# Patient Record
Sex: FEMALE | Race: BLACK OR AFRICAN AMERICAN | Employment: OTHER | URBAN - METROPOLITAN AREA
[De-identification: names, ages, dates, MRNs, and addresses within clinical notes are randomized per-mention and may not be internally consistent; named-entity substitution may affect disease eponyms.]

---

## 2017-01-10 ENCOUNTER — TELEPHONE (OUTPATIENT)
Dept: PRIMARY CARE CLINIC | Age: 62
End: 2017-01-10

## 2017-04-17 ENCOUNTER — OFFICE VISIT (OUTPATIENT)
Dept: PRIMARY CARE CLINIC | Age: 62
End: 2017-04-17

## 2017-04-17 VITALS
DIASTOLIC BLOOD PRESSURE: 83 MMHG | RESPIRATION RATE: 18 BRPM | BODY MASS INDEX: 34.38 KG/M2 | HEART RATE: 61 BPM | OXYGEN SATURATION: 97 % | SYSTOLIC BLOOD PRESSURE: 122 MMHG | WEIGHT: 213 LBS | TEMPERATURE: 98.8 F

## 2017-04-17 DIAGNOSIS — M79.10 MUSCLE PAIN: Primary | ICD-10-CM

## 2017-04-17 DIAGNOSIS — E53.8 VITAMIN B12 DEFICIENCY: ICD-10-CM

## 2017-04-17 DIAGNOSIS — I10 ESSENTIAL HYPERTENSION: ICD-10-CM

## 2017-04-17 DIAGNOSIS — E55.9 VITAMIN D DEFICIENCY: ICD-10-CM

## 2017-04-17 DIAGNOSIS — E78.2 MIXED HYPERLIPIDEMIA: ICD-10-CM

## 2017-04-17 DIAGNOSIS — I89.0 LYMPHEDEMA OF BOTH LOWER EXTREMITIES: ICD-10-CM

## 2017-04-17 DIAGNOSIS — M79.7 FIBROMYALGIA: ICD-10-CM

## 2017-04-17 PROCEDURE — G8419 CALC BMI OUT NRM PARAM NOF/U: HCPCS | Performed by: INTERNAL MEDICINE

## 2017-04-17 PROCEDURE — G8427 DOCREV CUR MEDS BY ELIG CLIN: HCPCS | Performed by: INTERNAL MEDICINE

## 2017-04-17 PROCEDURE — 99214 OFFICE O/P EST MOD 30 MIN: CPT | Performed by: INTERNAL MEDICINE

## 2017-04-17 PROCEDURE — 3014F SCREEN MAMMO DOC REV: CPT | Performed by: INTERNAL MEDICINE

## 2017-04-17 PROCEDURE — 3017F COLORECTAL CA SCREEN DOC REV: CPT | Performed by: INTERNAL MEDICINE

## 2017-04-17 PROCEDURE — 1036F TOBACCO NON-USER: CPT | Performed by: INTERNAL MEDICINE

## 2017-04-17 RX ORDER — SIMVASTATIN 10 MG
TABLET ORAL
Qty: 30 TABLET | Refills: 5 | Status: SHIPPED | OUTPATIENT
Start: 2017-04-17 | End: 2017-11-10 | Stop reason: SDUPTHER

## 2017-04-17 RX ORDER — SIMVASTATIN 10 MG
TABLET ORAL
Qty: 30 TABLET | Refills: 5 | Status: SHIPPED | OUTPATIENT
Start: 2017-04-17 | End: 2017-04-19

## 2017-04-19 ASSESSMENT — ENCOUNTER SYMPTOMS
RHINORRHEA: 0
BACK PAIN: 0
EYES NEGATIVE: 1
ABDOMINAL DISTENTION: 0
ANAL BLEEDING: 0
COUGH: 0
CONSTIPATION: 0
SORE THROAT: 0
DIARRHEA: 0
TROUBLE SWALLOWING: 0
SHORTNESS OF BREATH: 0
PHOTOPHOBIA: 0
RESPIRATORY NEGATIVE: 1
VOMITING: 0
BLOOD IN STOOL: 0

## 2017-04-19 ASSESSMENT — PATIENT HEALTH QUESTIONNAIRE - PHQ9
SUM OF ALL RESPONSES TO PHQ9 QUESTIONS 1 & 2: 0
1. LITTLE INTEREST OR PLEASURE IN DOING THINGS: 0
SUM OF ALL RESPONSES TO PHQ QUESTIONS 1-9: 0
2. FEELING DOWN, DEPRESSED OR HOPELESS: 0

## 2017-05-03 ENCOUNTER — OFFICE VISIT (OUTPATIENT)
Dept: PRIMARY CARE CLINIC | Age: 62
End: 2017-05-03

## 2017-05-03 VITALS
WEIGHT: 213 LBS | HEART RATE: 80 BPM | DIASTOLIC BLOOD PRESSURE: 73 MMHG | HEIGHT: 66 IN | BODY MASS INDEX: 34.23 KG/M2 | SYSTOLIC BLOOD PRESSURE: 107 MMHG | OXYGEN SATURATION: 97 %

## 2017-05-03 DIAGNOSIS — L03.113 CELLULITIS OF ARM, RIGHT: Primary | ICD-10-CM

## 2017-05-03 DIAGNOSIS — Z23 NEED FOR TDAP VACCINATION: ICD-10-CM

## 2017-05-03 PROCEDURE — G8427 DOCREV CUR MEDS BY ELIG CLIN: HCPCS | Performed by: INTERNAL MEDICINE

## 2017-05-03 PROCEDURE — G8417 CALC BMI ABV UP PARAM F/U: HCPCS | Performed by: INTERNAL MEDICINE

## 2017-05-03 PROCEDURE — 3014F SCREEN MAMMO DOC REV: CPT | Performed by: INTERNAL MEDICINE

## 2017-05-03 PROCEDURE — 1036F TOBACCO NON-USER: CPT | Performed by: INTERNAL MEDICINE

## 2017-05-03 PROCEDURE — 90715 TDAP VACCINE 7 YRS/> IM: CPT | Performed by: INTERNAL MEDICINE

## 2017-05-03 PROCEDURE — 3017F COLORECTAL CA SCREEN DOC REV: CPT | Performed by: INTERNAL MEDICINE

## 2017-05-03 PROCEDURE — 90471 IMMUNIZATION ADMIN: CPT | Performed by: INTERNAL MEDICINE

## 2017-05-03 PROCEDURE — 99212 OFFICE O/P EST SF 10 MIN: CPT | Performed by: INTERNAL MEDICINE

## 2017-05-03 RX ORDER — DOXYCYCLINE HYCLATE 100 MG
100 TABLET ORAL 2 TIMES DAILY
Qty: 14 TABLET | Refills: 0 | Status: SHIPPED | OUTPATIENT
Start: 2017-05-03 | End: 2017-05-10

## 2017-05-03 ASSESSMENT — ENCOUNTER SYMPTOMS: ROS SKIN COMMENTS: SEE HPI

## 2017-05-04 ENCOUNTER — TELEPHONE (OUTPATIENT)
Dept: PRIMARY CARE CLINIC | Age: 62
End: 2017-05-04

## 2017-06-12 DIAGNOSIS — E55.9 VITAMIN D DEFICIENCY: ICD-10-CM

## 2017-06-12 DIAGNOSIS — E78.2 MIXED HYPERLIPIDEMIA: ICD-10-CM

## 2017-06-12 DIAGNOSIS — E53.8 VITAMIN B12 DEFICIENCY: ICD-10-CM

## 2017-06-12 LAB
CHOLESTEROL, TOTAL: 217 MG/DL (ref 0–199)
HDLC SERPL-MCNC: 79 MG/DL (ref 40–60)
LDL CHOLESTEROL CALCULATED: 129 MG/DL
TRIGL SERPL-MCNC: 47 MG/DL (ref 0–150)
VITAMIN B-12: 322 PG/ML (ref 211–911)
VITAMIN D 25-HYDROXY: 44.5 NG/ML
VLDLC SERPL CALC-MCNC: 9 MG/DL

## 2017-07-03 RX ORDER — LINACLOTIDE 145 UG/1
CAPSULE, GELATIN COATED ORAL
Qty: 30 CAPSULE | Refills: 8 | Status: SHIPPED | OUTPATIENT
Start: 2017-07-03 | End: 2017-12-08 | Stop reason: SDUPTHER

## 2017-11-10 DIAGNOSIS — E78.2 MIXED HYPERLIPIDEMIA: ICD-10-CM

## 2017-11-11 RX ORDER — SIMVASTATIN 10 MG
TABLET ORAL
Qty: 30 TABLET | Refills: 5 | Status: SHIPPED | OUTPATIENT
Start: 2017-11-11 | End: 2018-05-25 | Stop reason: SDUPTHER

## 2017-12-08 ENCOUNTER — OFFICE VISIT (OUTPATIENT)
Dept: PRIMARY CARE CLINIC | Age: 62
End: 2017-12-08

## 2017-12-08 VITALS
RESPIRATION RATE: 18 BRPM | TEMPERATURE: 98.7 F | HEIGHT: 66 IN | DIASTOLIC BLOOD PRESSURE: 75 MMHG | OXYGEN SATURATION: 100 % | SYSTOLIC BLOOD PRESSURE: 116 MMHG | WEIGHT: 218 LBS | HEART RATE: 89 BPM | BODY MASS INDEX: 35.03 KG/M2

## 2017-12-08 DIAGNOSIS — K59.09 CHRONIC CONSTIPATION: ICD-10-CM

## 2017-12-08 DIAGNOSIS — Q82.0 HEREDITARY LYMPHEDEMA OF LEGS: ICD-10-CM

## 2017-12-08 DIAGNOSIS — Z12.11 COLON CANCER SCREENING: ICD-10-CM

## 2017-12-08 DIAGNOSIS — I10 ESSENTIAL HYPERTENSION: ICD-10-CM

## 2017-12-08 DIAGNOSIS — Z23 NEED FOR PROPHYLACTIC VACCINATION AND INOCULATION AGAINST VARICELLA: ICD-10-CM

## 2017-12-08 DIAGNOSIS — R00.0 RAPID PULSE: ICD-10-CM

## 2017-12-08 DIAGNOSIS — Z00.00 ANNUAL PHYSICAL EXAM: Primary | ICD-10-CM

## 2017-12-08 DIAGNOSIS — E55.9 VITAMIN D DEFICIENCY: ICD-10-CM

## 2017-12-08 PROCEDURE — 93000 ELECTROCARDIOGRAM COMPLETE: CPT | Performed by: INTERNAL MEDICINE

## 2017-12-08 PROCEDURE — 99396 PREV VISIT EST AGE 40-64: CPT | Performed by: INTERNAL MEDICINE

## 2017-12-08 ASSESSMENT — ENCOUNTER SYMPTOMS: ROS SKIN COMMENTS: SEE HPI

## 2017-12-08 ASSESSMENT — PATIENT HEALTH QUESTIONNAIRE - PHQ9
2. FEELING DOWN, DEPRESSED OR HOPELESS: 0
1. LITTLE INTEREST OR PLEASURE IN DOING THINGS: 0
SUM OF ALL RESPONSES TO PHQ9 QUESTIONS 1 & 2: 0
SUM OF ALL RESPONSES TO PHQ QUESTIONS 1-9: 0

## 2017-12-08 NOTE — PROGRESS NOTES
Subjective:      Patient ID: Betty Navas is a 65 y.o. female. HPI  Patient presents for annual exam:  Past Medical History:   Diagnosis Date    Allergic rhinitis     Chronic back pain     Fibroids     Fibromyalgia     Fibromyalgia     Hyperlipidemia 6/25/2012    Hypertension     STEPHANIE (obstructive sleep apnea)     Sleep apnea      Past Surgical History:   Procedure Laterality Date    ACHILLES TENDON SURGERY  2007    left achilles repair by Dr. Marlene Rodney cervical surgery-- sees Dr Lan Shetty [facility]  2006    Dr Bonita Lima and 8/2012    ENDOMETRIAL BIOPSY      Dr Moraima Pitts     Social History   Substance Use Topics    Smoking status: Former Smoker     Packs/day: 1.00     Years: 3.00     Quit date: 5/23/1982    Smokeless tobacco: Never Used    Alcohol use No     Current Outpatient Prescriptions   Medication Sig Dispense Refill    linaclotide (LINZESS) 145 MCG capsule TAKE ONE CAPSULE BY MOUTH DAILY 30 capsule 8    simvastatin (ZOCOR) 10 MG tablet TAKE ONE TABLET BY MOUTH EVERY NIGHT AT BEDTIME 30 tablet 5    Elastic Bandages & Supports (MEDICAL COMPRESSION STOCKINGS) MISC 2 each by Does not apply route daily Thigh high 30-40 mm Hg, 2 each 3     No current facility-administered medications for this visit.       Family History   Problem Relation Age of Onset    Cancer Mother      colon    Heart Disease Mother      heart infection    High Blood Pressure Father     Kidney Disease Paternal Uncle     Cancer Paternal Uncle 47     colon cancer    High Cholesterol Sister     Diabetes Maternal Aunt     Cancer Maternal Grandmother      breast cancer    Stroke Maternal Grandmother     Cancer Paternal Grandmother     Diabetes Paternal Grandmother      Allergies   Allergen Reactions    Lisinopril Anaphylaxis     sweating    Flagyl [Metronidazole Hcl] Other (See Comments)     \"metallic taste\" /75   Pulse 89   Temp 98.7 °F (37.1 °C) (Oral)   Resp 18   Ht 5' 6\" (1.676 m)   Wt 218 lb (98.9 kg)   SpO2 100%   BMI 35.19 kg/m²     Review of Systems   Constitutional: Negative for chills, fatigue and fever. Musculoskeletal: Negative for arthralgias. Skin: Positive for rash. See HPI   Hematological: Does not bruise/bleed easily. Psychiatric/Behavioral: Negative. Objective:   Physical Exam   Constitutional: She is oriented to person, place, and time. She appears well-developed and well-nourished. No distress. HENT:   Head: Normocephalic and atraumatic. Right Ear: External ear normal.   Left Ear: External ear normal.   Nose: Nose normal.   Mouth/Throat: Oropharynx is clear and moist. No oropharyngeal exudate. Eyes: Conjunctivae and EOM are normal. Pupils are equal, round, and reactive to light. No scleral icterus. Neck: Normal range of motion. Neck supple. No JVD present. No tracheal deviation present. No thyromegaly present. Cardiovascular: Normal rate, regular rhythm, normal heart sounds and intact distal pulses. No murmur heard. Pulmonary/Chest: Effort normal and breath sounds normal. She has no wheezes. She exhibits no tenderness. Normal breast exam and no adenopathy. Abdominal: Soft. Bowel sounds are normal. She exhibits no distension and no mass. There is no tenderness. There is no rebound and no guarding. Genitourinary: Rectal exam shows guaiac negative stool. Genitourinary Comments: Normal rectum hemoccult negative. Musculoskeletal: Normal range of motion. She exhibits no edema or tenderness. Right thigh is larger than left. .This is a chronic finding and MRI showed only fatty tissue and no mass. Lymphadenopathy:     She has no cervical adenopathy. Neurological: She is alert and oriented to person, place, and time. She has normal reflexes. No cranial nerve deficit. She exhibits normal muscle tone.  Coordination normal.   Skin: Skin is warm and dry. No rash noted. She is not diaphoretic. No erythema. No pallor. Lipomatous mass on the right leg with stable size. Psychiatric: She has a normal mood and affect. Her behavior is normal. Judgment and thought content normal.   Nursing note and vitals reviewed. BP Readings from Last 3 Encounters:   12/08/17 116/75   05/03/17 107/73   04/17/17 122/83       Assessment:      1. Annual physical exam  EKG 12 Lead    TSH WITH REFLEX TO FT4    Hemoglobin A1C    Comprehensive Metabolic Panel    Lipid Panel    CBC Auto Differential    Vitamin D 25 Hydroxy    Urinalysis   2. Need for prophylactic vaccination and inoculation against varicella , letter given to take to [facility] to get vaccine. 3. Rapid pulse will get ekg. 4. Colon cancer screening  POCT Fecal Immunochemical Test (FIT)   5. Hereditary lymphedema of legs new script given for bilateral thigh high 30-40,      6. Chronic constipation  linaclotide (LINZESS) 145 MCG capsule     '      Plan:      Arnol Grandchild received counseling on the following healthy behaviors: medication adherence    Patient given educational materials on After visit summary with diagnosis and treatment plan. I have instructed Arnol Grandchild to complete a self tracking handout on Weights and instructed them to bring it with them to her next appointment. Discussed use, benefit, and side effects of prescribed medications. Barriers to medication compliance addressed. All patient questions answered. Pt voiced understanding. Patient is taking over the counter meds and discussed as to how they interact with prescription medications.

## 2017-12-20 DIAGNOSIS — Z00.00 ANNUAL PHYSICAL EXAM: ICD-10-CM

## 2017-12-20 LAB
A/G RATIO: 1.6 (ref 1.1–2.2)
ALBUMIN SERPL-MCNC: 4.4 G/DL (ref 3.4–5)
ALP BLD-CCNC: 79 U/L (ref 40–129)
ALT SERPL-CCNC: 11 U/L (ref 10–40)
ANION GAP SERPL CALCULATED.3IONS-SCNC: 13 MMOL/L (ref 3–16)
AST SERPL-CCNC: 15 U/L (ref 15–37)
BASOPHILS ABSOLUTE: 0 K/UL (ref 0–0.2)
BASOPHILS RELATIVE PERCENT: 0.6 %
BILIRUB SERPL-MCNC: 0.6 MG/DL (ref 0–1)
BILIRUBIN URINE: NEGATIVE
BLOOD, URINE: NEGATIVE
BUN BLDV-MCNC: 11 MG/DL (ref 7–20)
CALCIUM SERPL-MCNC: 9.6 MG/DL (ref 8.3–10.6)
CHLORIDE BLD-SCNC: 100 MMOL/L (ref 99–110)
CHOLESTEROL, TOTAL: 198 MG/DL (ref 0–199)
CLARITY: CLEAR
CO2: 27 MMOL/L (ref 21–32)
COLOR: YELLOW
CREAT SERPL-MCNC: 0.9 MG/DL (ref 0.6–1.2)
EOSINOPHILS ABSOLUTE: 0.1 K/UL (ref 0–0.6)
EOSINOPHILS RELATIVE PERCENT: 2.3 %
GFR AFRICAN AMERICAN: >60
GFR NON-AFRICAN AMERICAN: >60
GLOBULIN: 2.8 G/DL
GLUCOSE BLD-MCNC: 82 MG/DL (ref 70–99)
GLUCOSE URINE: NEGATIVE MG/DL
HCT VFR BLD CALC: 40.9 % (ref 36–48)
HDLC SERPL-MCNC: 79 MG/DL (ref 40–60)
HEMOGLOBIN: 13.7 G/DL (ref 12–16)
KETONES, URINE: NEGATIVE MG/DL
LDL CHOLESTEROL CALCULATED: 106 MG/DL
LEUKOCYTE ESTERASE, URINE: NEGATIVE
LYMPHOCYTES ABSOLUTE: 2.8 K/UL (ref 1–5.1)
LYMPHOCYTES RELATIVE PERCENT: 48.7 %
MCH RBC QN AUTO: 30.1 PG (ref 26–34)
MCHC RBC AUTO-ENTMCNC: 33.5 G/DL (ref 31–36)
MCV RBC AUTO: 89.7 FL (ref 80–100)
MICROSCOPIC EXAMINATION: NORMAL
MONOCYTES ABSOLUTE: 0.7 K/UL (ref 0–1.3)
MONOCYTES RELATIVE PERCENT: 11.9 %
NEUTROPHILS ABSOLUTE: 2.1 K/UL (ref 1.7–7.7)
NEUTROPHILS RELATIVE PERCENT: 36.5 %
NITRITE, URINE: NEGATIVE
PDW BLD-RTO: 14.9 % (ref 12.4–15.4)
PH UA: 6.5
PLATELET # BLD: 223 K/UL (ref 135–450)
PMV BLD AUTO: 9.1 FL (ref 5–10.5)
POTASSIUM SERPL-SCNC: 4.1 MMOL/L (ref 3.5–5.1)
PROTEIN UA: NEGATIVE MG/DL
RBC # BLD: 4.56 M/UL (ref 4–5.2)
SODIUM BLD-SCNC: 140 MMOL/L (ref 136–145)
SPECIFIC GRAVITY UA: 1.01
TOTAL PROTEIN: 7.2 G/DL (ref 6.4–8.2)
TRIGL SERPL-MCNC: 66 MG/DL (ref 0–150)
TSH REFLEX FT4: 1.43 UIU/ML (ref 0.27–4.2)
URINE TYPE: NORMAL
UROBILINOGEN, URINE: 0.2 E.U./DL
VITAMIN D 25-HYDROXY: 38.3 NG/ML
VLDLC SERPL CALC-MCNC: 13 MG/DL
WBC # BLD: 5.7 K/UL (ref 4–11)

## 2017-12-21 LAB
ESTIMATED AVERAGE GLUCOSE: 108.3 MG/DL
HBA1C MFR BLD: 5.4 %

## 2017-12-28 DIAGNOSIS — C18.9 MALIGNANT NEOPLASM OF COLON, UNSPECIFIED PART OF COLON (HCC): Primary | ICD-10-CM

## 2017-12-28 LAB
CONTROL: NORMAL
HEMOCCULT STL QL: NEGATIVE

## 2017-12-28 PROCEDURE — 82274 ASSAY TEST FOR BLOOD FECAL: CPT | Performed by: INTERNAL MEDICINE

## 2018-03-29 ENCOUNTER — TELEPHONE (OUTPATIENT)
Dept: PRIMARY CARE CLINIC | Age: 63
End: 2018-03-29

## 2018-04-24 ENCOUNTER — TELEPHONE (OUTPATIENT)
Dept: PRIMARY CARE CLINIC | Age: 63
End: 2018-04-24

## 2018-05-02 ENCOUNTER — OFFICE VISIT (OUTPATIENT)
Dept: PRIMARY CARE CLINIC | Age: 63
End: 2018-05-02

## 2018-05-02 ENCOUNTER — HOSPITAL ENCOUNTER (OUTPATIENT)
Dept: OTHER | Age: 63
Discharge: HOME | End: 2018-05-02
Attending: INTERNAL MEDICINE | Admitting: INTERNAL MEDICINE

## 2018-05-02 VITALS
WEIGHT: 218 LBS | OXYGEN SATURATION: 98 % | HEIGHT: 66 IN | BODY MASS INDEX: 35.03 KG/M2 | DIASTOLIC BLOOD PRESSURE: 94 MMHG | HEART RATE: 68 BPM | SYSTOLIC BLOOD PRESSURE: 149 MMHG

## 2018-05-02 DIAGNOSIS — R31.29 MICROSCOPIC HEMATURIA: Primary | ICD-10-CM

## 2018-05-02 DIAGNOSIS — M54.31 SCIATICA OF RIGHT SIDE: ICD-10-CM

## 2018-05-02 DIAGNOSIS — M25.551 RIGHT HIP PAIN: ICD-10-CM

## 2018-05-02 PROCEDURE — G8427 DOCREV CUR MEDS BY ELIG CLIN: HCPCS | Performed by: INTERNAL MEDICINE

## 2018-05-02 PROCEDURE — 3017F COLORECTAL CA SCREEN DOC REV: CPT | Performed by: INTERNAL MEDICINE

## 2018-05-02 PROCEDURE — 99213 OFFICE O/P EST LOW 20 MIN: CPT | Performed by: INTERNAL MEDICINE

## 2018-05-02 PROCEDURE — G8417 CALC BMI ABV UP PARAM F/U: HCPCS | Performed by: INTERNAL MEDICINE

## 2018-05-02 PROCEDURE — 1036F TOBACCO NON-USER: CPT | Performed by: INTERNAL MEDICINE

## 2018-05-02 ASSESSMENT — ENCOUNTER SYMPTOMS
ANAL BLEEDING: 0
ROS SKIN COMMENTS: SEE HPI
BLOOD IN STOOL: 0
VOMITING: 0
SORE THROAT: 0
TROUBLE SWALLOWING: 0
ABDOMINAL DISTENTION: 0
EYES NEGATIVE: 1
RHINORRHEA: 0
COUGH: 0
CONSTIPATION: 0
RESPIRATORY NEGATIVE: 1
BACK PAIN: 0
DIARRHEA: 0
SHORTNESS OF BREATH: 0
PHOTOPHOBIA: 0

## 2018-05-02 ASSESSMENT — PATIENT HEALTH QUESTIONNAIRE - PHQ9
SUM OF ALL RESPONSES TO PHQ QUESTIONS 1-9: 0
SUM OF ALL RESPONSES TO PHQ9 QUESTIONS 1 & 2: 0
2. FEELING DOWN, DEPRESSED OR HOPELESS: 0
1. LITTLE INTEREST OR PLEASURE IN DOING THINGS: 0

## 2018-05-04 ENCOUNTER — HOSPITAL ENCOUNTER (OUTPATIENT)
Dept: ULTRASOUND IMAGING | Age: 63
Discharge: HOME | End: 2018-05-04
Attending: INTERNAL MEDICINE | Admitting: INTERNAL MEDICINE

## 2018-05-04 DIAGNOSIS — R31.29 MICROSCOPIC HEMATURIA: ICD-10-CM

## 2018-05-06 LAB
AMORPHOUS: NORMAL /HPF
BACTERIA: NORMAL /HPF
BILIRUBIN URINE: NEGATIVE
BLOOD, URINE: NEGATIVE
CALCIUM OXALATE CRYSTALS: NORMAL /HPF
CASTS: NORMAL /LPF
CLARITY: CLEAR
COLOR: YELLOW
COMMENT: NORMAL
CRYSTALS: NORMAL /HPF
CULTURE: NORMAL
EPITHELIAL CELLS, UA: NORMAL /HPF
GLUCOSE URINE: NEGATIVE
GRANULAR CASTS: NORMAL /LPF
HYALINE CASTS: NORMAL /LPF
KETONES, URINE: NEGATIVE
LEUKOCYTE ESTERASE, URINE: NEGATIVE
LEUKOCYTES, UA: NORMAL /HPF
NITRITE, URINE: NEGATIVE
PH UA: 6 (ref 5–8)
PROTEIN UA: NEGATIVE
RBC UA: NORMAL /HPF
REDUCING SUBSTANCES, URINE: NORMAL %
RENAL EPITHELIAL, POC: NORMAL /HPF
SPECIFIC GRAVITY UA: 1 (ref 1–1.03)
TRANSITIONAL EPITHELIAL: NORMAL /HPF
TRIPLE PHOSPHATE CRYSTALS: NORMAL /HPF
URATE CRYSTALS, URINE: NORMAL /HPF
YEAST: NORMAL /HPF

## 2018-05-25 DIAGNOSIS — E78.2 MIXED HYPERLIPIDEMIA: ICD-10-CM

## 2018-05-25 RX ORDER — SIMVASTATIN 10 MG
TABLET ORAL
Qty: 30 TABLET | Refills: 4 | Status: SHIPPED | OUTPATIENT
Start: 2018-05-25 | End: 2018-11-01 | Stop reason: SDUPTHER

## 2018-07-02 ENCOUNTER — TELEPHONE (OUTPATIENT)
Dept: PRIMARY CARE CLINIC | Age: 63
End: 2018-07-02

## 2018-07-02 DIAGNOSIS — K58.1 IRRITABLE BOWEL SYNDROME WITH CONSTIPATION: Primary | ICD-10-CM

## 2018-07-02 RX ORDER — LUBIPROSTONE 8 UG/1
8 CAPSULE, GELATIN COATED ORAL 2 TIMES DAILY WITH MEALS
Qty: 60 CAPSULE | Refills: 3 | Status: SHIPPED | OUTPATIENT
Start: 2018-07-02 | End: 2018-08-01

## 2018-07-02 NOTE — TELEPHONE ENCOUNTER
Patient requesting return call from physician to discuss [phone] [employer] co pay now over 200.00 can not afford please advise what else can be used (discount cards) [phone]

## 2018-07-23 ENCOUNTER — TELEPHONE (OUTPATIENT)
Dept: PRIMARY CARE CLINIC | Age: 63
End: 2018-07-23

## 2018-08-01 ENCOUNTER — OFFICE VISIT (OUTPATIENT)
Dept: PRIMARY CARE CLINIC | Age: 63
End: 2018-08-01

## 2018-08-01 VITALS
BODY MASS INDEX: 32.56 KG/M2 | HEART RATE: 78 BPM | WEIGHT: 202.6 LBS | OXYGEN SATURATION: 98 % | RESPIRATION RATE: 18 BRPM | DIASTOLIC BLOOD PRESSURE: 80 MMHG | HEIGHT: 66 IN | SYSTOLIC BLOOD PRESSURE: 111 MMHG

## 2018-08-01 DIAGNOSIS — G47.33 OSA (OBSTRUCTIVE SLEEP APNEA): ICD-10-CM

## 2018-08-01 DIAGNOSIS — I89.0 LYMPHEDEMA OF BOTH LOWER EXTREMITIES: ICD-10-CM

## 2018-08-01 DIAGNOSIS — E78.2 MIXED HYPERLIPIDEMIA: Primary | ICD-10-CM

## 2018-08-01 DIAGNOSIS — Z13.1 SCREENING FOR DIABETES MELLITUS: ICD-10-CM

## 2018-08-01 DIAGNOSIS — E55.9 VITAMIN D DEFICIENCY: ICD-10-CM

## 2018-08-01 DIAGNOSIS — I10 ESSENTIAL HYPERTENSION: ICD-10-CM

## 2018-08-01 PROCEDURE — G8417 CALC BMI ABV UP PARAM F/U: HCPCS | Performed by: INTERNAL MEDICINE

## 2018-08-01 PROCEDURE — 3017F COLORECTAL CA SCREEN DOC REV: CPT | Performed by: INTERNAL MEDICINE

## 2018-08-01 PROCEDURE — 1036F TOBACCO NON-USER: CPT | Performed by: INTERNAL MEDICINE

## 2018-08-01 PROCEDURE — 99214 OFFICE O/P EST MOD 30 MIN: CPT | Performed by: INTERNAL MEDICINE

## 2018-08-01 PROCEDURE — G8427 DOCREV CUR MEDS BY ELIG CLIN: HCPCS | Performed by: INTERNAL MEDICINE

## 2018-08-05 ASSESSMENT — ENCOUNTER SYMPTOMS
ROS SKIN COMMENTS: SEE HPI
RHINORRHEA: 0
PHOTOPHOBIA: 0
BLOOD IN STOOL: 0
EYES NEGATIVE: 1
CONSTIPATION: 0
COUGH: 0
DIARRHEA: 0
RESPIRATORY NEGATIVE: 1
VOMITING: 0
SORE THROAT: 0
ANAL BLEEDING: 0
TROUBLE SWALLOWING: 0
BACK PAIN: 0
SHORTNESS OF BREATH: 0
ABDOMINAL DISTENTION: 0

## 2018-08-06 NOTE — PROGRESS NOTES
Subjective:      Patient ID: Ash Boss is a 65 y.o. female. HPI  Patient presents for evaluation of the following:   Diagnosis    1. Mixed hyperlipidemia Control with weight loss diet and simvastatin 10 mg daily. patient has been following a low fat ketogenic diet and has lost 15 pounds over the past 3 months. No chest pain dyspnea on exertion TIA or claudication-like symptoms. No myalgias. Hyperlipidemia present for years. Wt Readings from Last 3 Encounters:   08/01/18 202 lb 9.6 oz (91.9 kg)   05/02/18 218 lb (98.9 kg)   12/08/17 218 lb (98.9 kg)     Lab Results   Component Value Date    CHOL 198 12/20/2017    CHOL 217 (H) 06/12/2017    CHOL 172 05/13/2016     Lab Results   Component Value Date    TRIG 66 12/20/2017    TRIG 47 06/12/2017    TRIG 35 05/13/2016     Lab Results   Component Value Date    HDL 79 (H) 12/20/2017    HDL 79 (H) 06/12/2017    HDL 72 (H) 05/13/2016     Lab Results   Component Value Date    LDLCALC 106 (H) 12/20/2017    LDLCALC 129 (H) 06/12/2017    LDLCALC 93 05/13/2016     Lab Results   Component Value Date    LABVLDL 13 12/20/2017    LABVLDL 9 06/12/2017    LABVLDL 7 05/13/2016     No results found for: CHOLHDLRATIO  The 10-year ASCVD risk score (Mitch Kong et al., 2013) is: 3.2%    Values used to calculate the score:      Age: 65 years      Sex: Female      Is Non- : Yes      Diabetic: No      Tobacco smoker: No      Systolic Blood Pressure: 878 mmHg      Is BP treated: No      HDL Cholesterol: 79 mg/dL      Total Cholesterol: 198 mg/dL      2. Essential hypertension controlled with diet and encourage exercise. Losing 17 pounds over the past month pressure is controlled again. Patient is following a low-salt diet as well as low-fat diet. Encouraged exercise. No headaches or dizziness. No leg edema. BP Readings from Last 3 Encounters:   08/01/18 111/80   05/02/18 (!) 149/94   12/08/17 116/75           3.  Vitamin D deficiency on supplement will monitor level with goal 50-80 no complaint of leg cramps or bone pain. 4. Screening for diabetes mellitus . Continue to work on diet and exercise and order A1c.    5. Lymphedema of both lower extremities improved with weight loss. Encourage exercise support stockings 30 mmHg. When the knees. No leg pain. 6. STEPHANIE (obstructive sleep apnea) needs refill on mask and tubing. Patient instructed to see her sleep specialist.     CPAP Machine MISC     Current Outpatient Prescriptions on File Prior to Visit   Medication Sig Dispense Refill    simvastatin (ZOCOR) 10 MG tablet TAKE ONE TABLET BY MOUTH DAILY AT BEDTIME 30 tablet 4    linaclotide (LINZESS) 145 MCG capsule TAKE ONE CAPSULE BY MOUTH DAILY 30 capsule 8    Elastic Bandages & Supports (MEDICAL COMPRESSION STOCKINGS) MISC 2 each by Does not apply route daily Thigh high 30-40 mm Hg, 2 each 3    diclofenac (VOLTAREN) 50 MG EC tablet Take 1 tablet by mouth 2 times daily 60 tablet 0     No current facility-administered medications on file prior to visit. Patient Active Problem List   Diagnosis    Fibromyalgia    Cervical spondylosis with radiculopathy    Vitamin D deficiency    Vitamin B12 deficiency    Constipation    Varicose vein of leg    Brittle nails    Mass of thigh    Obesity    STEPHANIE (obstructive sleep apnea)    H. pylori infection    Obesity (BMI 30.0-34. 9)    Essential hypertension    Hx of colonoscopy    Mixed hyperlipidemia     Allergies   Allergen Reactions    Lisinopril Anaphylaxis     sweating    Flagyl [Metronidazole Hcl] Other (See Comments)     \"metallic taste\"       Review of Systems   Constitutional: Negative for activity change, appetite change, chills, diaphoresis, fatigue, fever and unexpected weight change. Vitamin B12 has resolved the fatigue. HENT: Negative for congestion, rhinorrhea, sore throat and trouble swallowing. Eyes: Negative. Negative for photophobia. Respiratory: Negative.   Negative addressed. All patient questions answered. Pt voiced understanding.

## 2018-08-09 ENCOUNTER — TELEPHONE (OUTPATIENT)
Dept: PRIMARY CARE CLINIC | Age: 63
End: 2018-08-09

## 2018-08-09 DIAGNOSIS — K59.09 CHRONIC CONSTIPATION: ICD-10-CM

## 2018-08-09 NOTE — TELEPHONE ENCOUNTER
Patient calling about application for medication to receive assistance with pharmacy would like a call back

## 2018-08-27 ENCOUNTER — TELEPHONE (OUTPATIENT)
Dept: PRIMARY CARE CLINIC | Age: 63
End: 2018-08-27

## 2018-08-27 NOTE — TELEPHONE ENCOUNTER
Patient called stating [facility] has not received the complete fax that was supposed to be sent. Patient states that the prescription has not been received.  Please advise

## 2018-08-31 DIAGNOSIS — K59.09 CHRONIC CONSTIPATION: ICD-10-CM

## 2018-09-06 ENCOUNTER — TELEPHONE (OUTPATIENT)
Dept: PRIMARY CARE CLINIC | Age: 63
End: 2018-09-06

## 2018-09-10 ENCOUNTER — TELEPHONE (OUTPATIENT)
Dept: PRIMARY CARE CLINIC | Age: 63
End: 2018-09-10

## 2018-09-10 NOTE — TELEPHONE ENCOUNTER
[facility] is refaxing a form that we sent to them that cannot read. It is shrunken and dark. I will put on Diann's desk when it arrives.

## 2018-09-12 DIAGNOSIS — E78.2 MIXED HYPERLIPIDEMIA: ICD-10-CM

## 2018-09-12 DIAGNOSIS — E55.9 VITAMIN D DEFICIENCY: ICD-10-CM

## 2018-09-12 DIAGNOSIS — Z13.1 SCREENING FOR DIABETES MELLITUS: ICD-10-CM

## 2018-09-12 DIAGNOSIS — R31.29 MICROSCOPIC HEMATURIA: ICD-10-CM

## 2018-09-12 DIAGNOSIS — K59.09 CHRONIC CONSTIPATION: ICD-10-CM

## 2018-09-12 DIAGNOSIS — I10 ESSENTIAL HYPERTENSION: ICD-10-CM

## 2018-09-12 LAB
A/G RATIO: 1.7 (ref 1.1–2.2)
ALBUMIN SERPL-MCNC: 4.3 G/DL (ref 3.4–5)
ALP BLD-CCNC: 79 U/L (ref 40–129)
ALT SERPL-CCNC: 10 U/L (ref 10–40)
ANION GAP SERPL CALCULATED.3IONS-SCNC: 14 MMOL/L (ref 3–16)
AST SERPL-CCNC: 13 U/L (ref 15–37)
BILIRUB SERPL-MCNC: 0.5 MG/DL (ref 0–1)
BILIRUBIN URINE: NEGATIVE
BLOOD, URINE: ABNORMAL
BUN BLDV-MCNC: 11 MG/DL (ref 7–20)
CALCIUM SERPL-MCNC: 9.5 MG/DL (ref 8.3–10.6)
CHLORIDE BLD-SCNC: 103 MMOL/L (ref 99–110)
CLARITY: CLEAR
CO2: 25 MMOL/L (ref 21–32)
COLOR: YELLOW
CREAT SERPL-MCNC: 0.8 MG/DL (ref 0.6–1.2)
EPITHELIAL CELLS, UA: 0 /HPF (ref 0–5)
GFR AFRICAN AMERICAN: >60
GFR NON-AFRICAN AMERICAN: >60
GLOBULIN: 2.6 G/DL
GLUCOSE BLD-MCNC: 83 MG/DL (ref 70–99)
GLUCOSE URINE: NEGATIVE MG/DL
HYALINE CASTS: 1 /LPF (ref 0–8)
KETONES, URINE: NEGATIVE MG/DL
LEUKOCYTE ESTERASE, URINE: NEGATIVE
MICROSCOPIC EXAMINATION: YES
NITRITE, URINE: NEGATIVE
PH UA: 6.5
POTASSIUM SERPL-SCNC: 4.1 MMOL/L (ref 3.5–5.1)
PROTEIN UA: NEGATIVE MG/DL
RBC UA: 0 /HPF (ref 0–4)
SODIUM BLD-SCNC: 142 MMOL/L (ref 136–145)
SPECIFIC GRAVITY UA: 1.01
TOTAL PROTEIN: 6.9 G/DL (ref 6.4–8.2)
URINE TYPE: ABNORMAL
UROBILINOGEN, URINE: 0.2 E.U./DL
VITAMIN D 25-HYDROXY: 27 NG/ML
WBC UA: 0 /HPF (ref 0–5)

## 2018-09-13 LAB
ESTIMATED AVERAGE GLUCOSE: 102.5 MG/DL
HBA1C MFR BLD: 5.2 %

## 2018-09-16 LAB — URINE CULTURE, ROUTINE: NORMAL

## 2018-11-01 DIAGNOSIS — E78.2 MIXED HYPERLIPIDEMIA: ICD-10-CM

## 2018-11-02 RX ORDER — SIMVASTATIN 10 MG
TABLET ORAL
Qty: 30 TABLET | Refills: 3 | Status: SHIPPED | OUTPATIENT
Start: 2018-11-02 | End: 2018-11-16 | Stop reason: SDUPTHER

## 2018-11-09 ENCOUNTER — OFFICE VISIT (OUTPATIENT)
Dept: PRIMARY CARE CLINIC | Age: 63
End: 2018-11-09
Payer: COMMERCIAL

## 2018-11-09 VITALS
WEIGHT: 198 LBS | SYSTOLIC BLOOD PRESSURE: 130 MMHG | HEART RATE: 67 BPM | OXYGEN SATURATION: 99 % | HEIGHT: 66 IN | BODY MASS INDEX: 31.82 KG/M2 | DIASTOLIC BLOOD PRESSURE: 80 MMHG | TEMPERATURE: 98.7 F

## 2018-11-09 DIAGNOSIS — K21.9 GASTROESOPHAGEAL REFLUX DISEASE WITHOUT ESOPHAGITIS: Primary | ICD-10-CM

## 2018-11-09 DIAGNOSIS — Z71.85 VACCINE COUNSELING: ICD-10-CM

## 2018-11-09 PROCEDURE — G8427 DOCREV CUR MEDS BY ELIG CLIN: HCPCS | Performed by: INTERNAL MEDICINE

## 2018-11-09 PROCEDURE — G8484 FLU IMMUNIZE NO ADMIN: HCPCS | Performed by: INTERNAL MEDICINE

## 2018-11-09 PROCEDURE — 99213 OFFICE O/P EST LOW 20 MIN: CPT | Performed by: INTERNAL MEDICINE

## 2018-11-09 PROCEDURE — 3017F COLORECTAL CA SCREEN DOC REV: CPT | Performed by: INTERNAL MEDICINE

## 2018-11-09 PROCEDURE — G8417 CALC BMI ABV UP PARAM F/U: HCPCS | Performed by: INTERNAL MEDICINE

## 2018-11-09 PROCEDURE — 1036F TOBACCO NON-USER: CPT | Performed by: INTERNAL MEDICINE

## 2018-11-09 RX ORDER — OMEPRAZOLE 40 MG/1
40 CAPSULE, DELAYED RELEASE ORAL DAILY
Qty: 30 CAPSULE | Refills: 1 | Status: SHIPPED | OUTPATIENT
Start: 2018-11-09 | End: 2018-11-16

## 2018-11-09 ASSESSMENT — ENCOUNTER SYMPTOMS
WHEEZING: 0
GASTROINTESTINAL NEGATIVE: 1
SHORTNESS OF BREATH: 0
EYES NEGATIVE: 1
DIARRHEA: 0
COUGH: 0
NAUSEA: 0
CONSTIPATION: 0
ABDOMINAL DISTENTION: 0
ABDOMINAL PAIN: 0
CHEST TIGHTNESS: 0

## 2018-11-12 ENCOUNTER — TELEPHONE (OUTPATIENT)
Dept: PRIMARY CARE CLINIC | Age: 63
End: 2018-11-12

## 2018-11-13 ENCOUNTER — HOSPITAL ENCOUNTER (OUTPATIENT)
Dept: WOMENS IMAGING | Age: 63
Discharge: HOME | End: 2018-11-13
Payer: COMMERCIAL

## 2018-11-13 DIAGNOSIS — Z12.39 BREAST CANCER SCREENING: ICD-10-CM

## 2018-11-13 DIAGNOSIS — I89.0 LYMPHEDEMA OF BOTH LOWER EXTREMITIES: ICD-10-CM

## 2018-11-13 PROCEDURE — 77063 BREAST TOMOSYNTHESIS BI: CPT

## 2018-11-14 NOTE — TELEPHONE ENCOUNTER
Patient calling stating that recent orders sent to [facility] were supposed to specify knee high compressions socks. Patient already has thigh high.  Please advise

## 2018-11-16 ENCOUNTER — OFFICE VISIT (OUTPATIENT)
Dept: PRIMARY CARE CLINIC | Age: 63
End: 2018-11-16
Payer: COMMERCIAL

## 2018-11-16 VITALS
HEART RATE: 72 BPM | RESPIRATION RATE: 18 BRPM | SYSTOLIC BLOOD PRESSURE: 140 MMHG | TEMPERATURE: 97.4 F | DIASTOLIC BLOOD PRESSURE: 82 MMHG | OXYGEN SATURATION: 98 %

## 2018-11-16 DIAGNOSIS — I89.0 LYMPHEDEMA OF BOTH LOWER EXTREMITIES: Primary | ICD-10-CM

## 2018-11-16 DIAGNOSIS — K21.9 GASTROESOPHAGEAL REFLUX DISEASE WITHOUT ESOPHAGITIS: ICD-10-CM

## 2018-11-16 DIAGNOSIS — Z12.11 COLON CANCER SCREENING: ICD-10-CM

## 2018-11-16 DIAGNOSIS — E78.2 MIXED HYPERLIPIDEMIA: ICD-10-CM

## 2018-11-16 PROCEDURE — G8417 CALC BMI ABV UP PARAM F/U: HCPCS | Performed by: INTERNAL MEDICINE

## 2018-11-16 PROCEDURE — 3017F COLORECTAL CA SCREEN DOC REV: CPT | Performed by: INTERNAL MEDICINE

## 2018-11-16 PROCEDURE — 99214 OFFICE O/P EST MOD 30 MIN: CPT | Performed by: INTERNAL MEDICINE

## 2018-11-16 PROCEDURE — G8484 FLU IMMUNIZE NO ADMIN: HCPCS | Performed by: INTERNAL MEDICINE

## 2018-11-16 PROCEDURE — 1036F TOBACCO NON-USER: CPT | Performed by: INTERNAL MEDICINE

## 2018-11-16 PROCEDURE — G8427 DOCREV CUR MEDS BY ELIG CLIN: HCPCS | Performed by: INTERNAL MEDICINE

## 2018-11-16 RX ORDER — SIMVASTATIN 10 MG
TABLET ORAL
Qty: 30 TABLET | Refills: 11 | Status: SHIPPED | OUTPATIENT
Start: 2018-11-16 | End: 2019-12-12 | Stop reason: SDUPTHER

## 2018-11-16 ASSESSMENT — ENCOUNTER SYMPTOMS
DIARRHEA: 0
SINUS PRESSURE: 0
COUGH: 0
WHEEZING: 0
EYES NEGATIVE: 1
CHEST TIGHTNESS: 0
SHORTNESS OF BREATH: 0
ABDOMINAL DISTENTION: 0
ABDOMINAL PAIN: 0
NAUSEA: 0
GASTROINTESTINAL NEGATIVE: 1
CONSTIPATION: 0

## 2018-11-16 NOTE — PROGRESS NOTES
She has no cervical adenopathy. Neurological: She is alert and oriented to person, place, and time. She has normal strength. Skin: Skin is warm. Psychiatric: Her behavior is normal. Judgment normal.   Vitals reviewed. ASSESSMENT/PLAN:   Diagnosis Orders   1. Lymphedema of both lower extremities CONTROLLED WITH STOCKING AND LOW SALT, CONTINUE. Compression Stocking    2. Gastroesophageal reflux disease without esophagitis RESOLVED, DID NOT HAVE TO TAKE PRILOSEC, BUT HAS AT HOME , JUST IN CASE. MOUTH PAIN RESOLVED WITH BITE GUARD FROM DENTIST. 3. Mixed hyperlipidemia ON ZOCOR AND ASA, WILL GET FASTING LIPID, LDLGOAL LESS THAN 100 simvastatin (ZOCOR) 10 MG tablet    Lipid Panel   4. Colon cancer screening WITH NO SYMPTOMS OF BLEEDING. POCT Fecal Immunochemical Test (FIT)     Christine Scanlon received counseling on the following healthy behaviors: medication adherence    Patient given educational materials on Nutrition    I have instructed Christine Scanlon to complete a self tracking handout on Weights and instructed them to bring it with them to her next appointment. Discussed use, benefit, and side effects of prescribed medications. Barriers to medication compliance addressed. All patient questions answered. Pt voiced understanding. Patient is taking over the counter meds and discussed as to how they interact with prescription medications. An electronic signature was used to authenticate this note.     --Ricardo Choi MD on 11/16/2018 at 11:20 AM

## 2018-12-04 ENCOUNTER — OFFICE VISIT (OUTPATIENT)
Dept: GYNECOLOGY | Age: 63
End: 2018-12-04
Payer: COMMERCIAL

## 2018-12-04 VITALS
BODY MASS INDEX: 31.85 KG/M2 | SYSTOLIC BLOOD PRESSURE: 130 MMHG | RESPIRATION RATE: 17 BRPM | DIASTOLIC BLOOD PRESSURE: 82 MMHG | HEIGHT: 66 IN | WEIGHT: 198.2 LBS | HEART RATE: 83 BPM | TEMPERATURE: 97.2 F

## 2018-12-04 DIAGNOSIS — Z01.419 WELL WOMAN EXAM WITH ROUTINE GYNECOLOGICAL EXAM: Primary | ICD-10-CM

## 2018-12-04 DIAGNOSIS — Z78.0 MENOPAUSE: ICD-10-CM

## 2018-12-04 PROCEDURE — G8484 FLU IMMUNIZE NO ADMIN: HCPCS | Performed by: OBSTETRICS & GYNECOLOGY

## 2018-12-04 PROCEDURE — 99396 PREV VISIT EST AGE 40-64: CPT | Performed by: OBSTETRICS & GYNECOLOGY

## 2018-12-04 RX ORDER — IBUPROFEN 200 MG
200 TABLET ORAL EVERY 6 HOURS PRN
COMMUNITY
End: 2020-08-17

## 2018-12-04 ASSESSMENT — ENCOUNTER SYMPTOMS
GASTROINTESTINAL NEGATIVE: 1
EYES NEGATIVE: 1
RESPIRATORY NEGATIVE: 1

## 2018-12-05 DIAGNOSIS — E78.2 MIXED HYPERLIPIDEMIA: ICD-10-CM

## 2018-12-05 LAB
CHOLESTEROL, TOTAL: 200 MG/DL (ref 0–199)
HDLC SERPL-MCNC: 80 MG/DL (ref 40–60)
LDL CHOLESTEROL CALCULATED: 111 MG/DL
TRIGL SERPL-MCNC: 45 MG/DL (ref 0–150)
VLDLC SERPL CALC-MCNC: 9 MG/DL

## 2018-12-06 LAB
HPV COMMENT: NORMAL
HPV TYPE 16: NOT DETECTED
HPV TYPE 18: NOT DETECTED
HPVOH (OTHER TYPES): NOT DETECTED

## 2019-05-08 ENCOUNTER — OFFICE VISIT (OUTPATIENT)
Dept: PRIMARY CARE CLINIC | Age: 64
End: 2019-05-08
Payer: COMMERCIAL

## 2019-05-08 VITALS
OXYGEN SATURATION: 97 % | DIASTOLIC BLOOD PRESSURE: 95 MMHG | HEIGHT: 66 IN | TEMPERATURE: 97.2 F | SYSTOLIC BLOOD PRESSURE: 153 MMHG | BODY MASS INDEX: 31.98 KG/M2 | WEIGHT: 199 LBS | HEART RATE: 63 BPM

## 2019-05-08 DIAGNOSIS — M54.42 CHRONIC LEFT-SIDED LOW BACK PAIN WITH LEFT-SIDED SCIATICA: ICD-10-CM

## 2019-05-08 DIAGNOSIS — R10.13 EPIGASTRIC PAIN: ICD-10-CM

## 2019-05-08 DIAGNOSIS — R14.1 ABDOMINAL GAS PAIN: ICD-10-CM

## 2019-05-08 DIAGNOSIS — K59.09 OTHER CONSTIPATION: Primary | ICD-10-CM

## 2019-05-08 DIAGNOSIS — A04.8 H. PYLORI INFECTION: ICD-10-CM

## 2019-05-08 DIAGNOSIS — G89.29 CHRONIC LEFT-SIDED LOW BACK PAIN WITH LEFT-SIDED SCIATICA: ICD-10-CM

## 2019-05-08 PROBLEM — M54.9 CHRONIC BACK PAIN: Status: ACTIVE | Noted: 2019-05-08

## 2019-05-08 LAB
A/G RATIO: 1.4 (ref 1.1–2.2)
ALBUMIN SERPL-MCNC: 4.2 G/DL (ref 3.4–5)
ALP BLD-CCNC: 77 U/L (ref 40–129)
ALT SERPL-CCNC: 8 U/L (ref 10–40)
AMYLASE: 77 U/L (ref 25–115)
ANION GAP SERPL CALCULATED.3IONS-SCNC: 11 MMOL/L (ref 3–16)
AST SERPL-CCNC: 12 U/L (ref 15–37)
BASOPHILS ABSOLUTE: 0 K/UL (ref 0–0.2)
BASOPHILS RELATIVE PERCENT: 0.8 %
BILIRUB SERPL-MCNC: 0.4 MG/DL (ref 0–1)
BUN BLDV-MCNC: 11 MG/DL (ref 7–20)
CALCIUM SERPL-MCNC: 9.3 MG/DL (ref 8.3–10.6)
CHLORIDE BLD-SCNC: 104 MMOL/L (ref 99–110)
CO2: 26 MMOL/L (ref 21–32)
CREAT SERPL-MCNC: 0.8 MG/DL (ref 0.6–1.2)
EOSINOPHILS ABSOLUTE: 0.1 K/UL (ref 0–0.6)
EOSINOPHILS RELATIVE PERCENT: 2.4 %
GFR AFRICAN AMERICAN: >60
GFR NON-AFRICAN AMERICAN: >60
GLOBULIN: 2.9 G/DL
GLUCOSE BLD-MCNC: 86 MG/DL (ref 70–99)
HCT VFR BLD CALC: 40.2 % (ref 36–48)
HEMOGLOBIN: 13.5 G/DL (ref 12–16)
LIPASE: 16 U/L (ref 13–60)
LYMPHOCYTES ABSOLUTE: 1.8 K/UL (ref 1–5.1)
LYMPHOCYTES RELATIVE PERCENT: 44.4 %
MCH RBC QN AUTO: 29.9 PG (ref 26–34)
MCHC RBC AUTO-ENTMCNC: 33.5 G/DL (ref 31–36)
MCV RBC AUTO: 89.2 FL (ref 80–100)
MONOCYTES ABSOLUTE: 0.4 K/UL (ref 0–1.3)
MONOCYTES RELATIVE PERCENT: 10.8 %
NEUTROPHILS ABSOLUTE: 1.7 K/UL (ref 1.7–7.7)
NEUTROPHILS RELATIVE PERCENT: 41.6 %
PDW BLD-RTO: 14.4 % (ref 12.4–15.4)
PLATELET # BLD: 217 K/UL (ref 135–450)
PMV BLD AUTO: 8.6 FL (ref 5–10.5)
POTASSIUM SERPL-SCNC: 3.9 MMOL/L (ref 3.5–5.1)
RBC # BLD: 4.51 M/UL (ref 4–5.2)
SODIUM BLD-SCNC: 141 MMOL/L (ref 136–145)
TOTAL PROTEIN: 7.1 G/DL (ref 6.4–8.2)
WBC # BLD: 4 K/UL (ref 4–11)

## 2019-05-08 PROCEDURE — 3017F COLORECTAL CA SCREEN DOC REV: CPT | Performed by: INTERNAL MEDICINE

## 2019-05-08 PROCEDURE — G8427 DOCREV CUR MEDS BY ELIG CLIN: HCPCS | Performed by: INTERNAL MEDICINE

## 2019-05-08 PROCEDURE — 99214 OFFICE O/P EST MOD 30 MIN: CPT | Performed by: INTERNAL MEDICINE

## 2019-05-08 PROCEDURE — G8417 CALC BMI ABV UP PARAM F/U: HCPCS | Performed by: INTERNAL MEDICINE

## 2019-05-08 PROCEDURE — 1036F TOBACCO NON-USER: CPT | Performed by: INTERNAL MEDICINE

## 2019-05-08 RX ORDER — SIMETHICONE 80 MG
80 TABLET,CHEWABLE ORAL 4 TIMES DAILY PRN
Qty: 180 TABLET | Refills: 3 | Status: SHIPPED | OUTPATIENT
Start: 2019-05-08 | End: 2019-07-21

## 2019-05-08 ASSESSMENT — ENCOUNTER SYMPTOMS
BACK PAIN: 1
HEMATOCHEZIA: 0
RESPIRATORY NEGATIVE: 1
DIARRHEA: 0
VOMITING: 0
BELCHING: 0
FLATUS: 1
CONSTIPATION: 1
EYES NEGATIVE: 1
NAUSEA: 0
EYE DISCHARGE: 0
ABDOMINAL PAIN: 1

## 2019-05-08 ASSESSMENT — PATIENT HEALTH QUESTIONNAIRE - PHQ9
SUM OF ALL RESPONSES TO PHQ QUESTIONS 1-9: 0
SUM OF ALL RESPONSES TO PHQ QUESTIONS 1-9: 0
1. LITTLE INTEREST OR PLEASURE IN DOING THINGS: 0
2. FEELING DOWN, DEPRESSED OR HOPELESS: 0
SUM OF ALL RESPONSES TO PHQ9 QUESTIONS 1 & 2: 0

## 2019-05-08 NOTE — PATIENT INSTRUCTIONS
Take the simethicone (Mylicon 80) up to 4 times a day for increased abdominal gas. Use the Prilosec 20 mg once a day. X-ray of the abdomen. Continue to take Linzess as previously directed. Stop taking ibuprofen use Tylenol for pain in the back and elsewhere. Lab review. Please complete the fit test to screen for colon cancer. Bring the results back into the office. Continue your other medications. Follow up with Dr. Nicole Smith in 6 weeks. The emergency room if your abdominal pain worsens.

## 2019-05-08 NOTE — PROGRESS NOTES
Vincent Fonseca  YOB: 1955    Date of Service:  5/8/2019    Chief Complaint   Patient presents with    Abdominal Pain     c/o stomach pain for 2 weeks. Abdominal pain and bloating. The patient feels better after she passes gas. She has a history of recent use of Linzess or constipation and gets stool most of the time when she takes the medication. Unclear if this is part of the problem or not that the patient is satisfied with her elimination on this drug. KUB to see if she remains constipated after elimination with Linzess. Lab review today. Start on simethicone for the increased abdominal gas and start on Prilosec as previously ordered or gastritis. Discontinue ibuprofen. Tylenol for back pain. See other orders. Abdominal Pain   This is a new problem. The current episode started 1 to 4 weeks ago. The onset quality is undetermined. The problem occurs intermittently. The problem has been gradually worsening. The pain is located in the generalized abdominal region. The pain is at a severity of 4/10. The pain is moderate. The quality of the pain is a sensation of fullness and cramping. The abdominal pain does not radiate. Associated symptoms include arthralgias, constipation and flatus. Pertinent negatives include no anorexia, belching, diarrhea, dysuria, fever, frequency, headaches, hematochezia, hematuria, melena, nausea, vomiting or weight loss. Nothing aggravates the pain. The pain is relieved by passing flatus. She has tried nothing for the symptoms. Prior workup: KUB. Her past medical history is significant for abdominal surgery. Back Pain   This is a recurrent problem. The current episode started in the past 7 days. The problem occurs intermittently. The problem is unchanged. The pain is present in the lumbar spine and sacro-iliac. The quality of the pain is described as aching. The pain radiates to the left thigh. The pain is at a severity of 3/10. The pain is moderate.  The pain is worse during the day. The symptoms are aggravated by sitting. Associated symptoms include abdominal pain. Pertinent negatives include no dysuria, fever, headaches or weight loss. Risk factors include sedentary lifestyle. She has tried NSAIDs for the symptoms. The treatment provided mild relief.        Allergies   Allergen Reactions    Lisinopril Anaphylaxis     sweating    Flagyl [Metronidazole Hcl] Other (See Comments)     \"metallic taste\"     Outpatient Medications Marked as Taking for the 5/8/19 encounter (Office Visit) with Miah Torrez MD   Medication Sig Dispense Refill    simethicone (MYLICON) 80 MG chewable tablet Take 1 tablet by mouth 4 times daily as needed for Flatulence 180 tablet 3    ibuprofen (ADVIL;MOTRIN) 200 MG tablet Take 200 mg by mouth every 6 hours as needed for Pain      simvastatin (ZOCOR) 10 MG tablet TAKE ONE TABLET BY MOUTH EVERY NIGHT AT BEDTIME 30 tablet 11    linaclotide (LINZESS) 145 MCG capsule TAKE ONE CAPSULE BY MOUTH DAILY 90 capsule 8    CPAP Machine MISC by Does not apply route 1 each 0    Elastic Bandages & Supports (MEDICAL COMPRESSION STOCKINGS) MISC 2 each by Does not apply route daily Thigh high 30-40 mm Hg, 2 each 3       Immunization History   Administered Date(s) Administered    PPD Test 08/24/2010    Td, unspecified formulation 08/24/2010    Tdap (Boostrix, Adacel) 08/24/2010, 05/03/2017       Past Medical History:   Diagnosis Date    Allergic rhinitis     Chronic back pain     Fibroids     Fibromyalgia     Fibromyalgia     Hyperlipidemia 6/25/2012    Hypertension     STEPHANIE (obstructive sleep apnea)     Sleep apnea      Past Surgical History:   Procedure Laterality Date    ACHILLES TENDON SURGERY  2007    left achilles repair by Dr. Roslyn Hernandez cervical surgery-- sees Dr Tara Elder @ [address] [place] [address]    COLONOSCOPY  2006    Dr Titus Ulloa and 8/2012    ENDOMETRIAL BIOPSY      Dr Shafer Files     Family History   Problem Relation Age of Onset    Cancer Mother         colon    Heart Disease Mother         heart infection    High Blood Pressure Father     Kidney Disease Paternal Uncle     Cancer Paternal Uncle 47        colon cancer    High Cholesterol Sister     Diabetes Maternal Aunt     Cancer Maternal Grandmother         breast cancer    Stroke Maternal Grandmother     Cancer Paternal Grandmother     Diabetes Paternal Grandmother        Review of Systems:  Review of Systems   Constitutional: Negative for activity change, appetite change, fever and weight loss. HENT: Negative. Eyes: Negative. Negative for discharge. Respiratory: Negative. Gastrointestinal: Positive for abdominal pain, constipation and flatus. Negative for anorexia, diarrhea, hematochezia, melena, nausea and vomiting. Genitourinary: Negative for difficulty urinating, dysuria, frequency and hematuria. Musculoskeletal: Positive for arthralgias and back pain. Skin: Negative for rash. Neurological: Negative. Negative for headaches. Psychiatric/Behavioral: Negative. Negative for agitation and confusion. The patient is not nervous/anxious. All other systems reviewed and are negative. Vitals:    05/08/19 1215 05/08/19 1219   BP: (!) 153/96 (!) 153/95   Pulse: 63    Temp: 97.2 °F (36.2 °C)    TempSrc: Oral    SpO2: 97%    Weight: 199 lb (90.3 kg)    Height: 5' 6\" (1.676 m)      Body mass index is 32.12 kg/m². Wt Readings from Last 3 Encounters:   05/08/19 199 lb (90.3 kg)   12/04/18 198 lb 3.2 oz (89.9 kg)   11/09/18 198 lb (89.8 kg)     BP Readings from Last 3 Encounters:   05/08/19 (!) 153/95   12/04/18 130/82   11/16/18 (!) 140/82         Physical Exam   Constitutional: She is oriented to person, place, and time. She appears well-developed and well-nourished. HENT:   Head: Normocephalic and atraumatic. Eyes: Pupils are equal, round, and reactive to light.  Conjunctivae are normal.   Neck: Normal range of motion. Neck supple. Cardiovascular: Normal rate, regular rhythm and normal heart sounds. Exam reveals no gallop and no friction rub. No murmur heard. Pulmonary/Chest: Effort normal and breath sounds normal. No stridor. No respiratory distress. She has no wheezes. She has no rales. She exhibits no tenderness. Abdominal: Soft. Bowel sounds are normal. She exhibits no distension and no mass. There is no tenderness. There is no rebound and no guarding. No hernia. Musculoskeletal: Normal range of motion. She exhibits no edema, tenderness or deformity. Neurological: She is alert and oriented to person, place, and time. She has normal reflexes. Skin: Skin is warm and dry. Psychiatric: She has a normal mood and affect.  Her behavior is normal. Judgment and thought content normal.       Lab Review   Orders Only on 12/05/2018   Component Date Value    Cholesterol, Total 12/05/2018 200*    Triglycerides 12/05/2018 45     HDL 12/05/2018 80*    LDL Calculated 12/05/2018 111*    VLDL Cholesterol Calcula* 12/05/2018 9    Office Visit on 12/04/2018   Component Date Value    HPV TYPE 16 12/04/2018 Not Detected     HPV TYPE 18 12/04/2018 Not Detected     HPVOH (OTHER TYPES) 12/04/2018 Not Detected     HPV Comment 12/04/2018 See below      notapplicable      Health Maintenance   Topic Date Due    Colon Cancer Screen FIT/FOBT  12/28/2018    Flu vaccine (Season Ended) 12/09/2019 (Originally 9/1/2019)    Shingles Vaccine (1 of 2) 12/09/2019 (Originally 11/7/2006)    Potassium monitoring  09/12/2019    Creatinine monitoring  09/12/2019    Breast cancer screen  11/13/2020    Cervical cancer screen  12/04/2023    Lipid screen  12/05/2023    DTaP/Tdap/Td vaccine (3 - Td) 05/03/2027    Hepatitis C screen  Completed    HIV screen  Completed    Pneumococcal 0-64 years Vaccine  Aged Out          Assessment/Plan:    Constipation  See orders    Epigastric pain  See

## 2019-05-10 DIAGNOSIS — Z12.11 COLON CANCER SCREENING: ICD-10-CM

## 2019-05-10 LAB
CONTROL: NORMAL
HEMOCCULT STL QL: NEGATIVE

## 2019-05-10 PROCEDURE — 82274 ASSAY TEST FOR BLOOD FECAL: CPT | Performed by: INTERNAL MEDICINE

## 2019-05-13 ENCOUNTER — HOSPITAL ENCOUNTER (OUTPATIENT)
Age: 64
Discharge: HOME | End: 2019-05-13
Payer: COMMERCIAL

## 2019-05-13 ENCOUNTER — HOSPITAL ENCOUNTER (OUTPATIENT)
Dept: GENERAL RADIOLOGY | Age: 64
Discharge: HOME | End: 2019-05-13
Payer: COMMERCIAL

## 2019-05-13 DIAGNOSIS — K59.09 OTHER CONSTIPATION: ICD-10-CM

## 2019-05-13 PROCEDURE — 74018 RADEX ABDOMEN 1 VIEW: CPT

## 2019-05-15 ENCOUNTER — OFFICE VISIT (OUTPATIENT)
Dept: PRIMARY CARE CLINIC | Age: 64
End: 2019-05-15
Payer: COMMERCIAL

## 2019-05-15 VITALS
RESPIRATION RATE: 18 BRPM | HEART RATE: 81 BPM | OXYGEN SATURATION: 95 % | BODY MASS INDEX: 32.44 KG/M2 | DIASTOLIC BLOOD PRESSURE: 80 MMHG | WEIGHT: 201 LBS | TEMPERATURE: 98.3 F | SYSTOLIC BLOOD PRESSURE: 135 MMHG

## 2019-05-15 DIAGNOSIS — K59.04 CHRONIC IDIOPATHIC CONSTIPATION: Primary | ICD-10-CM

## 2019-05-15 DIAGNOSIS — R10.84 GENERALIZED ABDOMINAL PAIN: ICD-10-CM

## 2019-05-15 DIAGNOSIS — E66.01 CLASS 2 SEVERE OBESITY DUE TO EXCESS CALORIES WITH SERIOUS COMORBIDITY IN ADULT, UNSPECIFIED BMI (HCC): ICD-10-CM

## 2019-05-15 DIAGNOSIS — R14.0 ABDOMINAL BLOATING: ICD-10-CM

## 2019-05-15 DIAGNOSIS — K59.04 CHRONIC IDIOPATHIC CONSTIPATION: ICD-10-CM

## 2019-05-15 DIAGNOSIS — I10 ESSENTIAL HYPERTENSION: ICD-10-CM

## 2019-05-15 LAB
A/G RATIO: 1.5 (ref 1.1–2.2)
ALBUMIN SERPL-MCNC: 4.4 G/DL (ref 3.4–5)
ALP BLD-CCNC: 80 U/L (ref 40–129)
ALT SERPL-CCNC: 9 U/L (ref 10–40)
ANION GAP SERPL CALCULATED.3IONS-SCNC: 10 MMOL/L (ref 3–16)
AST SERPL-CCNC: 13 U/L (ref 15–37)
BASOPHILS ABSOLUTE: 0 K/UL (ref 0–0.2)
BASOPHILS RELATIVE PERCENT: 1.4 %
BILIRUB SERPL-MCNC: 0.5 MG/DL (ref 0–1)
BUN BLDV-MCNC: 11 MG/DL (ref 7–20)
CALCIUM SERPL-MCNC: 9.9 MG/DL (ref 8.3–10.6)
CHLORIDE BLD-SCNC: 102 MMOL/L (ref 99–110)
CO2: 29 MMOL/L (ref 21–32)
CREAT SERPL-MCNC: 0.8 MG/DL (ref 0.6–1.2)
EOSINOPHILS ABSOLUTE: 0.1 K/UL (ref 0–0.6)
EOSINOPHILS RELATIVE PERCENT: 3.7 %
GFR AFRICAN AMERICAN: >60
GFR NON-AFRICAN AMERICAN: >60
GLOBULIN: 3 G/DL
GLUCOSE BLD-MCNC: 90 MG/DL (ref 70–99)
HCT VFR BLD CALC: 41.2 % (ref 36–48)
HEMOGLOBIN: 13.6 G/DL (ref 12–16)
LYMPHOCYTES ABSOLUTE: 1.5 K/UL (ref 1–5.1)
LYMPHOCYTES RELATIVE PERCENT: 49.2 %
MCH RBC QN AUTO: 29.5 PG (ref 26–34)
MCHC RBC AUTO-ENTMCNC: 32.9 G/DL (ref 31–36)
MCV RBC AUTO: 89.7 FL (ref 80–100)
MONOCYTES ABSOLUTE: 0.3 K/UL (ref 0–1.3)
MONOCYTES RELATIVE PERCENT: 9.8 %
NEUTROPHILS ABSOLUTE: 1.1 K/UL (ref 1.7–7.7)
NEUTROPHILS RELATIVE PERCENT: 35.9 %
PDW BLD-RTO: 14.3 % (ref 12.4–15.4)
PLATELET # BLD: 220 K/UL (ref 135–450)
PMV BLD AUTO: 8.6 FL (ref 5–10.5)
POTASSIUM SERPL-SCNC: 4.4 MMOL/L (ref 3.5–5.1)
RBC # BLD: 4.6 M/UL (ref 4–5.2)
SEDIMENTATION RATE, ERYTHROCYTE: 13 MM/HR (ref 0–30)
SODIUM BLD-SCNC: 141 MMOL/L (ref 136–145)
TOTAL PROTEIN: 7.4 G/DL (ref 6.4–8.2)
TSH REFLEX FT4: 1.67 UIU/ML (ref 0.27–4.2)
WBC # BLD: 3.1 K/UL (ref 4–11)

## 2019-05-15 PROCEDURE — G8417 CALC BMI ABV UP PARAM F/U: HCPCS | Performed by: INTERNAL MEDICINE

## 2019-05-15 PROCEDURE — G8427 DOCREV CUR MEDS BY ELIG CLIN: HCPCS | Performed by: INTERNAL MEDICINE

## 2019-05-15 PROCEDURE — 3017F COLORECTAL CA SCREEN DOC REV: CPT | Performed by: INTERNAL MEDICINE

## 2019-05-15 PROCEDURE — 1036F TOBACCO NON-USER: CPT | Performed by: INTERNAL MEDICINE

## 2019-05-15 PROCEDURE — 99214 OFFICE O/P EST MOD 30 MIN: CPT | Performed by: INTERNAL MEDICINE

## 2019-05-15 RX ORDER — POLYETHYLENE GLYCOL 3350 17 G/17G
17 POWDER, FOR SOLUTION ORAL DAILY
Qty: 1530 G | Refills: 1 | Status: SHIPPED | OUTPATIENT
Start: 2019-05-15 | End: 2019-06-12

## 2019-05-15 ASSESSMENT — ENCOUNTER SYMPTOMS
ABDOMINAL PAIN: 0
WHEEZING: 0
ABDOMINAL DISTENTION: 0
EYES NEGATIVE: 1
CONSTIPATION: 1
SINUS PRESSURE: 0
DIARRHEA: 0
CHEST TIGHTNESS: 0
COUGH: 0
SHORTNESS OF BREATH: 0
NAUSEA: 0

## 2019-05-15 ASSESSMENT — PATIENT HEALTH QUESTIONNAIRE - PHQ9
2. FEELING DOWN, DEPRESSED OR HOPELESS: 0
SUM OF ALL RESPONSES TO PHQ9 QUESTIONS 1 & 2: 0
SUM OF ALL RESPONSES TO PHQ QUESTIONS 1-9: 0
1. LITTLE INTEREST OR PLEASURE IN DOING THINGS: 0
SUM OF ALL RESPONSES TO PHQ QUESTIONS 1-9: 0

## 2019-05-15 NOTE — PROGRESS NOTES
5/15/2019     Tiffani Piedra (:  1955) is a 59 y.o. female, here for evaluation of the following medical concerns:    HPI   Diagnosis Orders   1. Chronic idiopathic constipation chronic for years. Normal colonoscopy 2-3 years ago and negative fit test m may of 2019. Takes linzess prn , but daily for past week. Patient encourage to take daily. Stools are sometime liquid and some times formed. Good elimination with linzess 3 times a week. No melena or hematochezia.  kub with a few dilated loops of small  Bowel. NO nausea or RUQ pain. 2. Class 2 severe obesity due to excess calories with serious comorbidity in adult, unspecified BMI (HCC) not exercising. Start walking 30 minutes daily which will help constipation. Wt Readings from Last 3 Encounters:   05/15/19 201 lb (91.2 kg)   19 199 lb (90.3 kg)   18 198 lb 3.2 oz (89.9 kg)       3. Essential hypertension controlled on diet. No chest pain or shortness of breath. No leg edema with support stockings. One day got up to bath room and then went back to bed and put on cpap. Became afraid and called 911. Patient checked out and vital.  Since then no shortness of breath and going up and down stairs and fine . Did not go to er. BP Readings from Last 3 Encounters:   05/15/19 135/80   19 (!) 153/95   18 130/82           Patient Active Problem List   Diagnosis    Fibromyalgia    Cervical spondylosis with radiculopathy    Vitamin D deficiency    Vitamin B12 deficiency    Constipation    Varicose vein of leg    Brittle nails    Mass of thigh    Obesity    STEPHANIE (obstructive sleep apnea)    H. pylori infection    Obesity (BMI 30.0-34. 9)    Essential hypertension    Hx of colonoscopy    Mixed hyperlipidemia    Lymphedema of both lower extremities    Epigastric pain    Abdominal gas pain    Chronic back pain     Allergies   Allergen Reactions    Lisinopril Anaphylaxis and Itching     sweating    Flagyl [Metronidazole Hcl] Other (See Comments)     \"metallic taste\"    Metronidazole Nausea And Vomiting       Review of Systems   Constitutional: Negative for activity change, appetite change, fatigue, fever and unexpected weight change. Flu Vac    HENT: Negative. Negative for sinus pressure. Sore throat from November resolved and never took prilosec. No dysphagia   Eyes: Negative. Respiratory: Negative for cough, chest tightness, shortness of breath and wheezing. Does not smoke   No etoh   No asthma    Cardiovascular: Negative. Negative for chest pain. No HTN / CAD    Gastrointestinal: Positive for constipation. Negative for abdominal distention, abdominal pain, diarrhea and nausea. Frequent constipation , not relieved with linzess. Genitourinary: Negative for dysuria, frequency, menstrual problem, urgency and vaginal discharge. Musculoskeletal: Negative. Neurological: Negative for dizziness, weakness and headaches. Psychiatric/Behavioral: Negative for behavioral problems and sleep disturbance. The patient is not nervous/anxious. Prior to Visit Medications    Medication Sig Taking?  Authorizing Provider   simethicone (MYLICON) 80 MG chewable tablet Take 1 tablet by mouth 4 times daily as needed for Flatulence Yes Lynda Sandoval MD   ibuprofen (ADVIL;MOTRIN) 200 MG tablet Take 200 mg by mouth every 6 hours as needed for Pain Yes Historical Provider, MD   simvastatin (ZOCOR) 10 MG tablet TAKE ONE TABLET BY MOUTH EVERY NIGHT AT BEDTIME Yes Rosas Mann MD   linaclotide (LINZESS) 145 MCG capsule TAKE ONE CAPSULE BY MOUTH DAILY Yes Rosas Mann MD   CPAP Machine MISC by Does not apply route Yes Rosas Mann MD   Elastic Bandages & Supports ([facility]) [address] 2 each by Does not apply route daily Thigh high 30-40 mm Hg, Yes Rosas Mann MD        Social History     Tobacco Use    Smoking status: Former Smoker     Packs/day: 1.00     Years: 3.00     Pack years: 3.00     Last attempt to quit: 1982     Years since quittin.0    Smokeless tobacco: Never Used   Substance Use Topics    Alcohol use: No     Alcohol/week: 0.0 oz        Vitals:    05/15/19 0750   BP: 135/80   Pulse: 81   Resp: 18   Temp: 98.3 °F (36.8 °C)   TempSrc: Oral   SpO2: 95%   Weight: 201 lb (91.2 kg)     Estimated body mass index is 32.44 kg/m² as calculated from the following:    Height as of 19: 5' 6\" (1.676 m). Weight as of this encounter: 201 lb (91.2 kg). Physical Exam   Constitutional: She is oriented to person, place, and time. No distress. HENT:   Mouth/Throat: Oropharynx is clear and moist.   Eyes: Conjunctivae are normal.   Neck: Neck supple. Cardiovascular: Normal rate, regular rhythm and normal heart sounds. Pulmonary/Chest: Breath sounds normal.   Abdominal: Soft. She exhibits no distension. There is no tenderness. Musculoskeletal: Normal range of motion. Lymphadenopathy:     She has no cervical adenopathy. Neurological: She is alert and oriented to person, place, and time. She has normal strength. Skin: Skin is warm. Psychiatric: Her behavior is normal. Judgment normal.   Vitals reviewed. ASSESSMENT/PLAN:   Diagnosis Orders   1. Chronic idiopathic constipation  SACCHAROMYCES CEREVISIAE ANTIBODIES, IGG AND IGA    SEDIMENTATION RATE    TSH WITH REFLEX TO FT4    CBC Auto Differential    Comprehensive Metabolic Panel    polyethylene glycol (GLYCOLAX) powder   2. Class 2 severe obesity due to excess calories with serious comorbidity in adult, unspecified BMI ([address] conselled to continue diet and exercise, at a stand still in weight. 3. Essential hypertension controlled on diet contineu. 4. Abdominal bloating  SACCHAROMYCES CEREVISIAE ANTIBODIES, IGG AND IGA    SEDIMENTATION RATE   5. Generalized abdominal pain benign exexam consistent with IBS.  SACCHAROMYCES CEREVISIAE ANTIBODIES, IGG AND IGA    SEDIMENTATION RATE    FL UGI W SMALL BOWEL    polyethylene glycol (GLYCOLAX) powder     Avani iWlson received counseling on the following healthy behaviors: medication adherence    Patient given educational materials on After visit summary with diagnosis and treatment plan. I have instructed Avani Wilson to complete a self tracking handout on Blood Pressures  and Weights and instructed them to bring it with them to her next appointment. Discussed use, benefit, and side effects of prescribed medications. Barriers to medication compliance addressed. All patient questions answered. Pt voiced understanding. out of town    An [address] [facility] was used to authenticate this note.     --Franc Lilly MD on 5/15/2019 at 8:08 AM

## 2019-05-17 LAB
SACCHAROMYCES CEREVISIAE AB IGA: 2.1 UNITS (ref 0–24.9)
SACCHAROMYCES CEREVISIAE AB IGG: 3.6 UNITS (ref 0–24.9)

## 2019-05-21 DIAGNOSIS — D70.8 OTHER NEUTROPENIA (HCC): Primary | ICD-10-CM

## 2019-06-03 ENCOUNTER — TELEPHONE (OUTPATIENT)
Dept: PRIMARY CARE CLINIC | Age: 64
End: 2019-06-03

## 2019-06-03 NOTE — TELEPHONE ENCOUNTER
Patient is unclear on the instructions on how to take a medication. Please reach out to the patient regarding this matter.

## 2019-06-04 NOTE — TELEPHONE ENCOUNTER
Messages should not come to me on tuesdays and thursdays. I handle message for other MD's when they are not in the office. Inform patient to take linzess daily and miralax daily.

## 2019-06-10 ENCOUNTER — TELEPHONE (OUTPATIENT)
Dept: PRIMARY CARE CLINIC | Age: 64
End: 2019-06-10

## 2019-06-10 ENCOUNTER — HOSPITAL ENCOUNTER (OUTPATIENT)
Dept: GENERAL RADIOLOGY | Age: 64
Discharge: HOME | End: 2019-06-10
Payer: COMMERCIAL

## 2019-06-10 DIAGNOSIS — K59.09 CHRONIC CONSTIPATION: ICD-10-CM

## 2019-06-10 DIAGNOSIS — R10.84 GENERALIZED ABDOMINAL PAIN: ICD-10-CM

## 2019-06-10 NOTE — TELEPHONE ENCOUNTER
[address] radiology call patient scheduled for upper GI and small bowel follow through patient now feels testing not necessary doctor notified patient sent home.  FYI

## 2019-06-12 ENCOUNTER — OFFICE VISIT (OUTPATIENT)
Dept: PRIMARY CARE CLINIC | Age: 64
End: 2019-06-12
Payer: COMMERCIAL

## 2019-06-12 VITALS
RESPIRATION RATE: 18 BRPM | SYSTOLIC BLOOD PRESSURE: 125 MMHG | BODY MASS INDEX: 32.28 KG/M2 | DIASTOLIC BLOOD PRESSURE: 77 MMHG | WEIGHT: 200 LBS | OXYGEN SATURATION: 98 % | TEMPERATURE: 98 F | HEART RATE: 78 BPM

## 2019-06-12 DIAGNOSIS — D70.8 OTHER NEUTROPENIA (HCC): ICD-10-CM

## 2019-06-12 DIAGNOSIS — K58.1 IRRITABLE BOWEL SYNDROME WITH CONSTIPATION: Primary | ICD-10-CM
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
## 2019-06-12 LAB
BASOPHILS ABSOLUTE: 0 K/UL (ref 0–0.2)
BASOPHILS RELATIVE PERCENT: 0.6 %
EOSINOPHILS ABSOLUTE: 0.1 K/UL (ref 0–0.6)
EOSINOPHILS RELATIVE PERCENT: 2.1 %
HCT VFR BLD CALC: 40.9 % (ref 36–48)
HEMOGLOBIN: 13.3 G/DL (ref 12–16)
LYMPHOCYTES ABSOLUTE: 2.4 K/UL (ref 1–5.1)
LYMPHOCYTES RELATIVE PERCENT: 47 %
MCH RBC QN AUTO: 28.6 PG (ref 26–34)
MCHC RBC AUTO-ENTMCNC: 32.5 G/DL (ref 31–36)
MCV RBC AUTO: 88 FL (ref 80–100)
MONOCYTES ABSOLUTE: 0.7 K/UL (ref 0–1.3)
MONOCYTES RELATIVE PERCENT: 13 %
NEUTROPHILS ABSOLUTE: 1.9 K/UL (ref 1.7–7.7)
NEUTROPHILS RELATIVE PERCENT: 37.3 %
PDW BLD-RTO: 13.8 % (ref 12.4–15.4)
PLATELET # BLD: 228 K/UL (ref 135–450)
PMV BLD AUTO: 8.6 FL (ref 5–10.5)
RBC # BLD: 4.64 M/UL (ref 4–5.2)
WBC # BLD: 5.1 K/UL (ref 4–11)

## 2019-06-12 PROCEDURE — 3017F COLORECTAL CA SCREEN DOC REV: CPT | Performed by: INTERNAL MEDICINE

## 2019-06-12 PROCEDURE — G8417 CALC BMI ABV UP PARAM F/U: HCPCS | Performed by: INTERNAL MEDICINE

## 2019-06-12 PROCEDURE — G8427 DOCREV CUR MEDS BY ELIG CLIN: HCPCS | Performed by: INTERNAL MEDICINE

## 2019-06-12 PROCEDURE — 99213 OFFICE O/P EST LOW 20 MIN: CPT | Performed by: INTERNAL MEDICINE

## 2019-06-12 PROCEDURE — 1036F TOBACCO NON-USER: CPT | Performed by: INTERNAL MEDICINE

## 2019-06-12 ASSESSMENT — ENCOUNTER SYMPTOMS
SINUS PRESSURE: 0
VOMITING: 0
ABDOMINAL DISTENTION: 0
COUGH: 0
CONSTIPATION: 1
WHEEZING: 0
SHORTNESS OF BREATH: 0
ABDOMINAL PAIN: 1
VISUAL CHANGE: 0
NAUSEA: 0
BOWEL INCONTINENCE: 0
DIARRHEA: 0
EYES NEGATIVE: 1
CHEST TIGHTNESS: 0

## 2019-06-19 DIAGNOSIS — K58.1 IRRITABLE BOWEL SYNDROME WITH CONSTIPATION: ICD-10-CM

## 2019-07-19 ENCOUNTER — OFFICE VISIT (OUTPATIENT)
Dept: PRIMARY CARE CLINIC | Age: 64
End: 2019-07-19
Payer: COMMERCIAL

## 2019-07-19 VITALS
TEMPERATURE: 97.1 F | HEART RATE: 78 BPM | RESPIRATION RATE: 18 BRPM | BODY MASS INDEX: 32.77 KG/M2 | OXYGEN SATURATION: 100 % | SYSTOLIC BLOOD PRESSURE: 123 MMHG | WEIGHT: 203 LBS | DIASTOLIC BLOOD PRESSURE: 74 MMHG

## 2019-07-19 DIAGNOSIS — R41.3 MEMORY LOSS: Primary | ICD-10-CM

## 2019-07-19 DIAGNOSIS — I89.0 LYMPHEDEMA OF BOTH LOWER EXTREMITIES: ICD-10-CM

## 2019-07-19 DIAGNOSIS — R41.3 MEMORY LOSS: ICD-10-CM

## 2019-07-19 LAB
A/G RATIO: 1.9 (ref 1.1–2.2)
ALBUMIN SERPL-MCNC: 4.5 G/DL (ref 3.4–5)
ALP BLD-CCNC: 80 U/L (ref 40–129)
ALT SERPL-CCNC: 10 U/L (ref 10–40)
ANION GAP SERPL CALCULATED.3IONS-SCNC: 13 MMOL/L (ref 3–16)
AST SERPL-CCNC: 13 U/L (ref 15–37)
BILIRUB SERPL-MCNC: 0.3 MG/DL (ref 0–1)
BUN BLDV-MCNC: 12 MG/DL (ref 7–20)
CALCIUM SERPL-MCNC: 9.8 MG/DL (ref 8.3–10.6)
CHLORIDE BLD-SCNC: 101 MMOL/L (ref 99–110)
CO2: 25 MMOL/L (ref 21–32)
CREAT SERPL-MCNC: 0.9 MG/DL (ref 0.6–1.2)
GFR AFRICAN AMERICAN: >60
GFR NON-AFRICAN AMERICAN: >60
GLOBULIN: 2.4 G/DL
GLUCOSE BLD-MCNC: 76 MG/DL (ref 70–99)
POTASSIUM SERPL-SCNC: 4 MMOL/L (ref 3.5–5.1)
SODIUM BLD-SCNC: 139 MMOL/L (ref 136–145)
TOTAL PROTEIN: 6.9 G/DL (ref 6.4–8.2)
TSH REFLEX FT4: 1.29 UIU/ML (ref 0.27–4.2)
VITAMIN B-12: 279 PG/ML (ref 211–911)

## 2019-07-19 PROCEDURE — G8427 DOCREV CUR MEDS BY ELIG CLIN: HCPCS | Performed by: INTERNAL MEDICINE

## 2019-07-19 PROCEDURE — 3017F COLORECTAL CA SCREEN DOC REV: CPT | Performed by: INTERNAL MEDICINE

## 2019-07-19 PROCEDURE — 1036F TOBACCO NON-USER: CPT | Performed by: INTERNAL MEDICINE

## 2019-07-19 PROCEDURE — 99213 OFFICE O/P EST LOW 20 MIN: CPT | Performed by: INTERNAL MEDICINE

## 2019-07-19 PROCEDURE — G8417 CALC BMI ABV UP PARAM F/U: HCPCS | Performed by: INTERNAL MEDICINE

## 2019-07-19 ASSESSMENT — ENCOUNTER SYMPTOMS
FLATUS: 0
HEMATOCHEZIA: 0
VISUAL CHANGE: 0
COUGH: 0
NAUSEA: 0
CONSTIPATION: 0
SINUS PRESSURE: 0
ABDOMINAL PAIN: 1
VOMITING: 0
BELCHING: 0
DIARRHEA: 0
EYES NEGATIVE: 1
SHORTNESS OF BREATH: 0
ABDOMINAL DISTENTION: 0
WHEEZING: 0
CHEST TIGHTNESS: 0

## 2019-07-21 ENCOUNTER — HOSPITAL ENCOUNTER (EMERGENCY)
Age: 64
Discharge: HOME | End: 2019-07-21
Attending: EMERGENCY MEDICINE
Payer: COMMERCIAL

## 2019-07-21 ENCOUNTER — APPOINTMENT (OUTPATIENT)
Dept: GENERAL RADIOLOGY | Age: 64
End: 2019-07-21
Payer: COMMERCIAL

## 2019-07-21 VITALS
RESPIRATION RATE: 16 BRPM | OXYGEN SATURATION: 99 % | TEMPERATURE: 98.1 F | SYSTOLIC BLOOD PRESSURE: 144 MMHG | HEART RATE: 69 BPM | DIASTOLIC BLOOD PRESSURE: 90 MMHG

## 2019-07-21 DIAGNOSIS — R07.9 CHEST PAIN, UNSPECIFIED TYPE: Primary | ICD-10-CM

## 2019-07-21 LAB
ANION GAP SERPL CALCULATED.3IONS-SCNC: 9 MMOL/L (ref 3–16)
BASOPHILS ABSOLUTE: 0.1 K/UL (ref 0–0.2)
BASOPHILS RELATIVE PERCENT: 1.1 %
BUN BLDV-MCNC: 10 MG/DL (ref 7–20)
CALCIUM SERPL-MCNC: 9.5 MG/DL (ref 8.3–10.6)
CHLORIDE BLD-SCNC: 103 MMOL/L (ref 99–110)
CO2: 27 MMOL/L (ref 21–32)
CREAT SERPL-MCNC: 0.7 MG/DL (ref 0.6–1.2)
EOSINOPHILS ABSOLUTE: 0.1 K/UL (ref 0–0.6)
EOSINOPHILS RELATIVE PERCENT: 1.9 %
GFR AFRICAN AMERICAN: >60
GFR NON-AFRICAN AMERICAN: >60
GLUCOSE BLD-MCNC: 103 MG/DL (ref 70–99)
HCT VFR BLD CALC: 40.6 % (ref 36–48)
HEMOGLOBIN: 13.2 G/DL (ref 12–16)
LYMPHOCYTES ABSOLUTE: 2.4 K/UL (ref 1–5.1)
LYMPHOCYTES RELATIVE PERCENT: 46.5 %
MCH RBC QN AUTO: 28.4 PG (ref 26–34)
MCHC RBC AUTO-ENTMCNC: 32.4 G/DL (ref 31–36)
MCV RBC AUTO: 87.6 FL (ref 80–100)
MONOCYTES ABSOLUTE: 0.5 K/UL (ref 0–1.3)
MONOCYTES RELATIVE PERCENT: 9.6 %
NEUTROPHILS ABSOLUTE: 2.1 K/UL (ref 1.7–7.7)
NEUTROPHILS RELATIVE PERCENT: 40.9 %
PDW BLD-RTO: 14 % (ref 12.4–15.4)
PLATELET # BLD: 217 K/UL (ref 135–450)
PMV BLD AUTO: 8 FL (ref 5–10.5)
POTASSIUM SERPL-SCNC: 3.8 MMOL/L (ref 3.5–5.1)
RBC # BLD: 4.64 M/UL (ref 4–5.2)
SODIUM BLD-SCNC: 139 MMOL/L (ref 136–145)
TROPONIN: <0.01 NG/ML
TROPONIN: <0.01 NG/ML
WBC # BLD: 5.2 K/UL (ref 4–11)

## 2019-07-21 PROCEDURE — 71046 X-RAY EXAM CHEST 2 VIEWS: CPT

## 2019-07-21 PROCEDURE — 93005 ELECTROCARDIOGRAM TRACING: CPT | Performed by: PHYSICIAN ASSISTANT

## 2019-07-21 PROCEDURE — 80048 BASIC METABOLIC PNL TOTAL CA: CPT

## 2019-07-21 PROCEDURE — 99285 EMERGENCY DEPT VISIT HI MDM: CPT

## 2019-07-21 PROCEDURE — 85025 COMPLETE CBC W/AUTO DIFF WBC: CPT

## 2019-07-21 PROCEDURE — 84484 ASSAY OF TROPONIN QUANT: CPT

## 2019-07-21 ASSESSMENT — PAIN DESCRIPTION - DESCRIPTORS: DESCRIPTORS: HEAVINESS

## 2019-07-21 ASSESSMENT — PAIN DESCRIPTION - LOCATION: LOCATION: CHEST

## 2019-07-21 ASSESSMENT — PAIN DESCRIPTION - PAIN TYPE: TYPE: ACUTE PAIN

## 2019-07-21 ASSESSMENT — ENCOUNTER SYMPTOMS
VOMITING: 0
ABDOMINAL PAIN: 0
NAUSEA: 0
CHEST TIGHTNESS: 0
SHORTNESS OF BREATH: 0

## 2019-07-21 ASSESSMENT — PAIN DESCRIPTION - FREQUENCY: FREQUENCY: CONTINUOUS

## 2019-07-21 ASSESSMENT — PAIN SCALES - GENERAL: PAINLEVEL_OUTOF10: 7

## 2019-07-21 ASSESSMENT — HEART SCORE: ECG: 0

## 2019-07-21 NOTE — ED PROVIDER NOTES
[facility] ENCOUNTER          PHYSICIAN ASSISTANT NOTE       Date of evaluation: 7/21/2019    Chief Complaint     Chest Pain and Palpitations      History of Present Illness     Scott Bey is a 59 y.o. female with history of hyperlipidemia and hypertension who presents to the emergency department with chest discomfort. She has been experiencing intermittent chest discomfort with associated palpitations for the past several days. She saw her primary care provider for routine follow-up on Friday, and at that time she did not appreciate any abnormalities. Patient symptoms have progressed over the weekend and have persistently become more frequent. She denies any specific association with when the chest discomfort occurs, specifically denies any worsening with inspiration or exertion. She denies experiencing similar symptoms in the past.    Review of Systems     Review of Systems   Constitutional: Negative for chills and fever. Respiratory: Negative for chest tightness and shortness of breath. Cardiovascular: Positive for chest pain and palpitations. Negative for leg swelling. Gastrointestinal: Negative for abdominal pain, nausea and vomiting. Neurological: Negative for dizziness and headaches. Past Medical, Surgical, Family, and Social History     She has a past medical history of Allergic rhinitis, Chronic back pain, Fibroids, Fibromyalgia, Fibromyalgia, Hyperlipidemia, Hypertension, STEPHANIE (obstructive sleep apnea), and Sleep apnea. She has a past surgical history that includes Appendectomy; Cervix surgery; Endometrial biopsy; Achilles tendon surgery (2007); Breast cyst aspiration; and Colonoscopy (2006).   Her family history includes Cancer in her maternal grandmother, mother, and paternal grandmother; Cancer (age of onset: 47) in her paternal uncle; Diabetes in her maternal aunt and paternal grandmother; Heart Disease in her mother; High Blood Pressure in physician No att. providers found  See medical chart. RADIOLOGY:  XR CHEST STANDARD (2 VW)   Final Result      No acute cardiopulmonary findings. LABS:   Results for orders placed or performed during the hospital encounter of 07/21/19   CBC auto differential   Result Value Ref Range    WBC 5.2 4.0 - 11.0 K/uL    RBC 4.64 4.00 - 5.20 M/uL    Hemoglobin 13.2 12.0 - 16.0 g/dL    Hematocrit 40.6 36.0 - 48.0 %    MCV 87.6 80.0 - 100.0 fL    MCH 28.4 26.0 - 34.0 pg    MCHC 32.4 31.0 - 36.0 g/dL    RDW 14.0 12.4 - 15.4 %    Platelets 817 051 - 886 K/uL    MPV 8.0 5.0 - 10.5 fL    Neutrophils % 40.9 %    Lymphocytes % 46.5 %    Monocytes % 9.6 %    Eosinophils % 1.9 %    Basophils % 1.1 %    Neutrophils # 2.1 1.7 - 7.7 K/uL    Lymphocytes # 2.4 1.0 - 5.1 K/uL    Monocytes # 0.5 0.0 - 1.3 K/uL    Eosinophils # 0.1 0.0 - 0.6 K/uL    Basophils # 0.1 0.0 - 0.2 K/uL   Basic Metabolic Panel   Result Value Ref Range    Sodium 139 136 - 145 mmol/L    Potassium 3.8 3.5 - 5.1 mmol/L    Chloride 103 99 - 110 mmol/L    CO2 27 21 - 32 mmol/L    Anion Gap 9 3 - 16    Glucose 103 (H) 70 - 99 mg/dL    BUN 10 7 - 20 mg/dL    CREATININE 0.7 0.6 - 1.2 mg/dL    GFR Non-African American >60 >60    GFR African American >60 >60    Calcium 9.5 8.3 - 10.6 mg/dL   Troponin (lab)   Result Value Ref Range    Troponin <0.01 <0.01 ng/mL       ED BEDSIDE ULTRASOUND:  n/a    RECENT VITALS:  BP: (!) 149/82, Temp: 98.1 °F (36.7 °C), Pulse: 62, Resp: 20, SpO2: 96 %     Procedures     n/a    ED Course     Nursing Notes, Past Medical Hx,Past Surgical Hx, Social Hx, Allergies, and Family Hx were reviewed. The patient was given the following medications:  No orders of the defined types were placed in this encounter. CONSULTS:  None    MEDICAL DECISION MAKING / ASSESSMENT / Valarie Ernie is a 59 y.o. female who presents to the emergency department with chest discomfort.   The symptoms have been occurring for the past several days, but has become more frequent in nature. She denies any associated shortness of breath but has been expressing intermittent palpitations as well. She denies any specific events that worsen this pain, such as inspiration or with exertion. She denies any abdominal pain, nausea or vomiting. She still had similar symptoms in the past.  On exam, patient's lungs are clear to oscillation bilaterally without any focal wheezing or rales. Her cardiac exam reveals regular rate, rhythm without any obvious murmurs. Abdomen is soft without any focal tenderness, guarding or rebound. Patient has a heart score of 3. Initial troponin was normal.  All other lab work including EKG and chest x-ray were unremarkable as well. Given the patient's low heart score, I used shared decision-making to ask the patient if she would prefer to come in the hospital for stress testing versus going home to follow-up with her primary care provider as an outpatient. The patient opted to follow-up with her primary care provider. Given the fact that patient would like to go home, I ordered a second troponin. If this troponin is negative, she will be discharged home. The patient was given strict return precautions. She remained hemodynamically stable throughout her stay in the emergency department. This patient was also evaluated by the attending physician. All care plans were discussed and agreed upon. Clinical Impression     1.  Chest pain, unspecified type        Disposition     PATIENT REFERRED TO:  Loma Severe, MD  [address]  [phone]            DISCHARGE MEDICATIONS:  New Prescriptions    No medications on file       DISPOSITION Discharge - Pending Orders Complete 07/21/2019 08:50:49 PM     Robina Rowan  07/21/19 4040

## 2019-07-22 ENCOUNTER — TELEPHONE (OUTPATIENT)
Dept: PRIMARY CARE CLINIC | Age: 64
End: 2019-07-22

## 2019-07-22 DIAGNOSIS — R00.2 PALPITATIONS: Primary | ICD-10-CM

## 2019-07-22 LAB
EKG ATRIAL RATE: 77 BPM
EKG DIAGNOSIS: NORMAL
EKG P AXIS: 65 DEGREES
EKG P-R INTERVAL: 162 MS
EKG Q-T INTERVAL: 384 MS
EKG QRS DURATION: 78 MS
EKG QTC CALCULATION (BAZETT): 434 MS
EKG R AXIS: -3 DEGREES
EKG T AXIS: 49 DEGREES
EKG VENTRICULAR RATE: 77 BPM

## 2019-07-23 DIAGNOSIS — R00.2 PALPITATIONS: ICD-10-CM

## 2019-07-23 LAB — MAGNESIUM: 2.1 MG/DL (ref 1.8–2.4)

## 2019-07-24 ENCOUNTER — HOSPITAL ENCOUNTER (OUTPATIENT)
Dept: NON INVASIVE DIAGNOSTICS | Age: 64
Discharge: HOME | End: 2019-07-24
Payer: COMMERCIAL

## 2019-07-24 ENCOUNTER — NURSE ONLY (OUTPATIENT)
Dept: PRIMARY CARE CLINIC | Age: 64
End: 2019-07-24
Payer: COMMERCIAL

## 2019-07-24 VITALS
DIASTOLIC BLOOD PRESSURE: 74 MMHG | WEIGHT: 203 LBS | SYSTOLIC BLOOD PRESSURE: 125 MMHG | TEMPERATURE: 98.4 F | BODY MASS INDEX: 32.77 KG/M2

## 2019-07-24 DIAGNOSIS — E53.8 VITAMIN B12 DEFICIENCY: Primary | ICD-10-CM

## 2019-07-24 DIAGNOSIS — R00.2 PALPITATIONS: ICD-10-CM

## 2019-07-24 LAB
LV EF: 55 %
LVEF MODALITY: NORMAL

## 2019-07-24 PROCEDURE — 93225 XTRNL ECG REC<48 HRS REC: CPT

## 2019-07-24 PROCEDURE — 93306 TTE W/DOPPLER COMPLETE: CPT

## 2019-07-24 PROCEDURE — 96372 THER/PROPH/DIAG INJ SC/IM: CPT | Performed by: INTERNAL MEDICINE

## 2019-07-24 PROCEDURE — 93226 XTRNL ECG REC<48 HR SCAN A/R: CPT

## 2019-07-24 RX ORDER — CYANOCOBALAMIN 1000 UG/ML
1000 INJECTION INTRAMUSCULAR; SUBCUTANEOUS ONCE
Qty: 1 ML | Refills: 0 | Status: SHIPPED | OUTPATIENT
Start: 2019-07-24 | End: 2019-07-24 | Stop reason: CLARIF

## 2019-07-24 RX ORDER — CYANOCOBALAMIN 1000 UG/ML
1000 INJECTION INTRAMUSCULAR; SUBCUTANEOUS ONCE
Status: COMPLETED | OUTPATIENT
Start: 2019-07-24 | End: 2019-07-24

## 2019-07-24 RX ADMIN — CYANOCOBALAMIN 1000 MCG: 1000 INJECTION INTRAMUSCULAR; SUBCUTANEOUS at 11:41

## 2019-08-06 LAB
ACQUISITION DURATION: NORMAL S
AVERAGE HEART RATE: 75 BPM
EKG DIAGNOSIS: NORMAL
FASTEST SUPRAVENTRICULAR RATE: 98 BPM
HOLTER MAX HEART RATE: 102 BPM
HOOKUP DATE: NORMAL
HOOKUP TIME: NORMAL
LONGEST SUPRAVENTRICULAR RATE: 98 BPM
MAX HEART RATE TIME/DATE: NORMAL
MIN HEART RATE TIME/DATE: NORMAL
MIN HEART RATE: 56 BPM
NUMBER OF FASTEST SUPRAVENTRICULAR BEATS: 3
NUMBER OF LONGEST SUPRAVENTRICULAR BEATS: 3
NUMBER OF QRS COMPLEXES: NORMAL
NUMBER OF SUPRAVENTRICULAR BEATS IN RUNS: 3
NUMBER OF SUPRAVENTRICULAR COUPLETS: 9
NUMBER OF SUPRAVENTRICULAR ECTOPICS: 355
NUMBER OF SUPRAVENTRICULAR ISOLATED BEATS: 334
NUMBER OF SUPRAVENTRICULAR RUNS: 1
NUMBER OF VENTRICULAR BEATS IN RUNS: 0
NUMBER OF VENTRICULAR BIGEMINAL CYCLES: 3
NUMBER OF VENTRICULAR COUPLETS: 0
NUMBER OF VENTRICULAR ECTOPICS: 44
NUMBER OF VENTRICULAR ISOLATED BEATS: 44
NUMBER OF VENTRICULAR RUNS: 0

## 2019-10-21 ENCOUNTER — OFFICE VISIT (OUTPATIENT)
Dept: PRIMARY CARE CLINIC | Age: 64
End: 2019-10-21
Payer: COMMERCIAL

## 2019-10-21 VITALS
TEMPERATURE: 97.2 F | WEIGHT: 205 LBS | BODY MASS INDEX: 33.09 KG/M2 | SYSTOLIC BLOOD PRESSURE: 128 MMHG | DIASTOLIC BLOOD PRESSURE: 74 MMHG | OXYGEN SATURATION: 99 % | HEART RATE: 68 BPM

## 2019-10-21 DIAGNOSIS — K58.1 IRRITABLE BOWEL SYNDROME WITH CONSTIPATION: ICD-10-CM

## 2019-10-21 DIAGNOSIS — Z00.00 ENCOUNTER FOR PREVENTIVE HEALTH EXAMINATION: Primary | ICD-10-CM

## 2019-10-21 DIAGNOSIS — Z00.00 ENCOUNTER FOR PREVENTIVE HEALTH EXAMINATION: ICD-10-CM

## 2019-10-21 LAB
ALBUMIN SERPL-MCNC: 5 G/DL (ref 3.4–5)
ANION GAP SERPL CALCULATED.3IONS-SCNC: 16 MMOL/L (ref 3–16)
BASOPHILS ABSOLUTE: 0 K/UL (ref 0–0.2)
BASOPHILS RELATIVE PERCENT: 0.7 %
BILIRUBIN URINE: NEGATIVE
BLOOD, URINE: ABNORMAL
BUN BLDV-MCNC: 12 MG/DL (ref 7–20)
CALCIUM SERPL-MCNC: 9.7 MG/DL (ref 8.3–10.6)
CHLORIDE BLD-SCNC: 100 MMOL/L (ref 99–110)
CHOLESTEROL, TOTAL: 207 MG/DL (ref 0–199)
CLARITY: CLEAR
CO2: 24 MMOL/L (ref 21–32)
COLOR: YELLOW
CREAT SERPL-MCNC: 0.8 MG/DL (ref 0.6–1.2)
EOSINOPHILS ABSOLUTE: 0.1 K/UL (ref 0–0.6)
EOSINOPHILS RELATIVE PERCENT: 1.8 %
EPITHELIAL CELLS, UA: 0 /HPF (ref 0–5)
GFR AFRICAN AMERICAN: >60
GFR NON-AFRICAN AMERICAN: >60
GLUCOSE BLD-MCNC: 77 MG/DL (ref 70–99)
GLUCOSE URINE: NEGATIVE MG/DL
HCT VFR BLD CALC: 40.9 % (ref 36–48)
HDLC SERPL-MCNC: 99 MG/DL (ref 40–60)
HEMOGLOBIN: 13.5 G/DL (ref 12–16)
HYALINE CASTS: 0 /LPF (ref 0–8)
KETONES, URINE: NEGATIVE MG/DL
LDL CHOLESTEROL CALCULATED: 98 MG/DL
LEUKOCYTE ESTERASE, URINE: NEGATIVE
LYMPHOCYTES ABSOLUTE: 1.9 K/UL (ref 1–5.1)
LYMPHOCYTES RELATIVE PERCENT: 50 %
MCH RBC QN AUTO: 29.2 PG (ref 26–34)
MCHC RBC AUTO-ENTMCNC: 33 G/DL (ref 31–36)
MCV RBC AUTO: 88.6 FL (ref 80–100)
MICROSCOPIC EXAMINATION: YES
MONOCYTES ABSOLUTE: 0.3 K/UL (ref 0–1.3)
MONOCYTES RELATIVE PERCENT: 8.8 %
NEUTROPHILS ABSOLUTE: 1.5 K/UL (ref 1.7–7.7)
NEUTROPHILS RELATIVE PERCENT: 38.7 %
NITRITE, URINE: NEGATIVE
PDW BLD-RTO: 14.1 % (ref 12.4–15.4)
PH UA: 6 (ref 5–8)
PHOSPHORUS: 3.7 MG/DL (ref 2.5–4.9)
PLATELET # BLD: 215 K/UL (ref 135–450)
PMV BLD AUTO: 8.3 FL (ref 5–10.5)
POTASSIUM SERPL-SCNC: 3.9 MMOL/L (ref 3.5–5.1)
PROTEIN UA: NEGATIVE MG/DL
RBC # BLD: 4.62 M/UL (ref 4–5.2)
RBC UA: 2 /HPF (ref 0–4)
SODIUM BLD-SCNC: 140 MMOL/L (ref 136–145)
SPECIFIC GRAVITY UA: 1.01 (ref 1–1.03)
TRIGL SERPL-MCNC: 49 MG/DL (ref 0–150)
URINE TYPE: ABNORMAL
UROBILINOGEN, URINE: 0.2 E.U./DL
VITAMIN B-12: 262 PG/ML (ref 211–911)
VITAMIN D 25-HYDROXY: 24 NG/ML
VLDLC SERPL CALC-MCNC: 10 MG/DL
WBC # BLD: 3.8 K/UL (ref 4–11)
WBC UA: 0 /HPF (ref 0–5)

## 2019-10-21 PROCEDURE — 99396 PREV VISIT EST AGE 40-64: CPT | Performed by: INTERNAL MEDICINE

## 2019-10-21 PROCEDURE — G8484 FLU IMMUNIZE NO ADMIN: HCPCS | Performed by: INTERNAL MEDICINE

## 2019-10-21 ASSESSMENT — ENCOUNTER SYMPTOMS
NAUSEA: 0
CONSTIPATION: 0
CHEST TIGHTNESS: 0
EYES NEGATIVE: 1
SHORTNESS OF BREATH: 0
ABDOMINAL DISTENTION: 0
WHEEZING: 0
DIARRHEA: 0
ABDOMINAL PAIN: 0
VOMITING: 0
COUGH: 0
SINUS PRESSURE: 0

## 2019-10-28 ENCOUNTER — NURSE ONLY (OUTPATIENT)
Dept: PRIMARY CARE CLINIC | Age: 64
End: 2019-10-28
Payer: COMMERCIAL

## 2019-10-28 DIAGNOSIS — E53.8 VITAMIN B12 DEFICIENCY: Primary | ICD-10-CM

## 2019-10-28 PROCEDURE — 96372 THER/PROPH/DIAG INJ SC/IM: CPT | Performed by: INTERNAL MEDICINE

## 2019-10-28 RX ORDER — CYANOCOBALAMIN 1000 UG/ML
1000 INJECTION INTRAMUSCULAR; SUBCUTANEOUS ONCE
Status: COMPLETED | OUTPATIENT
Start: 2019-10-28 | End: 2019-10-28

## 2019-10-28 RX ADMIN — CYANOCOBALAMIN 1000 MCG: 1000 INJECTION INTRAMUSCULAR; SUBCUTANEOUS at 13:22

## 2019-11-04 DIAGNOSIS — I89.0 LYMPHEDEMA OF BOTH LOWER EXTREMITIES: ICD-10-CM

## 2019-11-25 ENCOUNTER — HOSPITAL ENCOUNTER (OUTPATIENT)
Dept: WOMENS IMAGING | Age: 64
Discharge: HOME | End: 2019-11-25
Payer: COMMERCIAL

## 2019-11-25 DIAGNOSIS — Z80.3 FAMILY HISTORY OF MALIGNANT NEOPLASM OF BREAST: ICD-10-CM

## 2019-11-25 DIAGNOSIS — Z12.31 BREAST CANCER SCREENING BY MAMMOGRAM: ICD-10-CM

## 2019-11-25 PROCEDURE — 77063 BREAST TOMOSYNTHESIS BI: CPT

## 2019-11-29 ENCOUNTER — TELEPHONE (OUTPATIENT)
Dept: PRIMARY CARE CLINIC | Age: 64
End: 2019-11-29

## 2019-11-29 DIAGNOSIS — I89.0 LYMPHEDEMA OF BOTH LOWER EXTREMITIES: Primary | ICD-10-CM

## 2019-12-12 DIAGNOSIS — E78.2 MIXED HYPERLIPIDEMIA: ICD-10-CM

## 2019-12-14 RX ORDER — SIMVASTATIN 10 MG
TABLET ORAL
Qty: 30 TABLET | Refills: 11 | Status: SHIPPED | OUTPATIENT
Start: 2019-12-14 | End: 2020-08-17 | Stop reason: SDUPTHER

## 2019-12-19 ENCOUNTER — PATIENT MESSAGE (OUTPATIENT)
Dept: PRIMARY CARE CLINIC | Age: 64
End: 2019-12-19

## 2019-12-19 DIAGNOSIS — E78.2 MIXED HYPERLIPIDEMIA: ICD-10-CM

## 2019-12-19 RX ORDER — SIMVASTATIN 10 MG
TABLET ORAL
Qty: 30 TABLET | Refills: 11 | Status: CANCELLED | OUTPATIENT
Start: 2019-12-19

## 2019-12-20 ENCOUNTER — TELEPHONE (OUTPATIENT)
Dept: PRIMARY CARE CLINIC | Age: 64
End: 2019-12-20

## 2019-12-31 ENCOUNTER — TELEPHONE (OUTPATIENT)
Dept: PRIMARY CARE CLINIC | Age: 64
End: 2019-12-31

## 2020-01-22 ENCOUNTER — OFFICE VISIT (OUTPATIENT)
Dept: PRIMARY CARE CLINIC | Age: 65
End: 2020-01-22
Payer: COMMERCIAL

## 2020-01-22 VITALS
BODY MASS INDEX: 32.78 KG/M2 | HEART RATE: 80 BPM | TEMPERATURE: 97.1 F | HEIGHT: 66 IN | SYSTOLIC BLOOD PRESSURE: 121 MMHG | RESPIRATION RATE: 16 BRPM | DIASTOLIC BLOOD PRESSURE: 77 MMHG | WEIGHT: 204 LBS | OXYGEN SATURATION: 100 %

## 2020-01-22 LAB
ALBUMIN SERPL-MCNC: 4.5 G/DL (ref 3.4–5)
ANION GAP SERPL CALCULATED.3IONS-SCNC: 15 MMOL/L (ref 3–16)
BUN BLDV-MCNC: 10 MG/DL (ref 7–20)
CALCIUM SERPL-MCNC: 10 MG/DL (ref 8.3–10.6)
CHLORIDE BLD-SCNC: 98 MMOL/L (ref 99–110)
CO2: 27 MMOL/L (ref 21–32)
CREAT SERPL-MCNC: 0.8 MG/DL (ref 0.6–1.2)
GFR AFRICAN AMERICAN: >60
GFR NON-AFRICAN AMERICAN: >60
GLUCOSE BLD-MCNC: 79 MG/DL (ref 70–99)
PHOSPHORUS: 3.9 MG/DL (ref 2.5–4.9)
POTASSIUM SERPL-SCNC: 4 MMOL/L (ref 3.5–5.1)
SODIUM BLD-SCNC: 140 MMOL/L (ref 136–145)
VITAMIN B-12: 327 PG/ML (ref 211–911)
VITAMIN D 25-HYDROXY: 40.6 NG/ML

## 2020-01-22 PROCEDURE — G8484 FLU IMMUNIZE NO ADMIN: HCPCS | Performed by: INTERNAL MEDICINE

## 2020-01-22 PROCEDURE — G8417 CALC BMI ABV UP PARAM F/U: HCPCS | Performed by: INTERNAL MEDICINE

## 2020-01-22 PROCEDURE — G8427 DOCREV CUR MEDS BY ELIG CLIN: HCPCS | Performed by: INTERNAL MEDICINE

## 2020-01-22 PROCEDURE — 96372 THER/PROPH/DIAG INJ SC/IM: CPT | Performed by: INTERNAL MEDICINE

## 2020-01-22 PROCEDURE — 1036F TOBACCO NON-USER: CPT | Performed by: INTERNAL MEDICINE

## 2020-01-22 PROCEDURE — 99213 OFFICE O/P EST LOW 20 MIN: CPT | Performed by: INTERNAL MEDICINE

## 2020-01-22 PROCEDURE — 3017F COLORECTAL CA SCREEN DOC REV: CPT | Performed by: INTERNAL MEDICINE

## 2020-01-22 RX ORDER — CYANOCOBALAMIN 1000 UG/ML
1000 INJECTION INTRAMUSCULAR; SUBCUTANEOUS ONCE
Status: COMPLETED | OUTPATIENT
Start: 2020-01-22 | End: 2020-01-22

## 2020-01-22 RX ADMIN — CYANOCOBALAMIN 1000 MCG: 1000 INJECTION INTRAMUSCULAR; SUBCUTANEOUS at 13:17

## 2020-01-22 ASSESSMENT — ENCOUNTER SYMPTOMS
SINUS PRESSURE: 0
BLURRED VISION: 0
VOMITING: 0
ABDOMINAL PAIN: 0
ORTHOPNEA: 0
CONSTIPATION: 0
DIARRHEA: 0
NAUSEA: 0
SHORTNESS OF BREATH: 0
WHEEZING: 0
CHEST TIGHTNESS: 0
EYES NEGATIVE: 1
COUGH: 0
ABDOMINAL DISTENTION: 0

## 2020-01-22 ASSESSMENT — PATIENT HEALTH QUESTIONNAIRE - PHQ9
SUM OF ALL RESPONSES TO PHQ QUESTIONS 1-9: 0
1. LITTLE INTEREST OR PLEASURE IN DOING THINGS: 0
2. FEELING DOWN, DEPRESSED OR HOPELESS: 0
SUM OF ALL RESPONSES TO PHQ9 QUESTIONS 1 & 2: 0
DEPRESSION UNABLE TO ASSESS: FUNCTIONAL CAPACITY MOTIVATION LIMITS ACCURACY
1. LITTLE INTEREST OR PLEASURE IN DOING THINGS: 0
SUM OF ALL RESPONSES TO PHQ9 QUESTIONS 1 & 2: 0
2. FEELING DOWN, DEPRESSED OR HOPELESS: 0

## 2020-01-22 NOTE — PROGRESS NOTES
tightness, shortness of breath and wheezing. Does not smoke   No etoh   No asthma    Cardiovascular: Negative. Negative for chest pain, palpitations, orthopnea and PND. No HTN / CAD    Gastrointestinal: Negative for abdominal distention, abdominal pain, constipation, diarrhea, nausea and vomiting. Frequent constipation , not relieved with linzess. Genitourinary: Negative for dysuria, frequency, hematuria, menstrual problem, urgency and vaginal discharge. Musculoskeletal: Negative. Negative for arthralgias, myalgias and neck pain. Skin: Negative for rash. Neurological: Negative for dizziness, weakness, numbness and headaches. Psychiatric/Behavioral: Negative for behavioral problems and sleep disturbance. The patient is not nervous/anxious. Prior to Visit Medications    Medication Sig Taking? Authorizing Provider   Elastic Bandages & Supports (MEDICAL COMPRESSION STOCKINGS) MISC 2 each by Does not apply route daily Thigh high 30-40 mm Hg, Yes Monica Ko MD   simvastatin (ZOCOR) 10 MG tablet TAKE ONE TABLET BY MOUTH EVERY NIGHT AT BEDTIME Yes Monica Ko MD   Compression Stockings MISC by Does not apply route Knee-high 20-30 mmHg compression bilaterally. Yes Monica Ko MD   linaclotide [facility]) 290 MCG CAPS capsule Take 1 capsule by mouth every morning (before breakfast) Yes Monica Ko MD   ibuprofen (ADVIL;MOTRIN) 200 MG tablet Take 200 mg by mouth every 6 hours as needed for Pain Yes Historical Provider, MD   CPAP Machine MISC by Does not apply route Yes Monica Ko MD        Social History     Tobacco Use    Smoking status: Former Smoker     Packs/day: 1.00     Years: 3.00     Pack years: 3.00     Last attempt to quit: 1982     Years since quittin.6    Smokeless tobacco: Never Used   Substance Use Topics    Alcohol use:  Yes     Alcohol/week: 0.0 standard drinks     Comment: occ        Vitals:    20 1147   BP: 121/77   Site: Left Upper Arm   Position: Sitting   Cuff Size: Large Adult   Pulse: 80   Resp: 16   Temp: 97.1 °F (36.2 °C)   TempSrc: Oral   SpO2: 100%   Weight: 204 lb (92.5 kg)   Height: 5' 6\" (1.676 m)     Estimated body mass index is 32.93 kg/m² as calculated from the following:    Height as of this encounter: 5' 6\" (1.676 m). Weight as of this encounter: 204 lb (92.5 kg). Physical Exam  Vitals signs reviewed. Constitutional:       General: She is not in acute distress. Eyes:      Conjunctiva/sclera: Conjunctivae normal.   Neck:      Musculoskeletal: Neck supple. Cardiovascular:      Rate and Rhythm: Normal rate and regular rhythm. Heart sounds: Normal heart sounds. Pulmonary:      Breath sounds: Normal breath sounds. Abdominal:      General: There is no distension. Palpations: Abdomen is soft. Tenderness: There is no tenderness. Musculoskeletal: Normal range of motion. Lymphadenopathy:      Cervical: No cervical adenopathy. Skin:     General: Skin is warm. Neurological:      Mental Status: She is alert and oriented to person, place, and time. Psychiatric:         Behavior: Behavior normal.         Judgment: Judgment normal.         ASSESSMENT/PLAN:  Considering chirotherapy of assymetric adipose deposits on the righ tthigh. Diagnosis Orders   1. Essential hypertension is under control with diet and exercise and reminded to avoid NSAIDs. BP Readings from Last 3 Encounters:   01/22/20 121/77   10/21/19 128/74   07/24/19 125/74    Renal Function Panel    Renal Function Panel   2. Vitamin D deficiency monitor level on supplement. Vitamin D 25 Hydroxy    Vitamin D 25 Hydroxy   3. Vitamin B12 deficiency felt better not having memory problems has come in for a few B12 injections and reminded to come monthly. Vitamin B12    cyanocobalamin injection 1,000 mcg    Vitamin B12       An electronic signature was used to authenticate this note.     --Yeni Atkins Mardel Denver, MD on 1/22/2020 at 12:19 PM

## 2020-08-17 ENCOUNTER — OFFICE VISIT (OUTPATIENT)
Dept: PRIMARY CARE CLINIC | Age: 65
End: 2020-08-17
Payer: COMMERCIAL

## 2020-08-17 ENCOUNTER — HOSPITAL ENCOUNTER (OUTPATIENT)
Dept: GENERAL RADIOLOGY | Age: 65
Discharge: HOME | End: 2020-08-17
Payer: COMMERCIAL

## 2020-08-17 VITALS
HEART RATE: 75 BPM | TEMPERATURE: 98.4 F | WEIGHT: 204 LBS | SYSTOLIC BLOOD PRESSURE: 148 MMHG | BODY MASS INDEX: 32.78 KG/M2 | HEIGHT: 66 IN | DIASTOLIC BLOOD PRESSURE: 86 MMHG | OXYGEN SATURATION: 98 %

## 2020-08-17 DIAGNOSIS — R42 VERTIGO: ICD-10-CM

## 2020-08-17 DIAGNOSIS — E55.9 VITAMIN D DEFICIENCY: ICD-10-CM

## 2020-08-17 DIAGNOSIS — Z13.1 SCREENING FOR DIABETES MELLITUS: ICD-10-CM

## 2020-08-17 DIAGNOSIS — Z12.11 SCREENING FOR COLON CANCER: Primary | ICD-10-CM

## 2020-08-17 DIAGNOSIS — I10 ESSENTIAL HYPERTENSION: ICD-10-CM

## 2020-08-17 LAB
A/G RATIO: 1.5 (ref 1.1–2.2)
ALBUMIN SERPL-MCNC: 4.4 G/DL (ref 3.4–5)
ALP BLD-CCNC: 76 U/L (ref 40–129)
ALT SERPL-CCNC: 9 U/L (ref 10–40)
ANION GAP SERPL CALCULATED.3IONS-SCNC: 13 MMOL/L (ref 3–16)
AST SERPL-CCNC: 14 U/L (ref 15–37)
BASOPHILS ABSOLUTE: 0 K/UL (ref 0–0.2)
BASOPHILS RELATIVE PERCENT: 1.2 %
BILIRUB SERPL-MCNC: 0.5 MG/DL (ref 0–1)
BILIRUBIN URINE: NEGATIVE
BLOOD, URINE: NEGATIVE
BUN BLDV-MCNC: 13 MG/DL (ref 7–20)
CALCIUM SERPL-MCNC: 10 MG/DL (ref 8.3–10.6)
CHLORIDE BLD-SCNC: 103 MMOL/L (ref 99–110)
CHOLESTEROL, TOTAL: 234 MG/DL (ref 0–199)
CLARITY: CLEAR
CO2: 25 MMOL/L (ref 21–32)
COLOR: YELLOW
CREAT SERPL-MCNC: 0.9 MG/DL (ref 0.6–1.2)
EOSINOPHILS ABSOLUTE: 0.1 K/UL (ref 0–0.6)
EOSINOPHILS RELATIVE PERCENT: 1.8 %
GFR AFRICAN AMERICAN: >60
GFR NON-AFRICAN AMERICAN: >60
GLOBULIN: 2.9 G/DL
GLUCOSE BLD-MCNC: 89 MG/DL (ref 70–99)
GLUCOSE URINE: NEGATIVE MG/DL
HCT VFR BLD CALC: 42.1 % (ref 36–48)
HDLC SERPL-MCNC: 77 MG/DL (ref 40–60)
HEMOGLOBIN: 13.7 G/DL (ref 12–16)
KETONES, URINE: NEGATIVE MG/DL
LDL CHOLESTEROL CALCULATED: 149 MG/DL
LEUKOCYTE ESTERASE, URINE: NEGATIVE
LYMPHOCYTES ABSOLUTE: 1.9 K/UL (ref 1–5.1)
LYMPHOCYTES RELATIVE PERCENT: 47.1 %
MCH RBC QN AUTO: 28.8 PG (ref 26–34)
MCHC RBC AUTO-ENTMCNC: 32.5 G/DL (ref 31–36)
MCV RBC AUTO: 88.7 FL (ref 80–100)
MICROSCOPIC EXAMINATION: NORMAL
MONOCYTES ABSOLUTE: 0.4 K/UL (ref 0–1.3)
MONOCYTES RELATIVE PERCENT: 9.2 %
NEUTROPHILS ABSOLUTE: 1.7 K/UL (ref 1.7–7.7)
NEUTROPHILS RELATIVE PERCENT: 40.7 %
NITRITE, URINE: NEGATIVE
PDW BLD-RTO: 15 % (ref 12.4–15.4)
PH UA: 6 (ref 5–8)
PLATELET # BLD: 215 K/UL (ref 135–450)
PMV BLD AUTO: 8.7 FL (ref 5–10.5)
POTASSIUM SERPL-SCNC: 4.3 MMOL/L (ref 3.5–5.1)
PROTEIN UA: NEGATIVE MG/DL
RBC # BLD: 4.75 M/UL (ref 4–5.2)
SODIUM BLD-SCNC: 141 MMOL/L (ref 136–145)
SPECIFIC GRAVITY UA: 1.01 (ref 1–1.03)
TOTAL PROTEIN: 7.3 G/DL (ref 6.4–8.2)
TRIGL SERPL-MCNC: 42 MG/DL (ref 0–150)
URINE TYPE: NORMAL
UROBILINOGEN, URINE: 0.2 E.U./DL
VLDLC SERPL CALC-MCNC: 8 MG/DL
WBC # BLD: 4.1 K/UL (ref 4–11)

## 2020-08-17 PROCEDURE — 99214 OFFICE O/P EST MOD 30 MIN: CPT | Performed by: INTERNAL MEDICINE

## 2020-08-17 PROCEDURE — 72040 X-RAY EXAM NECK SPINE 2-3 VW: CPT

## 2020-08-17 PROCEDURE — G8427 DOCREV CUR MEDS BY ELIG CLIN: HCPCS | Performed by: INTERNAL MEDICINE

## 2020-08-17 PROCEDURE — G8417 CALC BMI ABV UP PARAM F/U: HCPCS | Performed by: INTERNAL MEDICINE

## 2020-08-17 PROCEDURE — 1036F TOBACCO NON-USER: CPT | Performed by: INTERNAL MEDICINE

## 2020-08-17 PROCEDURE — 3017F COLORECTAL CA SCREEN DOC REV: CPT | Performed by: INTERNAL MEDICINE

## 2020-08-17 RX ORDER — ASPIRIN 81 MG/1
81 TABLET ORAL DAILY
Qty: 90 TABLET | Refills: 1 | Status: SHIPPED | OUTPATIENT
Start: 2020-08-17 | End: 2021-01-29

## 2020-08-17 RX ORDER — SIMVASTATIN 10 MG
TABLET ORAL
Qty: 30 TABLET | Refills: 11 | Status: SHIPPED | OUTPATIENT
Start: 2020-08-17 | End: 2020-09-15

## 2020-08-17 RX ORDER — HYDROCHLOROTHIAZIDE 25 MG/1
25 TABLET ORAL EVERY MORNING
Qty: 90 TABLET | Refills: 1 | Status: SHIPPED | OUTPATIENT
Start: 2020-08-17 | End: 2021-01-29

## 2020-08-17 ASSESSMENT — PATIENT HEALTH QUESTIONNAIRE - PHQ9
SUM OF ALL RESPONSES TO PHQ QUESTIONS 1-9: 0
SUM OF ALL RESPONSES TO PHQ QUESTIONS 1-9: 0
1. LITTLE INTEREST OR PLEASURE IN DOING THINGS: 0
SUM OF ALL RESPONSES TO PHQ9 QUESTIONS 1 & 2: 0
2. FEELING DOWN, DEPRESSED OR HOPELESS: 0

## 2020-08-17 NOTE — PATIENT INSTRUCTIONS
Low carbohydrate low fat diet:  Drink only water. You can have home made tea or lemonade. Only belen with Stevia  Try to drink 8 glasses of water daily. Each meal or snack should consist of 3/4 green vegetables and 1/4 lean protein. Lean protein :  Eggs, fish with fins, chicken or turkey breast without out skin, beef or pork. Means and poultry should be lean and broiled, so the fat fall away and not consumed by you. Ketosis burns fat cells. Eat only the berries for fruit , since the berries are low in carbohydrates. You can pick from a variety of berries such as blue berries, black berries, strawberries, raspberries.  acai berries and etc.

## 2020-08-17 NOTE — PROGRESS NOTES
2020     Babetta Phalen (:  1955) is a 62 y.o. female, here for evaluation of the following medical concerns:    HPI     Hypertension not controlled. Patient is treated with life  Style change. No associate CHF, renal disease or headaches. BP Readings from Last 3 Encounters:   20 (!) 148/86   20 121/77   10/21/19 128/74     Vertigo with horizontal nystagmus on left lateral gaze. Patient called 911 one week ago when could not get out of bed to go to bathroom due to vertigo. NO slurred speech or confusion . No numbness . No syncope. No tinnitus or decreased hearing and no headache       Radicular right arm pain qhs. No numbness or loss of strength, symptoms present for over a month. Patient Active Problem List   Diagnosis    Fibromyalgia    Cervical spondylosis with radiculopathy    Vitamin D deficiency    Vitamin B12 deficiency    Constipation    Varicose vein of leg    Brittle nails    Mass of thigh    Obesity    STEPHANIE (obstructive sleep apnea)    H. pylori infection    Obesity (BMI 30.0-34. 9)    Essential hypertension    Hx of colonoscopy    Mixed hyperlipidemia    Lymphedema of both lower extremities    Epigastric pain    Abdominal gas pain    Chronic back pain     Allergies   Allergen Reactions    Lisinopril Anaphylaxis and Itching     sweating    Flagyl [Metronidazole Hcl] Other (See Comments)     \"metallic taste\"    Metronidazole Nausea And Vomiting         Review of Systems   Constitutional: Negative for activity change, appetite change, fatigue, fever and unexpected weight change. Flu Vac    HENT: Negative. Negative for sinus pressure. Sore throat from November resolved and never took prilosec. No dysphagia   Eyes: Negative. Respiratory: Negative for cough, chest tightness, shortness of breath and wheezing. Does not smoke   No etoh   No asthma    Cardiovascular: Negative. Negative for chest pain and palpitations. No HTN / CAD    Gastrointestinal: Negative for abdominal distention, abdominal pain, constipation, diarrhea, nausea and vomiting. Frequent constipation , not relieved with linzess. Genitourinary: Negative for dysuria, frequency, hematuria, menstrual problem, urgency and vaginal discharge. Musculoskeletal: Negative. Negative for arthralgias, myalgias and neck pain. Skin: Negative for rash. Neurological: Negative for dizziness, weakness, numbness and headaches. Psychiatric/Behavioral: Negative for behavioral problems and sleep disturbance. The patient is not nervous/anxious. Prior to Visit Medications    Medication Sig Taking? Authorizing Provider   Elastic Bandages & Supports (MEDICAL COMPRESSION STOCKINGS) MISC 2 each by Does not apply route daily Thigh high 30-40 mm Hg, Yes Swapnil Gomez MD   simvastatin (ZOCOR) 10 MG tablet TAKE ONE TABLET BY MOUTH EVERY NIGHT AT BEDTIME Yes Swapnil Gomez MD   Compression Stockings MISC by Does not apply route Knee-high 20-30 mmHg compression bilaterally. Yes Swapnil Gomez MD   linaclotide [facility]) 290 MCG CAPS capsule Take 1 capsule by mouth every morning (before breakfast) Yes Swapnil Gomez MD   ibuprofen (ADVIL;MOTRIN) 200 MG tablet Take 200 mg by mouth every 6 hours as needed for Pain Yes Historical Provider, MD   CPAP Machine MISC by Does not apply route Yes Swapnil Gomez MD        Social History     Tobacco Use    Smoking status: Former Smoker     Packs/day: 1.00     Years: 3.00     Pack years: 3.00     Last attempt to quit: 1982     Years since quittin.2    Smokeless tobacco: Never Used   Substance Use Topics    Alcohol use:  Yes     Alcohol/week: 0.0 standard drinks     Comment: occ        Vitals:    20 0942   BP: 130/88   Pulse: 75   Temp: 98.4 °F (36.9 °C)   TempSrc: Oral   SpO2: 98%   Weight: 204 lb (92.5 kg)   Height: 5' 6\" (1.676 m)     Estimated body mass index is 32.93 kg/m² as calculated from the following:    Height as of this encounter: 5' 6\" (1.676 m). Weight as of this encounter: 204 lb (92.5 kg). Physical Exam  Vitals signs reviewed. Constitutional:       General: She is not in acute distress. Eyes:      Conjunctiva/sclera: Conjunctivae normal.   Neck:      Musculoskeletal: Neck supple. Cardiovascular:      Rate and Rhythm: Normal rate and regular rhythm. Heart sounds: Normal heart sounds. Pulmonary:      Breath sounds: Normal breath sounds. Abdominal:      General: There is no distension. Palpations: Abdomen is soft. There is no mass. Tenderness: There is no abdominal tenderness. There is no right CVA tenderness, left CVA tenderness, guarding or rebound. Hernia: No hernia is present. Musculoskeletal: Normal range of motion. Lymphadenopathy:      Cervical: No cervical adenopathy. Skin:     General: Skin is warm. Neurological:      General: No focal deficit present. Mental Status: She is alert and oriented to person, place, and time. Cranial Nerves: No cranial nerve deficit. Sensory: No sensory deficit. Motor: No weakness. Coordination: Coordination normal.      Gait: Gait normal.      Deep Tendon Reflexes: Reflexes normal.   Psychiatric:         Mood and Affect: Mood normal.         Behavior: Behavior normal.         Thought Content: Thought content normal.         Judgment: Judgment normal.         ASSESSMENT/PLAN:   Diagnosis Orders   1. Radicular pain in right arm  XR CERVICAL SPINE (2-3 VIEWS)   2. Vitamin D deficiency on supplement monitor labs to see effect goal 50-80. Vitamin D 25 Hydroxy   3. Vitamin B12 deficiency   Lab Results   Component Value Date    YZEZFPIX96 045 08/17/2020     patient is to come to office for monthly injections and will teach her how to give     4.  Essential hypertension hypertension resolved with getting weight down from 240 pounds to 198 pounds but will recent regaining of weight blood pressure has become an issue again. Start hydrochlorothiazide and work on weight reduction with low-carb diet and regular exercise  Wt Readings from Last 3 Encounters:   08/19/20 207 lb (93.9 kg)   08/17/20 204 lb (92.5 kg)   01/22/20 204 lb (92.5 kg)       BP Readings from Last 3 Encounters:   08/19/20 (!) 141/92   08/17/20 (!) 148/86   01/22/20 121/77    hydroCHLOROthiazide (HYDRODIURIL) 25 MG tablet    Lipid Panel    Urinalysis    TSH WITH REFLEX TO FT4    Comprehensive Metabolic Panel   5. Vertigo consistently benign positional vertigo. Will refer to ENT give exercises monitor labs. CBC Auto Differential    Vitamin B12    Chrystal Rodríguez DO, Otolaryngology, [facility], give vertigo exercises. 6. Screening for diabetes mellitus  Hemoglobin A1C   7. Mixed hyperlipidemia not controlled because not taking statin therapy. Will restart. Lab Results   Component Value Date    CHOL 234 (H) 08/17/2020    CHOL 207 (H) 10/21/2019    CHOL 200 (H) 12/05/2018     Lab Results   Component Value Date    TRIG 42 08/17/2020    TRIG 49 10/21/2019    TRIG 45 12/05/2018     Lab Results   Component Value Date    HDL 77 (H) 08/17/2020    HDL 99 (H) 10/21/2019    HDL 80 (H) 12/05/2018     Lab Results   Component Value Date    LDLCALC 149 (H) 08/17/2020    LDLCALC 98 10/21/2019    LDLCALC 111 (H) 12/05/2018     Lab Results   Component Value Date    LABVLDL 8 08/17/2020    LABVLDL 10 10/21/2019    LABVLDL 9 12/05/2018     No results found for: CHOLHDLRATIO   simvastatin (ZOCOR) 10 MG tablet    aspirin EC 81 MG EC tablet       An electronic signature was used to authenticate this note.     --Rip Ann MD on 8/17/2020 at 10:07 AM

## 2020-08-18 LAB
ESTIMATED AVERAGE GLUCOSE: 111.2 MG/DL
HBA1C MFR BLD: 5.5 %
TSH REFLEX FT4: 1.24 UIU/ML (ref 0.27–4.2)
VITAMIN B-12: 295 PG/ML (ref 211–911)
VITAMIN D 25-HYDROXY: 37 NG/ML

## 2020-08-19 ENCOUNTER — OFFICE VISIT (OUTPATIENT)
Dept: ENT CLINIC | Age: 65
End: 2020-08-19
Payer: COMMERCIAL

## 2020-08-19 VITALS
BODY MASS INDEX: 33.27 KG/M2 | DIASTOLIC BLOOD PRESSURE: 92 MMHG | TEMPERATURE: 97.9 F | WEIGHT: 207 LBS | SYSTOLIC BLOOD PRESSURE: 141 MMHG | HEART RATE: 76 BPM | HEIGHT: 66 IN

## 2020-08-19 PROCEDURE — 99244 OFF/OP CNSLTJ NEW/EST MOD 40: CPT | Performed by: OTOLARYNGOLOGY

## 2020-08-19 PROCEDURE — G8417 CALC BMI ABV UP PARAM F/U: HCPCS | Performed by: OTOLARYNGOLOGY

## 2020-08-19 PROCEDURE — G8427 DOCREV CUR MEDS BY ELIG CLIN: HCPCS | Performed by: OTOLARYNGOLOGY

## 2020-08-19 RX ORDER — METHYLPREDNISOLONE 4 MG/1
TABLET ORAL
Qty: 1 KIT | Refills: 0 | Status: SHIPPED | OUTPATIENT
Start: 2020-08-19 | End: 2020-08-25

## 2020-08-19 ASSESSMENT — ENCOUNTER SYMPTOMS
PHOTOPHOBIA: 0
FACIAL SWELLING: 0
COUGH: 0
NAUSEA: 0
VOMITING: 0
RHINORRHEA: 0
DIARRHEA: 0
VOICE CHANGE: 0
WHEEZING: 0
CONSTIPATION: 0
EYE DISCHARGE: 0
BLOOD IN STOOL: 0
SINUS PRESSURE: 0
SORE THROAT: 0
TROUBLE SWALLOWING: 0
SHORTNESS OF BREATH: 0
SINUS PAIN: 0
CHOKING: 0
EYE ITCHING: 0
COLOR CHANGE: 0
STRIDOR: 0
BACK PAIN: 0

## 2020-08-19 NOTE — PROGRESS NOTES
[facility]  [facility], [address]  P: [phone]  F: [phone]       Patient     Charles Tilley  1955    ChiefComplaint     Chief Complaint   Patient presents with    Dizziness     Patient is here today because last week she started having dizzy spells that made her feel off balance, however before the dizziness started she was having popping sound in her left ear       History of Present Illness     Charles Tilley is a pleasant 62 y.o. female presents as a new patient referred by her primary care physician for dizziness. 1 week ago she woke up in the middle the night to go to the restroom and when she sat up and stood up from bed she experienced lightheaded sensation and a feeling of motion. She felt like she was going to pass out. She denies any room spinning. Denies nausea or vomiting. Denied any auditory changes including hearing loss or tinnitus or aural fullness. Denies any otorrhea or otalgia. She denies a history of ear infections or ear surgeries. A couple days later she did have a similar but much milder episode that only lasted a couple minutes and was self-limiting. She is never had any episodes like this before. A few days prior to this she did experience some popping and clicking of her left ear which subsided after using NyQuil. She has had an audiogram in June for her work which was normal.  No recent audiogram otherwise. No change in hearing. Denies any family history of ear problems.     Past Medical History     Past Medical History:   Diagnosis Date    Allergic rhinitis     Chronic back pain     Fibroids     Fibromyalgia     Fibromyalgia     Hyperlipidemia 6/25/2012    Hypertension     STEPHANIE (obstructive sleep apnea)     Sleep apnea        Past Surgical History     Past Surgical History:   Procedure Laterality Date    ACHILLES TENDON SURGERY  2007    left achilles repair by Dr. Luciano Quinones APPENDECTOMY      BREAST CYST ASPIRATION      CERVIX SURGERY      chryo cervical surgery-- sees Dr Manohar Mccormick @ [facility]  2006    Dr Ezequiel Rocha and 2012    ENDOMETRIAL BIOPSY      Dr Baltazar Byrne       Family History     Family History   Problem Relation Age of Onset    Cancer Mother         colon    Heart Disease Mother         heart infection    High Blood Pressure Father     Kidney Disease Paternal Uncle     Cancer Paternal Uncle 47        colon cancer    High Cholesterol Sister     Diabetes Maternal Aunt     Cancer Maternal Grandmother         breast cancer    Stroke Maternal Grandmother     Cancer Paternal Grandmother     Diabetes Paternal Grandmother        Social History     Social History     Socioeconomic History    Marital status:      Spouse name: Not on file    Number of children: 3    Years of education: Not on file    Highest education level: Not on file   Occupational History    Occupation: Patient registration     Comment: retired   Social Needs    Financial resource strain: Not on file    Food insecurity     Worry: Not on file     Inability: Not on file   [employer] needs     Medical: Not on file     Non-medical: Not on file   Tobacco Use    Smoking status: Former Smoker     Packs/day: 1.00     Years: 3.00     Pack years: 3.00     Last attempt to quit: 1982     Years since quittin.2    Smokeless tobacco: Never Used   Substance and Sexual Activity    Alcohol use:  Yes     Alcohol/week: 0.0 standard drinks     Comment: occ    Drug use: No    Sexual activity: Never   Lifestyle    Physical activity     Days per week: Not on file     Minutes per session: Not on file    Stress: Not on file   Relationships    Social connections     Talks on phone: Not on file     Gets together: Not on file     Attends Mu-ism service: Not on file     Active member of club or organization: Not on file     Attends meetings of clubs or organizations: Not on file     Relationship status: Not on file    Intimate partner violence     Fear of current or ex partner: Not on file     Emotionally abused: Not on file     Physically abused: Not on file     Forced sexual activity: Not on file   Other Topics Concern    Not on file   Social History Narrative    Not on file       Allergies     Allergies   Allergen Reactions    Lisinopril Anaphylaxis and Itching     sweating    Flagyl [Metronidazole Hcl] Other (See Comments)     \"metallic taste\"    Metronidazole Nausea And Vomiting       Medications     Current Outpatient Medications   Medication Sig Dispense Refill    methylPREDNISolone (MEDROL DOSEPACK) 4 MG tablet Take by mouth. 1 kit 0    hydroCHLOROthiazide (HYDRODIURIL) 25 MG tablet Take 1 tablet by mouth every morning 90 tablet 1    simvastatin (ZOCOR) 10 MG tablet TAKE ONE TABLET BY MOUTH EVERY NIGHT AT BEDTIME 30 tablet 11    aspirin EC 81 MG EC tablet Take 1 tablet by mouth daily 90 tablet 1    Elastic Bandages & Supports (MEDICAL COMPRESSION STOCKINGS) MISC 2 each by Does not apply route daily Thigh high 30-40 mm Hg, 2 each 3    Compression Stockings MISC by Does not apply route Knee-high 20-30 mmHg compression bilaterally. 2 each 5    linaclotide (LINZESS) 290 MCG CAPS capsule Take 1 capsule by mouth every morning (before breakfast) 90 capsule 3    CPAP Machine MISC by Does not apply route 1 each 0     No current facility-administered medications for this visit. Review of Systems     Review of Systems   Constitutional: Negative for activity change, appetite change, chills, diaphoresis, fatigue, fever and unexpected weight change. HENT: Negative for congestion, dental problem, drooling, ear discharge, ear pain, facial swelling, hearing loss, mouth sores, nosebleeds, postnasal drip, rhinorrhea, sinus pressure, sinus pain, sneezing, sore throat, tinnitus, trouble swallowing and voice change.     Eyes: Negative for photophobia, discharge, itching and visual disturbance. Respiratory: Negative for cough, choking, shortness of breath, wheezing and stridor. Gastrointestinal: Negative for blood in stool, constipation, diarrhea, nausea and vomiting. Endocrine: Negative for cold intolerance, heat intolerance, polyphagia and polyuria. Musculoskeletal: Negative for back pain, gait problem, neck pain and neck stiffness. Skin: Negative for color change, pallor, rash and wound. Neurological: Positive for dizziness and light-headedness. Negative for syncope, facial asymmetry, speech difficulty, numbness and headaches. Hematological: Negative for adenopathy. Does not bruise/bleed easily. Psychiatric/Behavioral: Negative for agitation, confusion and sleep disturbance. PhysicalExam     Vitals:    08/19/20 1618   BP: (!) 141/92   Pulse: 76   Temp: 97.9 °F (36.6 °C)       Physical Exam  Constitutional:       Appearance: She is well-developed. HENT:      Head: Normocephalic and atraumatic. Not macrocephalic and not microcephalic. No raccoon eyes, Hammond's sign, abrasion, contusion, right periorbital erythema, left periorbital erythema or laceration. Hair is normal.      Jaw: No trismus. Right Ear: Hearing, tympanic membrane and external ear normal. No decreased hearing noted. No drainage, swelling or tenderness. No middle ear effusion. No mastoid tenderness. Tympanic membrane is not perforated, retracted or bulging. Tympanic membrane has normal mobility. Left Ear: Hearing, tympanic membrane and external ear normal. No decreased hearing noted. No drainage, swelling or tenderness. No middle ear effusion. No mastoid tenderness. Tympanic membrane is not perforated, retracted or bulging. Tympanic membrane has normal mobility. Nose: No nasal deformity, septal deviation, laceration, mucosal edema or rhinorrhea. Right Sinus: No maxillary sinus tenderness or frontal sinus tenderness.       Left Sinus: No maxillary sinus tenderness or frontal sinus tenderness. Mouth/Throat:      Mouth: Mucous membranes are not pale, not dry and not cyanotic. No lacerations or oral lesions. Dentition: Normal dentition. No dental caries or dental abscesses. Pharynx: Uvula midline. No oropharyngeal exudate, posterior oropharyngeal erythema or uvula swelling. Tonsils: No tonsillar abscesses. Eyes:      General: Lids are normal.         Right eye: No discharge. Left eye: No discharge. Extraocular Movements:      Right eye: Normal extraocular motion and no nystagmus. Left eye: Normal extraocular motion and no nystagmus. Conjunctiva/sclera:      Right eye: No chemosis or exudate. Left eye: No chemosis or exudate. Neck:      Musculoskeletal: Neck supple. Thyroid: No thyroid mass or thyromegaly. Vascular: Normal carotid pulses. Trachea: No tracheal tenderness or tracheal deviation. Cardiovascular:      Rate and Rhythm: Normal rate and regular rhythm. Pulmonary:      Effort: No tachypnea, bradypnea or respiratory distress. Breath sounds: No stridor. Musculoskeletal:      Right shoulder: She exhibits normal range of motion. Lymphadenopathy:      Head:      Right side of head: No submental, submandibular, tonsillar, preauricular, posterior auricular or occipital adenopathy. Left side of head: No submental, submandibular, tonsillar, preauricular, posterior auricular or occipital adenopathy. Cervical: No cervical adenopathy. Right cervical: No superficial, deep or posterior cervical adenopathy. Left cervical: No superficial, deep or posterior cervical adenopathy. Skin:     General: Skin is warm and dry. Findings: No bruising, erythema, laceration, lesion or rash. Neurological:      Mental Status: She is alert and oriented to person, place, and time. Cranial Nerves: No cranial nerve deficit. Sensory: Sensation is intact. Motor: Motor function is intact.

## 2020-08-20 PROBLEM — H35.412 LATTICE DEGENERATION OF RETINA, LEFT EYE: Status: ACTIVE | Noted: 2020-08-20

## 2020-08-20 PROBLEM — H25.13 AGE-RELATED NUCLEAR CATARACT, BILATERAL: Status: ACTIVE | Noted: 2020-08-20

## 2020-08-20 PROBLEM — H43.813 VITREOUS DEGENERATION, BILATERAL: Status: ACTIVE | Noted: 2020-08-20

## 2020-08-20 PROBLEM — H43.812 PVD (POSTERIOR VITREOUS DETACHMENT), LEFT EYE: Status: ACTIVE | Noted: 2020-08-20

## 2020-08-20 PROBLEM — H52.13 MYOPIA OF BOTH EYES: Status: ACTIVE | Noted: 2020-08-20

## 2020-08-20 PROBLEM — H33.321 ROUND HOLE, RIGHT EYE: Status: ACTIVE | Noted: 2020-08-20

## 2020-08-20 PROBLEM — H35.033 HYPERTENSIVE RETINOPATHY, BILATERAL: Status: ACTIVE | Noted: 2020-08-20

## 2020-08-20 ASSESSMENT — ENCOUNTER SYMPTOMS
CHEST TIGHTNESS: 0
ABDOMINAL PAIN: 0
EYES NEGATIVE: 1
SHORTNESS OF BREATH: 0
DIARRHEA: 0
VOMITING: 0
SINUS PRESSURE: 0
NAUSEA: 0
CONSTIPATION: 0
ABDOMINAL DISTENTION: 0
COUGH: 0
WHEEZING: 0

## 2020-08-26 ENCOUNTER — NURSE ONLY (OUTPATIENT)
Dept: PRIMARY CARE CLINIC | Age: 65
End: 2020-08-26
Payer: COMMERCIAL

## 2020-08-26 PROCEDURE — 96372 THER/PROPH/DIAG INJ SC/IM: CPT | Performed by: INTERNAL MEDICINE

## 2020-08-26 RX ORDER — CYANOCOBALAMIN 1000 UG/ML
1000 INJECTION INTRAMUSCULAR; SUBCUTANEOUS ONCE
Status: COMPLETED | OUTPATIENT
Start: 2020-08-26 | End: 2020-08-26

## 2020-08-26 RX ADMIN — CYANOCOBALAMIN 1000 MCG: 1000 INJECTION INTRAMUSCULAR; SUBCUTANEOUS at 10:40

## 2020-09-15 ENCOUNTER — OFFICE VISIT (OUTPATIENT)
Dept: PRIMARY CARE CLINIC | Age: 65
End: 2020-09-15
Payer: COMMERCIAL

## 2020-09-15 VITALS
WEIGHT: 204 LBS | HEIGHT: 66 IN | TEMPERATURE: 97.1 F | HEART RATE: 79 BPM | SYSTOLIC BLOOD PRESSURE: 110 MMHG | BODY MASS INDEX: 32.78 KG/M2 | DIASTOLIC BLOOD PRESSURE: 78 MMHG | OXYGEN SATURATION: 99 %

## 2020-09-15 PROCEDURE — 3017F COLORECTAL CA SCREEN DOC REV: CPT | Performed by: INTERNAL MEDICINE

## 2020-09-15 PROCEDURE — 99214 OFFICE O/P EST MOD 30 MIN: CPT | Performed by: INTERNAL MEDICINE

## 2020-09-15 PROCEDURE — G8417 CALC BMI ABV UP PARAM F/U: HCPCS | Performed by: INTERNAL MEDICINE

## 2020-09-15 PROCEDURE — 1036F TOBACCO NON-USER: CPT | Performed by: INTERNAL MEDICINE

## 2020-09-15 PROCEDURE — G8427 DOCREV CUR MEDS BY ELIG CLIN: HCPCS | Performed by: INTERNAL MEDICINE

## 2020-09-15 RX ORDER — LINACLOTIDE 290 UG/1
290 CAPSULE, GELATIN COATED ORAL
Qty: 120 CAPSULE | Refills: 3 | Status: SHIPPED | OUTPATIENT
Start: 2020-09-15 | End: 2020-12-14

## 2020-09-15 RX ORDER — SIMVASTATIN 20 MG
TABLET ORAL
Qty: 90 TABLET | Refills: 3 | Status: SHIPPED | OUTPATIENT
Start: 2020-09-15 | End: 2021-10-21 | Stop reason: SDUPTHER

## 2020-09-15 RX ORDER — CYANOCOBALAMIN 1000 UG/ML
1000 INJECTION INTRAMUSCULAR; SUBCUTANEOUS
Qty: 1 ML | Refills: 11 | Status: SHIPPED | OUTPATIENT
Start: 2020-09-15 | End: 2021-02-18

## 2020-09-15 RX ORDER — CYANOCOBALAMIN 1000 UG/ML
1000 INJECTION INTRAMUSCULAR; SUBCUTANEOUS ONCE
Status: DISCONTINUED | OUTPATIENT
Start: 2020-09-15 | End: 2021-05-25

## 2020-09-15 ASSESSMENT — ENCOUNTER SYMPTOMS
BLURRED VISION: 0
EYES NEGATIVE: 1
SHORTNESS OF BREATH: 0
ABDOMINAL DISTENTION: 0
NAUSEA: 0
ORTHOPNEA: 0
VOMITING: 0
CONSTIPATION: 0
ABDOMINAL PAIN: 0
WHEEZING: 0
CHEST TIGHTNESS: 0
DIARRHEA: 0
SINUS PRESSURE: 0
COUGH: 0

## 2020-09-15 NOTE — PROGRESS NOTES
9/15/2020     Cristóbal Gómze (:  1955) is a 62 y.o. female, here for evaluation of the following medical concerns:    Hypertension   This is a chronic problem. The current episode started more than 1 year ago. The problem has been gradually improving since onset. The problem is controlled. Pertinent negatives include no anxiety, blurred vision, chest pain, headaches, malaise/fatigue, neck pain, orthopnea, palpitations, peripheral edema, PND or shortness of breath. There are no associated agents to hypertension. Risk factors for coronary artery disease include dyslipidemia, obesity and post-menopausal state. Past treatments include diuretics. The current treatment provides significant improvement. Compliance problems include exercise and diet. There is no history of kidney disease, CVA or heart failure. Hyperlipidemia   This is a chronic problem. The current episode started more than 1 year ago. The problem is uncontrolled. Recent lipid tests were reviewed and are high. Exacerbating diseases include obesity. She has no history of hypothyroidism. Pertinent negatives include no chest pain, myalgias or shortness of breath. Current antihyperlipidemic treatment includes statins. The current treatment provides mild improvement of lipids. Compliance problems include adherence to diet and adherence to exercise. Risk factors for coronary artery disease include hypertension, obesity, post-menopausal and dyslipidemia. Patient wants to know if she should take a probiotic and I recommended align. Patient wants to know if she should take a multivitamin. She is consuming a healthy diet so, she does not need to take. Patient wanted to know about bone health, she eats yogurt, greek, 3-4 times a week and instructed to increase to daily and will get a bone density study. ,  Patient Active Problem List   Diagnosis    Fibromyalgia    Cervical spondylosis with radiculopathy    Vitamin D deficiency  Vitamin B12 deficiency    Constipation    Varicose vein of leg    Brittle nails    Mass of thigh    Obesity    STEPHANIE (obstructive sleep apnea)    H. pylori infection    Obesity (BMI 30.0-34. 9)    Essential hypertension    Hx of colonoscopy    Mixed hyperlipidemia    Lymphedema of both lower extremities    Epigastric pain    Abdominal gas pain    Chronic back pain    Age-related nuclear cataract, bilateral    Degeneration of lumbar or lumbosacral intervertebral disc    Hypertensive retinopathy, bilateral    Intestinal disaccharidase deficiencies and disaccharide malabsorption    Lattice degeneration of retina, left eye    Lumbosacral spondylosis without myelopathy    Mitral valve disorder    Myopia of both eyes    PVD (posterior vitreous detachment), left eye    Round hole, right eye    Vitreous degeneration, bilateral     Allergies   Allergen Reactions    Lisinopril Anaphylaxis and Itching     sweating    Flagyl [Metronidazole Hcl] Other (See Comments)     \"metallic taste\"    Metronidazole Nausea And Vomiting         Review of Systems   Constitutional: Negative for activity change, appetite change, fatigue, fever, malaise/fatigue and unexpected weight change. Flu Vac    HENT: Negative. Negative for sinus pressure. Sore throat from November resolved and never took prilosec. No dysphagia   Eyes: Negative. Negative for blurred vision. Respiratory: Negative for cough, chest tightness, shortness of breath and wheezing. Does not smoke   No etoh   No asthma    Cardiovascular: Negative. Negative for chest pain, palpitations, orthopnea and PND. No HTN / CAD    Gastrointestinal: Negative for abdominal distention, abdominal pain, constipation, diarrhea, nausea and vomiting. Frequent constipation , not relieved with linzess. Genitourinary: Negative for dysuria, frequency, hematuria, menstrual problem, urgency and vaginal discharge.    Musculoskeletal: 12/05/2018     Lab Results   Component Value Date    LDLCALC 149 (H) 08/17/2020    LDLCALC 98 10/21/2019    LDLCALC 111 (H) 12/05/2018     Lab Results   Component Value Date    LABVLDL 8 08/17/2020    LABVLDL 10 10/21/2019    LABVLDL 9 12/05/2018     No results found for: CHOLHDLRATIO   simvastatin (ZOCOR) 20 MG tablet   3. Essential hypertension controlled on hydrochlorothiazide continue. BP Readings from Last 3 Encounters:   09/15/20 110/78   08/19/20 (!) 141/92   08/17/20 (!) 148/86       4. Irritable bowel syndrome with constipation completed application for getting medication from the pharmaceutical company  linaclotide (LINZESS) 290 MCG CAPS capsule   5. Colon cancer screening  POCT Fecal Immunochemical Test (FIT)     Lab Results   Component Value Date    [id number] 295 08/17/2020     vitamin B12 level will give second injection over thousand micrograms in order supplement for patient to learn to do herself monthly. An electronic signature was used to authenticate this note.     --Ruma Kennedy MD on 9/15/2020 at 10:31 AM

## 2020-09-29 ENCOUNTER — NURSE ONLY (OUTPATIENT)
Dept: PRIMARY CARE CLINIC | Age: 65
End: 2020-09-29
Payer: COMMERCIAL

## 2020-09-29 DIAGNOSIS — E53.8 VITAMIN B12 DEFICIENCY: Primary | ICD-10-CM

## 2020-09-29 PROCEDURE — 96372 THER/PROPH/DIAG INJ SC/IM: CPT | Performed by: INTERNAL MEDICINE

## 2020-09-29 RX ORDER — CYANOCOBALAMIN 1000 UG/ML
1000 INJECTION INTRAMUSCULAR; SUBCUTANEOUS ONCE
Status: COMPLETED | OUTPATIENT
Start: 2020-09-29 | End: 2020-09-29

## 2020-09-29 RX ADMIN — CYANOCOBALAMIN 1000 MCG: 1000 INJECTION INTRAMUSCULAR; SUBCUTANEOUS at 11:15

## 2020-09-30 ENCOUNTER — HOSPITAL ENCOUNTER (OUTPATIENT)
Dept: GENERAL RADIOLOGY | Age: 65
Discharge: HOME | End: 2020-09-30
Payer: COMMERCIAL

## 2020-09-30 PROCEDURE — 77080 DXA BONE DENSITY AXIAL: CPT

## 2020-10-13 LAB
CONTROL: NORMAL
HEMOCCULT STL QL: NEGATIVE

## 2020-10-13 PROCEDURE — 82274 ASSAY TEST FOR BLOOD FECAL: CPT | Performed by: INTERNAL MEDICINE

## 2020-10-28 ENCOUNTER — NURSE ONLY (OUTPATIENT)
Dept: PRIMARY CARE CLINIC | Age: 65
End: 2020-10-28
Payer: COMMERCIAL

## 2020-10-28 PROCEDURE — 96372 THER/PROPH/DIAG INJ SC/IM: CPT | Performed by: INTERNAL MEDICINE

## 2020-10-28 RX ORDER — CYANOCOBALAMIN 1000 UG/ML
1000 INJECTION INTRAMUSCULAR; SUBCUTANEOUS ONCE
Status: COMPLETED | OUTPATIENT
Start: 2020-10-28 | End: 2020-10-28

## 2020-10-28 RX ADMIN — CYANOCOBALAMIN 1000 MCG: 1000 INJECTION INTRAMUSCULAR; SUBCUTANEOUS at 13:55

## 2020-11-27 ENCOUNTER — NURSE ONLY (OUTPATIENT)
Dept: PRIMARY CARE CLINIC | Age: 65
End: 2020-11-27
Payer: COMMERCIAL

## 2020-11-27 PROCEDURE — 96372 THER/PROPH/DIAG INJ SC/IM: CPT | Performed by: INTERNAL MEDICINE

## 2020-11-27 RX ORDER — CYANOCOBALAMIN 1000 UG/ML
1000 INJECTION INTRAMUSCULAR; SUBCUTANEOUS ONCE
Status: COMPLETED | OUTPATIENT
Start: 2020-11-27 | End: 2020-11-27

## 2020-11-27 RX ADMIN — CYANOCOBALAMIN 1000 MCG: 1000 INJECTION INTRAMUSCULAR; SUBCUTANEOUS at 12:08

## 2020-12-14 ENCOUNTER — OFFICE VISIT (OUTPATIENT)
Dept: PRIMARY CARE CLINIC | Age: 65
End: 2020-12-14
Payer: COMMERCIAL

## 2020-12-14 VITALS
TEMPERATURE: 97.2 F | DIASTOLIC BLOOD PRESSURE: 70 MMHG | BODY MASS INDEX: 34.23 KG/M2 | HEIGHT: 66 IN | HEART RATE: 56 BPM | OXYGEN SATURATION: 98 % | SYSTOLIC BLOOD PRESSURE: 120 MMHG | WEIGHT: 213 LBS

## 2020-12-14 PROCEDURE — 1036F TOBACCO NON-USER: CPT | Performed by: INTERNAL MEDICINE

## 2020-12-14 PROCEDURE — 3017F COLORECTAL CA SCREEN DOC REV: CPT | Performed by: INTERNAL MEDICINE

## 2020-12-14 PROCEDURE — 99214 OFFICE O/P EST MOD 30 MIN: CPT | Performed by: INTERNAL MEDICINE

## 2020-12-14 PROCEDURE — G8484 FLU IMMUNIZE NO ADMIN: HCPCS | Performed by: INTERNAL MEDICINE

## 2020-12-14 PROCEDURE — G8417 CALC BMI ABV UP PARAM F/U: HCPCS | Performed by: INTERNAL MEDICINE

## 2020-12-14 PROCEDURE — G8427 DOCREV CUR MEDS BY ELIG CLIN: HCPCS | Performed by: INTERNAL MEDICINE

## 2020-12-14 ASSESSMENT — ENCOUNTER SYMPTOMS
CHEST TIGHTNESS: 0
CONSTIPATION: 0
EYES NEGATIVE: 1
ABDOMINAL DISTENTION: 0
SINUS PRESSURE: 0
SHORTNESS OF BREATH: 0
DIARRHEA: 0
COUGH: 0
WHEEZING: 0
VOMITING: 0
NAUSEA: 0
ABDOMINAL PAIN: 0

## 2020-12-14 NOTE — PROGRESS NOTES
2020     [facility] (:  1955) is a 60 y.o. female, here for evaluation of the following medical concerns:    Hyperlipidemia  This is a chronic problem. The current episode started more than 1 year ago. The problem is controlled. Recent lipid tests were reviewed and are normal. Exacerbating diseases include diabetes and obesity. She has no history of chronic renal disease, hypothyroidism, liver disease or nephrotic syndrome. prediabetes. There are no known factors aggravating her hyperlipidemia. Pertinent negatives include no chest pain, focal sensory loss, focal weakness, leg pain, myalgias or shortness of breath. She is currently on no antihyperlipidemic treatment. The current treatment provides significant improvement of lipids. Compliance problems include adherence to diet and adherence to exercise. Risk factors for coronary artery disease include hypertension, obesity, dyslipidemia, diabetes mellitus and post-menopausal.     IBS and 290 are too strong and wants to reduce dose to 145 mg qd  Patient Active Problem List   Diagnosis    Fibromyalgia    Cervical spondylosis with radiculopathy    Vitamin D deficiency    Vitamin B12 deficiency    Constipation    Varicose vein of leg    Brittle nails    Mass of thigh    Obesity    STEPHANIE (obstructive sleep apnea)    H. pylori infection    Obesity (BMI 30.0-34. 9)    Essential hypertension    Hx of colonoscopy    Mixed hyperlipidemia    Lymphedema of both lower extremities    Epigastric pain    Abdominal gas pain    Chronic back pain    Age-related nuclear cataract, bilateral    Degeneration of lumbar or lumbosacral intervertebral disc    Hypertensive retinopathy, bilateral    Intestinal disaccharidase deficiencies and disaccharide malabsorption    Lattice degeneration of retina, left eye    Lumbosacral spondylosis without myelopathy    Mitral valve disorder    Myopia of both eyes    PVD (posterior vitreous detachment), left eye    Round hole, right eye    Vitreous degeneration, bilateral     Allergies   Allergen Reactions    Lisinopril Anaphylaxis and Itching     sweating    Flagyl [Metronidazole Hcl] Other (See Comments)     \"metallic taste\"    Metronidazole Nausea And Vomiting       Review of Systems   Constitutional: Negative for activity change, appetite change, fatigue, fever and unexpected weight change. Flu Vac    HENT: Negative. Negative for sinus pressure. Sore throat from November resolved and never took prilosec. No dysphagia   Eyes: Negative. Respiratory: Negative for cough, chest tightness, shortness of breath and wheezing. Does not smoke   No etoh   No asthma    Cardiovascular: Negative. Negative for chest pain and palpitations. No HTN / CAD    Gastrointestinal: Negative for abdominal distention, abdominal pain, constipation, diarrhea, nausea and vomiting. Frequent constipation , not relieved with linzess. Genitourinary: Negative for dysuria, frequency, hematuria, menstrual problem, urgency and vaginal discharge. Musculoskeletal: Negative. Negative for arthralgias, myalgias and neck pain. Skin: Negative for rash. Neurological: Negative for dizziness, focal weakness, weakness, numbness and headaches. Psychiatric/Behavioral: Negative for behavioral problems and sleep disturbance. The patient is not nervous/anxious. Prior to Visit Medications    Medication Sig Taking?  Authorizing Provider   simvastatin (ZOCOR) 20 MG tablet TAKE ONE TABLET BY MOUTH EVERY NIGHT AT BEDTIME Yes Yamilka Hernandez MD   linaclotide (LINZESS) 290 MCG CAPS capsule Take 1 capsule by mouth every morning (before breakfast) Yes Yamilka Hernandez MD   cyanocobalamin 1000 MCG/ML injection Inject 1 mL into the skin every 30 days Yes Yamilka Hernandez MD   Syringe, Disposable, 1 ML MISC 1 Device by Does not apply route every 30 days Yes Yamilka Hernandez MD   hydroCHLOROthiazide (HYDRODIURIL) 25 MG tablet Take 1 tablet by mouth every morning Yes Ced Min MD   aspirin EC 81 MG EC tablet Take 1 tablet by mouth daily Yes Ced Min MD   Elastic Bandages & Supports ([facility]) [address] [facility] 2 each by Does not apply route daily Thigh high 30-40 mm Hg, Yes Ced Min MD   Compression Stockings MISC by Does not apply route Knee-high 20-30 mmHg compression bilaterally. Yes Ced Min MD   CPAP Machine MISC by Does not apply route Yes Ced Min MD        Social History     Tobacco Use    Smoking status: Former Smoker     Packs/day: 1.00     Years: 3.00     Pack years: 3.00     Quit date: 1982     Years since quittin.5    Smokeless tobacco: Never Used   Substance Use Topics    Alcohol use: Yes     Alcohol/week: 0.0 standard drinks     Comment: occ        Vitals:    20 1004   BP: 120/70   Pulse: 56   Temp: 97.2 °F (36.2 °C)   TempSrc: Oral   SpO2: 98%   Weight: 213 lb (96.6 kg)   Height: 5' 6\" (1.676 m)     Estimated body mass index is 34.38 kg/m² as calculated from the following:    Height as of this encounter: 5' 6\" (1.676 m). Weight as of this encounter: 213 lb (96.6 kg). Physical Exam  Vitals signs reviewed. Constitutional:       General: She is not in acute distress. HENT:      Head: Normocephalic. Nose: Nose normal.   Eyes:      Conjunctiva/sclera: Conjunctivae normal.   Neck:      Musculoskeletal: Neck supple. Cardiovascular:      Rate and Rhythm: Normal rate and regular rhythm. Heart sounds: Normal heart sounds. Pulmonary:      Effort: Pulmonary effort is normal.      Breath sounds: Normal breath sounds. Abdominal:      General: There is no distension. Palpations: Abdomen is soft. There is no mass. Tenderness: There is no abdominal tenderness. There is no right CVA tenderness, left CVA tenderness, guarding or rebound.       Hernia: No hernia is present. Musculoskeletal: Normal range of motion. Lymphadenopathy:      Cervical: No cervical adenopathy. Skin:     General: Skin is warm. Neurological:      General: No focal deficit present. Mental Status: She is alert and oriented to person, place, and time. Cranial Nerves: No cranial nerve deficit. Sensory: No sensory deficit. Motor: No weakness. Coordination: Coordination normal.      Gait: Gait normal.      Deep Tendon Reflexes: Reflexes normal.   Psychiatric:         Mood and Affect: Mood normal.         Behavior: Behavior normal.         Thought Content: Thought content normal.         Judgment: Judgment normal.         ASSESSMENT/PLAN:   Diagnosis Orders   1. Irritable bowel syndrome with constipation from the patient assistance program and feels better on the 145 mg dosage. The 290 mg dosage causes creatinine diarrhea. The form was amended to change the doses to 145 mg and prescriptions written and formally faxed to the patient assistance company  linaclotide (LINZESS) 145 MCG capsule   2. Essential hypertension controlled on hydrochlorothiazide 25 mg daily. Patient encouraged to check. Blood pressure at home daily. Repeat renal function. Renal Function Panel   3. Mixed hyperlipidemia patient was off her statin therapy but has been on it for week three months so will repeat lipid profile. No symptoms of TIA, CAD or claudication.   Lab Results   Component Value Date    CHOL 234 (H) 08/17/2020    CHOL 207 (H) 10/21/2019    CHOL 200 (H) 12/05/2018     Lab Results   Component Value Date    TRIG 42 08/17/2020    TRIG 49 10/21/2019    TRIG 45 12/05/2018     Lab Results   Component Value Date    HDL 77 (H) 08/17/2020    HDL 99 (H) 10/21/2019    HDL 80 (H) 12/05/2018     Lab Results   Component Value Date    LDLCALC 149 (H) 08/17/2020    LDLCALC 98 10/21/2019    LDLCALC 111 (H) 12/05/2018     Lab Results   Component Value Date    LABVLDL 8 08/17/2020    LABVLDL 10 10/21/2019 LABVLDL 9 12/05/2018     No results found for: CHOLHDLRATIO   Lipid Panel   4. Vitamin D deficiency on supplement, continue  8/19/2020 12:01 AM - [facility] Incoming Lab Results From Soft (Epic Adt)    Component Value Ref Range & Units Status Collected Lab   Vit D, 25-Hydroxy 37.0  >=30 ng/mL Final 08/17/2020 12:       ==    5. Vitamin B12 deficiency on supplement, continue 8/19/2020 12:01 AM - [facility] Incoming Lab Results From Soft (Epic Adt)    Component Value Ref Range & Units Status Collected Lab   Vitamin B-12 295  211 - 911 pg/mL Final 08/17/2020 12:28 PM [address] Lab   Testing Performed By    Patient takes a thousand MCG of vitamin B12 subcu monthly, self-administered         An electronic signature was used to authenticate this note.     --Adam Velez MD on 12/14/2020 at 10:55 AM

## 2020-12-14 NOTE — PATIENT INSTRUCTIONS
Patient Education        Learning About Low-Carbohydrate Diets  What is a low-carbohydrate diet? A low-carbohydrate (or \"low-carb\") diet limits foods and drinks that have carbohydrates. This includes grains, fruits, milk and yogurt, and starchy vegetables like potatoes, beans, and corn. It also avoids foods and drinks that have added sugar. Instead, low-carb diets include foods that are high in protein and fat. Why might you follow a low-carb diet? Low-carb diets may be used for a variety of reasons, such as for weight loss. People who have diabetes may use a low-carb diet to help manage their blood sugar levels. What should you do before you start the diet? Talk to your doctor before you try any diet. This is even more important if you have health problems like kidney disease, heart disease, or diabetes. Your doctor may suggest that you meet with a registered dietitian. A dietitian can help you make an eating plan that works for you. What foods do you eat on a low-carb diet? On a low-carb diet, you choose foods that are high in protein and fat. Examples of these are:  · Meat, poultry, and fish. · Eggs. · Nuts, such as walnuts, pecans, almonds, and peanuts. · Peanut butter and other nut butters. · Tofu. · Avocado. · [place]. · Non-starchy vegetables like broccoli, cauliflower, green beans, mushrooms, peppers, lettuce, and spinach. · Unsweetened non-dairy milks like almond milk and coconut milk. · Cheese, cottage cheese, and cream cheese. Current as of: August 22, 2019               Content Version: 12.6  © 9132-5121 [employer]. Care instructions adapted under license by [facility] ([facility]). If you have questions about a medical condition or this instruction, always ask your healthcare professional. [address] any warranty or liability for your use of this information.          Patient Education        Learning About Cutting Calories  How do calories affect your weight? Food gives your body energy. Energy from the food you eat is measured in calories. This energy keeps your heart beating, your brain active, and your muscles working. Your body needs a certain number of calories each day. After your body uses the calories it needs, it stores extra calories as fat. To lose weight safely, you have to eat fewer calories while eating in a healthy way. How many calories do you need each day? The more active you are, the more calories you need. When you are less active, you need fewer calories. How many calories you need each day also depends on several things, including your age and whether you are male or female. Here are some general guidelines for adults:  · Less active women and older adults need 1,600 to 2,000 calories each day. · Active women and less active men need 2,000 to 2,400 calories each day. · Active men need 2,400 to 3,000 calories each day. How can you cut calories and eat healthy meals? Whole grains, vegetables and fruits, and dried beans are good lower-calorie foods. They give you lots of nutrients and fiber. And they fill you up. Sweets, energy drinks, and soda pop are high in calories. They give you few nutrients and no fiber. Try to limit soda pop, fruit juice, and energy drinks. Drink water instead. Some fats can be part of a healthy diet. But cutting back on fats from highly processed foods like fast foods and many snack foods is a good way to lower the calories in your diet. Also, use smaller amounts of fats like butter, margarine, salad dressing, and mayonnaise. Add fresh garlic, lemon, or herbs to your meals to add flavor without adding fat. Meats and dairy products can be a big source of hidden fats. Try to choose lean or low-fat versions of these products. Fat-free cookies, candies, chips, and frozen treats can still be high in sugar and calories. Some fat-free foods have more calories than regular ones.  Eat fat-free treats in moderation, as you would other foods. If your favorite foods are high in fat, salt, sugar, or calories, limit how often you eat them. Eat smaller servings, or look for healthy substitutes. Fill up on fruits, vegetables, and whole grains. Eating at home  · Use meat as a side dish instead of as the main part of your meal.  · Try main dishes that use whole wheat pasta, brown rice, dried beans, or vegetables. · Find ways to cook with little or no fat, such as broiling, steaming, or grilling. · Use cooking spray instead of oil. If you use oil, use a monounsaturated oil, such as canola or olive oil. · Trim fat from meats before you cook them. · Drain off fat after you brown the meat or while you roast it. · Chill soups and stews after you cook them. Then skim the fat off the top after it hardens. Eating out  · Order foods that are broiled or poached rather than fried or breaded. · Cut back on the amount of butter or margarine that you use on bread. · Order sauces, gravies, and salad dressings on the side, and use only a little. · When you order pasta, choose tomato sauce rather than cream sauce. · Ask for salsa with your baked potato instead of sour cream, butter, cheese, or palacios. · Order meals in a small size instead of upgrading to a large. · Share an entree, or take part of your food home to eat as another meal.  · Share appetizers and desserts. Where can you learn more? Go to https://CaterCowpe5by.[employer]. org and sign in to your [employer] account. Enter P246 in the [facility] box to learn more about \"Learning About Cutting Calories. \"     If you do not have an account, please click on the \"Sign Up Now\" link. Current as of: August 22, 2019               Content Version: 12.6  © 9566-7976 [employer]. Care instructions adapted under license by [facility] ([facility]).  If you have questions about a medical condition or this instruction, always ask your healthcare professional. [employer] disclaims any warranty or liability for your use of this information.

## 2020-12-22 ENCOUNTER — HOSPITAL ENCOUNTER (OUTPATIENT)
Age: 65
Discharge: HOME | End: 2020-12-22
Payer: COMMERCIAL

## 2020-12-22 LAB
ALBUMIN SERPL-MCNC: 3.9 G/DL (ref 3.4–5)
ANION GAP SERPL CALCULATED.3IONS-SCNC: 12 MMOL/L (ref 3–16)
BUN BLDV-MCNC: 17 MG/DL (ref 7–20)
CALCIUM SERPL-MCNC: 9.7 MG/DL (ref 8.3–10.6)
CHLORIDE BLD-SCNC: 103 MMOL/L (ref 99–110)
CHOLESTEROL, TOTAL: 177 MG/DL (ref 0–199)
CO2: 28 MMOL/L (ref 21–32)
CREAT SERPL-MCNC: 1 MG/DL (ref 0.6–1.2)
GFR AFRICAN AMERICAN: >60
GFR NON-AFRICAN AMERICAN: 56
GLUCOSE BLD-MCNC: 89 MG/DL (ref 70–99)
HDLC SERPL-MCNC: 73 MG/DL (ref 40–60)
LDL CHOLESTEROL CALCULATED: 93 MG/DL
PHOSPHORUS: 3.6 MG/DL (ref 2.5–4.9)
POTASSIUM SERPL-SCNC: 3.7 MMOL/L (ref 3.5–5.1)
SODIUM BLD-SCNC: 143 MMOL/L (ref 136–145)
TRIGL SERPL-MCNC: 54 MG/DL (ref 0–150)
VLDLC SERPL CALC-MCNC: 11 MG/DL

## 2020-12-22 PROCEDURE — 80061 LIPID PANEL: CPT

## 2020-12-22 PROCEDURE — 36415 COLL VENOUS BLD VENIPUNCTURE: CPT

## 2020-12-22 PROCEDURE — 80069 RENAL FUNCTION PANEL: CPT

## 2020-12-28 ENCOUNTER — NURSE ONLY (OUTPATIENT)
Dept: PRIMARY CARE CLINIC | Age: 65
End: 2020-12-28
Payer: COMMERCIAL

## 2020-12-28 ENCOUNTER — TELEPHONE (OUTPATIENT)
Dept: PRIMARY CARE CLINIC | Age: 65
End: 2020-12-28

## 2020-12-28 VITALS — TEMPERATURE: 97.1 F

## 2020-12-28 PROCEDURE — 96372 THER/PROPH/DIAG INJ SC/IM: CPT | Performed by: INTERNAL MEDICINE

## 2020-12-28 RX ORDER — CYANOCOBALAMIN 1000 UG/ML
1000 INJECTION INTRAMUSCULAR; SUBCUTANEOUS ONCE
Status: COMPLETED | OUTPATIENT
Start: 2020-12-28 | End: 2020-12-28

## 2020-12-28 RX ADMIN — CYANOCOBALAMIN 1000 MCG: 1000 INJECTION INTRAMUSCULAR; SUBCUTANEOUS at 14:10

## 2020-12-28 NOTE — PROGRESS NOTES
After obtaining consent, and per orders of Dr. Rufina Forrester, injection of vit B12 given in Left arm by Lebron Bergeron. Patient instructed to remain in clinic for 20 minutes afterwards, and to report any adverse reaction to me immediately.

## 2021-01-11 ENCOUNTER — HOSPITAL ENCOUNTER (OUTPATIENT)
Dept: WOMENS IMAGING | Age: 66
Discharge: HOME | End: 2021-01-11
Payer: COMMERCIAL

## 2021-01-11 DIAGNOSIS — Z12.31 VISIT FOR SCREENING MAMMOGRAM: ICD-10-CM

## 2021-01-11 PROCEDURE — 77063 BREAST TOMOSYNTHESIS BI: CPT

## 2021-01-29 ENCOUNTER — OFFICE VISIT (OUTPATIENT)
Dept: PRIMARY CARE CLINIC | Age: 66
End: 2021-01-29
Payer: COMMERCIAL

## 2021-01-29 VITALS
TEMPERATURE: 97.1 F | HEIGHT: 66 IN | OXYGEN SATURATION: 98 % | SYSTOLIC BLOOD PRESSURE: 119 MMHG | HEART RATE: 66 BPM | WEIGHT: 217 LBS | DIASTOLIC BLOOD PRESSURE: 77 MMHG | BODY MASS INDEX: 34.87 KG/M2

## 2021-01-29 DIAGNOSIS — E78.2 MIXED HYPERLIPIDEMIA: ICD-10-CM

## 2021-01-29 DIAGNOSIS — Z00.00 HEALTHCARE MAINTENANCE: ICD-10-CM

## 2021-01-29 DIAGNOSIS — E55.9 VITAMIN D DEFICIENCY: ICD-10-CM

## 2021-01-29 DIAGNOSIS — M79.7 FIBROMYALGIA: ICD-10-CM

## 2021-01-29 DIAGNOSIS — K59.04 CHRONIC IDIOPATHIC CONSTIPATION: ICD-10-CM

## 2021-01-29 DIAGNOSIS — E53.8 VITAMIN B12 DEFICIENCY: ICD-10-CM

## 2021-01-29 DIAGNOSIS — I10 ESSENTIAL HYPERTENSION: ICD-10-CM

## 2021-01-29 DIAGNOSIS — Z13.1 SCREENING FOR DIABETES MELLITUS: ICD-10-CM

## 2021-01-29 DIAGNOSIS — R00.2 PALPITATIONS: ICD-10-CM

## 2021-01-29 DIAGNOSIS — Z12.31 ENCOUNTER FOR SCREENING MAMMOGRAM FOR MALIGNANT NEOPLASM OF BREAST: ICD-10-CM

## 2021-01-29 DIAGNOSIS — G47.33 OSA (OBSTRUCTIVE SLEEP APNEA): Primary | ICD-10-CM

## 2021-01-29 PROCEDURE — 96372 THER/PROPH/DIAG INJ SC/IM: CPT | Performed by: INTERNAL MEDICINE

## 2021-01-29 PROCEDURE — G8417 CALC BMI ABV UP PARAM F/U: HCPCS | Performed by: INTERNAL MEDICINE

## 2021-01-29 PROCEDURE — 99214 OFFICE O/P EST MOD 30 MIN: CPT | Performed by: INTERNAL MEDICINE

## 2021-01-29 PROCEDURE — 1036F TOBACCO NON-USER: CPT | Performed by: INTERNAL MEDICINE

## 2021-01-29 PROCEDURE — 3017F COLORECTAL CA SCREEN DOC REV: CPT | Performed by: INTERNAL MEDICINE

## 2021-01-29 PROCEDURE — G8427 DOCREV CUR MEDS BY ELIG CLIN: HCPCS | Performed by: INTERNAL MEDICINE

## 2021-01-29 PROCEDURE — G8484 FLU IMMUNIZE NO ADMIN: HCPCS | Performed by: INTERNAL MEDICINE

## 2021-01-29 RX ORDER — CYANOCOBALAMIN 1000 UG/ML
1000 INJECTION INTRAMUSCULAR; SUBCUTANEOUS ONCE
Status: COMPLETED | OUTPATIENT
Start: 2021-01-29 | End: 2021-01-29

## 2021-01-29 RX ORDER — CYANOCOBALAMIN 1000 UG/ML
1000 INJECTION INTRAMUSCULAR; SUBCUTANEOUS ONCE
Qty: 1 ML | Refills: 0 | Status: SHIPPED | OUTPATIENT
Start: 2021-01-29 | End: 2021-05-25 | Stop reason: ALTCHOICE

## 2021-01-29 RX ORDER — METOPROLOL SUCCINATE 25 MG/1
25 TABLET, EXTENDED RELEASE ORAL NIGHTLY
Qty: 30 TABLET | Refills: 5 | Status: SHIPPED | OUTPATIENT
Start: 2021-01-29 | End: 2021-08-02

## 2021-01-29 RX ADMIN — CYANOCOBALAMIN 1000 MCG: 1000 INJECTION INTRAMUSCULAR; SUBCUTANEOUS at 11:22

## 2021-01-29 NOTE — PROGRESS NOTES
2021    Chaka Rosas (:  1955) is a 60 y.o. female, here for evaluation of the following medical concerns:    Chief Complaint   Patient presents with   Connie Brunner Doctor       HPI  71-year-old female with sleep apnea on CPAP, treated hyperlipidemia and hypertension, constipation predominant irritable bowel syndrome on Linzess, symptomatic lumbosacral spondylosis, comes in to establish care following her providers snf. She last saw her provider last month. Recent lipid and renal panel were quite reassuring consistent with good dyslipidemia control and no electrolyte disturbance or renal disease. Patient does not check her blood pressures at home, historically in the clinic 110-120 over 70s. No known cardiovascular disease. Patient up-to-date with Tdap, has not received influenza Shingrix influenza vaccinations. Received the first [address] vaccination. Reassuring mammogram this month, DEXA in -0.10.6 hip/spine. Pap . HIV HCV -. TSH vitamin D B12 within normal limits 2020. Colonoscopy  results unknown, but 2012 negative, negative Cologuard number 2015. Patient noticed worsening palpitations over the past month, improved when she cut her simvastatin dose from 20 to 10 mg inexplicably, after having blood test showing excellent lipid control on 20 mg simvastatin. She has struggled with palpitations in the past prompting echo and Holter 2019, with 400 premature beats, just 0.4%. States ongoing compliance with CPAP. Review of Systems   Constitutional: Negative for activity change, appetite change, fatigue and unexpected weight change. HENT: Negative for dental problem, sinus pain, sore throat and trouble swallowing. Eyes: Negative for pain and visual disturbance. Respiratory: Negative for apnea, cough, chest tightness, shortness of breath and wheezing. Cardiovascular: Negative for chest pain positive. Gastrointestinal: Negative for abdominal pain, blood in stool, constipation, diarrhea, nausea, rectal pain and vomiting. Endocrine: Negative for cold intolerance, heat intolerance, polydipsia, polyphagia and polyuria. Genitourinary: Negative for difficulty urinating, dysuria, flank pain, frequency, hematuria, pelvic pain, urgency, vaginal bleeding and vaginal discharge. Musculoskeletal: Negative for arthralgias, back pain, gait problem, joint swelling, myalgias, neck pain and neck stiffness. Skin: Negative for color change and rash. Neurological: Negative for dizziness, tremors, syncope, speech difficulty, weakness, light-headedness and headaches. Hematological: Negative for adenopathy. Does not bruise/bleed easily. Psychiatric/Behavioral: Negative for agitation, behavioral problems, decreased concentration, sleep disturbance and suicidal ideas. The patient is not nervous/anxious and is not hyperactive. Prior to Visit Medications    Medication Sig Taking?  Authorizing Provider   metoprolol succinate (TOPROL XL) 25 MG extended release tablet Take 1 tablet by mouth nightly Yes Jacque Nelson MD   cyanocobalamin 1000 MCG/ML injection Inject 1 mL into the muscle once for 1 dose Yes Jacque Nelson MD   linaclotide [facility]) 145 MCG capsule Take 1 capsule by mouth every morning (before breakfast) Yes Shelby Hale MD   vitamin D3 (CHOLECALCIFEROL) 125 MCG (5000 UT) TABS tablet Take 5,000 Int'l Units by mouth daily Yes Historical Provider, MD   simvastatin (ZOCOR) 20 MG tablet TAKE ONE TABLET BY MOUTH EVERY NIGHT AT BEDTIME Yes Shelby Hale MD   cyanocobalamin 1000 MCG/ML injection Inject 1 mL into the skin every 30 days Yes Shelby Hale MD   Syringe, Disposable, 1 ML MISC 1 Device by Does not apply route every 30 days Yes Shelby Hale MD   Elastic Bandages & Supports ([address] [facility] ) [address] [facility] 2 each by Does not apply route daily Thigh high 30-40 mm Hg, Yes Jonathon Mcdaniel MD   Compression Stockings MISC by Does not apply route Knee-high 20-30 mmHg compression bilaterally. Yes Jonathon Mcdaniel MD   CPAP Machine MISC by Does not apply route Yes Jonathon Mcdaniel MD        Allergies   Allergen Reactions    Lisinopril Anaphylaxis and Itching     sweating    Flagyl [Metronidazole Hcl] Other (See Comments)     \"metallic taste\"    Metronidazole Nausea And Vomiting       Past Medical History:   Diagnosis Date    Allergic rhinitis     Chronic back pain     Fibroids     Fibromyalgia     Fibromyalgia     Hyperlipidemia 2012    Hypertension     STEPHANIE (obstructive sleep apnea)     Sleep apnea        Past Surgical History:   Procedure Laterality Date    ACHILLES TENDON SURGERY  2007    left achilles repair by Dr. Kallie Lee cervical surgery-- sees Dr Bhargavi Miles @ [facility] COLONOSCOPY  2006    Dr Low Parekh and 2012    ENDOMETRIAL BIOPSY      Dr Arlen Coats History     Socioeconomic History    Marital status:      Spouse name: Not on file    Number of children: 3    Years of education: Not on file    Highest education level: Not on file   Occupational History    Occupation: Patient registration     Comment: retired   Social Needs    Financial resource strain: Not on file    Food insecurity     Worry: Not on file     Inability: Not on file   [employer] needs     Medical: Not on file     Non-medical: Not on file   Tobacco Use    Smoking status: Former Smoker     Packs/day: 1.00     Years: 3.00     Pack years: 3.00     Quit date: 1982     Years since quittin.7    Smokeless tobacco: Never Used   Substance and Sexual Activity    Alcohol use:  Yes     Alcohol/week: 0.0 standard drinks     Comment: occ    Drug use: No    Sexual activity: Never   Lifestyle    Physical activity     Days per week: Not on file     Minutes supraclavicular cervical axillary or inguinal lymphadenopathy. LUNGS:  Clear to auscultation bilaterally. Excellent air entry. No inspiratory crackles or expiratory wheezes. HEART:  Regular rate and rhythm without pathologic murmur rub gallop S3 or S4. ABDOMEN:  Soft, nontender. Normal bowel sounds. No guarding. No masses. UROGENITAL:  Deferred  EXTREMITIES:  Warm and well perfused without clubbing cyanosis or edema. 2+ pulses in all 4 extremities. Capillary refill less than 2 seconds. NEURO:  Cranial nerves 2-12 grossly intact. Normal muscle bulk and tone. No resting tremor, cogwheeling, normal rapid alternating movements in the hands and feet. No stocking paresthesia. Normal gait and station. MUSCULOSKELETAL:  Mild osteoarthritic changes. SKIN:  No worrisome lesions, skin a little dry. PSYCH:  No psychomotor retardation or agitation. Good eye contact. Unrestricted affect range. Mood congruent with affect. Linear thought.     [id number] [facility] Visit on 12/22/2020   Component Date Value    Cholesterol, Total 12/22/2020 177     Triglycerides 12/22/2020 54     HDL 12/22/2020 73*    LDL Calculated 12/22/2020 93     VLDL Cholesterol Calcula* 12/22/2020 11     Sodium 12/22/2020 143     Potassium 12/22/2020 3.7     Chloride 12/22/2020 103     CO2 12/22/2020 28     Anion Gap 12/22/2020 12     Glucose 12/22/2020 89     BUN 12/22/2020 17     CREATININE 12/22/2020 1.0     GFR Non- 12/22/2020 56*    GFR  12/22/2020 >60     Calcium 12/22/2020 9.7     Phosphorus 12/22/2020 3.6     Albumin 12/22/2020 3.9    Orders Only on 10/13/2020   Component Date Value    OCCULT BLOOD FECAL 10/13/2020 negative    Orders Only on 08/17/2020   Component Date Value    Vitamin B-12 08/17/2020 295     Vit D, 25-Hydroxy 08/17/2020 37.0     Sodium 08/17/2020 141     Potassium 08/17/2020 4.3     Chloride 08/17/2020 103     CO2 08/17/2020 25     Anion Gap 01/22/2020   Component Date Value    Sodium 01/22/2020 140     Potassium 01/22/2020 4.0     Chloride 01/22/2020 98*    CO2 01/22/2020 27     Anion Gap 01/22/2020 15     Glucose 01/22/2020 79     BUN 01/22/2020 10     CREATININE 01/22/2020 0.8     GFR Non- 01/22/2020 >60     GFR  01/22/2020 >60     Calcium 01/22/2020 10.0     Phosphorus 01/22/2020 3.9     Albumin 01/22/2020 4.5     Vit D, 25-Hydroxy 01/22/2020 40.6     Vitamin B-12 01/22/2020 327          ASSESSMENT/PLAN  1. STEPHANIE (obstructive sleep apnea)  Due to see sleep doctor for renewal, advised to discuss severity of sleep apnea and adequacy of treatment. She wonders if she still has sleep apnea. Gained 10 pounds over the last 6 months. 2. Vitamin D deficiency  Update vitamin D in the appropriate timeframe. 3. Fibromyalgia  Patient appears to have quite a somatic focus. Lyrica Cymbalta represent treatment options should they prove sufficiently troubling. Ocular exercise adequate sleep essential.    4. Vitamin B12 deficiency  Update B12 at monthly injection prakash. We will inquire regarding oral therapy trial, note chart diagnosis of pernicious anemia. 5. Chronic idiopathic constipation  Doing well on Linzess low-dose. 6. Mixed hyperlipidemia  Update LFTs lipid panel in appropriate timeframe, next December. Urged to resume 20 mg simvastatin. 7. Healthcare maintenance  Colonoscopy due summer 2022. Pap smear with her gynecologist sometime this year. Mammogram next January. Urged to get Shingrix vaccination. 8. Essential hypertension  After some discussion, patient will trial low-dose metoprolol at bedtime to simultaneously control her blood pressure and suppress palpitations. Not sure that she will actually pursue this but we will see. - metoprolol succinate (TOPROL XL) 25 MG extended release tablet; Take 1 tablet by mouth nightly  Dispense: 30 tablet; Refill: 5    9.  Palpitations  Strong somatic focus. Metoprolol should help. Wonder about adequacy of CPAP settings. - metoprolol succinate (TOPROL XL) 25 MG extended release tablet; Take 1 tablet by mouth nightly  Dispense: 30 tablet; Refill: 5      Return in about 7 months (around 8/29/2021). It was a pleasure to visit with Ms. Ronald Moore today. Answered all questions as best I could.   [employer], MD   Time 28 minutes

## 2021-01-29 NOTE — PATIENT INSTRUCTIONS
Pap 2021 with your gynecologist    Inquire with your pharmacy as to cost of shingles vaccine. Can contact [address] to schedule vaccination when available and indicated for your risk group, at [phone]. Metoprolol ER 25mg at bedtime, d/c HCTZ.  Let me know how palpitations are doing on the metoprolol    Resume simva 20mg bedtime after restart metoprolol    Call your sleep dr, ask him hows the STEPHANIE working    Colonoscopy summer 2022  b12 shot every month as you know

## 2021-02-01 ENCOUNTER — TELEPHONE (OUTPATIENT)
Dept: INTERNAL MEDICINE CLINIC | Age: 66
End: 2021-02-01

## 2021-02-18 ENCOUNTER — TELEPHONE (OUTPATIENT)
Dept: PRIMARY CARE CLINIC | Age: 66
End: 2021-02-18

## 2021-02-18 NOTE — TELEPHONE ENCOUNTER
Dr Tom Felder:  Received a fax from [facility] . Requesting HCTzx 25 mg daily. It looks like it was D/C'd on OV 01/29/21. But I don't see anything about it in the notes. Reorder? D/C?

## 2021-02-18 NOTE — TELEPHONE ENCOUNTER
Dr Del Phoenix:  Received a fax from [facility] . Requesting HCTzx 25 mg daily. It looks like it was D/C'd on OV 01/29/21. But I don't see anything about it in the notes. Reorder? D/C?

## 2021-02-18 NOTE — TELEPHONE ENCOUNTER
Check with patient to see if she switched from hydrochlorothiazide to metoprolol as discussed at time of visit.

## 2021-03-04 ENCOUNTER — NURSE ONLY (OUTPATIENT)
Dept: PRIMARY CARE CLINIC | Age: 66
End: 2021-03-04

## 2021-03-04 DIAGNOSIS — D64.9 ANEMIA, UNSPECIFIED TYPE: Primary | ICD-10-CM

## 2021-04-06 ENCOUNTER — NURSE ONLY (OUTPATIENT)
Dept: PRIMARY CARE CLINIC | Age: 66
End: 2021-04-06
Payer: COMMERCIAL

## 2021-04-06 DIAGNOSIS — E53.8 VITAMIN B12 DEFICIENCY: Primary | ICD-10-CM

## 2021-04-06 PROCEDURE — 96372 THER/PROPH/DIAG INJ SC/IM: CPT | Performed by: INTERNAL MEDICINE

## 2021-04-06 RX ORDER — CYANOCOBALAMIN 1000 UG/ML
1000 INJECTION INTRAMUSCULAR; SUBCUTANEOUS ONCE
Status: COMPLETED | OUTPATIENT
Start: 2021-04-06 | End: 2021-04-06

## 2021-04-06 RX ADMIN — CYANOCOBALAMIN 1000 MCG: 1000 INJECTION INTRAMUSCULAR; SUBCUTANEOUS at 09:24

## 2021-05-12 ENCOUNTER — NURSE ONLY (OUTPATIENT)
Dept: PRIMARY CARE CLINIC | Age: 66
End: 2021-05-12
Payer: COMMERCIAL

## 2021-05-12 DIAGNOSIS — E53.8 VITAMIN B12 DEFICIENCY: Primary | ICD-10-CM

## 2021-05-12 PROCEDURE — 96372 THER/PROPH/DIAG INJ SC/IM: CPT | Performed by: INTERNAL MEDICINE

## 2021-05-12 RX ORDER — CYANOCOBALAMIN 1000 UG/ML
1000 INJECTION INTRAMUSCULAR; SUBCUTANEOUS ONCE
Status: COMPLETED | OUTPATIENT
Start: 2021-05-12 | End: 2021-05-12

## 2021-05-12 RX ADMIN — CYANOCOBALAMIN 1000 MCG: 1000 INJECTION INTRAMUSCULAR; SUBCUTANEOUS at 09:24

## 2021-05-25 ENCOUNTER — HOSPITAL ENCOUNTER (EMERGENCY)
Age: 66
Discharge: HOME | End: 2021-05-25
Attending: EMERGENCY MEDICINE
Payer: COMMERCIAL

## 2021-05-25 VITALS
DIASTOLIC BLOOD PRESSURE: 83 MMHG | RESPIRATION RATE: 16 BRPM | HEIGHT: 65 IN | BODY MASS INDEX: 35.82 KG/M2 | WEIGHT: 215 LBS | OXYGEN SATURATION: 100 % | SYSTOLIC BLOOD PRESSURE: 139 MMHG | TEMPERATURE: 98 F | HEART RATE: 76 BPM

## 2021-05-25 DIAGNOSIS — R10.9 NONSPECIFIC ABDOMINAL PAIN: Primary | ICD-10-CM

## 2021-05-25 LAB
ALBUMIN SERPL-MCNC: 4.3 G/DL (ref 3.4–5)
ALP BLD-CCNC: 84 U/L (ref 40–129)
ALT SERPL-CCNC: 10 U/L (ref 10–40)
ANION GAP SERPL CALCULATED.3IONS-SCNC: 8 MMOL/L (ref 3–16)
AST SERPL-CCNC: 19 U/L (ref 15–37)
BASOPHILS ABSOLUTE: 0 K/UL (ref 0–0.2)
BASOPHILS RELATIVE PERCENT: 0.7 %
BILIRUB SERPL-MCNC: 0.5 MG/DL (ref 0–1)
BILIRUBIN DIRECT: <0.2 MG/DL (ref 0–0.3)
BILIRUBIN URINE: NEGATIVE
BILIRUBIN, INDIRECT: NORMAL MG/DL (ref 0–1)
BLOOD, URINE: ABNORMAL
BUN BLDV-MCNC: 19 MG/DL (ref 7–20)
CALCIUM SERPL-MCNC: 9.7 MG/DL (ref 8.3–10.6)
CHLORIDE BLD-SCNC: 102 MMOL/L (ref 99–110)
CLARITY: CLEAR
CO2: 28 MMOL/L (ref 21–32)
COLOR: YELLOW
CREAT SERPL-MCNC: 1 MG/DL (ref 0.6–1.2)
EOSINOPHILS ABSOLUTE: 0.1 K/UL (ref 0–0.6)
EOSINOPHILS RELATIVE PERCENT: 2.1 %
EPITHELIAL CELLS, UA: NORMAL /HPF (ref 0–5)
GFR AFRICAN AMERICAN: >60
GFR NON-AFRICAN AMERICAN: 56
GLUCOSE BLD-MCNC: 99 MG/DL (ref 70–99)
GLUCOSE URINE: NEGATIVE MG/DL
HCT VFR BLD CALC: 40.2 % (ref 36–48)
HEMOGLOBIN: 13.7 G/DL (ref 12–16)
KETONES, URINE: NEGATIVE MG/DL
LEUKOCYTE ESTERASE, URINE: NEGATIVE
LYMPHOCYTES ABSOLUTE: 1.9 K/UL (ref 1–5.1)
LYMPHOCYTES RELATIVE PERCENT: 33.8 %
MCH RBC QN AUTO: 30.1 PG (ref 26–34)
MCHC RBC AUTO-ENTMCNC: 34.1 G/DL (ref 31–36)
MCV RBC AUTO: 88.5 FL (ref 80–100)
MICROSCOPIC EXAMINATION: YES
MONOCYTES ABSOLUTE: 0.6 K/UL (ref 0–1.3)
MONOCYTES RELATIVE PERCENT: 10 %
NEUTROPHILS ABSOLUTE: 3.1 K/UL (ref 1.7–7.7)
NEUTROPHILS RELATIVE PERCENT: 53.4 %
NITRITE, URINE: NEGATIVE
PDW BLD-RTO: 14.5 % (ref 12.4–15.4)
PH UA: 6 (ref 5–8)
PLATELET # BLD: 213 K/UL (ref 135–450)
PMV BLD AUTO: 8 FL (ref 5–10.5)
POTASSIUM REFLEX MAGNESIUM: 4.6 MMOL/L (ref 3.5–5.1)
PROTEIN UA: NEGATIVE MG/DL
RBC # BLD: 4.54 M/UL (ref 4–5.2)
RBC UA: NORMAL /HPF (ref 0–4)
SODIUM BLD-SCNC: 138 MMOL/L (ref 136–145)
SPECIFIC GRAVITY UA: 1.02 (ref 1–1.03)
TOTAL PROTEIN: 8 G/DL (ref 6.4–8.2)
URINE TYPE: ABNORMAL
UROBILINOGEN, URINE: 0.2 E.U./DL
WBC # BLD: 5.8 K/UL (ref 4–11)
WBC UA: NORMAL /HPF (ref 0–5)

## 2021-05-25 PROCEDURE — 85025 COMPLETE CBC W/AUTO DIFF WBC: CPT

## 2021-05-25 PROCEDURE — 80048 BASIC METABOLIC PNL TOTAL CA: CPT

## 2021-05-25 PROCEDURE — 80076 HEPATIC FUNCTION PANEL: CPT

## 2021-05-25 PROCEDURE — 99283 EMERGENCY DEPT VISIT LOW MDM: CPT

## 2021-05-25 PROCEDURE — 81001 URINALYSIS AUTO W/SCOPE: CPT

## 2021-05-25 RX ORDER — CYANOCOBALAMIN 1000 UG/ML
1000 INJECTION INTRAMUSCULAR; SUBCUTANEOUS
COMMUNITY
End: 2021-10-21

## 2021-05-25 RX ORDER — DICYCLOMINE HCL 20 MG
20 TABLET ORAL EVERY 6 HOURS PRN
Qty: 12 TABLET | Refills: 0 | Status: SHIPPED | OUTPATIENT
Start: 2021-05-25 | End: 2021-10-21

## 2021-05-25 ASSESSMENT — ENCOUNTER SYMPTOMS
ABDOMINAL PAIN: 0
COLOR CHANGE: 0
NAUSEA: 0
COUGH: 0
SHORTNESS OF BREATH: 0
CONSTIPATION: 0
VOMITING: 0
RHINORRHEA: 0
SORE THROAT: 0
DIARRHEA: 0

## 2021-05-25 ASSESSMENT — PAIN DESCRIPTION - DESCRIPTORS: DESCRIPTORS: CRAMPING

## 2021-05-25 ASSESSMENT — PAIN DESCRIPTION - ORIENTATION: ORIENTATION: MID

## 2021-05-25 ASSESSMENT — PAIN DESCRIPTION - LOCATION: LOCATION: ABDOMEN

## 2021-05-25 ASSESSMENT — PAIN SCALES - GENERAL: PAINLEVEL_OUTOF10: 6

## 2021-05-26 NOTE — ED NOTES
Pt presents to the ED ambulatory from home with c/o progressively worsening abdominal pain over today. Pt denies any fever/chills. Denies any n/v/d. Pt is a/o x4, reports regular bms and passing gas. Pt is a/o x4, answering questions appropriately.       Ze Whiting RN  05/25/21 2025

## 2021-05-26 NOTE — ED PROVIDER NOTES
ED Attending Attestation Note     Date of evaluation: 5/25/2021    This patient was seen by the advance practice provider. I have seen and examined the patient, agree with the workup, evaluation, management and diagnosis. The care plan has been discussed. My assessment reveals a well-appearing patient, younger appearing than her stated age, pleasantly conversational, in no acute distress. She presents with waxing and waning abdominal pain over the course of the day today, which became much worse after eating dinner tonight, but had resolved by the time she was roomed in the emergency department. At the time of my examination, she states that the pain has returned very faintly, but is nowhere near as severe as it was earlier this evening. She describes the pain as being in the bilateral lateral abdomen, waxing and waning, a \"stretching\" sensation, which she seems to move around, and is somewhat improved with passing gas. She has had no vomiting. She denies urinary symptoms. She does have some mild chronic constipation which is generally well controlled on Linzess. On my examination, her abdomen is mildly protuberant, but very soft, with no tenderness to palpation, and active bowel sounds throughout. Her laboratory evaluation is entirely reassuring. There is no indication for cross-sectional imaging at this time. Her microscopic urinalysis showed moderate blood, although only 0-2 red blood cells was seen on microscopic urinalysis, and the patient states that she often has urine that is positive for blood, and this is not new for her. She has no urinary symptoms.          Linh Mcginnis MD  05/25/21 [id number]

## 2021-05-26 NOTE — ED PROVIDER NOTES
[address] ENCOUNTER          PHYSICIAN ASSISTANT NOTE       Date of evaluation: 5/25/2021    Chief Complaint     Abdominal Pain (progressive abd pain over today denies fever/chills, no n/v/d)      History of Present Illness     Luis F Wilson is a 60 y.o. female, with a history of hypertension, hyperlipidemia and fibromyalgia as well as appendectomy, who presents to the ED with complaints of bilateral lateral abdomen pain that started approximately 12 hours prior to arrival.  It was mild, sharp in quality, states it felt similar to gas pains. She drank some hot coffee and passed gas several times and this seemed to improve her pain. It became much worse however after eating dinner prompting her evaluation. The pain started to subside however as she was sitting in the waiting room and she rates it a 6 out of 10 in severity at this time. Has had no nausea or vomiting. She did have a normal bowel movement yesterday. No fever or chills. She is not taking any medication for her pain at home. No urinary frequency, urgency or dysuria. She states she always has microscopic hematuria for which she had a cystoscopy with no etiology found. She otherwise has no complaints. Review of Systems     Review of Systems   Constitutional: Negative for chills and fever. HENT: Negative for congestion, rhinorrhea and sore throat. Respiratory: Negative for cough and shortness of breath. Cardiovascular: Negative for chest pain and palpitations. Gastrointestinal: Negative for abdominal pain, constipation, diarrhea, nausea and vomiting. Genitourinary: Negative for dysuria, frequency and urgency. Musculoskeletal: Negative for arthralgias and myalgias. Skin: Negative for color change and rash. Neurological: Negative for dizziness, light-headedness and headaches. All other systems reviewed and are negative.       Past Medical, Surgical, Family, and Social History     She well-developed and overweight. HENT:      Head: Normocephalic and atraumatic. Mouth/Throat:      Mouth: Mucous membranes are moist.   Eyes:      Extraocular Movements: Extraocular movements intact. Conjunctiva/sclera: Conjunctivae normal.   Neck:      Trachea: Phonation normal.   Cardiovascular:      Rate and Rhythm: Normal rate and regular rhythm. Heart sounds: No murmur heard. No friction rub. No gallop. Pulmonary:      Effort: Pulmonary effort is normal. No respiratory distress. Breath sounds: No wheezing, rhonchi or rales. Abdominal:      General: There is no distension. Palpations: Abdomen is soft. Tenderness: There is no abdominal tenderness. Musculoskeletal:      Cervical back: Normal range of motion and neck supple. Skin:     General: Skin is warm and dry. Findings: No petechiae or rash. Neurological:      Mental Status: She is alert and oriented to person, place, and time. Psychiatric:         Mood and Affect: Mood and affect normal.         Behavior: Behavior normal.         RECENT VITALS:  BP: 139/83, Temp: 98 °F (36.7 °C), Pulse: 76, Resp: 16, SpO2: 100 %       ED Course     Nursing Notes, Past Medical Hx,Past Surgical Hx, Social Hx, Allergies, and Family Hx were reviewed. The patient was given the following medications:  Orders Placed This Encounter   Medications    dicyclomine (BENTYL) 20 MG tablet     Sig: Take 1 tablet by mouth every 6 hours as needed (abdominal pain)     Dispense:  12 tablet     Refill:  0       CONSULTS:  None    MEDICAL DECISION MAKING / ASSESSMENT / Ova Jadon Blanco is a 60 y.o. female presenting to the ED with bilateral lateral abdominal pain that seems to have subsided with no medication prior to arrival.  Vital signs are stable, she is afebrile and has no abdominal tenderness on my exam.  IV access was established. She declined medication. Labs include an unremarkable CBC, renal panel and liver panel.

## 2021-06-14 ENCOUNTER — NURSE ONLY (OUTPATIENT)
Dept: PRIMARY CARE CLINIC | Age: 66
End: 2021-06-14
Payer: COMMERCIAL

## 2021-06-14 DIAGNOSIS — E53.8 VITAMIN B12 DEFICIENCY: Primary | ICD-10-CM

## 2021-06-14 PROCEDURE — 96372 THER/PROPH/DIAG INJ SC/IM: CPT | Performed by: INTERNAL MEDICINE

## 2021-06-14 RX ORDER — CYANOCOBALAMIN 1000 UG/ML
1000 INJECTION INTRAMUSCULAR; SUBCUTANEOUS ONCE
Status: COMPLETED | OUTPATIENT
Start: 2021-06-14 | End: 2021-06-14

## 2021-06-14 RX ADMIN — CYANOCOBALAMIN 1000 MCG: 1000 INJECTION INTRAMUSCULAR; SUBCUTANEOUS at 09:11

## 2021-07-11 ENCOUNTER — HOSPITAL ENCOUNTER (EMERGENCY)
Age: 66
Discharge: HOME | End: 2021-07-11
Attending: EMERGENCY MEDICINE
Payer: COMMERCIAL

## 2021-07-11 ENCOUNTER — APPOINTMENT (OUTPATIENT)
Dept: GENERAL RADIOLOGY | Age: 66
End: 2021-07-11
Payer: COMMERCIAL

## 2021-07-11 VITALS
HEART RATE: 61 BPM | TEMPERATURE: 98.5 F | DIASTOLIC BLOOD PRESSURE: 71 MMHG | OXYGEN SATURATION: 98 % | HEIGHT: 66 IN | BODY MASS INDEX: 34.39 KG/M2 | WEIGHT: 214 LBS | SYSTOLIC BLOOD PRESSURE: 144 MMHG | RESPIRATION RATE: 14 BRPM

## 2021-07-11 DIAGNOSIS — R07.89 ATYPICAL CHEST PAIN: Primary | ICD-10-CM

## 2021-07-11 LAB
ALBUMIN SERPL-MCNC: 4 G/DL (ref 3.4–5)
ALP BLD-CCNC: 77 U/L (ref 40–129)
ALT SERPL-CCNC: 9 U/L (ref 10–40)
ANION GAP SERPL CALCULATED.3IONS-SCNC: 11 MMOL/L (ref 3–16)
AST SERPL-CCNC: 14 U/L (ref 15–37)
BASOPHILS ABSOLUTE: 0 K/UL (ref 0–0.2)
BASOPHILS RELATIVE PERCENT: 1.1 %
BILIRUB SERPL-MCNC: 0.5 MG/DL (ref 0–1)
BILIRUBIN DIRECT: <0.2 MG/DL (ref 0–0.3)
BILIRUBIN, INDIRECT: ABNORMAL MG/DL (ref 0–1)
BUN BLDV-MCNC: 10 MG/DL (ref 7–20)
CALCIUM SERPL-MCNC: 9.3 MG/DL (ref 8.3–10.6)
CHLORIDE BLD-SCNC: 102 MMOL/L (ref 99–110)
CO2: 26 MMOL/L (ref 21–32)
CREAT SERPL-MCNC: 0.8 MG/DL (ref 0.6–1.2)
EKG ATRIAL RATE: 73 BPM
EKG DIAGNOSIS: NORMAL
EKG P AXIS: 60 DEGREES
EKG P-R INTERVAL: 180 MS
EKG Q-T INTERVAL: 400 MS
EKG QRS DURATION: 74 MS
EKG QTC CALCULATION (BAZETT): 440 MS
EKG R AXIS: -6 DEGREES
EKG T AXIS: 23 DEGREES
EKG VENTRICULAR RATE: 73 BPM
EOSINOPHILS ABSOLUTE: 0.1 K/UL (ref 0–0.6)
EOSINOPHILS RELATIVE PERCENT: 2.3 %
GFR AFRICAN AMERICAN: >60
GFR NON-AFRICAN AMERICAN: >60
GLUCOSE BLD-MCNC: 86 MG/DL (ref 70–99)
HCT VFR BLD CALC: 38.9 % (ref 36–48)
HEMOGLOBIN: 12.9 G/DL (ref 12–16)
LIPASE: 18 U/L (ref 13–60)
LYMPHOCYTES ABSOLUTE: 1.8 K/UL (ref 1–5.1)
LYMPHOCYTES RELATIVE PERCENT: 40.5 %
MCH RBC QN AUTO: 29.6 PG (ref 26–34)
MCHC RBC AUTO-ENTMCNC: 33.1 G/DL (ref 31–36)
MCV RBC AUTO: 89.5 FL (ref 80–100)
MONOCYTES ABSOLUTE: 0.5 K/UL (ref 0–1.3)
MONOCYTES RELATIVE PERCENT: 11.4 %
NEUTROPHILS ABSOLUTE: 1.9 K/UL (ref 1.7–7.7)
NEUTROPHILS RELATIVE PERCENT: 44.7 %
PDW BLD-RTO: 14.4 % (ref 12.4–15.4)
PLATELET # BLD: 215 K/UL (ref 135–450)
PMV BLD AUTO: 8.1 FL (ref 5–10.5)
POTASSIUM REFLEX MAGNESIUM: 3.8 MMOL/L (ref 3.5–5.1)
RBC # BLD: 4.35 M/UL (ref 4–5.2)
SODIUM BLD-SCNC: 139 MMOL/L (ref 136–145)
TOTAL PROTEIN: 6.9 G/DL (ref 6.4–8.2)
TROPONIN: <0.01 NG/ML
WBC # BLD: 4.3 K/UL (ref 4–11)

## 2021-07-11 PROCEDURE — 71046 X-RAY EXAM CHEST 2 VIEWS: CPT

## 2021-07-11 PROCEDURE — 83690 ASSAY OF LIPASE: CPT

## 2021-07-11 PROCEDURE — 84484 ASSAY OF TROPONIN QUANT: CPT

## 2021-07-11 PROCEDURE — 85025 COMPLETE CBC W/AUTO DIFF WBC: CPT

## 2021-07-11 PROCEDURE — 6370000000 HC RX 637 (ALT 250 FOR IP): Performed by: PHYSICIAN ASSISTANT

## 2021-07-11 PROCEDURE — 93005 ELECTROCARDIOGRAM TRACING: CPT | Performed by: EMERGENCY MEDICINE

## 2021-07-11 PROCEDURE — 99284 EMERGENCY DEPT VISIT MOD MDM: CPT

## 2021-07-11 PROCEDURE — 80076 HEPATIC FUNCTION PANEL: CPT

## 2021-07-11 PROCEDURE — 80048 BASIC METABOLIC PNL TOTAL CA: CPT

## 2021-07-11 RX ORDER — OMEPRAZOLE 20 MG/1
20 CAPSULE, DELAYED RELEASE ORAL
Qty: 60 CAPSULE | Refills: 0 | Status: ON HOLD | OUTPATIENT
Start: 2021-07-11 | End: 2022-06-24

## 2021-07-11 RX ADMIN — ALUMINUM HYDROXIDE, MAGNESIUM HYDROXIDE, AND SIMETHICONE: 200; 200; 20 SUSPENSION ORAL at 09:45

## 2021-07-11 ASSESSMENT — PAIN SCALES - GENERAL
PAINLEVEL_OUTOF10: 5
PAINLEVEL_OUTOF10: 2

## 2021-07-11 ASSESSMENT — ENCOUNTER SYMPTOMS
DIARRHEA: 0
CHEST TIGHTNESS: 0
RHINORRHEA: 0
SORE THROAT: 0
SHORTNESS OF BREATH: 0
COUGH: 1
CONSTIPATION: 0
BACK PAIN: 0
NAUSEA: 0
VOMITING: 0

## 2021-07-11 NOTE — ED PROVIDER NOTES
ED Attending Attestation Note     Date of evaluation: 7/11/2021    This patient was seen by the advance practice provider. I have seen and examined the patient, agree with the workup, evaluation, management and diagnosis. The care plan has been discussed. I have reviewed the ECG and concur with the YAZMIN's interpretation. My assessment reveals patient here with epigastric and lower anterior chest discomfort that is most notable at rest when she is sitting or laying down. It has been present for 4 days. Food and beverage seem to improve the symptoms. Normal exam and no tenderness. ECG and labs including trop and lipase normal.  Will start PPI.      Tej Maradiaga MD  07/11/21 6163

## 2021-07-11 NOTE — ED PROVIDER NOTES
includes Appendectomy; Cervix surgery; Endometrial biopsy; Achilles tendon surgery (2007); Breast cyst aspiration; Colonoscopy (2006); and Tubal ligation. Her family history includes Cancer in her maternal grandmother, mother, and paternal grandmother; Cancer (age of onset: 47) in her paternal uncle; Diabetes in her maternal aunt and paternal grandmother; Heart Disease in her mother; High Blood Pressure in her father; High Cholesterol in her sister; Kidney Disease in her paternal uncle; Stroke in her maternal grandmother. She reports that she quit smoking about 39 years ago. She has a 3.00 pack-year smoking history. She has never used smokeless tobacco. She reports current alcohol use. She reports that she does not use drugs. Medications     Previous Medications    CPAP MACHINE MISC    by Does not apply route    CYANOCOBALAMIN 1000 MCG/ML INJECTION    Inject 1,000 mcg into the muscle every 30 days    DICYCLOMINE (BENTYL) 20 MG TABLET    Take 1 tablet by mouth every 6 hours as needed (abdominal pain)    LINACLOTIDE (LINZESS) 145 MCG CAPSULE    Take 1 capsule by mouth every morning (before breakfast)    METOPROLOL SUCCINATE (TOPROL XL) 25 MG EXTENDED RELEASE TABLET    Take 1 tablet by mouth nightly    SIMVASTATIN (ZOCOR) 20 MG TABLET    TAKE ONE TABLET BY MOUTH EVERY NIGHT AT BEDTIME    SYRINGE, DISPOSABLE, 1 ML MISC    1 Device by Does not apply route every 30 days    VITAMIN D3 (CHOLECALCIFEROL) 125 MCG (5000 UT) TABS TABLET    Take 5,000 Int'l Units by mouth daily       Allergies     She is allergic to lisinopril, flagyl [metronidazole hcl], and metronidazole. Physical Exam     INITIAL VITALS: BP: (!) 145/92, Temp: 98.5 °F (36.9 °C), Pulse: 75, Resp: 18, SpO2: 100 %  Physical Exam  Vitals and nursing note reviewed. Constitutional:       General: She is not in acute distress. Appearance: She is well-developed. HENT:      Head: Normocephalic and atraumatic.       Right Ear: External ear normal. chest.          LABS:   Results for orders placed or performed during the hospital encounter of 07/11/21   CBC auto differential   Result Value Ref Range    WBC 4.3 4.0 - 11.0 K/uL    RBC 4.35 4.00 - 5.20 M/uL    Hemoglobin 12.9 12.0 - 16.0 g/dL    Hematocrit 38.9 36.0 - 48.0 %    MCV 89.5 80.0 - 100.0 fL    MCH 29.6 26.0 - 34.0 pg    MCHC 33.1 31.0 - 36.0 g/dL    RDW 14.4 12.4 - 15.4 %    Platelets 726 888 - 840 K/uL    MPV 8.1 5.0 - 10.5 fL    Neutrophils % 44.7 %    Lymphocytes % 40.5 %    Monocytes % 11.4 %    Eosinophils % 2.3 %    Basophils % 1.1 %    Neutrophils Absolute 1.9 1.7 - 7.7 K/uL    Lymphocytes Absolute 1.8 1.0 - 5.1 K/uL    Monocytes Absolute 0.5 0.0 - 1.3 K/uL    Eosinophils Absolute 0.1 0.0 - 0.6 K/uL    Basophils Absolute 0.0 0.0 - 0.2 K/uL   Basic Metabolic Panel w/ Reflex to MG   Result Value Ref Range    Sodium 139 136 - 145 mmol/L    Potassium reflex Magnesium 3.8 3.5 - 5.1 mmol/L    Chloride 102 99 - 110 mmol/L    CO2 26 21 - 32 mmol/L    Anion Gap 11 3 - 16    Glucose 86 70 - 99 mg/dL    BUN 10 7 - 20 mg/dL    CREATININE 0.8 0.6 - 1.2 mg/dL    GFR Non-African American >60 >60    GFR African American >60 >60    Calcium 9.3 8.3 - 10.6 mg/dL   Troponin (lab)   Result Value Ref Range    Troponin <0.01 <0.01 ng/mL   Lipase   Result Value Ref Range    Lipase 18.0 13.0 - 60.0 U/L   Hepatic function panel (LFTs)   Result Value Ref Range    Total Protein 6.9 6.4 - 8.2 g/dL    Albumin 4.0 3.4 - 5.0 g/dL    Alkaline Phosphatase 77 40 - 129 U/L    ALT 9 (L) 10 - 40 U/L    AST 14 (L) 15 - 37 U/L    Total Bilirubin 0.5 0.0 - 1.0 mg/dL    Bilirubin, Direct <0.2 0.0 - 0.3 mg/dL    Bilirubin, Indirect see below 0.0 - 1.0 mg/dL           RECENT VITALS:  BP: (!) 157/79, Temp: 98.5 °F (36.9 °C), Pulse: 65, Resp: 15, SpO2: 100 %     Procedures         ED Course     Nursing Notes, Past Medical Hx,Past Surgical Hx, Social Hx, Allergies, and Family Hx were reviewed.     The patient was given the following medications:  Orders Placed This Encounter   Medications    aluminum & magnesium hydroxide-simethicone (MAALOX) 30 mL, lidocaine viscous hcl (XYLOCAINE) 5 mL (GI COCKTAIL)    omeprazole (PRILOSEC) 20 MG delayed release capsule     Sig: Take 1 capsule by mouth 2 times daily (before meals)     Dispense:  60 capsule     Refill:  0       CONSULTS:  None    MEDICAL DECISION MAKING / ASSESSMENT / Jenny Shoaib Mcpherson is a 60 y.o. female who presented to the emergency department with midepigastric and midsternal pain. On examination she is clear and equal breath sounds bilaterally. She had IV established with labs drawn. CBC BMP are unremarkable. LFTs and lipase show no concerning findings. Troponin is negative. EKG shows sinus rhythm with no ischemic patterns. In discussion with the patient she states eating and drinking water helps her pain for a period of time and she did have some relief with a GI cocktail here. This is atypical for cardiac origin and most likely secondary to acid reflux as again she had improvement with a GI cocktail, eating and drinking. She will be discharged on omeprazole and will follow up with her primary care physician for reevaluation. She states she will call tomorrow to set up an appointment. She is to return for worsening symptoms or concerns as discussed. This patient was also evaluated by the attending physician. All care plans were discussed and agreed upon. Clinical Impression     1.  Atypical chest pain        Disposition     PATIENT REFERRED TO:  Ruchi Sweeney MD  [address] [facility]  [phone]    Call   for follow up and reevaluation      DISCHARGE MEDICATIONS:  New Prescriptions    OMEPRAZOLE (PRILOSEC) 20 MG DELAYED RELEASE CAPSULE    Take 1 capsule by mouth 2 times daily (before meals)       [address]  07/11/21 1111

## 2021-07-14 ENCOUNTER — NURSE ONLY (OUTPATIENT)
Dept: PRIMARY CARE CLINIC | Age: 66
End: 2021-07-14
Payer: COMMERCIAL

## 2021-07-14 DIAGNOSIS — E53.8 VITAMIN B12 DEFICIENCY: Primary | ICD-10-CM

## 2021-07-14 PROCEDURE — 96372 THER/PROPH/DIAG INJ SC/IM: CPT | Performed by: INTERNAL MEDICINE

## 2021-07-14 RX ORDER — CYANOCOBALAMIN 1000 UG/ML
1000 INJECTION INTRAMUSCULAR; SUBCUTANEOUS ONCE
Status: COMPLETED | OUTPATIENT
Start: 2021-07-14 | End: 2021-07-14

## 2021-07-14 RX ADMIN — CYANOCOBALAMIN 1000 MCG: 1000 INJECTION INTRAMUSCULAR; SUBCUTANEOUS at 08:56

## 2021-07-22 ENCOUNTER — OFFICE VISIT (OUTPATIENT)
Dept: PRIMARY CARE CLINIC | Age: 66
End: 2021-07-22
Payer: COMMERCIAL

## 2021-07-22 VITALS
WEIGHT: 216.2 LBS | SYSTOLIC BLOOD PRESSURE: 124 MMHG | DIASTOLIC BLOOD PRESSURE: 80 MMHG | OXYGEN SATURATION: 99 % | HEART RATE: 70 BPM | BODY MASS INDEX: 35.43 KG/M2

## 2021-07-22 DIAGNOSIS — R10.13 DYSPEPSIA: ICD-10-CM

## 2021-07-22 DIAGNOSIS — E53.8 B12 DEFICIENCY: ICD-10-CM

## 2021-07-22 DIAGNOSIS — K21.9 GASTROESOPHAGEAL REFLUX DISEASE, UNSPECIFIED WHETHER ESOPHAGITIS PRESENT: Primary | ICD-10-CM

## 2021-07-22 DIAGNOSIS — K58.1 IRRITABLE BOWEL SYNDROME WITH CONSTIPATION: ICD-10-CM

## 2021-07-22 PROCEDURE — 1036F TOBACCO NON-USER: CPT | Performed by: INTERNAL MEDICINE

## 2021-07-22 PROCEDURE — 3017F COLORECTAL CA SCREEN DOC REV: CPT | Performed by: INTERNAL MEDICINE

## 2021-07-22 PROCEDURE — G8427 DOCREV CUR MEDS BY ELIG CLIN: HCPCS | Performed by: INTERNAL MEDICINE

## 2021-07-22 PROCEDURE — 1111F DSCHRG MED/CURRENT MED MERGE: CPT | Performed by: INTERNAL MEDICINE

## 2021-07-22 PROCEDURE — 99213 OFFICE O/P EST LOW 20 MIN: CPT | Performed by: INTERNAL MEDICINE

## 2021-07-22 PROCEDURE — G8417 CALC BMI ABV UP PARAM F/U: HCPCS | Performed by: INTERNAL MEDICINE

## 2021-07-22 RX ORDER — FAMOTIDINE 20 MG/1
20 TABLET, FILM COATED ORAL EVERY MORNING
Qty: 90 TABLET | Refills: 1 | Status: ON HOLD | OUTPATIENT
Start: 2021-07-22 | End: 2022-06-24

## 2021-07-22 SDOH — ECONOMIC STABILITY: FOOD INSECURITY: WITHIN THE PAST 12 MONTHS, YOU WORRIED THAT YOUR FOOD WOULD RUN OUT BEFORE YOU GOT MONEY TO BUY MORE.: NEVER TRUE

## 2021-07-22 SDOH — ECONOMIC STABILITY: FOOD INSECURITY: WITHIN THE PAST 12 MONTHS, THE FOOD YOU BOUGHT JUST DIDN'T LAST AND YOU DIDN'T HAVE MONEY TO GET MORE.: NEVER TRUE

## 2021-07-22 ASSESSMENT — PATIENT HEALTH QUESTIONNAIRE - PHQ9
SUM OF ALL RESPONSES TO PHQ9 QUESTIONS 1 & 2: 1
1. LITTLE INTEREST OR PLEASURE IN DOING THINGS: 0
2. FEELING DOWN, DEPRESSED OR HOPELESS: 1
SUM OF ALL RESPONSES TO PHQ QUESTIONS 1-9: 1

## 2021-07-22 ASSESSMENT — SOCIAL DETERMINANTS OF HEALTH (SDOH): HOW HARD IS IT FOR YOU TO PAY FOR THE VERY BASICS LIKE FOOD, HOUSING, MEDICAL CARE, AND HEATING?: NOT HARD AT ALL

## 2021-07-22 NOTE — PATIENT INSTRUCTIONS
1. Pepcid 20mg in AM, omeprazole 20mg in PM  2. H pylori re test  3. Rx linzess 145mg sent to pharmacy   4.  Heat your plate and utensils to aid in slowing pace of eating

## 2021-07-22 NOTE — PROGRESS NOTES
2021    Cecille Carranza (:  1955) is a 60 y.o. female, here for evaluation of the following medical concerns:    No chief complaint on file. HPI  45-year-old female with sleep apnea on CPAP, treated hyperlipidemia and hypertension, constipation predominant irritable bowel syndrome on Linzess, symptomatic lumbosacral spondylosis, apparent B12 deficiency on monthly B12 injections, who comes in for ED follow-up. Patient saw Dr Brynn Heath, for hypertension sleep apnea hyperlipidemia obesity interested in \"LCD\" (900 -calorie/day low calorie diet) earlier this month. Patient was then seen at the [facility] ED for 4-day history of chest and epigastric pain briefly improved following meals. EGD troponin subdiaphragmatic labs unremarkable. GI cocktail with some improvement. Dismissed on Prilosec. Comes in to establish care following her providers long-term. Recent lipid and renal panel were quite reassuring consistent with good dyslipidemia control and no electrolyte disturbance or renal disease. Patient does not check her blood pressures at home, historically in the clinic 110-120 over 70s. No known cardiovascular disease. Tdap , no other vaccinations charted in the registry despite patient report of receiving [address] vaccination . Reassuring mammogram this year, DEXA in -0.10.6 hip/spine. Pap . HIV HCV -. TSH vitamin D B12 within normal limits 2020. Colonoscopy  results unknown, but 2012 negative, negative Cologuard  . Patient has struggled with palpitations in the past, prompting echo and Holter 2019, with 400 premature beats, just 0.4%. States ongoing compliance with CPAP. Review of Systems   Constitutional: Negative for activity change, appetite change, fatigue and unexpected weight change. HENT: Negative for dental problem, sinus pain, sore throat and trouble swallowing.     Eyes: Negative for pain and visual disturbance. Respiratory: Negative for apnea, cough, chest tightness, shortness of breath and wheezing. Cardiovascular: Negative for chest pain positive. Gastrointestinal: Negative for abdominal pain, blood in stool, constipation, diarrhea, nausea, rectal pain and vomiting. Endocrine: Negative for cold intolerance, heat intolerance, polydipsia, polyphagia and polyuria. Genitourinary: Negative for difficulty urinating, dysuria, flank pain, frequency, hematuria, pelvic pain, urgency, vaginal bleeding and vaginal discharge. Musculoskeletal: Negative for arthralgias, back pain, gait problem, joint swelling, myalgias, neck pain and neck stiffness. Skin: Negative for color change and rash. Neurological: Negative for dizziness, tremors, syncope, speech difficulty, weakness, light-headedness and headaches. Hematological: Negative for adenopathy. Does not bruise/bleed easily. Psychiatric/Behavioral: Negative for agitation, behavioral problems, decreased concentration, sleep disturbance and suicidal ideas. The patient is not nervous/anxious and is not hyperactive. Prior to Visit Medications    Medication Sig Taking?  Authorizing Provider   omeprazole (PRILOSEC) 20 MG delayed release capsule Take 1 capsule by mouth 2 times daily (before meals)  Tricia Aschoff, PA   cyanocobalamin 1000 MCG/ML injection Inject 1,000 mcg into the muscle every 30 days  Historical Provider, MD   dicyclomine (BENTYL) 20 MG tablet Take 1 tablet by mouth every 6 hours as needed (abdominal pain)  HONEY Singer   metoprolol succinate (TOPROL XL) 25 MG extended release tablet Take 1 tablet by mouth nightly  Sana Soto MD   linaclotide (LINZESS) 145 MCG capsule Take 1 capsule by mouth every morning (before breakfast)  Patient taking differently: Take 290 mcg by mouth daily as needed   Marlo Hall MD   vitamin D3 (CHOLECALCIFEROL) 125 MCG (5000 UT) TABS tablet Take 5,000 Int'l Units by mouth daily Historical Provider, MD   simvastatin (ZOCOR) 20 MG tablet TAKE ONE TABLET BY MOUTH EVERY NIGHT AT BEDTIME  Jayesh Nick MD   Syringe, Disposable, 1 ML MISC 1 Device by Does not apply route every 30 days  Jayesh Nick MD   CPAP Machine MISC by Does not apply route  Jayesh Nick MD        Allergies   Allergen Reactions    Lisinopril Anaphylaxis and Itching     sweating    Flagyl [Metronidazole Hcl] Other (See Comments)     \"metallic taste\"    Metronidazole Nausea And Vomiting       Past Medical History:   Diagnosis Date    Allergic rhinitis     Chronic back pain     Fibroids     Fibromyalgia     Fibromyalgia     Hyperlipidemia 2012    Hypertension     STEPHANIE (obstructive sleep apnea)     Sleep apnea        Past Surgical History:   Procedure Laterality Date    ACHILLES TENDON SURGERY  2007    left achilles repair by Dr. Romain Reynolds cervical surgery-- sees Dr Manjeet Cochran @ [facility]      Dr Tangela Pham and 2012    ENDOMETRIAL BIOPSY      Dr Asmita Galdamez History     Socioeconomic History    Marital status:      Spouse name: Not on file    Number of children: 3    Years of education: Not on file    Highest education level: Not on file   Occupational History    Occupation: Patient registration     Comment: retired   Tobacco Use    Smoking status: Former Smoker     Packs/day: 1.00     Years: 3.00     Pack years: 3.00     Quit date: 1982     Years since quittin.1    Smokeless tobacco: Never Used   Vaping Use    Vaping Use: Never used   Substance and Sexual Activity    Alcohol use:  Yes     Alcohol/week: 0.0 standard drinks     Comment: occ    Drug use: No    Sexual activity: Never   Other Topics Concern    Not on file   Social History Narrative    Not on file     Social Determinants of Health     Financial Resource Strain:  Difficulty of Paying Living Expenses:    Food Insecurity:     Worried About Running Out of Food in the Last Year:     Ran Out of Food in the Last Year:    Transportation Needs:     Lack of Transportation (Medical):  Lack of Transportation (Non-Medical):    Physical Activity:     Days of Exercise per Week:     Minutes of Exercise per Session:    Stress:     Feeling of Stress :    Social Connections:     Frequency of Communication with Friends and Family:     Frequency of Social Gatherings with Friends and Family:     Attends Hinduism Services:     Active Member of Clubs or Organizations:     Attends Club or Organization Meetings:     Marital Status:    Intimate Partner Violence:     Fear of Current or Ex-Partner:     Emotionally Abused:     Physically Abused:     Sexually Abused:         Family History   Problem Relation Age of Onset    Cancer Mother         colon    Heart Disease Mother         heart infection    High Blood Pressure Father     Kidney Disease Paternal Uncle     Cancer Paternal Uncle 47        colon cancer    High Cholesterol Sister     Diabetes Maternal Aunt     Cancer Maternal Grandmother         breast cancer    Stroke Maternal Grandmother     Cancer Paternal Grandmother     Diabetes Paternal Grandmother        There were no vitals filed for this visit. Estimated body mass index is 35.07 kg/m² as calculated from the following:    Height as of 7/11/21: 5' 5.5\" (1.664 m). Weight as of 7/11/21: 214 lb (97.1 kg). PHYSICAL EXAM  GENERAL:  Pleasant  female who looks her stated age, awake alert and oriented x3, no acute distress. HEENT:  Normocephalic atraumatic. Pupils equal round reactive light and accommodation, extra ocular muscles are intact. Oropharynx is clear moist without injection or exudate. Tongue and palate move normally. Turbinates appear normal.  Tympanic membranes appear normal.  NECK:  Supple nontender. No carotid bruits. Brisk carotid upstrokes, no JVD. No thyromegaly. LYMPH:  No supraclavicular cervical axillary or inguinal lymphadenopathy. LUNGS:  Clear to auscultation bilaterally. Excellent air entry. No inspiratory crackles or expiratory wheezes. HEART:  Regular rate and rhythm without pathologic murmur rub gallop S3 or S4. ABDOMEN:  Soft, nontender. Normal bowel sounds. No guarding. No masses. UROGENITAL:  Deferred  EXTREMITIES:  Warm and well perfused without clubbing cyanosis or edema. 2+ pulses in all 4 extremities. Capillary refill less than 2 seconds. NEURO:  Cranial nerves 2-12 grossly intact. Normal muscle bulk and tone. No resting tremor, cogwheeling, normal rapid alternating movements in the hands and feet. No stocking paresthesia. Normal gait and station. MUSCULOSKELETAL:  Mild osteoarthritic changes. SKIN:  No worrisome lesions, skin a little dry. PSYCH:  No psychomotor retardation or agitation. Good eye contact. Unrestricted affect range. Mood congruent with affect. Linear thought. LABS  Lab Review   Admission on 07/11/2021, Discharged on 07/11/2021   Component Date Value    Ventricular Rate 07/11/2021 73     Atrial Rate 07/11/2021 73     P-R Interval 07/11/2021 180     QRS Duration 07/11/2021 74     Q-T Interval 07/11/2021 400     QTc Calculation (Bazett) 07/11/2021 440     P Axis 07/11/2021 60     R Axis 07/11/2021 -6     T Axis 07/11/2021 23     Diagnosis 07/11/2021 EKG performed in ER and to be interpreted by ER physician. Confirmed by MD, ER ([id number]),  JOSE MANUELCAT ([id number]) on 7/11/2021 8:17:45 PM     WBC 07/11/2021 4.3     RBC 07/11/2021 4.35     Hemoglobin 07/11/2021 12.9     Hematocrit 07/11/2021 38.9     MCV 07/11/2021 89.5     MCH 07/11/2021 29.6     MCHC 07/11/2021 33.1     RDW 07/11/2021 14.4     Platelets 20/12/8556 215     MPV 07/11/2021 8.1     Neutrophils % 07/11/2021 44.7     Lymphocytes % 07/11/2021 40.5     Monocytes % 07/11/2021 11.4     Eosinophils 05/25/2021 >60     Calcium 05/25/2021 9.7     Total Protein 05/25/2021 8.0     Albumin 05/25/2021 4.3     Alkaline Phosphatase 05/25/2021 84     ALT 05/25/2021 10     AST 05/25/2021 19     Total Bilirubin 05/25/2021 0.5     Bilirubin, Direct 05/25/2021 <0.2     Bilirubin, Indirect 05/25/2021 see below     Color, UA 05/25/2021 Yellow     Clarity, UA 05/25/2021 Clear     Glucose, Ur 05/25/2021 Negative     Bilirubin Urine 05/25/2021 Negative     Ketones, Urine 05/25/2021 Negative     Specific Gravity, UA 05/25/2021 1.020     Blood, Urine 05/25/2021 MODERATE*    pH, UA 05/25/2021 6.0     Protein, UA 05/25/2021 Negative     Urobilinogen, Urine 05/25/2021 0.2     Nitrite, Urine 05/25/2021 Negative     Leukocyte Esterase, Urine 05/25/2021 Negative     Microscopic Examination 05/25/2021 YES     Urine Type 05/25/2021 NotGiven     WBC, UA 05/25/2021 None seen     RBC, UA 05/25/2021 0-2     Epithelial Cells, UA 05/25/2021 2-5    Hospital Outpatient Visit on 12/22/2020   Component Date Value    Cholesterol, Total 12/22/2020 177     Triglycerides 12/22/2020 54     HDL 12/22/2020 73*    LDL Calculated 12/22/2020 93     VLDL Cholesterol Calcula* 12/22/2020 11     Sodium 12/22/2020 143     Potassium 12/22/2020 3.7     Chloride 12/22/2020 103     CO2 12/22/2020 28     Anion Gap 12/22/2020 12     Glucose 12/22/2020 89     BUN 12/22/2020 17     CREATININE 12/22/2020 1.0     GFR Non- 12/22/2020 56*    GFR  12/22/2020 >60     Calcium 12/22/2020 9.7     Phosphorus 12/22/2020 3.6     Albumin 12/22/2020 3.9    Orders Only on 10/13/2020   Component Date Value    OCCULT BLOOD FECAL 10/13/2020 negative    Orders Only on 08/17/2020   Component Date Value    Vitamin B-12 08/17/2020 295     Vit D, 25-Hydroxy 08/17/2020 37.0     Sodium 08/17/2020 141     Potassium 08/17/2020 4.3     Chloride 08/17/2020 103     CO2 08/17/2020 25     Anion Gap 08/17/2020 13     Glucose 08/17/2020 89     BUN 08/17/2020 13     CREATININE 08/17/2020 0.9     GFR Non- 08/17/2020 >60     GFR  08/17/2020 >60     Calcium 08/17/2020 10.0     Total Protein 08/17/2020 7.3     Albumin 08/17/2020 4.4     Albumin/Globulin Ratio 08/17/2020 1.5     Total Bilirubin 08/17/2020 0.5     Alkaline Phosphatase 08/17/2020 76     ALT 08/17/2020 9*    AST 08/17/2020 14*    Globulin 08/17/2020 2.9     TSH Reflex FT4 08/17/2020 1.24     Color, UA 08/17/2020 YELLOW     Clarity, UA 08/17/2020 Clear     Glucose, Ur 08/17/2020 Negative     Bilirubin Urine 08/17/2020 Negative     Ketones, Urine 08/17/2020 Negative     Specific Gravity, UA 08/17/2020 1.006     Blood, Urine 08/17/2020 Negative     pH, UA 08/17/2020 6.0     Protein, UA 08/17/2020 Negative     Urobilinogen, Urine 08/17/2020 0.2     Nitrite, Urine 08/17/2020 Negative     Leukocyte Esterase, Urine 08/17/2020 Negative     Microscopic Examination 08/17/2020 Not Indicated     Urine Type 08/17/2020 NotGiven     Cholesterol, Total 08/17/2020 234*    Triglycerides 08/17/2020 42     HDL 08/17/2020 77*    LDL Calculated 08/17/2020 149*    VLDL Cholesterol Calcula* 08/17/2020 8     Hemoglobin A1C 08/17/2020 5.5     eAG 08/17/2020 111.2     WBC 08/17/2020 4.1     RBC 08/17/2020 4.75     Hemoglobin 08/17/2020 13.7     Hematocrit 08/17/2020 42.1     MCV 08/17/2020 88.7     MCH 08/17/2020 28.8     MCHC 08/17/2020 32.5     RDW 08/17/2020 15.0     Platelets 28/49/9221 215     MPV 08/17/2020 8.7     Neutrophils % 08/17/2020 40.7     Lymphocytes % 08/17/2020 47.1     Monocytes % 08/17/2020 9.2     Eosinophils % 08/17/2020 1.8     Basophils % 08/17/2020 1.2     Neutrophils Absolute 08/17/2020 1.7     Lymphocytes Absolute 08/17/2020 1.9     Monocytes Absolute 08/17/2020 0.4     Eosinophils Absolute 08/17/2020 0.1     Basophils Absolute 08/17/2020 0.0          ASSESSMENT/PLAN  1.  STEPHANIE (obstructive sleep apnea)  Due to see sleep doctor for renewal, advised to discuss severity of sleep apnea and adequacy of treatment. She wonders if she still has sleep apnea. Gained 10 pounds over the last 6 months. 2. Vitamin D deficiency  Update vitamin D in the appropriate timeframe. 3. Fibromyalgia  Patient appears to have quite a somatic focus. Lyrica Cymbalta represent treatment options should they prove sufficiently troubling. Ocular exercise adequate sleep essential.    4. Vitamin B12 deficiency  Screen for presence of intrinsic factor antibodies, day. We will stop injections if negative. We will update B12 end of the year. 5. Chronic idiopathic constipation  Doing well on Linzess low-dose. 6. Mixed hyperlipidemia  Update LFTs lipid panel in appropriate timeframe, next December. Urged to resume 20 mg simvastatin. 7. Healthcare maintenance  Colonoscopy due summer 2022. Pap smear with her gynecologist sometime this year. Mammogram next January. Urged to get Shingrix vaccination. 8. Essential hypertension  After some discussion, patient will trial low-dose metoprolol at bedtime to simultaneously control her blood pressure and suppress palpitations. Not sure that she will actually pursue this but we will see. - metoprolol succinate (TOPROL XL) 25 MG extended release tablet; Take 1 tablet by mouth nightly  Dispense: 30 tablet; Refill: 5    9. Palpitations  Strong somatic focus. Metoprolol should help. Wonder about adequacy of CPAP settings. - metoprolol succinate (TOPROL XL) 25 MG extended release tablet; Take 1 tablet by mouth nightly  Dispense: 30 tablet; Refill: 5    10. GERD. No red flags to merit endoscopy. H2 RB in the morning, PPI in the evening, avoid food triggers. As loses weight may be able to come off. No follow-ups on file. It was a pleasure to visit with Ms. Leah Tony today. Answered all questions as best I could.   Julieta Del Cid MD   Time 19 minutes

## 2021-07-25 LAB — INTRINSIC FACTOR BLOCKING AB: NEGATIVE

## 2021-07-29 ENCOUNTER — OFFICE VISIT (OUTPATIENT)
Dept: GYNECOLOGY | Age: 66
End: 2021-07-29
Payer: COMMERCIAL

## 2021-07-29 VITALS
DIASTOLIC BLOOD PRESSURE: 70 MMHG | WEIGHT: 217.2 LBS | HEART RATE: 63 BPM | HEIGHT: 64 IN | RESPIRATION RATE: 17 BRPM | OXYGEN SATURATION: 96 % | BODY MASS INDEX: 37.08 KG/M2 | SYSTOLIC BLOOD PRESSURE: 124 MMHG

## 2021-07-29 DIAGNOSIS — R10.2 PELVIC PAIN IN FEMALE: ICD-10-CM

## 2021-07-29 DIAGNOSIS — Z01.419 WELL WOMAN EXAM WITH ROUTINE GYNECOLOGICAL EXAM: Primary | ICD-10-CM

## 2021-07-29 DIAGNOSIS — R10.13 EPIGASTRIC PAIN: ICD-10-CM

## 2021-07-29 DIAGNOSIS — K21.9 GASTROESOPHAGEAL REFLUX DISEASE WITHOUT ESOPHAGITIS: ICD-10-CM

## 2021-07-29 DIAGNOSIS — D21.9 FIBROIDS: ICD-10-CM

## 2021-07-29 PROCEDURE — 99396 PREV VISIT EST AGE 40-64: CPT | Performed by: OBSTETRICS & GYNECOLOGY

## 2021-07-29 ASSESSMENT — ENCOUNTER SYMPTOMS
RESPIRATORY NEGATIVE: 1
EYES NEGATIVE: 1
ABDOMINAL PAIN: 1

## 2021-07-30 NOTE — PROGRESS NOTES
Subjective:      Patient ID: Willie Camacho is a 60 y.o. female. Patient is here for annual. Patient in menopause. Patient has been having some GERD. Gynecologic Exam  Associated symptoms include abdominal pain. Review of Systems   Constitutional: Negative. HENT: Negative. Eyes: Negative. Respiratory: Negative. Cardiovascular: Negative. Gastrointestinal: Positive for abdominal pain. Gerd   Genitourinary: Positive for pelvic pain. Musculoskeletal: Negative. Skin: Negative. Neurological: Negative. Psychiatric/Behavioral: Negative. Date of Birth 1955  Past Medical History:   Diagnosis Date    Allergic rhinitis     Chronic back pain     Fibroids     Fibromyalgia     Fibromyalgia     Hyperlipidemia 2012    Hypertension     STEPHANIE (obstructive sleep apnea)     Sleep apnea      Past Surgical History:   Procedure Laterality Date    ACHILLES TENDON SURGERY  2007    left achilles repair by Dr. Hall Current cervical surgery-- sees Dr Gauthier @ [facility]      Dr Solitario Licea and 2012    ENDOMETRIAL BIOPSY      Dr Jer Boudreaux       OB History    Para Term  AB Living   4 3 3   1 3   SAB TAB Ectopic Molar Multiple Live Births   1         3      # Outcome Date GA Lbr Audie/2nd Weight Sex Delivery Anes PTL Lv   4 1981           3 Term 81    M Vag-Spont   BARRY   2 Term 77    M Vag-Spont   BARRY   1 Term 76    Lindy Chi   BARRY     Social History     Socioeconomic History    Marital status:       Spouse name: Not on file    Number of children: 3    Years of education: Not on file    Highest education level: Not on file   Occupational History    Occupation: Patient registration     Comment: retired   Tobacco Use    Smoking status: Former Smoker     Packs/day: 1.00     Years: 3.00     Pack years: 3.00 meals) 60 capsule 0    cyanocobalamin 1000 MCG/ML injection Inject 1,000 mcg into the muscle every 30 days      dicyclomine (BENTYL) 20 MG tablet Take 1 tablet by mouth every 6 hours as needed (abdominal pain) 12 tablet 0    metoprolol succinate (TOPROL XL) 25 MG extended release tablet Take 1 tablet by mouth nightly 30 tablet 5    vitamin D3 (CHOLECALCIFEROL) 125 MCG (5000 UT) TABS tablet Take 5,000 Int'l Units by mouth daily      simvastatin (ZOCOR) 20 MG tablet TAKE ONE TABLET BY MOUTH EVERY NIGHT AT BEDTIME 90 tablet 3     Family History   Problem Relation Age of Onset    Cancer Mother         colon    Heart Disease Mother         heart infection    High Blood Pressure Father     Kidney Disease Paternal Uncle     Cancer Paternal Uncle 47        colon cancer    High Cholesterol Sister     Diabetes Maternal Aunt     Cancer Maternal Grandmother         breast cancer    Stroke Maternal Grandmother     Cancer Paternal Grandmother     Diabetes Paternal Grandmother      /70 (Site: Right Upper Arm, Position: Sitting, Cuff Size: Large Adult)   Pulse 63   Resp 17   Ht 5' 4\" (1.626 m)   Wt 217 lb 3.2 oz (98.5 kg)   LMP  (LMP Unknown)   SpO2 96%   BMI 37.28 kg/m²       Objective:   Physical Exam  Constitutional:       General: She is not in acute distress. Appearance: Normal appearance. She is well-developed and normal weight. She is not diaphoretic. HENT:      Head: Normocephalic and atraumatic. Nose: Nose normal.      Mouth/Throat:      Mouth: Mucous membranes are moist.      Pharynx: Oropharynx is clear. Eyes:      Pupils: Pupils are equal, round, and reactive to light. Neck:      Thyroid: No thyromegaly. Cardiovascular:      Rate and Rhythm: Normal rate and regular rhythm. Heart sounds: Normal heart sounds. No murmur heard. No friction rub. No gallop. Pulmonary:      Effort: Pulmonary effort is normal. No respiratory distress.       Breath sounds: Normal breath sounds. No wheezing or rales. Chest:      Breasts:         Right: Normal. No swelling, bleeding, inverted nipple, mass, nipple discharge, skin change or tenderness. Left: Normal. No swelling, bleeding, inverted nipple, mass, nipple discharge, skin change or tenderness. Abdominal:      General: Abdomen is flat. Bowel sounds are normal. There is no distension. Palpations: Abdomen is soft. There is no hepatomegaly or mass. Tenderness: There is no abdominal tenderness. There is no guarding or rebound. Hernia: No hernia is present. There is no hernia in the left inguinal area or right inguinal area. Genitourinary:     General: Normal vulva. Exam position: Lithotomy position. Pubic Area: No rash. Labia:         Right: No rash, tenderness, lesion or injury. Left: No rash, tenderness, lesion or injury. Urethra: No prolapse, urethral pain, urethral swelling or urethral lesion. Vagina: Normal. No signs of injury and foreign body. No vaginal discharge, erythema, tenderness or bleeding. Cervix: No cervical motion tenderness, discharge, friability, lesion, erythema, cervical bleeding or eversion. Uterus: Enlarged. Not deviated, not fixed, not tender and no uterine prolapse. Adnexa:         Right: No mass, tenderness or fullness. Left: No mass, tenderness or fullness. Rectum: Normal. Guaiac result negative. No mass, tenderness, anal fissure, external hemorrhoid or internal hemorrhoid. Normal anal tone. Comments: Normal urethral meatus, nl urethra, nl bladder. Enlarged fibroid uterus    Musculoskeletal:         General: No tenderness. Normal range of motion. Cervical back: Normal range of motion and neck supple. No rigidity. Lymphadenopathy:      Cervical: No cervical adenopathy. Lower Body: No right inguinal adenopathy. No left inguinal adenopathy. Skin:     General: Skin is warm and dry.       Findings: No erythema or rash. Neurological:      General: No focal deficit present. Mental Status: She is alert and oriented to person, place, and time. Deep Tendon Reflexes: Reflexes are normal and symmetric. Psychiatric:         Mood and Affect: Mood normal.         Behavior: Behavior normal.         Thought Content: Thought content normal.         Judgment: Judgment normal.         Assessment:      1. Annual  2. menopuse  3. gerd  4. Epigastric pain  5. Fibroids/pain      Plan:      1. Pap, calcium, exercise, mammogram, hemocult negative  2. Stable  3-5. Order CT scan abd/pv. See GI if her symptoms are not any better.          Maurice Larios MD

## 2021-07-31 DIAGNOSIS — R00.2 PALPITATIONS: ICD-10-CM

## 2021-07-31 DIAGNOSIS — I10 ESSENTIAL HYPERTENSION: ICD-10-CM

## 2021-08-02 RX ORDER — METOPROLOL SUCCINATE 25 MG/1
TABLET, EXTENDED RELEASE ORAL
Qty: 90 TABLET | Refills: 1 | Status: SHIPPED | OUTPATIENT
Start: 2021-08-02 | End: 2021-08-04

## 2021-08-03 DIAGNOSIS — R00.2 PALPITATIONS: ICD-10-CM

## 2021-08-03 DIAGNOSIS — I10 ESSENTIAL HYPERTENSION: ICD-10-CM

## 2021-08-04 RX ORDER — METOPROLOL SUCCINATE 25 MG/1
TABLET, EXTENDED RELEASE ORAL
Qty: 90 TABLET | Refills: 1 | Status: SHIPPED | OUTPATIENT
Start: 2021-08-04 | End: 2021-10-21 | Stop reason: SDUPTHER

## 2021-08-10 ENCOUNTER — HOSPITAL ENCOUNTER (OUTPATIENT)
Dept: CT IMAGING | Age: 66
Discharge: HOME | End: 2021-08-10
Payer: COMMERCIAL

## 2021-08-10 DIAGNOSIS — D21.9 FIBROIDS: ICD-10-CM

## 2021-08-10 DIAGNOSIS — R10.13 EPIGASTRIC PAIN: ICD-10-CM

## 2021-08-10 DIAGNOSIS — K21.9 GASTROESOPHAGEAL REFLUX DISEASE WITHOUT ESOPHAGITIS: ICD-10-CM

## 2021-08-10 DIAGNOSIS — R10.2 PELVIC PAIN IN FEMALE: ICD-10-CM

## 2021-08-10 PROCEDURE — 74177 CT ABD & PELVIS W/CONTRAST: CPT

## 2021-08-10 PROCEDURE — 6360000004 HC RX CONTRAST MEDICATION: Performed by: OBSTETRICS & GYNECOLOGY

## 2021-08-10 RX ADMIN — IOPAMIDOL 80 ML: 755 INJECTION, SOLUTION INTRAVENOUS at 11:12

## 2021-08-10 RX ADMIN — IOHEXOL 50 ML: 240 INJECTION, SOLUTION INTRATHECAL; INTRAVASCULAR; INTRAVENOUS; ORAL at 11:12

## 2021-08-16 ENCOUNTER — NURSE ONLY (OUTPATIENT)
Dept: PRIMARY CARE CLINIC | Age: 66
End: 2021-08-16
Payer: COMMERCIAL

## 2021-08-16 ENCOUNTER — TELEPHONE (OUTPATIENT)
Dept: PRIMARY CARE CLINIC | Age: 66
End: 2021-08-16

## 2021-08-16 DIAGNOSIS — E53.8 VITAMIN B12 DEFICIENCY: Primary | ICD-10-CM

## 2021-08-16 PROCEDURE — 96372 THER/PROPH/DIAG INJ SC/IM: CPT | Performed by: INTERNAL MEDICINE

## 2021-08-16 RX ORDER — CYANOCOBALAMIN 1000 UG/ML
1000 INJECTION INTRAMUSCULAR; SUBCUTANEOUS ONCE
Status: COMPLETED | OUTPATIENT
Start: 2021-08-16 | End: 2021-08-16

## 2021-08-16 RX ADMIN — CYANOCOBALAMIN 1000 MCG: 1000 INJECTION INTRAMUSCULAR; SUBCUTANEOUS at 09:51

## 2021-09-16 ENCOUNTER — NURSE ONLY (OUTPATIENT)
Dept: PRIMARY CARE CLINIC | Age: 66
End: 2021-09-16
Payer: COMMERCIAL

## 2021-09-16 DIAGNOSIS — E53.8 VITAMIN B12 DEFICIENCY: Primary | ICD-10-CM

## 2021-09-16 PROCEDURE — 96372 THER/PROPH/DIAG INJ SC/IM: CPT | Performed by: INTERNAL MEDICINE

## 2021-09-16 RX ORDER — CYANOCOBALAMIN 1000 UG/ML
1000 INJECTION INTRAMUSCULAR; SUBCUTANEOUS ONCE
Status: COMPLETED | OUTPATIENT
Start: 2021-09-16 | End: 2021-09-16

## 2021-09-16 RX ADMIN — CYANOCOBALAMIN 1000 MCG: 1000 INJECTION INTRAMUSCULAR; SUBCUTANEOUS at 10:41

## 2021-09-16 NOTE — PROGRESS NOTES
After obtaining consent, and per orders of Dr. Virgie Ang, injection of Cyanocobalamin- Vit B12 given in Left arm by Nora Henley MA. Patient instructed to remain in clinic for 20 minutes afterwards, and to report any adverse reaction to me immediately.

## 2021-10-11 ENCOUNTER — TELEPHONE (OUTPATIENT)
Dept: GYNECOLOGY | Age: 66
End: 2021-10-11

## 2021-10-11 NOTE — TELEPHONE ENCOUNTER
Patient called in stating that she was returning a call from [employer].  Please contact patient at [phone]

## 2021-10-21 ENCOUNTER — OFFICE VISIT (OUTPATIENT)
Dept: PRIMARY CARE CLINIC | Age: 66
End: 2021-10-21
Payer: COMMERCIAL

## 2021-10-21 VITALS
SYSTOLIC BLOOD PRESSURE: 133 MMHG | BODY MASS INDEX: 35.81 KG/M2 | OXYGEN SATURATION: 97 % | DIASTOLIC BLOOD PRESSURE: 83 MMHG | WEIGHT: 208.6 LBS | TEMPERATURE: 97.1 F | HEART RATE: 64 BPM

## 2021-10-21 DIAGNOSIS — K58.1 IRRITABLE BOWEL SYNDROME WITH CONSTIPATION: ICD-10-CM

## 2021-10-21 DIAGNOSIS — E78.2 MIXED HYPERLIPIDEMIA: ICD-10-CM

## 2021-10-21 DIAGNOSIS — I10 ESSENTIAL HYPERTENSION: ICD-10-CM

## 2021-10-21 DIAGNOSIS — E53.8 B12 DEFICIENCY DUE TO DIET: ICD-10-CM

## 2021-10-21 DIAGNOSIS — M54.2 CERVICALGIA: Primary | ICD-10-CM

## 2021-10-21 DIAGNOSIS — R00.2 PALPITATIONS: ICD-10-CM

## 2021-10-21 PROCEDURE — G8484 FLU IMMUNIZE NO ADMIN: HCPCS | Performed by: INTERNAL MEDICINE

## 2021-10-21 PROCEDURE — 3017F COLORECTAL CA SCREEN DOC REV: CPT | Performed by: INTERNAL MEDICINE

## 2021-10-21 PROCEDURE — 1036F TOBACCO NON-USER: CPT | Performed by: INTERNAL MEDICINE

## 2021-10-21 PROCEDURE — G8427 DOCREV CUR MEDS BY ELIG CLIN: HCPCS | Performed by: INTERNAL MEDICINE

## 2021-10-21 PROCEDURE — 99213 OFFICE O/P EST LOW 20 MIN: CPT | Performed by: INTERNAL MEDICINE

## 2021-10-21 PROCEDURE — G8417 CALC BMI ABV UP PARAM F/U: HCPCS | Performed by: INTERNAL MEDICINE

## 2021-10-21 RX ORDER — SIMVASTATIN 20 MG
TABLET ORAL
Qty: 90 TABLET | Refills: 3 | Status: SHIPPED | OUTPATIENT
Start: 2021-10-21 | End: 2022-07-22 | Stop reason: SDUPTHER

## 2021-10-21 RX ORDER — METOPROLOL SUCCINATE 25 MG/1
TABLET, EXTENDED RELEASE ORAL
Qty: 90 TABLET | Refills: 1 | Status: SHIPPED | OUTPATIENT
Start: 2021-10-21 | End: 2022-07-22 | Stop reason: SDUPTHER

## 2021-10-21 NOTE — PROGRESS NOTES
10/21/2021    Anton Batres (:  1955) is a 60 y.o. female, here for evaluation of the following medical concerns:    No chief complaint on file. HPI  80-year-old female with sleep apnea on CPAP, treated hyperlipidemia and hypertension, constipation predominant irritable bowel syndrome on Linzess, symptomatic lumbosacral spondylosis, apparent B12 deficiency historically on monthly B12 injections, who comes in for routine follow-up. Patient saw Dr Hood Deluca, for hypertension sleep apnea hyperlipidemia obesity interested in \"LCD\" (900 -calorie/day low calorie diet) earlier this year, has lost 7 pounds. Patient has struggled with palpitations in the past, prompting echo and Holter 2019, with 400 premature beats, just 0.4%. States ongoing compliance with CPAP. Started on metoprolol to improve blood pressure control and additions, and resumed simvastatin. Review of Systems   Constitutional: Negative for activity change, appetite change, fatigue and unexpected weight change. HENT: Negative for dental problem, sinus pain, sore throat and trouble swallowing. Eyes: Negative for pain and visual disturbance. Respiratory: Negative for apnea, cough, chest tightness, shortness of breath and wheezing. Cardiovascular: Negative for chest pain positive. Gastrointestinal: Negative for abdominal pain, blood in stool, constipation, diarrhea, nausea, rectal pain and vomiting. Endocrine: Negative for cold intolerance, heat intolerance, polydipsia, polyphagia and polyuria. Genitourinary: Negative for difficulty urinating, dysuria, flank pain, frequency, hematuria, pelvic pain, urgency, vaginal bleeding and vaginal discharge. Musculoskeletal: Negative for arthralgias, back pain, gait problem, joint swelling, myalgias, neck pain and neck stiffness. Skin: Negative for color change and rash.    Neurological: Negative for dizziness, tremors, syncope, speech difficulty, weakness, light-headedness and headaches. Hematological: Negative for adenopathy. Does not bruise/bleed easily. Psychiatric/Behavioral: Negative for agitation, behavioral problems, decreased concentration, sleep disturbance and suicidal ideas. The patient is not nervous/anxious and is not hyperactive. Prior to Visit Medications    Medication Sig Taking?  Authorizing Provider   metoprolol succinate (TOPROL XL) 25 MG extended release tablet TAKE ONE TABLET BY MOUTH ONCE NIGHTLY  Oc Parekh MD   famotidine (PEPCID) 20 MG tablet Take 1 tablet by mouth every morning  Oc Parekh MD   linaclotide [facility]) 145 MCG capsule Take 1 capsule by mouth every morning (before breakfast)  Oc Parekh MD   omeprazole (PRILOSEC) 20 MG delayed release capsule Take 1 capsule by mouth 2 times daily (before meals)  HONEY Hester   cyanocobalamin 1000 MCG/ML injection Inject 1,000 mcg into the muscle every 30 days  Historical Provider, MD   dicyclomine (BENTYL) 20 MG tablet Take 1 tablet by mouth every 6 hours as needed (abdominal pain)  HONEY [place]   vitamin D3 (CHOLECALCIFEROL) 125 MCG (5000 UT) TABS tablet Take 5,000 Int'l Units by mouth daily  Historical Provider, MD   simvastatin (ZOCOR) 20 MG tablet TAKE ONE TABLET BY MOUTH EVERY NIGHT AT BEDTIME  Mau Pichardo MD        Allergies   Allergen Reactions    Lisinopril Anaphylaxis and Itching     sweating    Flagyl [Metronidazole Hcl] Other (See Comments)     \"metallic taste\"    Metronidazole Nausea And Vomiting       Past Medical History:   Diagnosis Date    Allergic rhinitis     Chronic back pain     Fibroids     Fibromyalgia     Fibromyalgia     Hyperlipidemia 6/25/2012    Hypertension     STEPHANIE (obstructive sleep apnea)     Sleep apnea        Past Surgical History:   Procedure Laterality Date    ACHILLES TENDON SURGERY  2007    left achilles repair by Dr. Gabi Reed diann cervical surgery-- sees Dr Glen Garcia @ [facility]  2006    Dr Celis January and 2012    ENDOMETRIAL BIOPSY      Dr Nikos Zhu History     Socioeconomic History    Marital status:      Spouse name: Not on file    Number of children: 3    Years of education: Not on file    Highest education level: Not on file   Occupational History    Occupation: Patient registration     Comment: retired   Tobacco Use    Smoking status: Former Smoker     Packs/day: 1.00     Years: 3.00     Pack years: 3.00     Quit date: 1982     Years since quittin.4    Smokeless tobacco: Never Used   Vaping Use    Vaping Use: Never used   Substance and Sexual Activity    Alcohol use: Yes     Alcohol/week: 0.0 standard drinks     Comment: occ    Drug use: No    Sexual activity: Never   Other Topics Concern    Not on file   Social History Narrative    Not on file     Social Determinants of Health     Financial Resource Strain: Low Risk     Difficulty of Paying Living Expenses: Not hard at all   Food Insecurity: No Food Insecurity    Worried About Running Out of Food in the Last Year: Never true    [address] in the Last Year: Never true   Transportation Needs:     Lack of Transportation (Medical):      Lack of Transportation (Non-Medical):    Physical Activity:     Days of Exercise per Week:     Minutes of Exercise per Session:    Stress:     Feeling of Stress :    Social Connections:     Frequency of Communication with Friends and Family:     Frequency of Social Gatherings with Friends and Family:     Attends Synagogue Services:     Active Member of Clubs or Organizations:     Attends Club or Organization Meetings:     Marital Status:    Intimate Partner Violence:     Fear of Current or Ex-Partner:     Emotionally Abused:     Physically Abused:     Sexually Abused:         Family History   Problem Relation Age of Onset    Cancer Mother colon    Heart Disease Mother         heart infection    High Blood Pressure Father     Kidney Disease Paternal Uncle     Cancer Paternal Uncle 47        colon cancer    High Cholesterol Sister     Diabetes Maternal Aunt     Cancer Maternal Grandmother         breast cancer    Stroke Maternal Grandmother     Cancer Paternal Grandmother     Diabetes Paternal Grandmother        There were no vitals filed for this visit. Estimated body mass index is 37.28 kg/m² as calculated from the following:    Height as of 7/29/21: 5' 4\" (1.626 m). Weight as of 7/29/21: 217 lb 3.2 oz (98.5 kg). PHYSICAL EXAM  GENERAL:  Pleasant  female who looks her stated age, awake alert and oriented x3, no acute distress. HEENT: Right external auditory canal small sebum pimple, which I was unable to unroofed. PSYCH:  No psychomotor retardation or agitation. Good eye contact. Unrestricted affect range. Mood congruent with affect. Linear thought. LABS  Lab Review   Orders Only on 08/05/2021   Component Date Value    H Pylori Ag, Stool 08/05/2021 Negative    Orders Only on 07/22/2021   Component Date Value    Intrinsic Factor Blockin* 07/22/2021 Negative    Admission on 07/11/2021, Discharged on 07/11/2021   Component Date Value    Ventricular Rate 07/11/2021 73     Atrial Rate 07/11/2021 73     P-R Interval 07/11/2021 180     QRS Duration 07/11/2021 74     Q-T Interval 07/11/2021 400     QTc Calculation (Bazett) 07/11/2021 440     P Axis 07/11/2021 60     R Axis 07/11/2021 -6     T Axis 07/11/2021 23     Diagnosis 07/11/2021 EKG performed in ER and to be interpreted by ER physician. Confirmed by MD, ER ([id number]),  JOSE MANUELCAT ([id number]) on 7/11/2021 8:17:45 PM     WBC 07/11/2021 4.3     RBC 07/11/2021 4.35     Hemoglobin 07/11/2021 12.9     Hematocrit 07/11/2021 38.9     MCV 07/11/2021 89.5     MCH 07/11/2021 29.6     MCHC 07/11/2021 33.1     RDW 07/11/2021 14.4     Platelets 52/54/2731 215     MPV 07/11/2021 8.1     Neutrophils % 07/11/2021 44.7     Lymphocytes % 07/11/2021 40.5     Monocytes % 07/11/2021 11.4     Eosinophils % 07/11/2021 2.3     Basophils % 07/11/2021 1.1     Neutrophils Absolute 07/11/2021 1.9     Lymphocytes Absolute 07/11/2021 1.8     Monocytes Absolute 07/11/2021 0.5     Eosinophils Absolute 07/11/2021 0.1     Basophils Absolute 07/11/2021 0.0     Sodium 07/11/2021 139     Potassium reflex Magnesi* 07/11/2021 3.8     Chloride 07/11/2021 102     CO2 07/11/2021 26     Anion Gap 07/11/2021 11     Glucose 07/11/2021 86     BUN 07/11/2021 10     CREATININE 07/11/2021 0.8     GFR Non- 07/11/2021 >60     GFR  07/11/2021 >60     Calcium 07/11/2021 9.3     Troponin 07/11/2021 <0.01     Lipase 07/11/2021 18.0     Total Protein 07/11/2021 6.9     Albumin 07/11/2021 4.0     Alkaline Phosphatase 07/11/2021 77     ALT 07/11/2021 9*    AST 07/11/2021 14*    Total Bilirubin 07/11/2021 0.5     Bilirubin, Direct 07/11/2021 <0.2     Bilirubin, Indirect 07/11/2021 see below    Admission on 05/25/2021, Discharged on 05/25/2021   Component Date Value    WBC 05/25/2021 5.8     RBC 05/25/2021 4.54     Hemoglobin 05/25/2021 13.7     Hematocrit 05/25/2021 40.2     MCV 05/25/2021 88.5     MCH 05/25/2021 30.1     MCHC 05/25/2021 34.1     RDW 05/25/2021 14.5     Platelets 35/48/3742 213     MPV 05/25/2021 8.0     Neutrophils % 05/25/2021 53.4     Lymphocytes % 05/25/2021 33.8     Monocytes % 05/25/2021 10.0     Eosinophils % 05/25/2021 2.1     Basophils % 05/25/2021 0.7     Neutrophils Absolute 05/25/2021 3.1     Lymphocytes Absolute 05/25/2021 1.9     Monocytes Absolute 05/25/2021 0.6     Eosinophils Absolute 05/25/2021 0.1     Basophils Absolute 05/25/2021 0.0     Sodium 05/25/2021 138     Potassium reflex Magnesi* 05/25/2021 4.6     Chloride 05/25/2021 102     CO2 05/25/2021 28     Anion Gap 05/25/2021 8     Glucose 05/25/2021 99     BUN 05/25/2021 19     CREATININE 05/25/2021 1.0     GFR Non- 05/25/2021 56*    GFR  05/25/2021 >60     Calcium 05/25/2021 9.7     Total Protein 05/25/2021 8.0     Albumin 05/25/2021 4.3     Alkaline Phosphatase 05/25/2021 84     ALT 05/25/2021 10     AST 05/25/2021 19     Total Bilirubin 05/25/2021 0.5     Bilirubin, Direct 05/25/2021 <0.2     Bilirubin, Indirect 05/25/2021 see below     Color, UA 05/25/2021 Yellow     Clarity, UA 05/25/2021 Clear     Glucose, Ur 05/25/2021 Negative     Bilirubin Urine 05/25/2021 Negative     Ketones, Urine 05/25/2021 Negative     Specific Gravity, UA 05/25/2021 1.020     Blood, Urine 05/25/2021 MODERATE*    pH, UA 05/25/2021 6.0     Protein, UA 05/25/2021 Negative     Urobilinogen, Urine 05/25/2021 0.2     Nitrite, Urine 05/25/2021 Negative     Leukocyte Esterase, Urine 05/25/2021 Negative     Microscopic Examination 05/25/2021 YES     Urine Type 05/25/2021 NotGiven     WBC, UA 05/25/2021 None seen     RBC, UA 05/25/2021 0-2     Epithelial Cells, UA 05/25/2021 2-5    Hospital Outpatient Visit on 12/22/2020   Component Date Value    Cholesterol, Total 12/22/2020 177     Triglycerides 12/22/2020 54     HDL 12/22/2020 73*    LDL Calculated 12/22/2020 93     VLDL Cholesterol Calcula* 12/22/2020 11     Sodium 12/22/2020 143     Potassium 12/22/2020 3.7     Chloride 12/22/2020 103     CO2 12/22/2020 28     Anion Gap 12/22/2020 12     Glucose 12/22/2020 89     BUN 12/22/2020 17     CREATININE 12/22/2020 1.0     GFR Non- 12/22/2020 56*    GFR  12/22/2020 >60     Calcium 12/22/2020 9.7     Phosphorus 12/22/2020 3.6     Albumin 12/22/2020 3.9    Orders Only on 10/13/2020   Component Date Value    OCCULT BLOOD FECAL 10/13/2020 negative          ASSESSMENT/PLAN  1. STEPHANIE (obstructive sleep apnea)  Due to see sleep doctor for renewal, advised to discuss severity of sleep apnea and adequacy of treatment. She wonders if she still has sleep apnea. Up 5 pounds relative to couple years ago only. 2. Vitamin D deficiency  Update vitamin D in the appropriate timeframe. 3. Fibromyalgia  Patient appears to have quite a somatic focus. Lyrica Cymbalta represent treatment options should they prove sufficiently troubling. Regular exercise adequate sleep essential.    4. Vitamin B12 deficiency  Upper 200s, IF ab neg. We will update B12 end of the year. 5. Chronic idiopathic constipation  Doing well on Linzess low-dose. 6. Mixed hyperlipidemia  Update LFTs lipid panel in appropriate timeframe, next December. Continue 20 mg simvastatin. 7. Healthcare maintenance  Colonoscopy due summer 2022. Pap smear with her gynecologist sometime this year. Mammogram next January. Urged to get Shingrix vaccination. Not interested in Covid vaccination. Offered flu shot today, pt declines. 8. Essential hypertension  At last visit patient agreed trial low-dose metoprolol at bedtime to simultaneously control her blood pressure and suppress palpitations. Home blood pressure readings in the 1 teens to 120s over 70s, with some slight improvement in her palpitations as well. 9. Palpitations  Strong somatic focus. Metoprolol helping. Wonder about adequacy of CPAP settings. 10.  GERD. No red flags to merit endoscopy. H2 RB in the morning, PPI in the evening no longer needed, avoid food triggers. As loses weight may be able to come off. Using as needed    No follow-ups on file. It was a pleasure to visit with Ms. James Schrader today. Answered all questions as best I could.   Alexandra Brannon MD   Time 28 minutes

## 2021-12-21 ENCOUNTER — TELEPHONE (OUTPATIENT)
Dept: GYNECOLOGY | Age: 66
End: 2021-12-21

## 2021-12-21 ENCOUNTER — OFFICE VISIT (OUTPATIENT)
Dept: GYNECOLOGY | Age: 66
End: 2021-12-21
Payer: MEDICARE

## 2021-12-21 VITALS
DIASTOLIC BLOOD PRESSURE: 80 MMHG | BODY MASS INDEX: 34.39 KG/M2 | SYSTOLIC BLOOD PRESSURE: 130 MMHG | RESPIRATION RATE: 17 BRPM | OXYGEN SATURATION: 98 % | WEIGHT: 214 LBS | HEART RATE: 83 BPM | HEIGHT: 66 IN

## 2021-12-21 DIAGNOSIS — Z78.0 MENOPAUSE: ICD-10-CM

## 2021-12-21 DIAGNOSIS — Z12.31 SCREENING MAMMOGRAM, ENCOUNTER FOR: Primary | ICD-10-CM

## 2021-12-21 DIAGNOSIS — B37.2 YEAST INFECTION OF THE SKIN: ICD-10-CM

## 2021-12-21 PROCEDURE — 4040F PNEUMOC VAC/ADMIN/RCVD: CPT | Performed by: OBSTETRICS & GYNECOLOGY

## 2021-12-21 PROCEDURE — G8427 DOCREV CUR MEDS BY ELIG CLIN: HCPCS | Performed by: OBSTETRICS & GYNECOLOGY

## 2021-12-21 PROCEDURE — G8484 FLU IMMUNIZE NO ADMIN: HCPCS | Performed by: OBSTETRICS & GYNECOLOGY

## 2021-12-21 PROCEDURE — G8417 CALC BMI ABV UP PARAM F/U: HCPCS | Performed by: OBSTETRICS & GYNECOLOGY

## 2021-12-21 PROCEDURE — 1123F ACP DISCUSS/DSCN MKR DOCD: CPT | Performed by: OBSTETRICS & GYNECOLOGY

## 2021-12-21 PROCEDURE — 99213 OFFICE O/P EST LOW 20 MIN: CPT | Performed by: OBSTETRICS & GYNECOLOGY

## 2021-12-21 PROCEDURE — 1090F PRES/ABSN URINE INCON ASSESS: CPT | Performed by: OBSTETRICS & GYNECOLOGY

## 2021-12-21 PROCEDURE — G8399 PT W/DXA RESULTS DOCUMENT: HCPCS | Performed by: OBSTETRICS & GYNECOLOGY

## 2021-12-21 PROCEDURE — 1036F TOBACCO NON-USER: CPT | Performed by: OBSTETRICS & GYNECOLOGY

## 2021-12-21 PROCEDURE — 3017F COLORECTAL CA SCREEN DOC REV: CPT | Performed by: OBSTETRICS & GYNECOLOGY

## 2021-12-21 RX ORDER — FLUCONAZOLE 150 MG/1
150 TABLET ORAL ONCE
Qty: 2 TABLET | Refills: 1 | Status: SHIPPED | OUTPATIENT
Start: 2021-12-21 | End: 2021-12-21

## 2021-12-21 RX ORDER — NYSTATIN 100000 [USP'U]/G
POWDER TOPICAL 2 TIMES DAILY
Qty: 60 G | Refills: 2 | Status: SHIPPED | OUTPATIENT
Start: 2021-12-21 | End: 2022-01-04

## 2021-12-21 ASSESSMENT — ENCOUNTER SYMPTOMS
GASTROINTESTINAL NEGATIVE: 1
RESPIRATORY NEGATIVE: 1
EYES NEGATIVE: 1

## 2021-12-21 NOTE — TELEPHONE ENCOUNTER
Patient believes that she has a yeast infections     Symptoms: private area seems to have a bad rash (spread to butt area). Not very itchy. Has been using hydrocortisone    Patient said on Nov 10th she was on antibiotics. The symptoms has been going on for the past 3-4 days.      Patient can be reached at [address] [place] [address] [address], [address] [place] [address] -  [phone]

## 2021-12-21 NOTE — TELEPHONE ENCOUNTER
Tried to call patient to see if she could come in at 11:40 am today. No answering machine. You can call in Diflucan 100 mg. Dispense 7. Take one tablet daily for 7 days. Needs to hold her Zocor during this. Call in nystatin powder. Dispense one bottle. Apply twice daily for 14 days with no refills.

## 2021-12-22 NOTE — PROGRESS NOTES
[facility] GYNECOLOGY  [address] [facility] [address]  Dept: [phone]  Dept Fax: [phone]  Loc: [phone]     2021    Candy Gardner (:  1955) is a 73 y.o. female, here for evaluation of the following medical concerns:    Patient is here with some vulvar irritation and itching. Review of Systems   Constitutional: Negative. HENT: Negative. Eyes: Negative. Respiratory: Negative. Cardiovascular: Negative. Gastrointestinal: Negative. Genitourinary: Positive for vaginal pain. Musculoskeletal: Negative. Skin: Negative. Neurological: Negative. Psychiatric/Behavioral: Negative. Date of Birth 1955  Past Medical History:   Diagnosis Date    Allergic rhinitis     Chronic back pain     Fibroids     Fibromyalgia     Fibromyalgia     Hyperlipidemia 2012    Hypertension     STEPHANIE (obstructive sleep apnea)     Sleep apnea      Past Surgical History:   Procedure Laterality Date    ACHILLES TENDON SURGERY  2007    left achilles repair by Dr. Enriqueta Diaz cervical surgery-- sees Dr Kelvin Epstein @ [facility]      Dr Vibha Patino and 2012    ENDOMETRIAL BIOPSY      Dr Whittington Coma       OB History    Para Term  AB Living   4 3 3   1 3   SAB IAB Ectopic Molar Multiple Live Births   1         3      # Outcome Date GA Lbr Audie/2nd Weight Sex Delivery Anes PTL Lv   4 1981           3 Term 81    M Vag-Spont   BARRY   2 Term 77    M Vag-Spont   BARRY   1 Term 76    Lanetta Francesca   BARRY     Social History     Socioeconomic History    Marital status:       Spouse name: Not on file    Number of children: 3    Years of education: Not on file    Highest education level: Not on file   Occupational History    Occupation: Patient registration     Comment: retired   Tobacco Vomiting     Outpatient Medications Marked as Taking for the 12/21/21 encounter (Office Visit) with Paulette Polanco MD   Medication Sig Dispense Refill    fluconazole (DIFLUCAN) 150 MG tablet Take 1 tablet by mouth once for 1 dose Repeat in 48 hours-hold zocor 2 tablet 1    nystatin (MYCOSTATIN) 778535 UNIT/GM powder Apply topically 2 times daily for 14 days 60 g 2    linaclotide (LINZESS) 145 MCG capsule Take 1 capsule by mouth every morning (before breakfast) 90 capsule 3    metoprolol succinate (TOPROL XL) 25 MG extended release tablet TAKE ONE TABLET BY MOUTH ONCE NIGHTLY 90 tablet 1    simvastatin (ZOCOR) 20 MG tablet TAKE ONE TABLET BY MOUTH EVERY NIGHT AT BEDTIME 90 tablet 3    vitamin D3 (CHOLECALCIFEROL) 125 MCG (5000 UT) TABS tablet Take 5,000 Int'l Units by mouth daily       Family History   Problem Relation Age of Onset    Cancer Mother         colon    Heart Disease Mother         heart infection    High Blood Pressure Father     Kidney Disease Paternal Uncle     Cancer Paternal Uncle 47        colon cancer    High Cholesterol Sister     Diabetes Maternal Aunt     Cancer Maternal Grandmother         breast cancer    Stroke Maternal Grandmother     Cancer Paternal Grandmother     Diabetes Paternal Grandmother      /80 (Site: Right Upper Arm, Position: Sitting, Cuff Size: Large Adult)   Pulse 83   Resp 17   Ht 5' 6\" (1.676 m)   Wt 214 lb (97.1 kg)   LMP  (LMP Unknown)   SpO2 98%   BMI 34.54 kg/m²       Prior to Visit Medications    Medication Sig Taking?  Authorizing Provider   fluconazole (DIFLUCAN) 150 MG tablet Take 1 tablet by mouth once for 1 dose Repeat in 48 hours-hold zocor Yes Paulette Polanco MD   nystatin (MYCOSTATIN) 543783 UNIT/GM powder Apply topically 2 times daily for 14 days Yes Paulette Polanco MD   linaclotide [facility]) 145 MCG capsule Take 1 capsule by mouth every morning (before breakfast) Yes Mary Carmen Ng MD   metoprolol succinate (TOPROL XL) 25 MG extended release tablet TAKE ONE TABLET BY MOUTH ONCE NIGHTLY Yes Stacie Rivera MD   simvastatin (ZOCOR) 20 MG tablet TAKE ONE TABLET BY MOUTH EVERY NIGHT AT BEDTIME Yes Stacie Rivera MD   vitamin D3 (CHOLECALCIFEROL) 125 MCG (5000 UT) TABS tablet Take 5,000 Int'l Units by mouth daily Yes Historical Provider, MD   famotidine (PEPCID) 20 MG tablet Take 1 tablet by mouth every morning  Patient not taking: Reported on 10/21/2021  Stacie Rivera MD   omeprazole (PRILOSEC) 20 MG delayed release capsule Take 1 capsule by mouth 2 times daily (before meals)  Patient not taking: Reported on 10/21/2021  HONEY Daley        Allergies   Allergen Reactions    Lisinopril Anaphylaxis and Itching     sweating    Flagyl [Metronidazole Hcl] Other (See Comments)     \"metallic taste\"    Metronidazole Nausea And Vomiting       Past Medical History:   Diagnosis Date    Allergic rhinitis     Chronic back pain     Fibroids     Fibromyalgia     Fibromyalgia     Hyperlipidemia 2012    Hypertension     STEPHANIE (obstructive sleep apnea)     Sleep apnea        Past Surgical History:   Procedure Laterality Date    ACHILLES TENDON SURGERY  2007    left achilles repair by Dr. Makeda Valentine cervical surgery-- sees Dr Gauthier @ [facility]      Dr Jacques Hernandez and 2012    ENDOMETRIAL BIOPSY      Dr Chance Gavin History     Socioeconomic History    Marital status:       Spouse name: Not on file    Number of children: 3    Years of education: Not on file    Highest education level: Not on file   Occupational History    Occupation: Patient registration     Comment: retired   Tobacco Use    Smoking status: Former Smoker     Packs/day: 1.00     Years: 3.00     Pack years: 3.00     Quit date: 1982     Years since quittin.6    Smokeless tobacco: Never Used   Vaping Use    Vaping Use: Never used   Substance and Sexual Activity    Alcohol use: Yes     Alcohol/week: 0.0 standard drinks     Comment: occ    Drug use: No    Sexual activity: Never   Other Topics Concern    Not on file   Social History Narrative    Not on file     Social Determinants of Health     Financial Resource Strain: Low Risk     Difficulty of Paying Living Expenses: Not hard at all   Food Insecurity: No Food Insecurity    Worried About Running Out of Food in the Last Year: Never true    [address] in the Last Year: Never true   Transportation Needs:     Lack of Transportation (Medical): Not on file    Lack of Transportation (Non-Medical):  Not on file   Physical Activity:     Days of Exercise per Week: Not on file    Minutes of Exercise per Session: Not on file   Stress:     Feeling of Stress : Not on file   Social Connections:     Frequency of Communication with Friends and Family: Not on file    Frequency of Social Gatherings with Friends and Family: Not on file    Attends [employer] Services: Not on file    Active Member of [address] [place] [address] or Organizations: Not on file    Attends Club or Organization Meetings: Not on file    Marital Status: Not on file   Intimate Partner Violence:     Fear of Current or Ex-Partner: Not on file    Emotionally Abused: Not on file    Physically Abused: Not on file    Sexually Abused: Not on file   Housing Stability:     Unable to Pay for Housing in the Last Year: Not on file    Number of [place] in the Last Year: Not on file    Unstable Housing in the Last Year: Not on file        Family History   Problem Relation Age of Onset    Cancer Mother         colon    Heart Disease Mother         heart infection    High Blood Pressure Father     Kidney Disease Paternal Uncle     Cancer Paternal Uncle 47        colon cancer    High Cholesterol Sister     Diabetes Maternal Aunt     Cancer Maternal Grandmother         breast cancer    Stroke Maternal Grandmother     Cancer Paternal Grandmother     Diabetes Paternal Grandmother        Vitals:    12/21/21 1139   BP: 130/80   Site: Right Upper Arm   Position: Sitting   Cuff Size: Large Adult   Pulse: 83   Resp: 17   SpO2: 98%   Weight: 214 lb (97.1 kg)   Height: 5' 6\" (1.676 m)     Estimated body mass index is 34.54 kg/m² as calculated from the following:    Height as of this encounter: 5' 6\" (1.676 m). Weight as of this encounter: 214 lb (97.1 kg). Physical Exam  Constitutional:       General: She is not in acute distress. Appearance: She is well-developed. She is not diaphoretic. HENT:      Head: Normocephalic. Eyes:      Pupils: Pupils are equal, round, and reactive to light. Abdominal:      General: There is no distension. Palpations: Abdomen is soft. There is no mass. Tenderness: There is no abdominal tenderness. There is no guarding or rebound. Genitourinary:     Labia:         Right: Rash present. No tenderness, lesion or injury. Left: Rash present. No tenderness, lesion or injury. Vagina: No signs of injury and foreign body. No vaginal discharge, erythema, tenderness or bleeding. Cervix: No cervical motion tenderness, discharge or friability. Uterus: Not deviated, not enlarged, not fixed and not tender. Adnexa:         Right: No mass, tenderness or fullness. Left: No mass, tenderness or fullness. Rectum: No external hemorrhoid. Comments: Normal urethral meatus, nl urethra, nl bladder. Slight erythematous rash  Musculoskeletal:         General: No tenderness. Normal range of motion. Skin:     General: Skin is warm and dry. Coloration: Skin is not pale. Findings: No erythema or rash. Neurological:      Mental Status: She is alert and oriented to person, place, and time. Psychiatric:         Behavior: Behavior normal.         Thought Content:  Thought content normal.         Judgment: Judgment normal. ASSESSMENT/PLAN:  1. Vulvar yeast infection-nystatin powder and diflucan  2.  Mammogram  3. menopause

## 2022-01-06 ASSESSMENT — LIFESTYLE VARIABLES
AUDIT-C TOTAL SCORE: INCOMPLETE
AUDIT TOTAL SCORE: INCOMPLETE
HOW OFTEN DO YOU HAVE A DRINK CONTAINING ALCOHOL: NEVER
HOW OFTEN DO YOU HAVE A DRINK CONTAINING ALCOHOL: 0

## 2022-01-06 ASSESSMENT — PATIENT HEALTH QUESTIONNAIRE - PHQ9
SUM OF ALL RESPONSES TO PHQ QUESTIONS 1-9: 0
SUM OF ALL RESPONSES TO PHQ QUESTIONS 1-9: 0
2. FEELING DOWN, DEPRESSED OR HOPELESS: 0
SUM OF ALL RESPONSES TO PHQ QUESTIONS 1-9: 0
SUM OF ALL RESPONSES TO PHQ9 QUESTIONS 1 & 2: 0
1. LITTLE INTEREST OR PLEASURE IN DOING THINGS: 0
SUM OF ALL RESPONSES TO PHQ QUESTIONS 1-9: 0

## 2022-01-07 ENCOUNTER — APPOINTMENT (OUTPATIENT)
Dept: GENERAL RADIOLOGY | Age: 67
End: 2022-01-07
Payer: MEDICARE

## 2022-01-07 ENCOUNTER — HOSPITAL ENCOUNTER (EMERGENCY)
Age: 67
Discharge: HOME | End: 2022-01-07
Payer: MEDICARE

## 2022-01-07 VITALS
SYSTOLIC BLOOD PRESSURE: 158 MMHG | RESPIRATION RATE: 19 BRPM | TEMPERATURE: 97.5 F | HEART RATE: 74 BPM | BODY MASS INDEX: 34.58 KG/M2 | OXYGEN SATURATION: 97 % | WEIGHT: 215.17 LBS | DIASTOLIC BLOOD PRESSURE: 91 MMHG | HEIGHT: 66 IN

## 2022-01-07 DIAGNOSIS — M79.641 RIGHT HAND PAIN: ICD-10-CM

## 2022-01-07 DIAGNOSIS — T14.8XXA AVULSION FRACTURE: Primary | ICD-10-CM

## 2022-01-07 PROCEDURE — 29130 APPL FINGER SPLINT STATIC: CPT

## 2022-01-07 PROCEDURE — 99283 EMERGENCY DEPT VISIT LOW MDM: CPT

## 2022-01-07 PROCEDURE — 73130 X-RAY EXAM OF HAND: CPT

## 2022-01-07 RX ORDER — ACETAMINOPHEN 500 MG
1000 TABLET ORAL 4 TIMES DAILY PRN
Qty: 60 TABLET | Refills: 0 | Status: ON HOLD | OUTPATIENT
Start: 2022-01-07 | End: 2022-06-24

## 2022-01-07 ASSESSMENT — PAIN SCALES - GENERAL: PAINLEVEL_OUTOF10: 9

## 2022-01-07 ASSESSMENT — PAIN DESCRIPTION - PAIN TYPE: TYPE: ACUTE PAIN

## 2022-01-07 ASSESSMENT — PAIN DESCRIPTION - LOCATION: LOCATION: FINGER (COMMENT WHICH ONE)

## 2022-01-07 ASSESSMENT — PAIN DESCRIPTION - ORIENTATION: ORIENTATION: RIGHT

## 2022-01-07 NOTE — ED PROVIDER NOTES
[address]  [address] [address]  Dept: [phone]  Loc: [address] ENCOUNTER        This patient was not seen or evaluated by the attending physician. I evaluated this patient, the attending physician was available for consultation. CHIEF COMPLAINT    Chief Complaint   Patient presents with    Hand Injury     believes she has a broken finger, right hand 3rd finger, last night injury states a bunch of dishes fell over and fell onto her hand        HPI    Margot Navas is a 73 y.o. female who presents with right finger/hand pain. The onset was last night. The duration has been constant since the onset. The quality of the pain is sharp and the severity is 9/10. The patient has no other associated injury. The context is putting away some dishes when some of them started to fall and she injured her right hand and more specifically her right third digit. Has had pain with range of motion signs, no pain at rest.  No numbness and tingling distal to site of pain. Came to the ED for further evaluation and treatment. REVIEW OF SYSTEMS    Skin: No lacerations or puncture wounds  Musculoskeletal: see HPI, no other joint or bony injury or pain  Neurologic: No loss of consciousness, no head injury, no paresthesias or focal distal extremity weakness  All other systems reviewed and are negative.     PAST MEDICAL & SURGICAL HISTORY    Past Medical History:   Diagnosis Date    Allergic rhinitis     Chronic back pain     Fibroids     Fibromyalgia     Fibromyalgia     Hyperlipidemia 6/25/2012    Hypertension     STEPHANIE (obstructive sleep apnea)     Sleep apnea      Past Surgical History:   Procedure Laterality Date    ACHILLES TENDON SURGERY  2007    left achilles repair by Dr. Sachin Stern      [facility] cervical surgery-- sees  Lalita Hair @ [facility] COLONOSCOPY  2006    Dr Hannah Merino and 2012    ENDOMETRIAL BIOPSY      Dr Elmo Campos  (may include discharge medications prescribed in the ED)  Current Outpatient Rx   Medication Sig Dispense Refill    acetaminophen (TYLENOL) 500 MG tablet Take 2 tablets by mouth 4 times daily as needed for Pain 60 tablet 0    linaclotide (LINZESS) 145 MCG capsule Take 1 capsule by mouth every morning (before breakfast) 90 capsule 3    metoprolol succinate (TOPROL XL) 25 MG extended release tablet TAKE ONE TABLET BY MOUTH ONCE NIGHTLY 90 tablet 1    simvastatin (ZOCOR) 20 MG tablet TAKE ONE TABLET BY MOUTH EVERY NIGHT AT BEDTIME 90 tablet 3    famotidine (PEPCID) 20 MG tablet Take 1 tablet by mouth every morning (Patient not taking: Reported on 10/21/2021) 90 tablet 1    omeprazole (PRILOSEC) 20 MG delayed release capsule Take 1 capsule by mouth 2 times daily (before meals) (Patient not taking: Reported on 10/21/2021) 60 capsule 0    vitamin D3 (CHOLECALCIFEROL) 125 MCG (5000 UT) TABS tablet Take 5,000 Int'l Units by mouth daily         ALLERGIES    Allergies   Allergen Reactions    Lisinopril Anaphylaxis and Itching     sweating    Flagyl [Metronidazole Hcl] Other (See Comments)     \"metallic taste\"    Metronidazole Nausea And Vomiting       SOCIAL & FAMILY HISTORY    Social History     Socioeconomic History    Marital status:      Spouse name: None    Number of children: 3    Years of education: None    Highest education level: None   Occupational History    Occupation: Patient registration     Comment: retired   Tobacco Use    Smoking status: Former Smoker     Packs/day: 1.00     Years: 3.00     Pack years: 3.00     Quit date: 1982     Years since quittin.6    Smokeless tobacco: Never Used   Vaping Use    Vaping Use: Never used   Substance and Sexual Activity    Alcohol use:  Yes     Alcohol/week: 0.0 standard drinks Comment: occ    Drug use: No    Sexual activity: Never   Other Topics Concern    None   Social History Narrative    None     Social Determinants of Health     Financial Resource Strain: Low Risk     Difficulty of Paying Living Expenses: Not hard at all   Food Insecurity: No Food Insecurity    Worried About Running Out of Food in the Last Year: Never true    Fer of Food in the Last Year: Never true   Transportation Needs:     Lack of Transportation (Medical): Not on file    Lack of Transportation (Non-Medical):  Not on file   Physical Activity:     Days of Exercise per Week: Not on file    Minutes of Exercise per Session: Not on file   Stress:     Feeling of Stress : Not on file   Social Connections:     Frequency of Communication with Friends and Family: Not on file    Frequency of Social Gatherings with Friends and Family: Not on file    Attends Uatsdin Services: Not on file    Active Member of [address] [place] [address] [employer] or Organizations: Not on file    Attends Club or Organization Meetings: Not on file    Marital Status: Not on file   Intimate Partner Violence:     Fear of Current or Ex-Partner: Not on file    Emotionally Abused: Not on file    Physically Abused: Not on file    Sexually Abused: Not on file   Housing Stability:     Unable to Pay for Housing in the Last Year: Not on file    Number of [place] in the Last Year: Not on file    Unstable Housing in the Last Year: Not on file     Family History   Problem Relation Age of Onset    Cancer Mother         colon    Heart Disease Mother         heart infection    High Blood Pressure Father     Kidney Disease Paternal Uncle     Cancer Paternal Uncle 47        colon cancer    High Cholesterol Sister     Diabetes Maternal Aunt     Cancer Maternal Grandmother         breast cancer    Stroke Maternal Grandmother     Cancer Paternal Grandmother     Diabetes Paternal Grandmother          PHYSICAL EXAM    VITAL SIGNS: BP (!) 158/91   Pulse 74 Temp 97.5 °F (36.4 °C)   Resp 19   Ht 5' 6\" (1.676 m)   Wt 215 lb 2.7 oz (97.6 kg)   LMP  (LMP Unknown)   SpO2 97%   BMI 34.73 kg/m²   Constitutional:  Well nourished, no acute distress  HENT:  Atraumatic, moist mucous membranes  NECK: normal range of motion,  supple   Respiratory:  No respiratory distress  Cardiovascular:  No JVD  Vascular: right radial pulse 2+, capillary refill less than 2 seconds  Musculoskeletal:  No pinpoint tenderness to palpation of the right third digit, full range of motion the DIP PIP and MCP joints, no edema, no deformity  Integument:  Well hydrated, no skin lacerations, no erythema  Neurologic:  Awake alert, no slurred speech, sensory and motor intact      RADIOLOGY   XR HAND RIGHT (MIN 3 VIEWS)   Final Result   Small rounded calcification anterior to the 3rd D IP joint could represent a   volar plate avulsion fracture of uncertain age      Otherwise, no acute bony or joint abnormality             ED COURSE & MEDICAL DECISION MAKING   See chart for medications given during emergency department course. Differential diagnosis: Arterial Injury/Ischemia, Fracture, Dislocation, Infection, Compartment Syndrome, Neurologic Deficit/Injury, ligamental injury, musculoskeletal pain, other    No evidence of neurovascular injury on exam.  Plain films as above. No results found for this visit on 01/07/22. I estimate there is LOW risk for COMPARTMENT SYNDROME, DEEP VENOUS THROMBOSIS, SEPTIC ARTHRITIS, TENDON OR NEUROVASCULAR INJURY, thus I consider the discharge disposition reasonable. Shashank Messina and I have discussed the diagnosis and risks, and we agree with discharging home to follow-up with their primary doctor or the referral orthopedist. We also discussed returning to the Emergency Department immediately if new or worsening symptoms occur.  We have discussed the symptoms which are most concerning (e.g., changing or worsening pain, numbness, weakness) that necessitate immediate return. The patient verbalized understanding, have no further questions or concerns and are in agreement with the plan of discharge. Blood pressure (!) 158/91, pulse 74, temperature 97.5 °F (36.4 °C), resp. rate 19, height 5' 6\" (1.676 m), weight 215 lb 2.7 oz (97.6 kg), SpO2 97 %, not currently breastfeeding. FINAL IMPRESSION    1. Avulsion fracture    2.  Right hand pain        PLAN  Discharge with outpatient follow-up and discharge instructions (see EMR)    (Please note that this note was completed with a voice recognition program.  Every attempt was made to edit the dictations, but inevitably there remain words that are mis-transcribed.)        RANCHO Jones - CNP  01/07/22 5030

## 2022-01-07 NOTE — ED NOTES
Discharge and education instructions reviewed. Patient verbalized understanding, teach-back successful. Patient denied questions at this time. No acute distress noted. Patient instructed to follow-up as noted - return to emergency department if symptoms worsen. Patient verbalized understanding. Discharged per EDMD with discharged instructions.        Nuzhat De La O RN  01/07/22 8661

## 2022-01-12 ENCOUNTER — OFFICE VISIT (OUTPATIENT)
Dept: PRIMARY CARE CLINIC | Age: 67
End: 2022-01-12
Payer: MEDICARE

## 2022-01-12 VITALS
HEIGHT: 66 IN | SYSTOLIC BLOOD PRESSURE: 136 MMHG | BODY MASS INDEX: 34.07 KG/M2 | HEART RATE: 71 BPM | OXYGEN SATURATION: 99 % | WEIGHT: 212 LBS | DIASTOLIC BLOOD PRESSURE: 87 MMHG | TEMPERATURE: 97.1 F

## 2022-01-12 DIAGNOSIS — Z12.11 SCREENING FOR COLORECTAL CANCER: ICD-10-CM

## 2022-01-12 DIAGNOSIS — Z00.00 ROUTINE GENERAL MEDICAL EXAMINATION AT A HEALTH CARE FACILITY: ICD-10-CM

## 2022-01-12 DIAGNOSIS — Z12.12 SCREENING FOR COLORECTAL CANCER: ICD-10-CM

## 2022-01-12 PROCEDURE — 1123F ACP DISCUSS/DSCN MKR DOCD: CPT | Performed by: NURSE PRACTITIONER

## 2022-01-12 PROCEDURE — G0402 INITIAL PREVENTIVE EXAM: HCPCS | Performed by: NURSE PRACTITIONER

## 2022-01-12 PROCEDURE — 3017F COLORECTAL CA SCREEN DOC REV: CPT | Performed by: NURSE PRACTITIONER

## 2022-01-12 PROCEDURE — 4040F PNEUMOC VAC/ADMIN/RCVD: CPT | Performed by: NURSE PRACTITIONER

## 2022-01-12 ASSESSMENT — PATIENT HEALTH QUESTIONNAIRE - PHQ9
SUM OF ALL RESPONSES TO PHQ QUESTIONS 1-9: 1
1. LITTLE INTEREST OR PLEASURE IN DOING THINGS: 1
SUM OF ALL RESPONSES TO PHQ9 QUESTIONS 1 & 2: 1
2. FEELING DOWN, DEPRESSED OR HOPELESS: 0
SUM OF ALL RESPONSES TO PHQ QUESTIONS 1-9: 1

## 2022-01-12 ASSESSMENT — LIFESTYLE VARIABLES: HOW OFTEN DO YOU HAVE A DRINK CONTAINING ALCOHOL: 0

## 2022-01-12 NOTE — PATIENT INSTRUCTIONS
DASH Diet: Care Instructions  Your Care Instructions     The DASH diet is an eating plan that can help lower your blood pressure. DASH stands for Dietary Approaches to Stop Hypertension. Hypertension is high blood pressure. The DASH diet focuses on eating foods that are high in calcium, potassium, and magnesium. These nutrients can lower blood pressure. The foods that are highest in these nutrients are fruits, vegetables, low-fat dairy products, nuts, seeds, and legumes. But taking calcium, potassium, and magnesium supplements instead of eating foods that are high in those nutrients does not have the same effect. The DASH diet also includes whole grains, fish, and poultry. The DASH diet is one of several lifestyle changes your doctor may recommend to lower your high blood pressure. Your doctor may also want you to decrease the amount of sodium in your diet. Lowering sodium while following the DASH diet can lower blood pressure even further than just the DASH diet alone. Follow-up care is a key part of your treatment and safety. Be sure to make and go to all appointments, and call your doctor if you are having problems. It's also a good idea to know your test results and keep a list of the medicines you take. How can you care for yourself at home? Following the DASH diet  · Eat 4 to 5 servings of fruit each day. A serving is 1 medium-sized piece of fruit, ½ cup chopped or canned fruit, 1/4 cup dried fruit, or 4 ounces (½ cup) of fruit juice. Choose fruit more often than fruit juice. · Eat 4 to 5 servings of vegetables each day. A serving is 1 cup of lettuce or raw leafy vegetables, ½ cup of chopped or cooked vegetables, or 4 ounces (½ cup) of vegetable juice. Choose vegetables more often than vegetable juice. · Get 2 to 3 servings of low-fat and fat-free dairy each day. A serving is 8 ounces of milk, 1 cup of yogurt, or 1 ½ ounces of cheese. · Eat 6 to 8 servings of grains each day.  A serving is 1 slice of bread, 1 ounce of dry cereal, or ½ cup of cooked rice, pasta, or cooked cereal. Try to choose whole-grain products as much as possible. · Limit lean meat, poultry, and fish to 2 servings each day. A serving is 3 ounces, about the size of a deck of cards. · Eat 4 to 5 servings of nuts, seeds, and legumes (cooked dried beans, lentils, and split peas) each week. A serving is 1/3 cup of nuts, 2 tablespoons of seeds, or ½ cup of cooked beans or peas. · Limit fats and oils to 2 to 3 servings each day. A serving is 1 teaspoon of vegetable oil or 2 tablespoons of salad dressing. · Limit sweets and added sugars to 5 servings or less a week. A serving is 1 tablespoon jelly or jam, ½ cup sorbet, or 1 cup of lemonade. · Eat less than 2,300 milligrams (mg) of sodium a day. If you limit your sodium to 1,500 mg a day, you can lower your blood pressure even more. · Be aware that all of these are the suggested number of servings for people who eat 1,800 to 2,000 calories a day. Your recommended number of servings may be different if you need more or fewer calories. Tips for success  · Start small. Do not try to make dramatic changes to your diet all at once. You might feel that you are missing out on your favorite foods and then be more likely to not follow the plan. Make small changes, and stick with them. Once those changes become habit, add a few more changes. · Try some of the following:  ? Make it a goal to eat a fruit or vegetable at every meal and at snacks. This will make it easy to get the recommended amount of fruits and vegetables each day. ? Try yogurt topped with fruit and nuts for a snack or healthy dessert. ? Add lettuce, tomato, cucumber, and onion to sandwiches. ? Combine a ready-made pizza crust with low-fat mozzarella cheese and lots of vegetable toppings. Try using tomatoes, squash, spinach, broccoli, carrots, cauliflower, and onions. ?  Have a variety of cut-up vegetables with a low-fat dip as an appetizer instead of chips and dip. ? Sprinkle sunflower seeds or chopped almonds over salads. Or try adding chopped walnuts or almonds to cooked vegetables. ? Try some vegetarian meals using beans and peas. Add garbanzo or kidney beans to salads. Make burritos and tacos with mashed escalante beans or black beans. Where can you learn more? Go to https://Snippetspepiceweb.Grid2Home. org and sign in to your MyBuilder account. Enter S680 in the [employer] box to learn more about \"DASH Diet: Care Instructions. \"     If you do not have an account, please click on the \"Sign Up Now\" link. Current as of: April 29, 2021               Content Version: 13.1  © 1179-9173 Healthwise, [employer]. Care instructions adapted under license by [facility] ([facility]). If you have questions about a medical condition or this instruction, always ask your healthcare professional. [place] [address] any warranty or liability for your use of this information. Learning About Low-Carbohydrate Diets  What is a low-carbohydrate diet? A low-carbohydrate (or \"low-carb\") diet limits foods and drinks that have carbohydrates. This includes grains, fruits, milk and yogurt, and starchy vegetables like potatoes, beans, and corn. It also avoids foods and drinks that have added sugar. Instead, low-carb diets include foods that are high in protein and fat. Why might you follow a low-carb diet? Low-carb diets may be used for a variety of reasons, such as for weight loss. People who have diabetes may use a low-carb diet to help manage their blood sugar levels. What should you do before you start the diet? Talk to your doctor before you try any diet. This is even more important if you have health problems like kidney disease, heart disease, or diabetes. Your doctor may suggest that you meet with a registered dietitian. A dietitian can help you make an eating plan that works for you.   What foods do you eat on a low-carb diet? On a low-carb diet, you choose foods that are high in protein and fat. Examples of these are:  · Meat, poultry, and fish. · Eggs. · Nuts, such as walnuts, pecans, almonds, and peanuts. · Peanut butter and other nut butters. · Tofu. · Avocado. · Daphne Armor. · Non-starchy vegetables like broccoli, cauliflower, green beans, mushrooms, peppers, lettuce, and spinach. · Unsweetened non-dairy milks like almond milk and coconut milk. · Cheese, cottage cheese, and cream cheese. Current as of: September 8, 2021               Content Version: 13.1  © 2006-2021 Healthwise, [employer]. Care instructions adapted under license by [facility] ([facility]). If you have questions about a medical condition or this instruction, always ask your healthcare professional. [place] [address] any warranty or liability for your use of this information. Eating Healthy Foods: Care Instructions  Your Care Instructions     Eating healthy foods can help lower your risk for disease. Healthy food gives you energy and keeps your heart strong, your brain active, your muscles working, and your bones strong. A healthy diet includes a variety of foods from the basic food groups: grains, vegetables, fruits, milk and milk products, and meat and beans. Some people may eat more of their favorite foods from only one food group and, as a result, miss getting the nutrients they need. So, it is important to pay attention not only to what you eat but also to what you are missing from your diet. You can eat a healthy, balanced diet by making a few small changes. Follow-up care is a key part of your treatment and safety. Be sure to make and go to all appointments, and call your doctor if you are having problems. It's also a good idea to know your test results and keep a list of the medicines you take. How can you care for yourself at home?   Look at what you eat  · Keep a food diary for a week or two and record everything you eat or drink. Track the number of servings you eat from each food group. · For a balanced diet every day, eat a variety of:  ? 6 or more ounce-equivalents of grains, such as cereals, breads, crackers, rice, or pasta, every day. An ounce-equivalent is 1 slice of bread, 1 cup of ready-to-eat cereal, or ½ cup of cooked rice, cooked pasta, or cooked cereal.  ? 2½ cups of vegetables, especially:  § Dark-green vegetables such as broccoli and spinach. § Orange vegetables such as carrots and sweet potatoes. § Dry beans (such as escalante and kidney beans) and peas (such as lentils). ? 2 cups of fresh, frozen, or canned fruit. A small apple or 1 banana or orange equals 1 cup. ? 3 cups of nonfat or low-fat milk, yogurt, or other milk products. ? 5½ ounces of meat and beans, such as chicken, fish, lean meat, beans, nuts, and seeds. One egg, 1 tablespoon of peanut butter, ½ ounce nuts or seeds, or ¼ cup of cooked beans equals 1 ounce of meat. · Learn how to read food labels for serving sizes and ingredients. Fast-food and convenience-food meals often contain few or no fruits or vegetables. Make sure you eat some fruits and vegetables to make the meal more nutritious. · Look at your food diary. For each food group, add up what you have eaten and then divide the total by the number of days. This will give you an idea of how much you are eating from each food group. See if you can find some ways to change your diet to make it more healthy. Start small  · Do not try to make dramatic changes to your diet all at once. You might feel that you are missing out on your favorite foods and then be more likely to fail. · Start slowly, and gradually change your habits. Try some of the following:  ? Use whole wheat bread instead of white bread. ? Use nonfat or low-fat milk instead of whole milk. ? Eat brown rice instead of white rice, and eat whole wheat pasta instead of white-flour pasta.   ? Try low-fat cheeses and low-fat yogurt. ? Add more fruits and vegetables to meals and have them for snacks. ? Add lettuce, tomato, cucumber, and onion to sandwiches. ? Add fruit to yogurt and cereal.  Enjoy food  · You can still eat your favorite foods. You just may need to eat less of them. If your favorite foods are high in fat, salt, and sugar, limit how often you eat them, but do not cut them out entirely. · Eat a wide variety of foods. Make healthy choices when eating out  · The type of restaurant you choose can help you make healthy choices. Even fast-food chains are now offering more low-fat or healthier choices on the menu. · Choose smaller portions, or take half of your meal home. · When eating out, try:  ? A veggie pizza with a whole wheat crust or grilled chicken (instead of sausage or pepperoni). ? Pasta with roasted vegetables, grilled chicken, or marinara sauce instead of cream sauce. ? A vegetable wrap or grilled chicken wrap. ? Broiled or poached food instead of fried or breaded items. Make healthy choices easy  · Buy packaged, prewashed, ready-to-eat fresh vegetables and fruits, such as baby carrots, salad mixes, and chopped or shredded broccoli and cauliflower. · Buy packaged, presliced fruits, such as melon or pineapple. · Choose 100% fruit or vegetable juice instead of soda. Limit juice intake to 4 to 6 oz (½ to ¾ cup) a day. · Blend low-fat yogurt, fruit juice, and canned or frozen fruit to make a smoothie for breakfast or a snack. Where can you learn more? Go to https://Dasdakadelina.healthAmerican Scientific Resources. org and sign in to your FibeRio account. Enter V279 in the [facility] box to learn more about \"Eating Healthy Foods: Care Instructions. \"     If you do not have an account, please click on the \"Sign Up Now\" link. Current as of: September 8, 2021               Content Version: 13.1  © 7583-2324 Healthwise, [facility]. Care instructions adapted under license by [facility] ([facility]).  If you have questions about a medical condition or this instruction, always ask your healthcare professional. [address] any warranty or liability for your use of this information. Personalized Preventive Plan for Toni Evans - 1/12/2022  Medicare offers a range of preventive health benefits. Some of the tests and screenings are paid in full while other may be subject to a deductible, co-insurance, and/or copay. Some of these benefits include a comprehensive review of your medical history including lifestyle, illnesses that may run in your family, and various assessments and screenings as appropriate. After reviewing your medical record and screening and assessments performed today your provider may have ordered immunizations, labs, imaging, and/or referrals for you. A list of these orders (if applicable) as well as your Preventive Care list are included within your After Visit Summary for your review. Other Preventive Recommendations:    · A preventive eye exam performed by an eye specialist is recommended every 1-2 years to screen for glaucoma; cataracts, macular degeneration, and other eye disorders. · A preventive dental visit is recommended every 6 months. · Try to get at least 150 minutes of exercise per week or 10,000 steps per day on a pedometer . · Order or download the FREE \"Exercise & Physical Activity: Your Everyday Guide\" from [employer]. Call 3-343.395.6728 or search [employer] online. · You need 6630-8660 mg of calcium and 7702-3735 IU of vitamin D per day. It is possible to meet your calcium requirement with diet alone, but a vitamin D supplement is usually necessary to meet this goal.  · When exposed to the sun, use a sunscreen that protects against both UVA and UVB radiation with an SPF of 30 or greater. Reapply every 2 to 3 hours or after sweating, drying off with a towel, or swimming.   · Always wear a seat belt when traveling in a car. Always wear a helmet when riding a bicycle or motorcycle.

## 2022-01-12 NOTE — PROGRESS NOTES
Medicare Annual Wellness Visit  Name: Hieu Keller Date: 2022   MRN: [id number] Sex: Female   Age: 73 y.o. Ethnicity: Non- / Non    : 1955 Race: Shanta Goncalves / African American      Monse Steward is here for Medicare AWV    Screenings for behavioral, psychosocial and functional/safety risks, and cognitive dysfunction are all negative except as indicated below. These results, as well as other patient data from the [address] form, are documented in Flowsheets linked to this Encounter. Allergies   Allergen Reactions    Lisinopril Anaphylaxis and Itching     sweating    Flagyl [Metronidazole Hcl] Other (See Comments)     \"metallic taste\"    Metronidazole Nausea And Vomiting       Prior to Visit Medications    Medication Sig Taking?  Authorizing Provider   linaclotide (LINZESS) 145 MCG capsule Take 1 capsule by mouth every morning (before breakfast) Yes Tonya Roberts MD   metoprolol succinate (TOPROL XL) 25 MG extended release tablet TAKE ONE TABLET BY MOUTH ONCE NIGHTLY Yes Tonya Roberts MD   simvastatin (ZOCOR) 20 MG tablet TAKE ONE TABLET BY MOUTH EVERY NIGHT AT BEDTIME Yes Tonya Roberts MD   vitamin D3 (CHOLECALCIFEROL) 125 MCG (5000 UT) TABS tablet Take 5,000 Int'l Units by mouth daily Yes Historical Provider, MD   acetaminophen (TYLENOL) 500 MG tablet Take 2 tablets by mouth 4 times daily as needed for Pain  Patient not taking: Reported on 2022  RANCHO Landrum CNP   famotidine (PEPCID) 20 MG tablet Take 1 tablet by mouth every morning  Patient not taking: Reported on 2022  Tonya Roberts MD   omeprazole (PRILOSEC) 20 MG delayed release capsule Take 1 capsule by mouth 2 times daily (before meals)  Patient not taking: Reported on 2022  HONEY Sharma       Past Medical History:   Diagnosis Date    Allergic rhinitis     Chronic back pain     Fibroids     Fibromyalgia     Fibromyalgia     Hyperlipidemia 2012    Hypertension     STEPHANIE (obstructive sleep apnea)     Sleep apnea        Past Surgical History:   Procedure Laterality Date    ACHILLES TENDON SURGERY  2007    left achilles repair by Dr. Brandin Gutierrez cervical surgery-- sees Dr Damian Fleming @ [facility]  2006    Dr Kalyn Acosta and 8/2012    ENDOMETRIAL BIOPSY      Dr Sharyle Sinclair         Family History   Problem Relation Age of Onset    Cancer Mother         colon    Heart Disease Mother         heart infection    High Blood Pressure Father     Kidney Disease Paternal Uncle     Cancer Paternal Uncle 47        colon cancer    High Cholesterol Sister     Diabetes Maternal Aunt     Cancer Maternal Grandmother         breast cancer    Stroke Maternal Grandmother     Cancer Paternal Grandmother     Diabetes Paternal Grandmother        CareTeam (Including outside providers/suppliers regularly involved in providing care):   Patient Care Team:  Brittany Simmons MD as PCP - General (Internal Medicine)  Brittany Simmons MD as PCP - [facility] Empaneled Provider  Emi Garcia (Ophthalmology)  Ochoa Ching MD as Consulting Physician (Gastroenterology)  Fabiana Roblero (Gynecology)  Kris Torres MD as Obstetrician (Obstetrics & Gynecology)    [facility] Readings from Last 3 Encounters:   01/12/22 212 lb (96.2 kg)   01/07/22 215 lb 2.7 oz (97.6 kg)   12/21/21 214 lb (97.1 kg)     Vitals:    01/12/22 1153   BP: 136/87   Site: Left Upper Arm   Position: Sitting   Cuff Size: Large Adult   Pulse: 71   Temp: 97.1 °F (36.2 °C)   SpO2: 99%   Weight: 212 lb (96.2 kg)   Height: 5' 6\" (1.676 m)     Body mass index is 34.22 kg/m². Based upon direct observation of the patient, evaluation of cognition reveals recent and remote memory intact. Patient's complete Health Risk Assessment and screening values have been reviewed and are found in Flowsheets.  The following problems were reviewed today and where indicated follow up appointments were made and/or referrals ordered. Declined Influenza, pneumonia, and shingles vaccine. Provided FIT test  Has breast cancer screen scheduled in February  Positive Risk Factor Screenings with Interventions:            General Health and ACP:  General  In general, how would you say your health is?: Good  In the past 7 days, have you experienced any of the following? New or Increased Pain, New or Increased Fatigue, Loneliness, Social Isolation, Stress or Anger?: (!) New or Increased Fatigue  Do you get the social and emotional support that you need?: Yes  Do you have a Living Will?: (!) No  Advance Directives     Power of  Living Will ACP-Advance Directive ACP-Power of     Not on File Filed on 08/05/13 Filed Not on File      General Health Risk Interventions:  · No Living Will: Advance Care Planning addressed with patient today    Health Habits/Nutrition:  Health Habits/Nutrition  Do you exercise for at least 20 minutes 2-3 times per week?: (!) No  Have you lost any weight without trying in the past 3 months?: No  Do you eat only one meal per day?: No  Have you seen the dentist within the past year?: Yes  Body mass index: (!) 34.21  Health Habits/Nutrition Interventions:  · Inadequate physical activity:  patient is not ready to increase his/her physical activity level at this time     Safety:  Safety  Do you have working smoke detectors?: Yes  Have all throw rugs been removed or fastened?: (!) No  Do you have non-slip mats or surfaces in all bathtubs/showers?: Yes  Do all of your stairways have a railing or banister?: (!) No (no stairways)  Are your doorways, halls and stairs free of clutter?: Yes  Do you always fasten your seatbelt when you are in a car?: Yes  Safety Interventions:  · Home safety tips provided     [facility]. Lives in apartment building on ground floor.   Steady gait, no assistive devices, no alert karson  Personalized Preventive Plan   Current Health Maintenance Status  Immunization History   Administered Date(s) Administered    COVID-19, Lisa Reynolds, Primary or Immunocompromised, PF, 100mcg/0.5mL 01/15/2021, 02/11/2021    PPD Test 08/24/2010    Td, unspecified formulation 08/24/2010    Tdap (Boostrix, Adacel) 08/24/2010, 05/03/2017        Health Maintenance   Topic Date Due    Shingles Vaccine (1 of 2) Never done    COVID-19 Vaccine (3 - Booster for Moderna series) 08/11/2021    Flu vaccine (1) Never done    Colon Cancer Screen FIT/FOBT  10/13/2021    Pneumococcal 65+ years Vaccine (1 of 1 - PPSV23) Never done   [employer] Visit (AWV)  Never done    Lipid screen  12/22/2021    Potassium monitoring  07/11/2022    Creatinine monitoring  07/11/2022    Depression Screen  01/06/2023    Breast cancer screen  01/11/2023    Cervical cancer screen  07/29/2024    DTaP/Tdap/Td vaccine (2 - Td or Tdap) 05/03/2027    DEXA (modify frequency per FRAX score)  Completed    Hepatitis C screen  Completed    HIV screen  Completed    Hepatitis A vaccine  Aged Out    Hepatitis B vaccine  Aged Out    Hib vaccine  Aged Out    Meningococcal (ACWY) vaccine  Aged Out     Recommendations for [employer] Due: see orders and patient instructions/AVS.  . Recommended screening schedule for the next 5-10 years is provided to the patient in written form: see Patient Instructions/AVS.    There are no diagnoses linked to this encounter.

## 2022-01-24 DIAGNOSIS — Z12.12 SCREENING FOR COLORECTAL CANCER: ICD-10-CM

## 2022-01-24 DIAGNOSIS — Z00.00 ROUTINE GENERAL MEDICAL EXAMINATION AT A HEALTH CARE FACILITY: ICD-10-CM

## 2022-01-24 DIAGNOSIS — Z12.11 SCREENING FOR COLORECTAL CANCER: ICD-10-CM

## 2022-01-24 LAB
CONTROL: NORMAL
HEMOCCULT STL QL: NEGATIVE

## 2022-01-24 PROCEDURE — 82274 ASSAY TEST FOR BLOOD FECAL: CPT | Performed by: NURSE PRACTITIONER

## 2022-02-08 ENCOUNTER — HOSPITAL ENCOUNTER (OUTPATIENT)
Dept: WOMENS IMAGING | Age: 67
Discharge: HOME | End: 2022-02-08
Payer: MEDICARE

## 2022-02-08 DIAGNOSIS — Z12.31 SCREENING MAMMOGRAM, ENCOUNTER FOR: ICD-10-CM

## 2022-02-08 PROCEDURE — 77063 BREAST TOMOSYNTHESIS BI: CPT

## 2022-02-10 ENCOUNTER — APPOINTMENT (OUTPATIENT)
Dept: CT IMAGING | Age: 67
End: 2022-02-10
Payer: MEDICARE

## 2022-02-10 ENCOUNTER — HOSPITAL ENCOUNTER (EMERGENCY)
Age: 67
Discharge: HOME | End: 2022-02-10
Attending: EMERGENCY MEDICINE
Payer: MEDICARE

## 2022-02-10 VITALS
SYSTOLIC BLOOD PRESSURE: 146 MMHG | WEIGHT: 207 LBS | TEMPERATURE: 98.2 F | HEART RATE: 87 BPM | HEIGHT: 66 IN | BODY MASS INDEX: 33.27 KG/M2 | RESPIRATION RATE: 18 BRPM | DIASTOLIC BLOOD PRESSURE: 80 MMHG | OXYGEN SATURATION: 100 %

## 2022-02-10 DIAGNOSIS — R11.2 NAUSEA VOMITING AND DIARRHEA: Primary | ICD-10-CM

## 2022-02-10 DIAGNOSIS — R19.7 NAUSEA VOMITING AND DIARRHEA: Primary | ICD-10-CM

## 2022-02-10 DIAGNOSIS — R50.9 FEBRILE ILLNESS: ICD-10-CM

## 2022-02-10 LAB
A/G RATIO: 1.2 (ref 1.1–2.2)
ALBUMIN SERPL-MCNC: 3.9 G/DL (ref 3.4–5)
ALP BLD-CCNC: 66 U/L (ref 40–129)
ALT SERPL-CCNC: 61 U/L (ref 10–40)
ANION GAP SERPL CALCULATED.3IONS-SCNC: 12 MMOL/L (ref 3–16)
AST SERPL-CCNC: 74 U/L (ref 15–37)
BACTERIA: ABNORMAL /HPF
BASOPHILS ABSOLUTE: 0 K/UL (ref 0–0.2)
BASOPHILS RELATIVE PERCENT: 0 %
BILIRUB SERPL-MCNC: 0.4 MG/DL (ref 0–1)
BILIRUBIN URINE: NEGATIVE
BLOOD, URINE: ABNORMAL
BUN BLDV-MCNC: 12 MG/DL (ref 7–20)
CALCIUM SERPL-MCNC: 9 MG/DL (ref 8.3–10.6)
CHLORIDE BLD-SCNC: 102 MMOL/L (ref 99–110)
CLARITY: CLEAR
CO2: 20 MMOL/L (ref 21–32)
COLOR: YELLOW
CREAT SERPL-MCNC: 1.1 MG/DL (ref 0.6–1.2)
EOSINOPHILS ABSOLUTE: 0 K/UL (ref 0–0.6)
EOSINOPHILS RELATIVE PERCENT: 0 %
EPITHELIAL CELLS, UA: ABNORMAL /HPF (ref 0–5)
GFR AFRICAN AMERICAN: >60
GFR NON-AFRICAN AMERICAN: 50
GLUCOSE BLD-MCNC: 102 MG/DL (ref 70–99)
GLUCOSE URINE: NEGATIVE MG/DL
HCT VFR BLD CALC: 41 % (ref 36–48)
HEMOGLOBIN: 13.5 G/DL (ref 12–16)
KETONES, URINE: NEGATIVE MG/DL
LEUKOCYTE ESTERASE, URINE: NEGATIVE
LIPASE: 10 U/L (ref 13–60)
LYMPHOCYTES ABSOLUTE: 0.5 K/UL (ref 1–5.1)
LYMPHOCYTES RELATIVE PERCENT: 13 %
MCH RBC QN AUTO: 29.2 PG (ref 26–34)
MCHC RBC AUTO-ENTMCNC: 32.9 G/DL (ref 31–36)
MCV RBC AUTO: 88.6 FL (ref 80–100)
MICROSCOPIC EXAMINATION: YES
MONOCYTES ABSOLUTE: 0.1 K/UL (ref 0–1.3)
MONOCYTES RELATIVE PERCENT: 2 %
NEUTROPHILS ABSOLUTE: 3.3 K/UL (ref 1.7–7.7)
NEUTROPHILS RELATIVE PERCENT: 85 %
NITRITE, URINE: NEGATIVE
PDW BLD-RTO: 14.4 % (ref 12.4–15.4)
PH UA: 6.5 (ref 5–8)
PLATELET # BLD: 182 K/UL (ref 135–450)
PMV BLD AUTO: 7.7 FL (ref 5–10.5)
POTASSIUM REFLEX MAGNESIUM: 4 MMOL/L (ref 3.5–5.1)
PROTEIN UA: NEGATIVE MG/DL
RAPID INFLUENZA  B AGN: NEGATIVE
RAPID INFLUENZA A AGN: NEGATIVE
RBC # BLD: 4.62 M/UL (ref 4–5.2)
RBC UA: ABNORMAL /HPF (ref 0–4)
SODIUM BLD-SCNC: 134 MMOL/L (ref 136–145)
SPECIFIC GRAVITY UA: 1.01 (ref 1–1.03)
TOTAL PROTEIN: 7.2 G/DL (ref 6.4–8.2)
URINE TYPE: ABNORMAL
UROBILINOGEN, URINE: 0.2 E.U./DL
WBC # BLD: 3.9 K/UL (ref 4–11)
WBC UA: ABNORMAL /HPF (ref 0–5)
YEAST: PRESENT /HPF

## 2022-02-10 PROCEDURE — 81001 URINALYSIS AUTO W/SCOPE: CPT

## 2022-02-10 PROCEDURE — U0005 INFEC AGEN DETEC AMPLI PROBE: HCPCS

## 2022-02-10 PROCEDURE — 36415 COLL VENOUS BLD VENIPUNCTURE: CPT

## 2022-02-10 PROCEDURE — 6360000004 HC RX CONTRAST MEDICATION: Performed by: EMERGENCY MEDICINE

## 2022-02-10 PROCEDURE — 74177 CT ABD & PELVIS W/CONTRAST: CPT

## 2022-02-10 PROCEDURE — 83690 ASSAY OF LIPASE: CPT

## 2022-02-10 PROCEDURE — 2580000003 HC RX 258: Performed by: EMERGENCY MEDICINE

## 2022-02-10 PROCEDURE — 99284 EMERGENCY DEPT VISIT MOD MDM: CPT

## 2022-02-10 PROCEDURE — 87804 INFLUENZA ASSAY W/OPTIC: CPT

## 2022-02-10 PROCEDURE — 6360000002 HC RX W HCPCS: Performed by: EMERGENCY MEDICINE

## 2022-02-10 PROCEDURE — 96374 THER/PROPH/DIAG INJ IV PUSH: CPT

## 2022-02-10 PROCEDURE — 85025 COMPLETE CBC W/AUTO DIFF WBC: CPT

## 2022-02-10 PROCEDURE — 80053 COMPREHEN METABOLIC PANEL: CPT

## 2022-02-10 PROCEDURE — U0003 INFECTIOUS AGENT DETECTION BY NUCLEIC ACID (DNA OR RNA); SEVERE ACUTE RESPIRATORY SYNDROME CORONAVIRUS 2 (SARS-COV-2) (CORONAVIRUS DISEASE [COVID-19]), AMPLIFIED PROBE TECHNIQUE, MAKING USE OF HIGH THROUGHPUT TECHNOLOGIES AS DESCRIBED BY CMS-2020-01-R: HCPCS

## 2022-02-10 RX ORDER — ONDANSETRON 4 MG/1
4 TABLET, FILM COATED ORAL EVERY 8 HOURS PRN
Qty: 20 TABLET | Refills: 0 | Status: ON HOLD | OUTPATIENT
Start: 2022-02-10 | End: 2022-06-24

## 2022-02-10 RX ORDER — ONDANSETRON 2 MG/ML
4 INJECTION INTRAMUSCULAR; INTRAVENOUS ONCE
Status: COMPLETED | OUTPATIENT
Start: 2022-02-10 | End: 2022-02-10

## 2022-02-10 RX ORDER — SODIUM CHLORIDE, SODIUM LACTATE, POTASSIUM CHLORIDE, CALCIUM CHLORIDE 600; 310; 30; 20 MG/100ML; MG/100ML; MG/100ML; MG/100ML
1000 INJECTION, SOLUTION INTRAVENOUS ONCE
Status: COMPLETED | OUTPATIENT
Start: 2022-02-10 | End: 2022-02-10

## 2022-02-10 RX ADMIN — ONDANSETRON 4 MG: 2 INJECTION INTRAMUSCULAR; INTRAVENOUS at 14:58

## 2022-02-10 RX ADMIN — SODIUM CHLORIDE, POTASSIUM CHLORIDE, SODIUM LACTATE AND CALCIUM CHLORIDE 1000 ML: 600; 310; 30; 20 INJECTION, SOLUTION INTRAVENOUS at 14:59

## 2022-02-10 RX ADMIN — IOPAMIDOL 80 ML: 755 INJECTION, SOLUTION INTRAVENOUS at 15:58

## 2022-02-10 NOTE — ED NOTES
Bed: B17-17  Expected date: 2/10/22  Expected time:   Means of arrival:   Comments:  Richelle Tidwell, KATHY  02/10/22 [id number]

## 2022-02-10 NOTE — ED PROVIDER NOTES
[address] [facility]          ATTENDING PHYSICIAN NOTE       Date of evaluation: 2/10/2022    Chief Complaint     Emesis and Diarrhea      History of Present Illness     Blanca Jordan is a 73 y.o. female who presents emergency department with complaint of nausea vomiting diarrhea and fever. Patient reports his symptoms started approximately 3 days ago with the onset of nausea vomiting of nonbloody nonbilious emesis and diarrhea which was primarily watery. Yesterday began running a fever which was as high as 101 last night. Denies dysuria urinary frequency. Denies cough productive cough shortness of breath sore throat nasal congestion rhinorrhea. Denies any known sick contacts. She has been vaccinated for Covid but has not received her booster. She denies any significant abdominal pain and reports that primarily her currently most bothersome symptoms are sensation of nausea and generalized malaise. Review of Systems     As documented in the HPI, otherwise all other systems were reviewed and were negative. Past Medical, Surgical, Family, and Social History     She has a past medical history of Allergic rhinitis, Chronic back pain, Fibroids, Fibromyalgia, Fibromyalgia, Hyperlipidemia, Hypertension, STEPHANIE (obstructive sleep apnea), and Sleep apnea. She has a past surgical history that includes Appendectomy; Cervix surgery; Endometrial biopsy; Achilles tendon surgery (2007); Breast cyst aspiration; Colonoscopy (2006); and Tubal ligation. Her family history includes Cancer in her maternal grandmother, mother, and paternal grandmother; Cancer (age of onset: 47) in her paternal uncle; Diabetes in her maternal aunt and paternal grandmother; Heart Disease in her mother; High Blood Pressure in her father; High Cholesterol in her sister; Kidney Disease in her paternal uncle; Stroke in her maternal grandmother. She reports that she quit smoking about 39 years ago.  She has a 3.00 pack-year smoking history. She has never used smokeless tobacco. She reports current alcohol use. She reports that she does not use drugs. Medications     Previous Medications    ACETAMINOPHEN (TYLENOL) 500 MG TABLET    Take 2 tablets by mouth 4 times daily as needed for Pain    FAMOTIDINE (PEPCID) 20 MG TABLET    Take 1 tablet by mouth every morning    LINACLOTIDE (LINZESS) 145 MCG CAPSULE    Take 1 capsule by mouth every morning (before breakfast)    METOPROLOL SUCCINATE (TOPROL XL) 25 MG EXTENDED RELEASE TABLET    TAKE ONE TABLET BY MOUTH ONCE NIGHTLY    OMEPRAZOLE (PRILOSEC) 20 MG DELAYED RELEASE CAPSULE    Take 1 capsule by mouth 2 times daily (before meals)    SIMVASTATIN (ZOCOR) 20 MG TABLET    TAKE ONE TABLET BY MOUTH EVERY NIGHT AT BEDTIME    VITAMIN D3 (CHOLECALCIFEROL) 125 MCG (5000 UT) TABS TABLET    Take 5,000 Int'l Units by mouth daily       Allergies     She is allergic to lisinopril, flagyl [metronidazole hcl], and metronidazole.     Physical Exam     INITIAL VITALS: BP: 122/77, Temp: 98.2 °F (36.8 °C), Pulse: 87, Resp: 18, SpO2: 97 %   General: 73year-old sitting in bed no apparent cardiorespiratory distress  HEENT:  head is atraumatic, pupils equal round and reactive to light, sclera are clear, oropharynx is nonerythematous  Neck: supple, no lymphadenopathy  Chest: nonlabored respirations, equal chest rise bilaterally, no accessory muscle use, clear to auscultation bilaterally without wheezes rhonchi or rales  Cardiovascular: Regular, rate, and rhythm, 2+ radial pulses bilaterally, capillary refill 2 seconds  Abdominal: Soft, nontender, nondistended, positive bowel sounds throughout, no rebound or guarding  Skin: Warm, dry well perfused, no rashes  Musculoskeletal: no obvious deformities, no tenderness to palpation diffusely  Neurologic:  alert and oriented x4, speech is clear and intact without dysarthria, gait is intact    Diagnostic Results     RADIOLOGY:  CT ABDOMEN PELVIS W IV CONTRAST Additional Contrast? None   Final Result   1. No acute abnormality of the abdomen or pelvis. 2. Lobulated appearance of the uterus with heterogeneity secondary to multiple underlying leiomyomas.              LABS:   Results for orders placed or performed during the hospital encounter of 02/10/22   CBC auto differential   Result Value Ref Range    WBC 3.9 (L) 4.0 - 11.0 K/uL    RBC 4.62 4.00 - 5.20 M/uL    Hemoglobin 13.5 12.0 - 16.0 g/dL    Hematocrit 41.0 36.0 - 48.0 %    MCV 88.6 80.0 - 100.0 fL    MCH 29.2 26.0 - 34.0 pg    MCHC 32.9 31.0 - 36.0 g/dL    RDW 14.4 12.4 - 15.4 %    Platelets 976 699 - 109 K/uL    MPV 7.7 5.0 - 10.5 fL    Neutrophils % 85.0 %    Lymphocytes % 13.0 %    Monocytes % 2.0 %    Eosinophils % 0.0 %    Basophils % 0.0 %    Neutrophils Absolute 3.3 1.7 - 7.7 K/uL    Lymphocytes Absolute 0.5 (L) 1.0 - 5.1 K/uL    Monocytes Absolute 0.1 0.0 - 1.3 K/uL    Eosinophils Absolute 0.0 0.0 - 0.6 K/uL    Basophils Absolute 0.0 0.0 - 0.2 K/uL   Comprehensive Metabolic Panel w/ Reflex to MG   Result Value Ref Range    Sodium 134 (L) 136 - 145 mmol/L    Potassium reflex Magnesium 4.0 3.5 - 5.1 mmol/L    Chloride 102 99 - 110 mmol/L    CO2 20 (L) 21 - 32 mmol/L    Anion Gap 12 3 - 16    Glucose 102 (H) 70 - 99 mg/dL    BUN 12 7 - 20 mg/dL    CREATININE 1.1 0.6 - 1.2 mg/dL    GFR Non-African American 50 (A) >60    GFR African American >60 >60    Calcium 9.0 8.3 - 10.6 mg/dL    Total Protein 7.2 6.4 - 8.2 g/dL    Albumin 3.9 3.4 - 5.0 g/dL    Albumin/Globulin Ratio 1.2 1.1 - 2.2    Total Bilirubin 0.4 0.0 - 1.0 mg/dL    Alkaline Phosphatase 66 40 - 129 U/L    ALT 61 (H) 10 - 40 U/L    AST 74 (H) 15 - 37 U/L   Lipase   Result Value Ref Range    Lipase 10.0 (L) 13.0 - 60.0 U/L   Urinalysis with Microscopic   Result Value Ref Range    Color, UA Yellow Straw/Yellow    Clarity, UA Clear Clear    Glucose, Ur Negative Negative mg/dL    Bilirubin Urine Negative Negative    Ketones, Urine Negative Negative mg/dL Specific Gravity, UA 1.010 1.005 - 1.030    Blood, Urine MODERATE (A) Negative    pH, UA 6.5 5.0 - 8.0    Protein, UA Negative Negative mg/dL    Urobilinogen, Urine 0.2 <2.0 E.U./dL    Nitrite, Urine Negative Negative    Leukocyte Esterase, Urine Negative Negative    Microscopic Examination YES     Urine Type NotGiven     WBC, UA 0-2 0 - 5 /HPF    RBC, UA 0-2 0 - 4 /HPF    Epithelial Cells, UA 2-5 0 - 5 /HPF    Bacteria, UA Rare (A) None Seen /HPF    Yeast, UA Present (A) None Seen /HPF         RECENT VITALS:  BP: 123/76, Temp: 98.2 °F (36.8 °C), Pulse: 87, Resp: 18, SpO2: 99 %         ED Course     Nursing Notes, Past Medical Hx, Past Surgical Hx, Social Hx, Allergies, and Family Hx were reviewed. The patient was given the following medications:  Orders Placed This Encounter   Medications    ondansetron (ZOFRAN) injection 4 mg    lactated ringers infusion 1,000 mL    iopamidol (ISOVUE-370) 76 % injection 80 mL    ondansetron (ZOFRAN) 4 MG tablet     Sig: Take 1 tablet by mouth every 8 hours as needed for Nausea     Dispense:  20 tablet     Refill:  0       CONSULTS:  None    MEDICAL DECISION MAKING / ASSESSMENT / Alexus Mederosangella Anne is a 73 y.o. female who presents emergency department with complaint of nausea vomiting and fever. Patient had persistent malaise. Laboratory investigation had been sent out in triage which was remarkable for some mild dehydration with a creatinine of 1.1 up from baseline of 0.9 GFR was greater than 60. Her bicarb was slightly low at 20. AST ALT mildly elevated her CBC had a white count of 3.9 with some lymphopenia. Urinalysis without evidence of infection negative nitrite negative leukocyte Estrace there was rare bacteria. There was moderate blood. The patient had lipase which was undetectable. CT scan of the abdomen pelvis was done showing no evidence of any intra-abdominal abnormalities.   The patient was improved in terms of her symptoms after receiving IV fluids and Zofran here in the emergency department. Overall I feel that her symptoms are most consistent with a viral illness. She was cautioned that she has been tested for Covid and that her symptoms could be representative of Covid. She has been prescribed Zofran to take as needed at home and was instructed that she may continue to take Imodium as needed. Clinical Impression     1. Nausea vomiting and diarrhea    2.  Febrile illness        Disposition     PATIENT REFERRED TO:  Susan Wilhelm MD  [address] [id number]  [phone]            DISCHARGE MEDICATIONS:  New Prescriptions    ONDANSETRON (ZOFRAN) 4 MG TABLET    Take 1 tablet by mouth every 8 hours as needed for Nausea       DISPOSITION Decision To Discharge 02/10/2022 04:22:19 PM         Renetta Pisano MD  02/10/22 [id number]

## 2022-02-11 LAB — SARS-COV-2, PCR: NOT DETECTED

## 2022-02-12 ENCOUNTER — APPOINTMENT (OUTPATIENT)
Dept: CT IMAGING | Age: 67
End: 2022-02-12
Payer: MEDICARE

## 2022-02-12 ENCOUNTER — HOSPITAL ENCOUNTER (EMERGENCY)
Age: 67
Discharge: HOME | End: 2022-02-12
Attending: STUDENT IN AN ORGANIZED HEALTH CARE EDUCATION/TRAINING PROGRAM
Payer: MEDICARE

## 2022-02-12 VITALS
OXYGEN SATURATION: 100 % | TEMPERATURE: 97.9 F | HEART RATE: 70 BPM | RESPIRATION RATE: 16 BRPM | SYSTOLIC BLOOD PRESSURE: 122 MMHG | DIASTOLIC BLOOD PRESSURE: 90 MMHG

## 2022-02-12 DIAGNOSIS — J30.9 ALLERGIC RHINITIS, UNSPECIFIED SEASONALITY, UNSPECIFIED TRIGGER: ICD-10-CM

## 2022-02-12 DIAGNOSIS — G44.89 ALLERGIC HEADACHE: ICD-10-CM

## 2022-02-12 DIAGNOSIS — G52.9 CRANIAL NEURALGIA: ICD-10-CM

## 2022-02-12 DIAGNOSIS — R51.9 ACUTE NONINTRACTABLE HEADACHE, UNSPECIFIED HEADACHE TYPE: Primary | ICD-10-CM

## 2022-02-12 LAB
ANION GAP SERPL CALCULATED.3IONS-SCNC: 11 MMOL/L (ref 3–16)
ANISOCYTOSIS: ABNORMAL
ATYPICAL LYMPHOCYTE RELATIVE PERCENT: 3 % (ref 0–6)
BASOPHILS ABSOLUTE: 0 K/UL (ref 0–0.2)
BASOPHILS RELATIVE PERCENT: 0 %
BUN BLDV-MCNC: 7 MG/DL (ref 7–20)
CALCIUM SERPL-MCNC: 9.3 MG/DL (ref 8.3–10.6)
CHLORIDE BLD-SCNC: 100 MMOL/L (ref 99–110)
CO2: 27 MMOL/L (ref 21–32)
CREAT SERPL-MCNC: 0.9 MG/DL (ref 0.6–1.2)
EOSINOPHILS ABSOLUTE: 0 K/UL (ref 0–0.6)
EOSINOPHILS RELATIVE PERCENT: 0 %
GFR AFRICAN AMERICAN: >60
GFR NON-AFRICAN AMERICAN: >60
GLUCOSE BLD-MCNC: 83 MG/DL (ref 70–99)
HCT VFR BLD CALC: 39 % (ref 36–48)
HEMOGLOBIN: 12.9 G/DL (ref 12–16)
LYMPHOCYTES ABSOLUTE: 2 K/UL (ref 1–5.1)
LYMPHOCYTES RELATIVE PERCENT: 62 %
MAGNESIUM: 1.8 MG/DL (ref 1.8–2.4)
MCH RBC QN AUTO: 29.3 PG (ref 26–34)
MCHC RBC AUTO-ENTMCNC: 33.2 G/DL (ref 31–36)
MCV RBC AUTO: 88.2 FL (ref 80–100)
MONOCYTES ABSOLUTE: 0.4 K/UL (ref 0–1.3)
MONOCYTES RELATIVE PERCENT: 12 %
NEUTROPHILS ABSOLUTE: 0.7 K/UL (ref 1.7–7.7)
NEUTROPHILS RELATIVE PERCENT: 23 %
PDW BLD-RTO: 13.7 % (ref 12.4–15.4)
PLATELET # BLD: 204 K/UL (ref 135–450)
PMV BLD AUTO: 7.7 FL (ref 5–10.5)
POTASSIUM REFLEX MAGNESIUM: 3.5 MMOL/L (ref 3.5–5.1)
RBC # BLD: 4.42 M/UL (ref 4–5.2)
SODIUM BLD-SCNC: 138 MMOL/L (ref 136–145)
WBC # BLD: 3 K/UL (ref 4–11)

## 2022-02-12 PROCEDURE — 85025 COMPLETE CBC W/AUTO DIFF WBC: CPT

## 2022-02-12 PROCEDURE — 80048 BASIC METABOLIC PNL TOTAL CA: CPT

## 2022-02-12 PROCEDURE — 83735 ASSAY OF MAGNESIUM: CPT

## 2022-02-12 PROCEDURE — 93005 ELECTROCARDIOGRAM TRACING: CPT | Performed by: STUDENT IN AN ORGANIZED HEALTH CARE EDUCATION/TRAINING PROGRAM

## 2022-02-12 PROCEDURE — 70450 CT HEAD/BRAIN W/O DYE: CPT

## 2022-02-12 PROCEDURE — 99284 EMERGENCY DEPT VISIT MOD MDM: CPT

## 2022-02-12 RX ORDER — 0.9 % SODIUM CHLORIDE 0.9 %
1000 INTRAVENOUS SOLUTION INTRAVENOUS ONCE
Status: DISCONTINUED | OUTPATIENT
Start: 2022-02-12 | End: 2022-02-12 | Stop reason: HOSPADM

## 2022-02-12 RX ORDER — DEXAMETHASONE SODIUM PHOSPHATE 4 MG/ML
10 INJECTION, SOLUTION INTRA-ARTICULAR; INTRALESIONAL; INTRAMUSCULAR; INTRAVENOUS; SOFT TISSUE ONCE
Status: DISCONTINUED | OUTPATIENT
Start: 2022-02-12 | End: 2022-02-12 | Stop reason: HOSPADM

## 2022-02-12 RX ORDER — DIPHENHYDRAMINE HYDROCHLORIDE 50 MG/ML
25 INJECTION INTRAMUSCULAR; INTRAVENOUS ONCE
Status: DISCONTINUED | OUTPATIENT
Start: 2022-02-12 | End: 2022-02-12 | Stop reason: HOSPADM

## 2022-02-12 RX ORDER — ACETAMINOPHEN 500 MG
1000 TABLET ORAL ONCE
Status: DISCONTINUED | OUTPATIENT
Start: 2022-02-12 | End: 2022-02-12 | Stop reason: HOSPADM

## 2022-02-12 RX ORDER — CETIRIZINE HYDROCHLORIDE 10 MG/1
10 TABLET ORAL DAILY
Qty: 30 TABLET | Refills: 0 | Status: SHIPPED | OUTPATIENT
Start: 2022-02-12 | End: 2022-03-14

## 2022-02-12 RX ORDER — METOCLOPRAMIDE HYDROCHLORIDE 5 MG/ML
10 INJECTION INTRAMUSCULAR; INTRAVENOUS ONCE
Status: DISCONTINUED | OUTPATIENT
Start: 2022-02-12 | End: 2022-02-12 | Stop reason: HOSPADM

## 2022-02-12 ASSESSMENT — ENCOUNTER SYMPTOMS
SINUS PRESSURE: 0
VOMITING: 0
CONSTIPATION: 0
WHEEZING: 0
ABDOMINAL PAIN: 0
DIARRHEA: 0
SHORTNESS OF BREATH: 0
SORE THROAT: 0
NAUSEA: 0
RHINORRHEA: 0
SINUS PAIN: 0
EYE PAIN: 0
COUGH: 0

## 2022-02-12 ASSESSMENT — PAIN SCALES - GENERAL: PAINLEVEL_OUTOF10: 8

## 2022-02-12 NOTE — ED NOTES
Bed: B25-25  Expected date:   Expected time:   Means of arrival:   Comments:  Jose Jacobsen RN  02/12/22 [id number]

## 2022-02-12 NOTE — ED PROVIDER NOTES
[address] RESIDENT NOTE       Date of evaluation: 2/12/2022    Chief Complaint     Headache (states recently evaluated 2/10 and work up was negative. Denies headache at that time. Presents with headache that began yesterday morning that stretched across her forehead. Denies any n/v at this time. )      History of Present Illness     Ebonie Gabriel is a 73 y.o. female who presents with a headache that began yesterday morning. She describes it as a dull ache that is located over her forehead and a portion superiorly on the right side. It is non-tender, does not radiate, and has been constant pain she rates as a 5-8/10. She also has associated \"ringing or buzzing\" in her ears that she has noticed associated with the headache. Patient states she also had an episode today where she felt light headed and tried to keep herself from fainting. Denies dizziness or vertigo. Notably, patient came to the ED on Thursday for a 3 day history of nausea, vomiting, diarrhea and fever. CT abd/pelvis was done showing no evidence of intra-abdominal abnormalities. Symptoms thought to be consistent with viral illness. Given one dose of zofran as well as a prescription. Patient did not take any of her prescribed zofran as nausea had subsided, and has not taken any other medications. Review of Systems     Review of Systems   Constitutional: Negative for appetite change, chills, fatigue and fever. HENT: Negative for congestion, ear pain, rhinorrhea, sinus pressure, sinus pain and sore throat. Eyes: Negative for pain and visual disturbance. Respiratory: Negative for cough, shortness of breath and wheezing. Cardiovascular: Negative for chest pain and palpitations. Gastrointestinal: Negative for abdominal pain, constipation, diarrhea, nausea and vomiting. Genitourinary: Negative for dysuria and hematuria.    Musculoskeletal: Negative for arthralgias, myalgias, neck pain and neck stiffness. Skin: Negative. Neurological: Positive for light-headedness and headaches (over forehead). Negative for dizziness, syncope, weakness and numbness. Past Medical, Surgical, Family, and Social History     She has a past medical history of Allergic rhinitis, Chronic back pain, Fibroids, Fibromyalgia, Fibromyalgia, Hyperlipidemia, Hypertension, STEPHANIE (obstructive sleep apnea), and Sleep apnea. She has a past surgical history that includes Appendectomy; Cervix surgery; Endometrial biopsy; Achilles tendon surgery (2007); Breast cyst aspiration; Colonoscopy (2006); and Tubal ligation. Her family history includes Cancer in her maternal grandmother, mother, and paternal grandmother; Cancer (age of onset: 47) in her paternal uncle; Diabetes in her maternal aunt and paternal grandmother; Heart Disease in her mother; High Blood Pressure in her father; High Cholesterol in her sister; Kidney Disease in her paternal uncle; Stroke in her maternal grandmother. She reports that she quit smoking about 39 years ago. She has a 3.00 pack-year smoking history. She has never used smokeless tobacco. She reports current alcohol use. She reports that she does not use drugs.     Medications     Discharge Medication List as of 2/12/2022  6:55 PM      CONTINUE these medications which have NOT CHANGED    Details   ondansetron (ZOFRAN) 4 MG tablet Take 1 tablet by mouth every 8 hours as needed for Nausea, Disp-20 tablet, R-0Print      acetaminophen (TYLENOL) 500 MG tablet Take 2 tablets by mouth 4 times daily as needed for Pain, Disp-60 tablet, R-0Print      linaclotide (LINZESS) 145 MCG capsule Take 1 capsule by mouth every morning (before breakfast), Disp-90 capsule, R-3D/c 290mg rxPrint      metoprolol succinate (TOPROL XL) 25 MG extended release tablet TAKE ONE TABLET BY MOUTH ONCE NIGHTLY, Disp-90 tablet, R-1Normal      simvastatin (ZOCOR) 20 MG tablet TAKE ONE TABLET BY MOUTH EVERY NIGHT AT BEDTIME, Disp-90 tablet, R-3Normal      famotidine (PEPCID) 20 MG tablet Take 1 tablet by mouth every morning, Disp-90 tablet, R-1Normal      omeprazole (PRILOSEC) 20 MG delayed release capsule Take 1 capsule by mouth 2 times daily (before meals), Disp-60 capsule, R-0Print      vitamin D3 (CHOLECALCIFEROL) 125 MCG (5000 UT) TABS tablet Take 5,000 Int'l Units by mouth dailyHistorical Med             Allergies     She is allergic to lisinopril, flagyl [metronidazole hcl], and metronidazole. Physical Exam     INITIAL VITALS: BP: (!) 148/86, Temp: 97.9 °F (36.6 °C), Pulse: 70, Resp: 16, SpO2: 100 %   Physical Exam  Constitutional:       General: She is not in acute distress. Appearance: She is obese. She is not ill-appearing or toxic-appearing. HENT:      Head: Normocephalic and atraumatic. Right Ear: Ear canal and external ear normal.      Left Ear: Ear canal and external ear normal.      Ears:      Comments: Some fluid visible on otoscope behind TM       Nose: Congestion present. No rhinorrhea. Mouth/Throat:      Mouth: Mucous membranes are dry. Pharynx: Oropharynx is clear. Eyes:      General: No scleral icterus. Right eye: No discharge. Left eye: No discharge. Extraocular Movements: Extraocular movements intact. Pupils: Pupils are equal, round, and reactive to light. Cardiovascular:      Rate and Rhythm: Normal rate and regular rhythm. Heart sounds: Normal heart sounds. No murmur heard. No friction rub. No gallop. Pulmonary:      Effort: Pulmonary effort is normal. No respiratory distress. Breath sounds: Normal breath sounds. No stridor. No wheezing, rhonchi or rales. Chest:      Chest wall: No tenderness. Abdominal:      General: Bowel sounds are normal. There is no distension. Palpations: Abdomen is soft. There is no mass. Tenderness: There is no abdominal tenderness. There is no guarding or rebound. Hernia: No hernia is present. Musculoskeletal:         General: Normal range of motion. Cervical back: Normal range of motion. Right lower leg: No edema. Left lower leg: No edema. Skin:     General: Skin is warm and dry. Neurological:      Mental Status: She is alert and oriented to person, place, and time. Mental status is at baseline. Sensory: No sensory deficit. Motor: No weakness. Gait: Gait normal.   Psychiatric:         Mood and Affect: Mood normal.         Behavior: Behavior normal.         Thought Content: Thought content normal.         Judgment: Judgment normal.         Diagnostic Results     EKG   Interpreted inconjunction with emergency department physician Andrea Gar MD  Rhythm: normal sinus   Rate: normal  Axis: normal  Ectopy: none  Conduction: normal  ST Segments: normal and nonspecific changes  T Waves: normal  Q Waves: none  Clinical Impression: no acute changes  Comparison:  7/11/21    RADIOLOGY:  CT HEAD WO CONTRAST   Final Result   1. No acute intracranial abnormality.           LABS:   Results for orders placed or performed during the hospital encounter of 93/02/09   Basic Metabolic Panel w/ Reflex to MG   Result Value Ref Range    Sodium 138 136 - 145 mmol/L    Potassium reflex Magnesium 3.5 3.5 - 5.1 mmol/L    Chloride 100 99 - 110 mmol/L    CO2 27 21 - 32 mmol/L    Anion Gap 11 3 - 16    Glucose 83 70 - 99 mg/dL    BUN 7 7 - 20 mg/dL    CREATININE 0.9 0.6 - 1.2 mg/dL    GFR Non-African American >60 >60    GFR African American >60 >60    Calcium 9.3 8.3 - 10.6 mg/dL   CBC Auto Differential   Result Value Ref Range    WBC 3.0 (L) 4.0 - 11.0 K/uL    RBC 4.42 4.00 - 5.20 M/uL    Hemoglobin 12.9 12.0 - 16.0 g/dL    Hematocrit 39.0 36.0 - 48.0 %    MCV 88.2 80.0 - 100.0 fL    MCH 29.3 26.0 - 34.0 pg    MCHC 33.2 31.0 - 36.0 g/dL    RDW 13.7 12.4 - 15.4 %    Platelets 086 239 - 627 K/uL    MPV 7.7 5.0 - 10.5 fL    Neutrophils % 23.0 %    Lymphocytes % 62.0 %    Monocytes % 12.0 % Eosinophils % 0.0 %    Basophils % 0.0 %    Neutrophils Absolute 0.7 (LL) 1.7 - 7.7 K/uL    Lymphocytes Absolute 2.0 1.0 - 5.1 K/uL    Monocytes Absolute 0.4 0.0 - 1.3 K/uL    Eosinophils Absolute 0.0 0.0 - 0.6 K/uL    Basophils Absolute 0.0 0.0 - 0.2 K/uL    Atypical Lymphocytes Relative 3 0 - 6 %    Anisocytosis Occasional (A)    Magnesium   Result Value Ref Range    Magnesium 1.80 1.80 - 2.40 mg/dL       ED BEDSIDE ULTRASOUND:      RECENT VITALS:  BP: (!) 122/90, Temp: 97.9 °F (36.6 °C),Pulse: 70, Resp: 16, SpO2: 100 %     Procedures         ED Course     Nursing Notes, Past Medical Hx, Past Surgical Hx, Social Hx, Allergies, and FamilyHx were reviewed. The patient was given the following medications:  Orders Placed This Encounter   Medications    DISCONTD: 0.9 % sodium chloride bolus    DISCONTD: metoclopramide (REGLAN) injection 10 mg    DISCONTD: diphenhydrAMINE (BENADRYL) injection 25 mg    DISCONTD: acetaminophen (TYLENOL) tablet 1,000 mg    DISCONTD: dexamethasone (DECADRON) injection 10 mg    cetirizine (ZYRTEC) 10 MG tablet     Sig: Take 1 tablet by mouth daily     Dispense:  30 tablet     Refill:  0       CONSULTS:  None    MEDICAL DECISION MAKING / ASSESSMENT / Shannan Watts ABEL Segal is a 73 y.o. female presenting with headache and ringing/buzzing in her ears that started Friday morning. Notably, patient was seen last Thursday for fever, nausea, emesis, diarrhea, given one dose of zofran and CT ab/pelvis obtained that was negative. Patient endorses feeling lightheaded at [employer] today and that she had to keep herself from fainting. Patient describes it has some tingling sensation but does not notice any neurological deficits. On presentation to the ED, vital signs are stable. Patient is mildly hypertensive to 140s/80s, afebrile, HR 70, RR 16, satting 100% on RA. Patient states her headache is currently a 5-6 but can range up to an 8.  CT Head w/o contrast revealed no acute

## 2022-02-12 NOTE — ED NOTES
Pt. Expressing desire to leave ED. States she can take fluids at home and can manage headache. Pt. Is relieved work up with negative and states headache is getting better.       Lamar Pisano RN  02/12/22 [id number]

## 2022-02-13 LAB
EKG ATRIAL RATE: 72 BPM
EKG DIAGNOSIS: NORMAL
EKG P AXIS: 54 DEGREES
EKG P-R INTERVAL: 166 MS
EKG Q-T INTERVAL: 404 MS
EKG QRS DURATION: 76 MS
EKG QTC CALCULATION (BAZETT): 442 MS
EKG R AXIS: -12 DEGREES
EKG T AXIS: 29 DEGREES
EKG VENTRICULAR RATE: 72 BPM

## 2022-06-23 ENCOUNTER — ANESTHESIA EVENT (OUTPATIENT)
Dept: ENDOSCOPY | Age: 67
End: 2022-06-23
Payer: MEDICARE

## 2022-06-23 NOTE — PROGRESS NOTES
ENDOSCOPY PREOP INSTRUCTIONS       You are scheduled for a procedure at [facility] on 6-24 @ 900.  You will need to arrival by: 730 (at least an hour & a half prior to planned start time)   Report to the MAIN entrance on [address] and register at the information desk on the left-hand side of the lobby   You will need your insurance & photo ID with you. For your procedure:      PLEASE FOLLOW ALL INSTRUCTIONS & PREPS GIVEN TO YOU FROM YOUR DOCTOR'S OFFICE.  If you have not received these instructions yet, please call the office immediately. Make sure to read them as soon as received.  Bring an accurate list of any medications, including the dose/ frequency, with you on the day of the procedure. Make sure to include over the counter medications.  If you are taking blood thinners, Aspirin or diabetic medication, make sure to call your doctor as soon as possible for instructions prior to your procedure.  Please dress comfortably and do not wear any lotion, powders or jewelry   Arrange for someone to be with you and sign you out & drive you home after your procedure.  We allow 2 visitors with you in the hospital & both of you are required to be masked.      WOMEN ONLY OF CHILDBEARING AGE: Please make sure to be able to give a urine sample on arrival      If you have further questions, you may contact your Endoscopist's office or Pre Admission Testing staff at [phone]  Shyam Fraser.6/23/2022 .10:06 AM

## 2022-06-24 ENCOUNTER — ANESTHESIA (OUTPATIENT)
Dept: ENDOSCOPY | Age: 67
End: 2022-06-24
Payer: MEDICARE

## 2022-06-24 ENCOUNTER — HOSPITAL ENCOUNTER (OUTPATIENT)
Age: 67
Setting detail: OUTPATIENT SURGERY
Discharge: HOME | End: 2022-06-24
Attending: INTERNAL MEDICINE | Admitting: INTERNAL MEDICINE
Payer: MEDICARE

## 2022-06-24 VITALS
WEIGHT: 217 LBS | TEMPERATURE: 97.1 F | DIASTOLIC BLOOD PRESSURE: 81 MMHG | OXYGEN SATURATION: 96 % | SYSTOLIC BLOOD PRESSURE: 153 MMHG | HEART RATE: 65 BPM | BODY MASS INDEX: 34.87 KG/M2 | RESPIRATION RATE: 16 BRPM | HEIGHT: 66 IN

## 2022-06-24 PROCEDURE — 7100000011 HC PHASE II RECOVERY - ADDTL 15 MIN: Performed by: INTERNAL MEDICINE

## 2022-06-24 PROCEDURE — 7100000010 HC PHASE II RECOVERY - FIRST 15 MIN: Performed by: INTERNAL MEDICINE

## 2022-06-24 PROCEDURE — 2580000003 HC RX 258: Performed by: ANESTHESIOLOGY

## 2022-06-24 PROCEDURE — 3609027000 HC COLONOSCOPY: Performed by: INTERNAL MEDICINE

## 2022-06-24 RX ORDER — SODIUM CHLORIDE 9 MG/ML
INJECTION, SOLUTION INTRAVENOUS PRN
Status: DISCONTINUED | OUTPATIENT
Start: 2022-06-24 | End: 2022-06-24 | Stop reason: HOSPADM

## 2022-06-24 RX ORDER — SODIUM CHLORIDE, SODIUM LACTATE, POTASSIUM CHLORIDE, CALCIUM CHLORIDE 600; 310; 30; 20 MG/100ML; MG/100ML; MG/100ML; MG/100ML
INJECTION, SOLUTION INTRAVENOUS CONTINUOUS
Status: DISCONTINUED | OUTPATIENT
Start: 2022-06-24 | End: 2022-06-24 | Stop reason: HOSPADM

## 2022-06-24 RX ORDER — SODIUM CHLORIDE 0.9 % (FLUSH) 0.9 %
5-40 SYRINGE (ML) INJECTION EVERY 12 HOURS SCHEDULED
Status: DISCONTINUED | OUTPATIENT
Start: 2022-06-24 | End: 2022-06-24 | Stop reason: HOSPADM

## 2022-06-24 RX ORDER — SODIUM CHLORIDE 0.9 % (FLUSH) 0.9 %
5-40 SYRINGE (ML) INJECTION PRN
Status: DISCONTINUED | OUTPATIENT
Start: 2022-06-24 | End: 2022-06-24 | Stop reason: HOSPADM

## 2022-06-24 RX ADMIN — SODIUM CHLORIDE, POTASSIUM CHLORIDE, SODIUM LACTATE AND CALCIUM CHLORIDE: 600; 310; 30; 20 INJECTION, SOLUTION INTRAVENOUS at 09:13

## 2022-06-24 ASSESSMENT — PAIN - FUNCTIONAL ASSESSMENT
PAIN_FUNCTIONAL_ASSESSMENT: 0-10

## 2022-06-24 ASSESSMENT — PAIN SCALES - GENERAL: PAINLEVEL_OUTOF10: 1

## 2022-06-24 NOTE — PROGRESS NOTES
Discharge instructions reviewed with patient/responsible adult and understanding verbalized. Discharge instructions signed and copies given. Patient discharged home with belongings. No sedation . Pt left amb per self.  No c/o

## 2022-06-24 NOTE — ANESTHESIA PRE PROCEDURE
Department of Anesthesiology  Preprocedure Note       Name:  Solitario Ayon   Age:  73 y.o.  :  1955                                          MRN:  [id number]         Date:  2022      Surgeon: Adriana Aguero):  Guzman Carter MD    Procedure: Procedure(s):  COLONOSCOPY    Medications prior to admission:   Prior to Admission medications    Medication Sig Start Date End Date Taking? Authorizing Provider   ondansetron (ZOFRAN) 4 MG tablet Take 1 tablet by mouth every 8 hours as needed for Nausea 2/10/22   Dee Nunn MD   acetaminophen (TYLENOL) 500 MG tablet Take 2 tablets by mouth 4 times daily as needed for Pain  Patient not taking: Reported on 2022   RANCHO Soto - CNP   linaclotide Goldy Lacks) 145 MCG capsule Take 1 capsule by mouth every morning (before breakfast) 10/21/21   Abdiel Trinh MD   metoprolol succinate (TOPROL XL) 25 MG extended release tablet TAKE ONE TABLET BY MOUTH ONCE NIGHTLY 10/21/21   Abdiel Trinh MD   simvastatin (ZOCOR) 20 MG tablet TAKE ONE TABLET BY MOUTH EVERY NIGHT AT BEDTIME 10/21/21 11/26/22  Abdiel Trinh MD   famotidine (PEPCID) 20 MG tablet Take 1 tablet by mouth every morning  Patient not taking: Reported on 2022   Abdiel Trinh MD   omeprazole (PRILOSEC) 20 MG delayed release capsule Take 1 capsule by mouth 2 times daily (before meals)  Patient not taking: Reported on 2022   HONEY Marrero   vitamin D3 (CHOLECALCIFEROL) 125 MCG (5000 UT) TABS tablet Take 5,000 Int'l Units by mouth daily    Historical Provider, MD       Current medications:    No current facility-administered medications for this encounter. Allergies:     Allergies   Allergen Reactions    Lisinopril Anaphylaxis and Itching     sweating    Flagyl [Metronidazole Hcl] Other (See Comments)     \"metallic taste\"    Metronidazole Nausea And Vomiting       Problem List:    Patient Active Problem List   Diagnosis Code    Fibromyalgia M79.7  Cervical spondylosis with radiculopathy M47.22    Vitamin D deficiency E55.9    Vitamin B12 deficiency E53.8    Constipation K59.00    Varicose vein of leg I83.90    Brittle nails L60.3    Mass of thigh R22.40    Obesity E66.9    STEPHANIE (obstructive sleep apnea) G47.33    H. pylori infection A04.8    Obesity (BMI 30.0-34. 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    Lattice degeneration of retina, left eye H35.412    Lumbosacral spondylosis without myelopathy M47.817    Mitral valve disorder I05.9    Myopia of both eyes H52.13    PVD (posterior vitreous detachment), left eye H43.812    Round hole, right eye H33.321    Vitreous degeneration, bilateral H43.813       Past Medical History:        Diagnosis Date    Allergic rhinitis     Chronic back pain     Fibroids     Fibromyalgia     Fibromyalgia     Hyperlipidemia 6/25/2012    Hypertension     STEPHANIE (obstructive sleep apnea)     Sleep apnea        Past Surgical History:        Procedure Laterality Date    ACHILLES TENDON SURGERY  2007    left achilles repair by Dr. Bauer Fitting cervical surgery-- sees Dr Marda Cowden @ [facility] COLONOSCOPY  2006    Dr Semaj Vyas and 8/2012    ENDOMETRIAL BIOPSY      Dr Ledy Mason         Social History:    Social History     Tobacco Use    Smoking status: Former Smoker     Packs/day: 1.00     Years: 3.00     Pack years: 3.00     Quit date: 5/23/1982     Years since quittin.1    Smokeless tobacco: Never Used   Substance Use Topics    Alcohol use: Yes     Alcohol/week: 0.0 standard drinks     Comment: occ                                Counseling given: Not Answered      Vital Signs (Current):   Vitals:    22 0808   BP: (!) 163/96   Pulse: 74   Resp: 15   Temp: 97.2 °F (36.2 °C)   TempSrc: Temporal   SpO2: 98%   Weight: 217 lb (98.4 kg)   Height: 5' 6\" (1.676 m)                                              BP Readings from Last 3 Encounters:   22 (!) 163/96   22 (!) 122/90   02/10/22 (!) 146/80       NPO Status:                                                                                 BMI:   Wt Readings from Last 3 Encounters:   22 217 lb (98.4 kg)   02/10/22 207 lb (93.9 kg)   22 212 lb (96.2 kg)     Body mass index is 35.02 kg/m². CBC:   Lab Results   Component Value Date    WBC 3.0 2022    RBC 4.42 2022    HGB 12.9 2022    HCT 39.0 2022    MCV 88.2 2022    RDW 13.7 2022     2022       CMP:   Lab Results   Component Value Date     2022    K 3.5 2022     2022    CO2 27 2022    BUN 7 2022    CREATININE 0.9 2022    GFRAA >60 2022    GFRAA >60 2012    AGRATIO 1.2 02/10/2022    LABGLOM >60 2022    GLUCOSE 83 2022    PROT 7.2 02/10/2022    PROT 7.4 2012    CALCIUM 9.3 2022    BILITOT 0.4 02/10/2022    ALKPHOS 66 02/10/2022    AST 74 02/10/2022    ALT 61 02/10/2022       POC Tests: No results for input(s): POCGLU, POCNA, POCK, POCCL, POCBUN, POCHEMO, POCHCT in the last 72 hours. Coags: No results found for: PROTIME, INR, APTT    HCG (If Applicable): No results found for: PREGTESTUR, PREGSERUM, HCG, HCGQUANT     ABGs: No results found for: PHART, PO2ART, JPA1AAR, MRB0XVM, BEART, M0LPKVQI     Type & Screen (If Applicable):  No results found for: LABABO, LABRH    Drug/Infectious Status (If Applicable):   No results found for: HIV, HEPCAB    COVID-19 Screening (If Applicable):   Lab Results   Component Value Date    COVID19 Not Detected 02/10/2022           Anesthesia Evaluation    Airway: Mallampati: II  TM distance: >3 FB   Neck ROM: full  Mouth opening: > = 3 FB   Dental:          Pulmonary:   (+) sleep apnea:                             Cardiovascular:    (+) hypertension:,         Rhythm: regular  Rate: normal                    Neuro/Psych:   (+) neuromuscular disease:,             GI/Hepatic/Renal:             Endo/Other:                     Abdominal:             Vascular: Other Findings:           Anesthesia Plan      MAC     ASA 2       Induction: intravenous. Anesthetic plan and risks discussed with patient. Plan discussed with CRNA.                     Carlos Nicolas MD   6/24/2022

## 2022-06-24 NOTE — PROCEDURES
[facility]  Colonoscopy Note    Patient: Sherrie Knox  : 1955  Acct#:     Procedure: Colonoscopy     Date:  2022    Surgeon:  Jorge Zuleta MD    Referring Physician:  Rad Hinojosa MD    Anesthesia: none     ASA: 2  Mallapati: 1     EBL: <50 mL    Indications: This is a 73y.o. year old female who presents today with family history of colon cancer. Procedure: An informed consent was obtained from the patient after explanation of indications, benefits, possible risks and complications of the procedure. The patient was then taken to the endoscopy suite, placed in the left lateral decubitus position, and the above IV anesthesia was administered. A digital rectal examination was performed and revealed negative without mass, lesions or tenderness, internal hemorrhoids noted, external hemorrhoids noted. The Olympus PCFQ-H190 video colonoscope was placed in the patient's rectum under digital direction and advanced to the cecum. The cecum was identified by characteristic anatomy and ballottment. The prep was adequate. Findings: The colon was normal.       The scope was then withdrawn into the rectum and retroflexed. The retroflexed view of the anal verge and rectum demonstrates large internal hemorrhoids. The scope was straightened, the colon was decompressed and the scope was withdrawn from the patient. The patient tolerated the procedure well and was taken to the PACU in good condition. Biopsies:  No       Impression:   Normal colon. Large internal hemorrhoids. Recommendations:  Colonoscopy in 5 years. High-fiber diet with Metamucil daily. Return my office for hemorrhoid ligation therapy.     Rad Hinojosa MD  [facility]

## 2022-06-24 NOTE — H&P
Gastroenterology Note                 Pre-operative History and Physical    Patient: Kameron Ham  : 1955  CSN:     History Obtained From:   Patient or guardian. HISTORY OF PRESENT ILLNESS:    The patient is a 73 y.o. female here for colonoscopy for fam hx colon CA and anal pain. Past Medical History:    Past Medical History:   Diagnosis Date    Allergic rhinitis     Chronic back pain     Fibroids     Fibromyalgia     Hyperlipidemia 2012    Hypertension     STEPHANIE (obstructive sleep apnea)     Wears CPAP    Sleep apnea      Past Surgical History:    Past Surgical History:   Procedure Laterality Date    ACHILLES TENDON SURGERY  2007    left achilles repair by Dr. Alicia Russell cervical surgery-- sees Dr Julius Foote @ [address] [place] [address]    COLONOSCOPY  2006    Dr Radha Price and 2012    ENDOMETRIAL BIOPSY      Dr Janina Almanzar       Medications Prior to Admission:   No current facility-administered medications on file prior to encounter.      Current Outpatient Medications on File Prior to Encounter   Medication Sig Dispense Refill    Multiple Vitamins-Minerals (CENTRUM SILVER 50+WOMEN) TABS daily      linaclotide (LINZESS) 145 MCG capsule Take 1 capsule by mouth every morning (before breakfast) 90 capsule 3    metoprolol succinate (TOPROL XL) 25 MG extended release tablet TAKE ONE TABLET BY MOUTH ONCE NIGHTLY 90 tablet 1    simvastatin (ZOCOR) 20 MG tablet TAKE ONE TABLET BY MOUTH EVERY NIGHT AT BEDTIME 90 tablet 3    vitamin D3 (CHOLECALCIFEROL) 125 MCG (5000 UT) TABS tablet Take 5,000 Int'l Units by mouth daily          Allergies:  Lisinopril, Flagyl [metronidazole hcl], and Metronidazole      Social History:   Social History     Tobacco Use    Smoking status: Former Smoker     Packs/day: 1.00     Years: 3.00     Pack years: 3.00     Quit date: 1982     Years since quittin.1    Smokeless tobacco: Never Used   Substance Use Topics    Alcohol use: Yes     Alcohol/week: 0.0 standard drinks     Comment: occ     Family History:   Family History   Problem Relation Age of Onset    Cancer Mother         colon    Heart Disease Mother         heart infection    High Blood Pressure Father     Kidney Disease Paternal Uncle     Cancer Paternal Uncle 47        colon cancer    High Cholesterol Sister     Diabetes Maternal Aunt     Cancer Maternal Grandmother         breast cancer    Stroke Maternal Grandmother     Cancer Paternal Grandmother     Diabetes Paternal Grandmother        PHYSICAL EXAM:      BP (!) 163/96   Pulse 74   Temp 97.2 °F (36.2 °C) (Temporal)   Resp 15   Ht 5' 6\" (1.676 m)   Wt 217 lb (98.4 kg)   LMP  (LMP Unknown)   SpO2 98%   BMI 35.02 kg/m²  I        Heart:  RRR, normal s1s2    Lungs:  CTA and normal effort    Abdomen:   Soft, nt nd. ASSESSMENT AND PLAN:    1. Patient is a 73 y.o. female here for endoscopy with MAC sedation. 2.  Procedure options, risks and benefits reviewed with patient and/or guardian. They express understanding.     Daisy Sky MD  [address] [place] [address]

## 2022-07-22 ENCOUNTER — OFFICE VISIT (OUTPATIENT)
Dept: PRIMARY CARE CLINIC | Age: 67
End: 2022-07-22
Payer: MEDICARE

## 2022-07-22 ENCOUNTER — HOSPITAL ENCOUNTER (OUTPATIENT)
Age: 67
Discharge: HOME | End: 2022-07-22
Payer: MEDICARE

## 2022-07-22 VITALS
HEART RATE: 72 BPM | DIASTOLIC BLOOD PRESSURE: 75 MMHG | SYSTOLIC BLOOD PRESSURE: 139 MMHG | OXYGEN SATURATION: 97 % | TEMPERATURE: 97.4 F | WEIGHT: 220 LBS | BODY MASS INDEX: 35.51 KG/M2

## 2022-07-22 DIAGNOSIS — E53.8 VITAMIN B12 DEFICIENCY: ICD-10-CM

## 2022-07-22 DIAGNOSIS — E74.89 OTHER SPECIFIED DISORDERS OF CARBOHYDRATE METABOLISM (HCC): ICD-10-CM

## 2022-07-22 DIAGNOSIS — I10 ESSENTIAL (PRIMARY) HYPERTENSION: ICD-10-CM

## 2022-07-22 DIAGNOSIS — K58.1 IRRITABLE BOWEL SYNDROME WITH CONSTIPATION: ICD-10-CM

## 2022-07-22 DIAGNOSIS — K64.8 INTERNAL HEMORRHOIDS: Primary | ICD-10-CM

## 2022-07-22 DIAGNOSIS — E66.01 SEVERE OBESITY (BMI 35.0-39.9) WITH COMORBIDITY (HCC): ICD-10-CM

## 2022-07-22 DIAGNOSIS — R06.02 SHORTNESS OF BREATH: ICD-10-CM

## 2022-07-22 DIAGNOSIS — E78.2 MIXED HYPERLIPIDEMIA: ICD-10-CM

## 2022-07-22 DIAGNOSIS — R60.9 PERIPHERAL EDEMA: ICD-10-CM

## 2022-07-22 DIAGNOSIS — Z00.00 ROUTINE ADULT HEALTH MAINTENANCE: ICD-10-CM

## 2022-07-22 DIAGNOSIS — G58.9 MONONEUROPATHY: ICD-10-CM

## 2022-07-22 DIAGNOSIS — I10 ESSENTIAL HYPERTENSION: ICD-10-CM

## 2022-07-22 DIAGNOSIS — R00.2 PALPITATIONS: ICD-10-CM

## 2022-07-22 LAB
ALT SERPL-CCNC: 9 U/L (ref 10–40)
ANION GAP SERPL CALCULATED.3IONS-SCNC: 10 MMOL/L (ref 3–16)
AST SERPL-CCNC: 16 U/L (ref 15–37)
BASOPHILS ABSOLUTE: 0 K/UL (ref 0–0.2)
BASOPHILS RELATIVE PERCENT: 0.9 %
BUN BLDV-MCNC: 11 MG/DL (ref 7–20)
CALCIUM SERPL-MCNC: 9.9 MG/DL (ref 8.3–10.6)
CHLORIDE BLD-SCNC: 102 MMOL/L (ref 99–110)
CHOLESTEROL, TOTAL: 178 MG/DL (ref 0–199)
CO2: 28 MMOL/L (ref 21–32)
CREAT SERPL-MCNC: 0.9 MG/DL (ref 0.6–1.2)
D DIMER: 0.62 UG/ML FEU (ref 0–0.6)
EOSINOPHILS ABSOLUTE: 0.1 K/UL (ref 0–0.6)
EOSINOPHILS RELATIVE PERCENT: 1.5 %
GFR AFRICAN AMERICAN: >60
GFR NON-AFRICAN AMERICAN: >60
GLUCOSE BLD-MCNC: 77 MG/DL (ref 70–99)
HCT VFR BLD CALC: 41.1 % (ref 36–48)
HDLC SERPL-MCNC: 72 MG/DL (ref 40–60)
HEMOGLOBIN: 13.6 G/DL (ref 12–16)
HOMOCYSTEINE: 8 UMOL/L (ref 0–10)
LDL CHOLESTEROL CALCULATED: 98 MG/DL
LYMPHOCYTES ABSOLUTE: 1.9 K/UL (ref 1–5.1)
LYMPHOCYTES RELATIVE PERCENT: 39.9 %
MCH RBC QN AUTO: 29.4 PG (ref 26–34)
MCHC RBC AUTO-ENTMCNC: 33.1 G/DL (ref 31–36)
MCV RBC AUTO: 89 FL (ref 80–100)
MONOCYTES ABSOLUTE: 0.4 K/UL (ref 0–1.3)
MONOCYTES RELATIVE PERCENT: 9.1 %
NEUTROPHILS ABSOLUTE: 2.3 K/UL (ref 1.7–7.7)
NEUTROPHILS RELATIVE PERCENT: 48.6 %
PDW BLD-RTO: 14.3 % (ref 12.4–15.4)
PLATELET # BLD: 234 K/UL (ref 135–450)
PMV BLD AUTO: 8.7 FL (ref 5–10.5)
POTASSIUM SERPL-SCNC: 4.1 MMOL/L (ref 3.5–5.1)
PRO-BNP: 136 PG/ML (ref 0–124)
RBC # BLD: 4.62 M/UL (ref 4–5.2)
SODIUM BLD-SCNC: 140 MMOL/L (ref 136–145)
TRIGL SERPL-MCNC: 41 MG/DL (ref 0–150)
VITAMIN B-12: 567 PG/ML (ref 211–911)
VITAMIN D 25-HYDROXY: 46.3 NG/ML
VLDLC SERPL CALC-MCNC: 8 MG/DL
WBC # BLD: 4.8 K/UL (ref 4–11)

## 2022-07-22 PROCEDURE — 83880 ASSAY OF NATRIURETIC PEPTIDE: CPT

## 2022-07-22 PROCEDURE — 82607 VITAMIN B-12: CPT

## 2022-07-22 PROCEDURE — 3017F COLORECTAL CA SCREEN DOC REV: CPT | Performed by: INTERNAL MEDICINE

## 2022-07-22 PROCEDURE — 80061 LIPID PANEL: CPT

## 2022-07-22 PROCEDURE — 85379 FIBRIN DEGRADATION QUANT: CPT

## 2022-07-22 PROCEDURE — G8417 CALC BMI ABV UP PARAM F/U: HCPCS | Performed by: INTERNAL MEDICINE

## 2022-07-22 PROCEDURE — G8399 PT W/DXA RESULTS DOCUMENT: HCPCS | Performed by: INTERNAL MEDICINE

## 2022-07-22 PROCEDURE — 80048 BASIC METABOLIC PNL TOTAL CA: CPT

## 2022-07-22 PROCEDURE — G8427 DOCREV CUR MEDS BY ELIG CLIN: HCPCS | Performed by: INTERNAL MEDICINE

## 2022-07-22 PROCEDURE — 99213 OFFICE O/P EST LOW 20 MIN: CPT | Performed by: INTERNAL MEDICINE

## 2022-07-22 PROCEDURE — 82306 VITAMIN D 25 HYDROXY: CPT

## 2022-07-22 PROCEDURE — 84450 TRANSFERASE (AST) (SGOT): CPT

## 2022-07-22 PROCEDURE — 85025 COMPLETE CBC W/AUTO DIFF WBC: CPT

## 2022-07-22 PROCEDURE — 1123F ACP DISCUSS/DSCN MKR DOCD: CPT | Performed by: INTERNAL MEDICINE

## 2022-07-22 PROCEDURE — 84165 PROTEIN E-PHORESIS SERUM: CPT

## 2022-07-22 PROCEDURE — 86334 IMMUNOFIX E-PHORESIS SERUM: CPT

## 2022-07-22 PROCEDURE — 83090 ASSAY OF HOMOCYSTEINE: CPT

## 2022-07-22 PROCEDURE — 1036F TOBACCO NON-USER: CPT | Performed by: INTERNAL MEDICINE

## 2022-07-22 PROCEDURE — 83921 ORGANIC ACID SINGLE QUANT: CPT

## 2022-07-22 PROCEDURE — 84155 ASSAY OF PROTEIN SERUM: CPT

## 2022-07-22 PROCEDURE — 36415 COLL VENOUS BLD VENIPUNCTURE: CPT

## 2022-07-22 PROCEDURE — 83036 HEMOGLOBIN GLYCOSYLATED A1C: CPT

## 2022-07-22 PROCEDURE — 1090F PRES/ABSN URINE INCON ASSESS: CPT | Performed by: INTERNAL MEDICINE

## 2022-07-22 PROCEDURE — 84460 ALANINE AMINO (ALT) (SGPT): CPT

## 2022-07-22 RX ORDER — SIMVASTATIN 20 MG
TABLET ORAL
Qty: 90 TABLET | Refills: 3 | Status: SHIPPED | OUTPATIENT
Start: 2022-07-22 | End: 2023-08-27

## 2022-07-22 RX ORDER — METOPROLOL SUCCINATE 25 MG/1
TABLET, EXTENDED RELEASE ORAL
Qty: 90 TABLET | Refills: 1 | Status: SHIPPED | OUTPATIENT
Start: 2022-07-22

## 2022-07-22 ASSESSMENT — PATIENT HEALTH QUESTIONNAIRE - PHQ9
SUM OF ALL RESPONSES TO PHQ QUESTIONS 1-9: 0
1. LITTLE INTEREST OR PLEASURE IN DOING THINGS: 0
SUM OF ALL RESPONSES TO PHQ QUESTIONS 1-9: 0
SUM OF ALL RESPONSES TO PHQ QUESTIONS 1-9: 0
SUM OF ALL RESPONSES TO PHQ9 QUESTIONS 1 & 2: 0
SUM OF ALL RESPONSES TO PHQ QUESTIONS 1-9: 0
2. FEELING DOWN, DEPRESSED OR HOPELESS: 0

## 2022-07-22 NOTE — PATIENT INSTRUCTIONS
1 above knee compression  2 watch the sodium  3 fasting labs  4 hold aspirin NSAIDs eg ibuprofen  for 5 days before to reduce bleed risk at time of hemorrhoidal banding  5 ref to weight loss solutions at [facility] in the pool  6 mmgm end of year as usual

## 2022-07-22 NOTE — PROGRESS NOTES
constipation, diarrhea, nausea, rectal pain and vomiting. Endocrine: Negative for cold intolerance, heat intolerance, polydipsia, polyphagia and polyuria. Genitourinary: Negative for difficulty urinating, dysuria, flank pain, frequency, hematuria, pelvic pain, urgency, vaginal bleeding and vaginal discharge. Musculoskeletal: Negative for arthralgias, back pain, gait problem, joint swelling, myalgias, neck pain and neck stiffness. Skin: Negative for color change and rash. Neurological: Negative for dizziness, tremors, syncope, speech difficulty, weakness, light-headedness and headaches. Hematological: Negative for adenopathy. Does not bruise/bleed easily. Psychiatric/Behavioral: Negative for agitation, behavioral problems, decreased concentration, sleep disturbance and suicidal ideas. The patient is not nervous/anxious and is not hyperactive. Prior to Visit Medications    Medication Sig Taking? Authorizing Provider   Multiple Vitamins-Minerals (CENTRUM SILVER 50+WOMEN) TABS daily Yes Historical Provider, MD   vitamin D3 (CHOLECALCIFEROL) 125 MCG (5000 UT) TABS tablet Take 1 tablet by mouth in the morning.   Michael Torres MD   simvastatin (ZOCOR) 20 MG tablet TAKE ONE TABLET BY MOUTH EVERY NIGHT AT BEDTIME  Michael Torres MD   metoprolol succinate (TOPROL XL) 25 MG extended release tablet TAKE ONE TABLET BY MOUTH ONCE NIGHTLY  Michael Torres MD   linaclotide [facility]) 145 MCG capsule Take 1 capsule by mouth every morning (before breakfast)  Michael Torres MD        Allergies   Allergen Reactions    Lisinopril Anaphylaxis and Itching     sweating    Flagyl [Metronidazole Hcl] Other (See Comments)     \"metallic taste\"    Metronidazole Nausea And Vomiting       Past Medical History:   Diagnosis Date    Allergic rhinitis     Chronic back pain     Fibroids     Fibromyalgia     Hyperlipidemia 06/25/2012    Hypertension     STEPHANIE (obstructive sleep apnea)     Wears CPAP    Sleep apnea        Past Surgical History:   Procedure Laterality Date    ACHILLES TENDON SURGERY  2007    left achilles repair by Dr. Sarah London cervical surgery-- sees Dr Gauthier @ [facility]      Dr Arpan Thakkar and 2012    COLONOSCOPY N/A 2022    COLONOSCOPY performed by Leticia Vieyra MD at [address] [place] [address]      Dr Buchanan Shorten History     Socioeconomic History    Marital status:      Spouse name: Not on file    Number of children: 3    Years of education: Not on file    Highest education level: Not on file   Occupational History    Occupation: Patient registration     Comment: retired   Tobacco Use    Smoking status: Former     Packs/day: 1.00     Years: 3.00     Pack years: 3.00     Types: Cigarettes     Quit date: 1982     Years since quittin.1    Smokeless tobacco: Never   Vaping Use    Vaping Use: Never used   Substance and Sexual Activity    Alcohol use:  Yes     Alcohol/week: 0.0 standard drinks     Comment: occ    Drug use: No    Sexual activity: Never   Other Topics Concern    Not on file   Social History Narrative    Not on file     Social Determinants of Health     Financial Resource Strain: Low Risk     Difficulty of Paying Living Expenses: Not hard at all   Food Insecurity: No Food Insecurity    Worried About Running Out of Food in the Last Year: Never true    Ran Out of Food in the Last Year: Never true   Transportation Needs: Not on file   Physical Activity: Not on file   Stress: Not on file   Social Connections: Not on file   Intimate Partner Violence: Not on file   Housing Stability: Not on file        Family History   Problem Relation Age of Onset    Cancer Mother         colon    Heart Disease Mother         heart infection    High Blood Pressure Father     Kidney Disease Paternal Uncle     Cancer Paternal Uncle 47        colon cancer    High Cholesterol Sister     Diabetes Maternal Aunt     Cancer Maternal Grandmother         breast cancer    Stroke Maternal Grandmother     Cancer Paternal Grandmother     Diabetes Paternal Grandmother        Vitals:    07/22/22 0852   BP: 139/75   Pulse: 72   Temp: 97.4 °F (36.3 °C)   TempSrc: Temporal   SpO2: 97%   Weight: 220 lb (99.8 kg)     Estimated body mass index is 35.51 kg/m² as calculated from the following:    Height as of 6/24/22: 5' 6\" (1.676 m). Weight as of this encounter: 220 lb (99.8 kg). PHYSICAL EXAM  GENERAL:  Pleasant  female who looks her stated age, awake alert and oriented x3, no acute distress. Extremities: Scant lower extremity pitting edema. Lipedema. Cold exam room so capillary refill is prolonged but 2+ DPs, 1+ PTs. No foot or ankle warmth or synovitis  Neuro: Fifth versus first toe monofilament sensation diminution. PSYCH:  No psychomotor retardation or agitation. Good eye contact. Unrestricted affect range. Mood congruent with affect. Linear thought. LABS  Lab Review   Admission on 02/12/2022, Discharged on 02/12/2022   Component Date Value    Ventricular Rate 02/12/2022 72     Atrial Rate 02/12/2022 72     P-R Interval 02/12/2022 166     QRS Duration 02/12/2022 76     Q-T Interval 02/12/2022 404     QTc Calculation (Bazett) 02/12/2022 442     P Axis 02/12/2022 54     R Axis 02/12/2022 -12     T Axis 02/12/2022 29     Diagnosis 02/12/2022 EKG performed in ER and to be interpreted by ER physician. Confirmed by MD, ER ([id number]),  MILY MCCULLOUGH ([id number]) on 2/13/2022 7:08:12 AM     Sodium 02/12/2022 138     Potassium reflex Magnesi* 02/12/2022 3.5     Chloride 02/12/2022 100     CO2 02/12/2022 27     Anion Gap 02/12/2022 11     Glucose 02/12/2022 83     BUN 02/12/2022 7     Creatinine 02/12/2022 0.9     GFR Non- 02/12/2022 >60     GFR  02/12/2022 >60     Calcium 02/12/2022 9.3     WBC 02/12/2022 3.0 (A)    RBC 02/12/2022 4.42 Hemoglobin 02/12/2022 12.9     Hematocrit 02/12/2022 39.0     MCV 02/12/2022 88.2     MCH 02/12/2022 29.3     MCHC 02/12/2022 33.2     RDW 02/12/2022 13.7     Platelets 25/88/8105 204     MPV 02/12/2022 7.7     Neutrophils % 02/12/2022 23.0     Lymphocytes % 02/12/2022 62.0     Monocytes % 02/12/2022 12.0     Eosinophils % 02/12/2022 0.0     Basophils % 02/12/2022 0.0     Neutrophils Absolute 02/12/2022 0.7 (A)    Lymphocytes Absolute 02/12/2022 2.0     Monocytes Absolute 02/12/2022 0.4     Eosinophils Absolute 02/12/2022 0.0     Basophils Absolute 02/12/2022 0.0     Atypical Lymphocytes Rel* 02/12/2022 3     Anisocytosis 02/12/2022 Occasional (A)    Magnesium 02/12/2022 1.80    Admission on 02/10/2022, Discharged on 02/10/2022   Component Date Value    WBC 02/10/2022 3.9 (A)    RBC 02/10/2022 4.62     Hemoglobin 02/10/2022 13.5     Hematocrit 02/10/2022 41.0     MCV 02/10/2022 88.6     MCH 02/10/2022 29.2     MCHC 02/10/2022 32.9     RDW 02/10/2022 14.4     Platelets 25/04/2223 182     MPV 02/10/2022 7.7     Neutrophils % 02/10/2022 85.0     Lymphocytes % 02/10/2022 13.0     Monocytes % 02/10/2022 2.0     Eosinophils % 02/10/2022 0.0     Basophils % 02/10/2022 0.0     Neutrophils Absolute 02/10/2022 3.3     Lymphocytes Absolute 02/10/2022 0.5 (A)    Monocytes Absolute 02/10/2022 0.1     Eosinophils Absolute 02/10/2022 0.0     Basophils Absolute 02/10/2022 0.0     Sodium 02/10/2022 134 (A)    Potassium reflex Magnesi* 02/10/2022 4.0     Chloride 02/10/2022 102     CO2 02/10/2022 20 (A)    Anion Gap 02/10/2022 12     Glucose 02/10/2022 102 (A)    BUN 02/10/2022 12     Creatinine 02/10/2022 1.1     GFR Non- 02/10/2022 50 (A)    GFR  02/10/2022 >60     Calcium 02/10/2022 9.0     Total Protein 02/10/2022 7.2     Albumin 02/10/2022 3.9     Albumin/Globulin Ratio 02/10/2022 1.2     Total Bilirubin 02/10/2022 0.4     Alkaline Phosphatase 02/10/2022 66     ALT 02/10/2022 61 (A)    AST 02/10/2022 74 (A)    Lipase 02/10/2022 10.0 (A)    Color, UA 02/10/2022 Yellow     Clarity, UA 02/10/2022 Clear     Glucose, Ur 02/10/2022 Negative     Bilirubin Urine 02/10/2022 Negative     Ketones, Urine 02/10/2022 Negative     Specific Gravity, UA 02/10/2022 1.010     Blood, Urine 02/10/2022 MODERATE (A)    pH, UA 02/10/2022 6.5     Protein, UA 02/10/2022 Negative     Urobilinogen, Urine 02/10/2022 0.2     Nitrite, Urine 02/10/2022 Negative     Leukocyte Esterase, Urine 02/10/2022 Negative     Microscopic Examination 02/10/2022 YES     Urine Type 02/10/2022 NotGiven     WBC, UA 02/10/2022 0-2     RBC, UA 02/10/2022 0-2     Epithelial Cells, UA 02/10/2022 2-5     Bacteria, UA 02/10/2022 Rare (A)    Yeast, UA 02/10/2022 Present (A)    Rapid Influenza A Ag 02/10/2022 Negative     Rapid Influenza B Ag 02/10/2022 Negative     SARS-CoV-2, PCR 02/10/2022 Not Detected    Orders Only on 01/24/2022   Component Date Value    OCCULT BLOOD FECAL 01/24/2022 negative    Orders Only on 08/05/2021   Component Date Value    H Pylori Ag, Stool 08/05/2021 Negative    Orders Only on 07/22/2021   Component Date Value    Intrinsic Factor Blockin* 07/22/2021 Negative    Admission on 07/11/2021, Discharged on 07/11/2021   Component Date Value    Ventricular Rate 07/11/2021 73     Atrial Rate 07/11/2021 73     P-R Interval 07/11/2021 180     QRS Duration 07/11/2021 74     Q-T Interval 07/11/2021 400     QTc Calculation (Bazett) 07/11/2021 440     P Axis 07/11/2021 60     R Bledsoe 07/11/2021 -6     T Axis 07/11/2021 23     Diagnosis 07/11/2021 EKG performed in ER and to be interpreted by ER physician. Confirmed by MD, MAIRA ([id number]),  CAT RICHARD ([id number]) on 7/11/2021 8:17:45 PM     WBC 07/11/2021 4.3     RBC 07/11/2021 4.35     Hemoglobin 07/11/2021 12.9     Hematocrit 07/11/2021 38.9     MCV 07/11/2021 89.5     MCH 07/11/2021 29.6     MCHC 07/11/2021 33.1     RDW 07/11/2021 14.4     Platelets 49/85/5493 215     MPV 07/11/2021 8.1 Neutrophils % 07/11/2021 44.7     Lymphocytes % 07/11/2021 40.5     Monocytes % 07/11/2021 11.4     Eosinophils % 07/11/2021 2.3     Basophils % 07/11/2021 1.1     Neutrophils Absolute 07/11/2021 1.9     Lymphocytes Absolute 07/11/2021 1.8     Monocytes Absolute 07/11/2021 0.5     Eosinophils Absolute 07/11/2021 0.1     Basophils Absolute 07/11/2021 0.0     Sodium 07/11/2021 139     Potassium reflex Magnesi* 07/11/2021 3.8     Chloride 07/11/2021 102     CO2 07/11/2021 26     Anion Gap 07/11/2021 11     Glucose 07/11/2021 86     BUN 07/11/2021 10     Creatinine 07/11/2021 0.8     GFR Non- 07/11/2021 >60     GFR  07/11/2021 >60     Calcium 07/11/2021 9.3     Troponin 07/11/2021 <0.01     Lipase 07/11/2021 18.0     Total Protein 07/11/2021 6.9     Albumin 07/11/2021 4.0     Alkaline Phosphatase 07/11/2021 77     ALT 07/11/2021 9 (A)    AST 07/11/2021 14 (A)    Total Bilirubin 07/11/2021 0.5     Bilirubin, Direct 07/11/2021 <0.2     Bilirubin, Indirect 07/11/2021 see below    Admission on 05/25/2021, Discharged on 05/25/2021   Component Date Value    WBC 05/25/2021 5.8     RBC 05/25/2021 4.54     Hemoglobin 05/25/2021 13.7     Hematocrit 05/25/2021 40.2     MCV 05/25/2021 88.5     MCH 05/25/2021 30.1     MCHC 05/25/2021 34.1     RDW 05/25/2021 14.5     Platelets 09/49/6335 213     MPV 05/25/2021 8.0     Neutrophils % 05/25/2021 53.4     Lymphocytes % 05/25/2021 33.8     Monocytes % 05/25/2021 10.0     Eosinophils % 05/25/2021 2.1     Basophils % 05/25/2021 0.7     Neutrophils Absolute 05/25/2021 3.1     Lymphocytes Absolute 05/25/2021 1.9     Monocytes Absolute 05/25/2021 0.6     Eosinophils Absolute 05/25/2021 0.1     Basophils Absolute 05/25/2021 0.0     Sodium 05/25/2021 138     Potassium reflex Magnesi* 05/25/2021 4.6     Chloride 05/25/2021 102     CO2 05/25/2021 28     Anion Gap 05/25/2021 8     Glucose 05/25/2021 99     BUN 05/25/2021 19     Creatinine 05/25/2021 1.0     GFR 2025.  HIV and hepatitis C nonreactive, 2016.    8. Essential hypertension  At last visit patient agreed trial low-dose metoprolol at bedtime to simultaneously control her blood pressure and suppress palpitations. Home blood pressure readings in the 1 teens to 120s over 70s, with some slight improvement in her palpitations as well. 9. Palpitations  Strong somatic focus. Metoprolol helping. 10.  GERD. H. pylori +2014 -2021 by fecal stool antigen testing consistent with persistent eradication. No red flags to merit endoscopy. H2 RB in the morning, PPI in the evening no longer needed, avoid food triggers. As loses weight may be able to come off. Using as needed    11. Lateral toe tingling. More prominent at night likely when she is not distracted. Suspect age-related neuronal degeneration. Checking immunofixation B12 A1c. Bedtime gabapentin if desirous    Return in about 4 weeks (around 8/19/2022). It was a pleasure to visit with Ms. Ford Nehemias today. Answered all questions as best I could.   Jocelynn Gong MD   Time 35 minutes

## 2022-07-22 NOTE — TELEPHONE ENCOUNTER
Pt needs refills on   metoprolol succinate (TOPROL XL) 25 MG extended release tablet   simvastatin (ZOCOR) 20 MG tablet   vitamin D3 (CHOLECALCIFEROL) 125 MCG (5000 UT) TABS tablet   linaclotide (LINZESS) 145 MCG capsule   [facility]   Fax# [phone]

## 2022-07-23 LAB
ESTIMATED AVERAGE GLUCOSE: 114 MG/DL
HBA1C MFR BLD: 5.6 %

## 2022-07-25 LAB
ALBUMIN SERPL-MCNC: 3.1 G/DL (ref 3.1–4.9)
ALPHA-1-GLOBULIN: 0.3 G/DL (ref 0.2–0.4)
ALPHA-2-GLOBULIN: 0.9 G/DL (ref 0.4–1.1)
BETA GLOBULIN: 1.2 G/DL (ref 0.9–1.6)
GAMMA GLOBULIN: 1.2 G/DL (ref 0.6–1.8)
SPE/IFE INTERPRETATION: NORMAL
TOTAL PROTEIN: 6.7 G/DL (ref 6.4–8.2)

## 2022-07-28 LAB — METHYLMALONIC ACID: 0.13 UMOL/L (ref 0–0.4)

## 2022-08-15 ENCOUNTER — OFFICE VISIT (OUTPATIENT)
Dept: PRIMARY CARE CLINIC | Age: 67
End: 2022-08-15
Payer: MEDICARE

## 2022-08-15 VITALS
OXYGEN SATURATION: 98 % | BODY MASS INDEX: 35.35 KG/M2 | WEIGHT: 219 LBS | TEMPERATURE: 97 F | SYSTOLIC BLOOD PRESSURE: 143 MMHG | HEART RATE: 73 BPM | DIASTOLIC BLOOD PRESSURE: 85 MMHG

## 2022-08-15 DIAGNOSIS — M79.7 FIBROMYALGIA: ICD-10-CM

## 2022-08-15 DIAGNOSIS — E53.8 VITAMIN B12 DEFICIENCY: ICD-10-CM

## 2022-08-15 DIAGNOSIS — E78.2 MIXED HYPERLIPIDEMIA: ICD-10-CM

## 2022-08-15 DIAGNOSIS — K59.09 CHRONIC CONSTIPATION: ICD-10-CM

## 2022-08-15 DIAGNOSIS — E55.9 VITAMIN D DEFICIENCY: ICD-10-CM

## 2022-08-15 DIAGNOSIS — G47.33 OSA (OBSTRUCTIVE SLEEP APNEA): ICD-10-CM

## 2022-08-15 DIAGNOSIS — I10 ESSENTIAL HYPERTENSION: ICD-10-CM

## 2022-08-15 PROBLEM — E74.89 OTHER SPECIFIED DISORDERS OF CARBOHYDRATE METABOLISM (HCC): Status: ACTIVE | Noted: 2022-08-15

## 2022-08-15 PROCEDURE — 3017F COLORECTAL CA SCREEN DOC REV: CPT | Performed by: INTERNAL MEDICINE

## 2022-08-15 PROCEDURE — 1123F ACP DISCUSS/DSCN MKR DOCD: CPT | Performed by: INTERNAL MEDICINE

## 2022-08-15 PROCEDURE — G8427 DOCREV CUR MEDS BY ELIG CLIN: HCPCS | Performed by: INTERNAL MEDICINE

## 2022-08-15 PROCEDURE — G8417 CALC BMI ABV UP PARAM F/U: HCPCS | Performed by: INTERNAL MEDICINE

## 2022-08-15 PROCEDURE — 99214 OFFICE O/P EST MOD 30 MIN: CPT | Performed by: INTERNAL MEDICINE

## 2022-08-15 PROCEDURE — 1090F PRES/ABSN URINE INCON ASSESS: CPT | Performed by: INTERNAL MEDICINE

## 2022-08-15 PROCEDURE — G8399 PT W/DXA RESULTS DOCUMENT: HCPCS | Performed by: INTERNAL MEDICINE

## 2022-08-15 PROCEDURE — 1036F TOBACCO NON-USER: CPT | Performed by: INTERNAL MEDICINE

## 2022-08-15 SDOH — ECONOMIC STABILITY: FOOD INSECURITY: WITHIN THE PAST 12 MONTHS, THE FOOD YOU BOUGHT JUST DIDN'T LAST AND YOU DIDN'T HAVE MONEY TO GET MORE.: NEVER TRUE

## 2022-08-15 SDOH — ECONOMIC STABILITY: FOOD INSECURITY: WITHIN THE PAST 12 MONTHS, YOU WORRIED THAT YOUR FOOD WOULD RUN OUT BEFORE YOU GOT MONEY TO BUY MORE.: NEVER TRUE

## 2022-08-15 ASSESSMENT — PATIENT HEALTH QUESTIONNAIRE - PHQ9
SUM OF ALL RESPONSES TO PHQ QUESTIONS 1-9: 0
1. LITTLE INTEREST OR PLEASURE IN DOING THINGS: 0
SUM OF ALL RESPONSES TO PHQ QUESTIONS 1-9: 0
2. FEELING DOWN, DEPRESSED OR HOPELESS: 0
SUM OF ALL RESPONSES TO PHQ QUESTIONS 1-9: 0
SUM OF ALL RESPONSES TO PHQ QUESTIONS 1-9: 0
SUM OF ALL RESPONSES TO PHQ9 QUESTIONS 1 & 2: 0

## 2022-08-15 ASSESSMENT — SOCIAL DETERMINANTS OF HEALTH (SDOH): HOW HARD IS IT FOR YOU TO PAY FOR THE VERY BASICS LIKE FOOD, HOUSING, MEDICAL CARE, AND HEATING?: NOT HARD AT ALL

## 2022-08-15 NOTE — PROGRESS NOTES
8/15/2022    Lyric Garnett (:  1955) is a 73 y.o. female, here for evaluation of the following medical concerns:    Chief Complaint   Patient presents with    Follow-up       HPI  71-year-old female with sleep apnea on CPAP, treated hyperlipidemia and hypertension, constipation predominant irritable bowel syndrome on Linzess, symptomatic lumbosacral spondylosis, apparent B12 deficiency historically on monthly B12 injections, seen 2022 with several concerns. She is not attaining her weight loss goals, left calf muscle strain when she started walking pretty severe a week or so ago ultrasound negative for DVT. Weight 207 to 220 this year, 220 currently. Last year patient saw Dr Dorenda Severe, for hypertension sleep apnea hyperlipidemia obesity interested in \"LCD\" (900 -calorie/day low calorie diet) though she is not doing this currently. Muscle pain is improving. She is interested in getting back into the pool, referral sent at last visit. She is also troubled by later in the day foot and ankle swelling without PND orthopnea STANTON, using knee-high compression hose    Her large internal hemorrhoids may be contributing to her constipation despite Linzess, in her gastroenterologist s view, and apparently she is scheduled to undergo hemorrhoidal banding, 1 at a time. She also notices some tingling particularly in her lateral toes at night without classic restless leg symptoms. No known history of diabetes, uses  arch support. b12 folate A1c SPEP reassuring. Review of Systems   Constitutional: Negative for activity change, appetite change, fatigue and unexpected weight change. HENT: Negative for dental problem, sinus pain, sore throat and trouble swallowing. Eyes: Negative for pain and visual disturbance. Respiratory: Negative for apnea, cough, chest tightness, shortness of breath and wheezing. Cardiovascular: Negative for chest pain positive.    Gastrointestinal: Negative for abdominal pain, blood in stool, constipation, diarrhea, nausea, rectal pain and vomiting. Endocrine: Negative for cold intolerance, heat intolerance, polydipsia, polyphagia and polyuria. Genitourinary: Negative for difficulty urinating, dysuria, flank pain, frequency, hematuria, pelvic pain, urgency, vaginal bleeding and vaginal discharge. Musculoskeletal: Negative for arthralgias, back pain, gait problem, joint swelling, myalgias, neck pain and neck stiffness. Skin: Negative for color change and rash. Neurological: Negative for dizziness, tremors, syncope, speech difficulty, weakness, light-headedness and headaches. Hematological: Negative for adenopathy. Does not bruise/bleed easily. Psychiatric/Behavioral: Negative for agitation, behavioral problems, decreased concentration, sleep disturbance and suicidal ideas. The patient is not nervous/anxious and is not hyperactive. Prior to Visit Medications    Medication Sig Taking? Authorizing Provider   vitamin D3 (CHOLECALCIFEROL) 125 MCG (5000 UT) TABS tablet Take 1 tablet by mouth in the morning.  Yes Fredrick Weber MD   simvastatin (ZOCOR) 20 MG tablet TAKE ONE TABLET BY MOUTH EVERY NIGHT AT BEDTIME Yes Fredrick Weber MD   metoprolol succinate (TOPROL XL) 25 MG extended release tablet TAKE ONE TABLET BY MOUTH ONCE NIGHTLY Yes Fredrick Weber MD   linaclotide [facility]) 145 MCG capsule Take 1 capsule by mouth every morning (before breakfast) Yes Fredrick Weber MD   Multiple Vitamins-Minerals (CENTRUM SILVER 50+WOMEN) TABS daily Yes Historical Provider, MD        Allergies   Allergen Reactions    Lisinopril Anaphylaxis and Itching     sweating    Flagyl [Metronidazole Hcl] Other (See Comments)     \"metallic taste\"    Metronidazole Nausea And Vomiting       Past Medical History:   Diagnosis Date    Allergic rhinitis     Chronic back pain     Fibroids     Fibromyalgia     Hyperlipidemia 06/25/2012    Hypertension     STEPHANIE (obstructive sleep apnea)     Wears CPAP    Sleep apnea        Past Surgical History:   Procedure Laterality Date    ACHILLES TENDON SURGERY  2007    left achilles repair by Dr. Armida Arambula cervical surgery-- sees Dr Torres Trejo @ [facility]      Dr Obdulia Villalobos and 2012    COLONOSCOPY N/A 2022    COLONOSCOPY performed by Denise Vo MD at [facility]      Dr Ribeiro Sara History     Socioeconomic History    Marital status:      Spouse name: Not on file    Number of children: 3    Years of education: Not on file    Highest education level: Not on file   Occupational History    Occupation: Patient registration     Comment: retired   Tobacco Use    Smoking status: Former     Packs/day: 1.00     Years: 3.00     Pack years: 3.00     Types: Cigarettes     Quit date: 1982     Years since quittin.2    Smokeless tobacco: Never   Vaping Use    Vaping Use: Never used   Substance and Sexual Activity    Alcohol use:  Yes     Alcohol/week: 0.0 standard drinks     Comment: occ    Drug use: No    Sexual activity: Never   Other Topics Concern    Not on file   Social History Narrative    Not on file     Social Determinants of Health     Financial Resource Strain: Low Risk     Difficulty of Paying Living Expenses: Not hard at all   Food Insecurity: No Food Insecurity    Worried About Running Out of Food in the Last Year: Never true    Ran Out of Food in the Last Year: Never true   Transportation Needs: Not on file   Physical Activity: Not on file   Stress: Not on file   Social Connections: Not on file   Intimate Partner Violence: Not on file   Housing Stability: Not on file        Family History   Problem Relation Age of Onset    Cancer Mother         colon    Heart Disease Mother         heart infection    High Blood Pressure Father     Kidney Disease Paternal Uncle     Cancer Potassium 07/22/2022 4.1     Chloride 07/22/2022 102     CO2 07/22/2022 28     Anion Gap 07/22/2022 10     Glucose 07/22/2022 77     BUN 07/22/2022 11     Creatinine 07/22/2022 0.9     GFR Non- 07/22/2022 >60     GFR  07/22/2022 >60     Calcium 07/22/2022 9.9     Vit D, 25-Hydroxy 07/22/2022 46.3     Homocysteine 07/22/2022 8     Methylmalonic Acid 07/22/2022 0.13     Total Protein 07/22/2022 6.7     Albumin 07/22/2022 3.1     Alpha-1-Globulin 07/22/2022 0.3     Alpha-2-Globulin 07/22/2022 0.9     Beta Globulin 07/22/2022 1.2     Gamma Globulin 07/22/2022 1.2     Vitamin B-12 07/22/2022 567     SPE/CORINA Interpretation 07/22/2022 REVIEWED    Admission on 02/12/2022, Discharged on 02/12/2022   Component Date Value    Ventricular Rate 02/12/2022 72     Atrial Rate 02/12/2022 72     P-R Interval 02/12/2022 166     QRS Duration 02/12/2022 76     Q-T Interval 02/12/2022 404     QTc Calculation (Bazett) 02/12/2022 442     P Axis 02/12/2022 54     R Axis 02/12/2022 -12     T Axis 02/12/2022 29     Diagnosis 02/12/2022 EKG performed in ER and to be interpreted by ER physician. Confirmed by MD, ER ([id number]),  Tavon Coleman [phone]) on 2/13/2022 7:08:12 AM     Sodium 02/12/2022 138     Potassium reflex Magnesi* 02/12/2022 3.5     Chloride 02/12/2022 100     CO2 02/12/2022 27     Anion Gap 02/12/2022 11     Glucose 02/12/2022 83     BUN 02/12/2022 7     Creatinine 02/12/2022 0.9     GFR Non- 02/12/2022 >60     GFR  02/12/2022 >60     Calcium 02/12/2022 9.3     WBC 02/12/2022 3.0 (A)    RBC 02/12/2022 4.42     Hemoglobin 02/12/2022 12.9     Hematocrit 02/12/2022 39.0     MCV 02/12/2022 88.2     MCH 02/12/2022 29.3     MCHC 02/12/2022 33.2     RDW 02/12/2022 13.7     Platelets 71/83/9050 204     MPV 02/12/2022 7.7     Neutrophils % 02/12/2022 23.0     Lymphocytes % 02/12/2022 62.0     Monocytes % 02/12/2022 12.0     Eosinophils % 02/12/2022 0.0     Basophils % Negative     Urobilinogen, Urine 02/10/2022 0.2     Nitrite, Urine 02/10/2022 Negative     Leukocyte Esterase, Urine 02/10/2022 Negative     Microscopic Examination 02/10/2022 YES     Urine Type 02/10/2022 NotGiven     WBC, UA 02/10/2022 0-2     RBC, UA 02/10/2022 0-2     Epithelial Cells, UA 02/10/2022 2-5     Bacteria, UA 02/10/2022 Rare (A)    Yeast, UA 02/10/2022 Present (A)    Rapid Influenza A Ag 02/10/2022 Negative     Rapid Influenza B Ag 02/10/2022 Negative     SARS-CoV-2, PCR 02/10/2022 Not Detected    Orders Only on 01/24/2022   Component Date Value    OCCULT BLOOD FECAL 01/24/2022 negative    Orders Only on 08/05/2021   Component Date Value    H Pylori Ag, Stool 08/05/2021 Negative    Orders Only on 07/22/2021   Component Date Value    Intrinsic Factor Blockin* 07/22/2021 Negative    Admission on 07/11/2021, Discharged on 07/11/2021   Component Date Value    Ventricular Rate 07/11/2021 73     Atrial Rate 07/11/2021 73     P-R Interval 07/11/2021 180     QRS Duration 07/11/2021 74     Q-T Interval 07/11/2021 400     QTc Calculation (Bazett) 07/11/2021 440     P Axis 07/11/2021 60     R [place] 07/11/2021 -6     T Axis 07/11/2021 23     Diagnosis 07/11/2021 EKG performed in ER and to be interpreted by ER physician. Confirmed by MD, ER ([id number]),  Hardy Nair ([id number]) on 7/11/2021 8:17:45 PM     WBC 07/11/2021 4.3     RBC 07/11/2021 4.35     Hemoglobin 07/11/2021 12.9     Hematocrit 07/11/2021 38.9     MCV 07/11/2021 89.5     MCH 07/11/2021 29.6     MCHC 07/11/2021 33.1     RDW 07/11/2021 14.4     Platelets 13/53/4241 215     MPV 07/11/2021 8.1     Neutrophils % 07/11/2021 44.7     Lymphocytes % 07/11/2021 40.5     Monocytes % 07/11/2021 11.4     Eosinophils % 07/11/2021 2.3     Basophils % 07/11/2021 1.1     Neutrophils Absolute 07/11/2021 1.9     Lymphocytes Absolute 07/11/2021 1.8     Monocytes Absolute 07/11/2021 0.5     Eosinophils Absolute 07/11/2021 0.1     Basophils Absolute 07/11/2021 0.0     Sodium 07/11/2021 139     Potassium reflex Magnesi* 07/11/2021 3.8     Chloride 07/11/2021 102     CO2 07/11/2021 26     Anion Gap 07/11/2021 11     Glucose 07/11/2021 86     BUN 07/11/2021 10     Creatinine 07/11/2021 0.8     GFR Non- 07/11/2021 >60     GFR  07/11/2021 >60     Calcium 07/11/2021 9.3     Troponin 07/11/2021 <0.01     Lipase 07/11/2021 18.0     Total Protein 07/11/2021 6.9     Albumin 07/11/2021 4.0     Alkaline Phosphatase 07/11/2021 77     ALT 07/11/2021 9 (A)    AST 07/11/2021 14 (A)    Total Bilirubin 07/11/2021 0.5     Bilirubin, Direct 07/11/2021 <0.2     Bilirubin, Indirect 07/11/2021 see below          ASSESSMENT/PLAN  1. STEPHANIE (obstructive sleep apnea) follows in sleep medicine with regular updates and confirmation of adequacy of settings    2. Vitamin D deficiency  Adequate supplementation. Reassuring DEXA 2020. Recheck vitamin D July 2023, DEXA scan 2025    3. Fibromyalgia  Patient appears to have quite a somatic focus. Lyrica Cymbalta represent treatment options should they prove sufficiently troubling, at this time would not appear to be. Regular exercise adequate sleep essential.    4. Vitamin B12 deficiency  Upper 200s off supplementation ,567 on supplementation July 2022, IF ab neg. reassuring MMA homocysteine. 5. Chronic idiopathic constipation  Doing well on Linzess low-dose. 6. Mixed hyperlipidemia reasonable LDL suppression with normal LFTs on 20 mg simvastatin. 7. Healthcare maintenance  Colonoscopy due summer 2027, colonoscopy June 2022 notable for large internal hemorrhoids but no polyps. Sees her gynecologist for Pap smears. Mammogram next January. Tdap 2017, due for Pneumovax 23 or 20, urged to get Shingrix vaccination. Not interested in Covid vaccination. DEXA 2020 9+0.6, worst hip -0.1. Recheck 2025. HIV and hepatitis C nonreactive, 2016.   Wishes to hold off on Pneumovax today, perhaps in view of upcoming COVID booster, only received the primary series to date. 8. Essential hypertension  Using low-dose metoprolol at bedtime to simultaneously control her blood pressure and suppress palpitations. Home blood pressure readings in the 1 teens to 120s over 70s, with some slight improvement in her palpitations as well. 9. Palpitations  Strong somatic focus. Metoprolol helping. 10.  GERD. H. pylori +2014 -2021 by fecal stool antigen testing consistent with persistent eradication. No red flags to merit endoscopy. No longer using acid suppressive therapy. 11.  Lateral toe tingling. More prominent at night likely when she is not distracted. Suspect age-related neuronal degeneration. Reassuring immunofixation B12 folate A1c. Bedtime gabapentin versus Lyrica if desirous    Return in about 3 months (around 11/15/2022). It was a pleasure to visit with Ms. Dean Post today. Answered all questions as best I could.   Melody Ferreira MD   Time 30 minutes

## 2022-08-15 NOTE — PATIENT INSTRUCTIONS
1/ If fibro muscle pain ever becomes unrelenting, rx options include lyrica or cymbalta.   2/ Sleep and exercise crucial to control fibromyalgia  3/ ref to PT for water aerobics [facility]  4/ Pneumovax 23 or 20 at f/u appt  5/ F/U appt Dr Tamika Garza in 3 months

## 2022-08-19 ENCOUNTER — HOSPITAL ENCOUNTER (OUTPATIENT)
Dept: PHYSICAL THERAPY | Age: 67
Setting detail: THERAPIES SERIES
Discharge: HOME | End: 2022-08-19
Payer: MEDICARE

## 2022-08-19 PROCEDURE — 97110 THERAPEUTIC EXERCISES: CPT

## 2022-08-19 PROCEDURE — 97161 PT EVAL LOW COMPLEX 20 MIN: CPT

## 2022-08-19 PROCEDURE — 97530 THERAPEUTIC ACTIVITIES: CPT

## 2022-08-19 NOTE — PLAN OF CARE
[address] [address], [address]  Phone: [phone]   Fax: [phone]                                                       Physical Therapy Certification    Dear Robin Benjamin MD  ,    We had the pleasure of evaluating the following patient for physical therapy services at [address] [place] [address]. A summary of our findings can be found in the initial assessment below. This includes our plan of care. If you have any questions or concerns regarding these findings, please do not hesitate to contact me at the office phone number checked above. Thank you for the referral.       Physician Signature:_______________________________Date:__________________  By signing above (or electronic signature), therapists plan is approved by physician      Patient: Jasbir Bay   : 1955   MRN: [id number]  Referring Physician: Robin Benjamin MD        Evaluation Date: 2022      Medical Diagnosis Information:  Diagnosis: Z68.35 - BMI 35.0-35.9,adult. M79.7 - Fibromyalgia   Treatment Diagnosis: pain, decreased strength and endurance, difficulty with walking                                         Insurance information: Medicare / Medical [place]      Preferred Language for Healthcare:   [x]English       []Other:    C-SSRS Triggered by Intake questionnaire (Past 2 wk assessment ):   [x] No, Questionnaire did not trigger screening.   [] Yes, Patient intake triggered C-SSRS Screening     [] Completed, no further action required. [] Completed, PCP notified via Epic    SUBJECTIVE: Pt with LE pain and tingling in extremities. Pt with tingling in hands and feet that cramp. Pt reports sh had diagnostic testing for neuropathy and various other things that all came back within normal limits.  Pt reports she feels weak and it is a struggle to go up and down steps in community and relative houses. Pt feels like her legs are heavy, but notes that she has lipidemia in RLE. 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 When working, pt was averaging 6-8K steps per day and since retiring if she doesn't deliberately take a walk she averages 2K steps per day. At night, pt has restless feet and has been woken up occasionally. Pt cannot tolerate physical touch to her extremities as it increases pain. ONSET: over last year and a half - since COVID    Fear avoidance: I should not do physical activities that (might) make my pain worse   [] True   [x] False     Current Level of Function: indep  Prior Level of Function: Prior to this injury / incident, pt was independent with ADLs and IADLs    Living Status: alone  Occupation/School: retired  Dec 7713    PAIN:  Pain Scale: 6/10 (with touch)  Easing factors: pain medication  Provocative factors: activities    Do you have any of the following? Numbness/tingling/change in sensation/strength: declining since Dec 2021  Changes in walking, balance, falls? No falls, but reports she is stumbling and tripping over feet more often  Dizziness/HAs? none  Face symptoms/difficulty with speech/swallowing/change in taste/smell? taste/smell changes related to COVID    Functional Outcome: FOTO: raw score = 64, risk adjusted score: 47 ,  taken at initial eval    Precautions/ Contra-indications: na  Latex Allergy:  [x]NO      []YES    Relevant Medical History:    [x] Patient history, allergies, meds reviewed. Medical chart reviewed. See intake form. Review Of Systems (ROS):  [x]Performed Review of systems (Integumentary, CardioPulmonary, Neurological) by intake and observation. Intake form has been scanned into medical record. Patient has been instructed to contact their primary care physician regarding ROS issues if not already being addressed at this time.       Co-morbidities/Complexities (which will affect course of rehabilitation):  []None        [x]Hx of COVID   Arthritic conditions   []Rheumatoid arthritis (M05.9)  []Osteoarthritis (M19.91)  []Gout   Cardiovascular conditions   [x]Hypertension (I10)  [x]Hyperlipidemia (E78.5)  []Angina pectoris (I20)  []Atherosclerosis (I70)  []Pacemaker  []Hx of CABG/stent/  cardiac surgeries   Musculoskeletal conditions   []Disc pathology   []Congenital spine pathologies   []Osteoporosis (M81.8)  []Osteopenia (M85.8)  []Scoliosis       Endocrine conditions   []Hypothyroid (E03.9)  []Hyperthyroid Gastrointestinal conditions   []Constipation (N03.63)   Metabolic conditions   []Morbid obesity (E66.01)  []Diabetes type 1(E10.65) or 2 (E11.65)   []Neuropathy (G60.9)     Cardio/Pulmonary conditions   []Asthma (J45)  []Coughing   []COPD (J44.9)  []CHF  []A-fib   Psychological Disorders  [x]Anxiety (F41.9)  []Depression (F32.9)   []Other:   Developmental Disorders  []Autism (F84.0)  []CP (G80)  []Down Syndrome (Q90.9)  []Developmental delay     Neurological conditions  []Prior Stroke (I69.30)  []Parkinson's (G20)  []Encephalopathy (G93.40)  []MS (G35)  []Post-polio (G14)  []SCI  []TBI  []ALS Other conditions  []Fibromyalgia (M79.7)  []Vertigo  []Syncope  []Kidney Failure  []Cancer      []currently undergoing                treatment  []Pregnancy  []Incontinence   Prior surgeries  []involved limb  []previous spinal surgery  [] section birth  []hysterectomy  []bowel / bladder surgery  []other relevant surgeries   []Other:                OBJECTIVE:       ROM  Comments   Lumbar Flex WFL    Lumbar Ext WFL      ROM LEFT RIGHT Comments   Lumbar SB      Ext and SB      Flex and SB      Thoracic/Lumbar Rotation      Hip IR      Segmental mobility           Posture:    Palpation:   Myofascial pain/tenderness: globally d/t fibromyalgia    ROM RIGHT AROM LEFT AROM Comments   LE         Hip Flexion      Hip Abd      Hip ER      Hip IR      Hip Extension      Knee Ext Knee Flex      DF      PF      Ankle INV      Ankle Ever            UE        Shoulder flex 150 150    Shoulder AB      Shoulder ER T3 T4 Painful on LUE   Shoulder IR T8 T6 Painful on LUE                 Palpation: global tenderness    Joint mobility:    []Normal    [x]Hypo   []Hyper    MMT testing RLE with more pain during testing compared to LLE    Strength / Myotomes RIGHT LEFT   Multifidus     Transverse Ab     LE     Hip Flexors (L1-2) - seated 3+ 4   Quads (L2-4) 4 4   Ankle Dorsiflexion (L4-5) 4 4   Great Toe Extension (L5)     Ankle Eversion (S1-2)     Ankle Plantarflexion (S1-2) 4+ 4+   Hip Abductors     Hip Extensors     Hip Internal Rotators     Hip External Rotators     Hamstrings      Ankle Inversion               Strength / Myotomes RIGHT LEFT   Cervical Flexion (C1-2)     Cervical SB (C3)     Shoulder Shrug (C4)     Shoulder Abduction (C5) 3+ 3+   Shoulder ER (C5) 3+ 3+   Biceps (C6) 4 4   Triceps (C7) 4 4   Wrist Extension (C6)     Wrist Flexion (C7)      (C8)     Thumb Abduction (C8)     Finger Abduction (T1)     Shoulder Flex     Shoulder Scap     Shoulder IR               **pain with all MMT     SUPINE: & PRONE:    T  Flexibility LEFT RIGHT Comments   Hip flexors      HS (90/90) Mod limit Mod limit    Glut max       piriformis      gastroc            Upper trapezius             Barriers to/and or personal factors that will affect rehab potential:              [x]Age  []Sex    []Smoker              []Motivation/Lack of Motivation                        [x]Co-Morbidities              []Cognitive Function, education/learning barriers              []Environmental, home barriers              []profession/work barriers  []past PT/medical experience  []other:  Justification:    Falls Risk Assessment (30 days):   [x] Falls Risk assessed and no intervention required.   [] Falls Risk assessed and Patient requires intervention due to being higher risk   TUG score (>12s at risk):     [] Falls education lifting, reaching, carrying tasks   [x]Reduced ability to squat   []Reduced ability to forward bend   [x]Reduced ability to ambulate prolonged functional periods/distances/surfaces   [x]Reduced ability to ascend/descend stairs   []Reduced ability to concentrate    []Reduced ability to tolerate any impact through UE or spine   []other:     Participation Restrictions   [x]Reduced participation in self care activities   [x]Reduced participation in home management activities   []Reduced participation in work activities   [x]Reduced participation in social activities. []Reduced participation in sport/recreational activities. Prognosis/Rehab Potential:      []Excellent   [x]Good    []Fair   []Poor    Tolerance of evaluation/treatment:    []Excellent   [x]Good    []Fair   []Poor     Physical Therapy Evaluation Complexity Justification  [x] A history of present problem with:  [] no personal factors and/or comorbidities that impact the plan of care;  [x]1-2 personal factors and/or comorbidities that impact the plan of care  []3 personal factors and/or comorbidities that impact the plan of care  [x] An examination of body systems using standardized tests and measures addressing any of the following: body structures and functions (impairments), activity limitations, and/or participation restrictions;:  [x] a total of 1-2 or more elements   [] a total of 3 or more elements   [] a total of 4 or more elements   [x] A clinical presentation with:  [x] stable and/or uncomplicated characteristics   [] evolving clinical presentation with changing characteristics  [] unstable and unpredictable characteristics;   [x] Clinical decision making of [x] low, [] moderate, [] high complexity using standardized patient assessment instrument and/or measurable assessment of functional outcome.     [x] EVAL (LOW) 85439 (typically 15 minutes face-to-face)  [] EVAL (MOD) 67121 (typically 30 minutes face-to-face)  [] EVAL (HIGH) 62078 (typically 45 minutes face-to-face)  [] RE-EVAL     HEP instruction: Written HEP instructions provided and reviewed    PLAN:  strength, ROM/flexibility, posture and body mechs, manual, MOC, HEP, pt education    Frequency/Duration:  2 days per week for  8 Weeks:  Interventions:  [x]  Therapeutic exercise including:strength, ROM, flexibility  [x]  NMR activation and proprioception including postural re-education  [x]  Manual therapy as indicated to include: IASTM, STM, PROM, Gr I-IV mobilizations, manipulation. [x]  Modalities as needed that may include: thermal agents, E-stim, Biofeedback, US, iontophoresis as indicated  [x]  Patient education on joint protection, postural re-education, activity modification, progression of HEP. Aquatic therapy    GOALS:  Patient stated goal: \" get better\"  [] Progressing: [] Met: [] Not Met: [] Adjusted    Therapist goals for Patient:   Short Term Goals: To be achieved in: 2 weeks  1. Independent in HEP and progression per patient tolerance, in order to prevent re-injury. [] Progressing: [] Met: [] Not Met: [] Adjusted  2. Patient will have a decrease in pain to facilitate improvement in movement, function, and ADLs as indicated by improvement with respect to Functional Deficits. [] Progressing: [] Met: [] Not Met: [] Adjusted    Long Term Goals: To be achieved in: 8 weeks  1. FOTO functional survey score of >/= 66  to assist with reaching prior level of function. [] Progressing: [] Met: [] Not Met: [] Adjusted  2. Patient will demonstrate increased UE/LE Strength to 5/5 with core activation to allow for proper functional mobility as indicated by patients Functional Deficits to allow pt to resume walking, stairs, and maintain independence without increase in symptoms. [] Progressing: [] Met: [] Not Met: [] Adjusted  3.  Patient will return to functional activities including ascending and descending stairs with at least one handrail to increase independence and decrease fall risk within the community without increased symptoms or restriction.    [] Progressing: [] Met: [] Not Met: [] Adjusted    Electronically signed by:  Brenda Harrell PT, DPT

## 2022-08-19 NOTE — FLOWSHEET NOTE
[facility] Physical Therapy  Phone: [phone]   Fax: [phone]    Physical Therapy Daily Treatment Note  Date:  2022    Patient Name:  Ari Mott    :  1955  MRN: [id number]  Medical/Treatment Diagnosis Information:  Diagnosis: Z68.35 (ICD-10-CM) - BMI 35.0-35.9,adult. M79.7 (ICD-10-CM) - Fibromyalgia  Treatment Diagnosis: pain, decreased strength and endurance, difficulty with walking          Insurance/Certification information:  Medicare  Physician Information:  Gorge Skelton MD    Plan of care signed (Y/N): []  Yes [x]  No     Date of Patient follow up with Physician:      Progress Report: []  Yes  [x]  No     Date Range for reporting period:  Beginnin22  Ending:     Progress report due (10 Rx/or 30 days whichever is less): visit #10 or  (date)     Recertification due (POC duration/ or 90 days whichever is less): visit #20 or 22 (date)     Visit # Insurance Allowable Auth required?  Date Range    [place] []  Yes  []  No 22-       Latex Allergy:  [x]NO      []YES  Preferred Language for Healthcare:   [x]English       []other:    Functional Scale:           Date assessed:  FOTO physical FS primary measure score = 64; risk adjusted = 47  22    Pain level:  7/10     SUBJECTIVE:  See eval    OBJECTIVE: See eval      RESTRICTIONS/PRECAUTIONS: fibromyalgia    Exercises/Interventions:     Therapeutic Exercises (27290) Resistance / level Sets/sec Reps Notes   bike       IB gastroc       Stair: HSS, HFS    HEP   Tband mid row/ext    HEP                                      Therapeutic Activities (32234)                                          Neuromuscular Re-ed (12478)       SLS, Tandem, Romberg                                           Manual Intervention (57875)                                                   AquaticTherapy Dates of Service:   Aquatic Visits Exercises/Activities:   Transfers:  [] Stairs   [] Ramp  [] Chair Lift   % Immersion:            Ambulation/ Warm up:   UE Exercises: Forward   x Shoulder Shrugs      Lateral   x Shoulder Circles      Retro   x Scapular Retraction      [place]    Push Downs      Heel/Toe Walking    Punching x      Rowing x      Elbow Flex/Ext x      Shldr Flex/Ext x      Shldr aBd/aDd x   LE Exercises:  Shldr Horiz aBd/aDd    HR/TR x Shldr IR/ER    Marches x Arm Circles x   Squats  x PNF Diagonals    Hamstring Curls xx Wall Push Ups    Hip Flexion (SLR) x     Hip aBduction (SLR) x     Hip Extension (SLR)       Hip aDduction (SLR)      Hip Circles x Functional:    Hip IR/Er  Step up forward x   Hip Hikes  Step up lateral  x     Step down  x      Lunges Forward      Lunges Retro      Lunges Lateral     Balance:        SLS        Tandem Stance x      NBOS eyes open x Seated:     NBOS eyes closed x Ankle pumps     Hand to Opposite Knee  Ankle Circles     Fwd Step ups to SLS  Knee Flex/Ext    Lateral Step ups to SLS  Hip aBd/aDd    Stop/Go Gait   Bicycle       Ankle DF/PF      Ankle Inv/Ev    Stretching:       Gastroc/Soleus      Hamstring  x Deep Water:    Knee Flex Stretch x Jog    Piriformis   Noodle Hang    Hip Flexor  Traction at [facility]      DKTC       ITB  Cool Down:    Quad  Fwd Walking x   Mid Back   Lat Walking x   UT  Retro Walking    Post Shoulder  Noodle float    Ladder Pull      Pec Stretch            Core: Other:    TrA set      Pelvic Tilts      Multifidi Walk outs c paddle      PNF Chop/Lifts                          Aquatic Abbreviation Key  B= Belt DB= Dumbells T= Theratube   H= Hydrotone N= Noodles W= Weights   P= Paddles S= Speedo equipment K= Kickboard      Pt. Education: Gave pt tour of pool, explained how to use lockers, what to wear, that it would be in group setting, and showed pt aquatic equipment.      Modalities:     Pt. Education:  -patient educated on diagnosis, prognosis and expectations for rehab  -all patient questions were answered    Home Exercise Program:  Access Code: [email or web address]  URL: [email or web address]  Date: 08/19/2022  Prepared by: Sarah Cutting    Exercises  Supine Lower Trunk Rotation - 1 x daily - 7 x weekly - 3 sets - 10 reps  Clamshell - 1 x daily - 7 x weekly - 3 sets - 10 reps  Seated Long Arc Quad - 1 x daily - 7 x weekly - 3 sets - 10 reps  Standing Shoulder Row with Anchored Resistance - 1 x daily - 7 x weekly - 3 sets - 10 reps  Shoulder Extension with Resistance - 1 x daily - 7 x weekly - 3 sets - 10 reps  Seated Hamstring Stretch - 1 x daily - 7 x weekly - 3 sets - 10 reps  Seated March - 1 x daily - 7 x weekly - 3 sets - 10 reps        Therapeutic Exercise and NMR EXR  [x] (30733) Provided verbal/tactile cueing for activities related to strengthening, flexibility, endurance, ROM for improvements in  [] LE / Lumbar: LE, proximal hip, and core control with self care, mobility, lifting, ambulation. [] UE / Cervical: cervical, postural, scapular, scapulothoracic and UE control with self care, reaching, carrying, lifting, house/yardwork, driving, computer work.  [] (18362) Provided verbal/tactile cueing for activities related to improving balance, coordination, kinesthetic sense, posture, motor skill, proprioception to assist with   [] LE / lumbar: LE, proximal hip, and core control in self care, mobility, lifting, ambulation and eccentric single leg control. [] UE / cervical: cervical, scapular, scapulothoracic and UE control with self care, reaching, carrying, lifting, house/yardwork, driving, computer work.   [] (13078) Therapist is in constant attendance of 2 or more patients providing skilled therapy interventions, but not providing any significant amount of measurable one-on-one time to either patient, for improvements in  [] LE / lumbar: LE, proximal hip, and core control in self care, mobility, lifting, ambulation and eccentric single leg control.    [] UE / cervical: cervical, scapular, scapulothoracic and UE control with self care, reaching, carrying, lifting, house/yardwork, driving, computer work. NMR and Therapeutic Activities:    [x] (11335 or 98906) Provided verbal/tactile cueing for activities related to improving balance, coordination, kinesthetic sense, posture, motor skill, proprioception and motor activation to allow for proper function of   [] LE: / Lumbar core, proximal hip and LE with self care and ADLs  [] UE / Cervical: cervical, postural, scapular, scapulothoracic and UE control with self care, carrying, lifting, driving, computer work.   [] (44624) Gait Re-education- Provided training and instruction to the patient for proper LE, core and proximal hip recruitment and positioning and eccentric body weight control with ambulation re-education including up and down stairs     Home Management Training / Self Care:  [] (75950) Provided self-care/home management training related to activities of daily living and compensatory training, and/or use of adaptive equipment for improvement with: ADLs and compensatory training, meal preparation, safety procedures and instruction in use of adaptive equipment, including bathing, grooming, dressing, personal hygiene, basic household cleaning and chores.      Home Exercise Program:    [x] (88842) Reviewed/Progressed HEP activities related to strengthening, flexibility, endurance, ROM of   [] LE / Lumbar: core, proximal hip and LE for functional self-care, mobility, lifting and ambulation/stair navigation   [] UE / Cervical: cervical, postural, scapular, scapulothoracic and UE control with self care, reaching, carrying, lifting, house/yardwork, driving, computer work  [] (23627)Reviewed/Progressed HEP activities related to improving balance, coordination, kinesthetic sense, posture, motor skill, proprioception of   [] LE: core, proximal hip and LE for self care, mobility, lifting, and ambulation/stair navigation    [] UE / Cervical: cervical, postural,  scapular, scapulothoracic and UE control with self care, reaching, carrying, lifting, house/yardwork, driving, computer work    Manual Treatments:  PROM / STM / Oscillations-Mobs:  G-I, II, III, IV (PA's, Inf., Post.)  [] (64894) Provided manual therapy to mobilize LE, proximal hip and/or LS spine soft tissue/joints for the purpose of modulating pain, promoting relaxation,  increasing ROM, reducing/eliminating soft tissue swelling/inflammation/restriction, improving soft tissue extensibility and allowing for proper ROM for normal function with   [] LE / lumbar: self care, mobility, lifting and ambulation. [] UE / Cervical: self care, reaching, carrying, lifting, house/yardwork, driving, computer work. Modalities:  [] (33282) Vasopneumatic compression: Utilized vasopneumatic compression to decrease edema / swelling for the purpose of improving mobility and quad tone / recruitment which will allow for increased overall function including but not limited to self-care, transfers, ambulation, and ascending / descending stairs.   [] (16732) Aquatic therapy with therapeutic exercise. Provided verbal and tactile cueing for activities related to strengthening, flexibility, endurance, ROM for improvements in  [] LE / lumbar: LE, proximal hip, and core control in self care, mobility, lifting, ambulation and eccentric single leg control. [] UE / cervical: cervical, scapular, scapulothoracic and UE control with self care, reaching, carrying, lifting, house/yardwork, driving, computer work.   [] (14951) Therapist is in constant attendance of 2 or more patients providing skilled therapy interventions, but not providing any significant amount of measurable one-on-one time to either patient, for improvements in  [] LE / lumbar: LE, proximal hip, and core control in self care, mobility, lifting, ambulation and eccentric single leg control.    [] UE / cervical: cervical, scapular, scapulothoracic and UE control with self care, reaching, carrying, lifting, house/yardwork, driving, computer work. Charges:  Timed Code Treatment Minutes: 10   Total Treatment Minutes: 45     [x] EVAL - LOW (37562)   [] EVAL - MOD (86191)  [] EVAL - HIGH (01679)  [] RE-EVAL (19188)  [x] CR(16283) x  1     [] Ionto  [] NMR (42480) x       [] Vaso  [] Manual (02959) x       [] Ultrasound  [x] TA x   1     [] Mech Traction (75705)  [] Aquatic Therapy x     [] ES (un) (96970):   [] Home Management Training x  [] ES(attended) (39268)   [] Dry Needling 1-2 muscles (53130):  [] Dry Needling 3+ muscles (734687)  [] Group:      [] Other:     GOALS:  Patient stated goal: \" get better\"  [] Progressing: [] Met: [] Not Met: [] Adjusted    Therapist goals for Patient:   Short Term Goals: To be achieved in: 2 weeks  1. Independent in HEP and progression per patient tolerance, in order to prevent re-injury. [] Progressing: [] Met: [] Not Met: [] Adjusted  2. Patient will have a decrease in pain to facilitate improvement in movement, function, and ADLs as indicated by improvement with respect to Functional Deficits. [] Progressing: [] Met: [] Not Met: [] Adjusted    Long Term Goals: To be achieved in: 8 weeks  1. FOTO functional survey score of >/= 66  to assist with reaching prior level of function. [] Progressing: [] Met: [] Not Met: [] Adjusted  2. Patient will demonstrate increased UE/LE Strength to 5/5 with core activation to allow for proper functional mobility as indicated by patients Functional Deficits to allow pt to resume walking, stairs, and maintain independence without increase in symptoms. [] Progressing: [] Met: [] Not Met: [] Adjusted  3. Patient will return to functional activities including ascending and descending stairs with at least one handrail to increase independence and decrease fall risk within the community without increased symptoms or restriction.    [] Progressing: [] Met: [] Not Met: [] Adjusted    Electronically signed by:  Tena Anthony, PT, DPT    Overall Progression Towards Functional goals/ Treatment Progress Update:  [] Patient is progressing as expected towards functional goals listed. [] Progression is slowed due to complexities/Impairments listed. [] Progression has been slowed due to co-morbidities. [x] Plan just implemented, too soon to assess goals progression <30days   [] Goals require adjustment due to lack of progress  [] Patient is not progressing as expected and requires additional follow up with physician  [] Other    Persisting Functional Limitations/Impairments:  [x]Sleeping [x]Sitting               [x]Standing [x]Transfers        [x]Walking []Kneeling               [x]Stairs [x]Squatting / bending   [x]ADLs [x]Reaching  [x]Lifting  [x]Housework  []Driving []Job related tasks  []Sports/Recreation []Other:        ASSESSMENT:  See eval  Treatment/Activity Tolerance:  [x] Patient able to complete tx [] Patient limited by fatigue  [x] Patient limited by pain  [] Patient limited by other medical complications  [] Other:     Prognosis: [x] Good [] Fair  [] Poor    Patient Requires Follow-up: [x] Yes  [] No    Plan for next treatment session:    PLAN: See eval. PT 2x / week for 8 weeks. 1 land, 1 aquatic per week  [] Continue per plan of care [] Alter current plan (see comments)  [x] Plan of care initiated [] Hold pending MD visit [] Discharge    Electronically signed by: Rajesh Aaron, PT, DPT    Note: If patient does not return for scheduled/ recommended follow up visits, this note will serve as a discharge from care along with most recent update on progress.

## 2022-09-09 ENCOUNTER — HOSPITAL ENCOUNTER (OUTPATIENT)
Dept: PHYSICAL THERAPY | Age: 67
Setting detail: THERAPIES SERIES
Discharge: HOME | End: 2022-09-09
Payer: MEDICARE

## 2022-09-09 PROCEDURE — 97113 AQUATIC THERAPY/EXERCISES: CPT

## 2022-09-09 PROCEDURE — 97150 GROUP THERAPEUTIC PROCEDURES: CPT

## 2022-09-09 NOTE — FLOWSHEET NOTE
[facility]  Phone: [phone]   Fax: [phone]    Physical Therapy Daily Treatment Note  Date:  2022    Patient Name:  Kristin Garduno    :  1955  MRN: [id number]  Medical/Treatment Diagnosis Information:  Diagnosis: Z68.35 (ICD-10-CM) - BMI 35.0-35.9,adult. M79.7 (ICD-10-CM) - Fibromyalgia  Treatment Diagnosis: pain, decreased strength and endurance, difficulty with walking          Insurance/Certification information:  Medicare  Physician Information:  Anda Kawasaki, MD    Plan of care signed (Y/N): []  Yes [x]  No     Date of Patient follow up with Physician:      Progress Report: []  Yes  [x]  No     Date Range for reporting period:  Beginnin22  Ending:     Progress report due (10 Rx/or 30 days whichever is less): visit #10 or  (date)     Recertification due (POC duration/ or 90 days whichever is less): visit #20 or 22 (date)     Visit # Insurance Allowable Auth required? Date Range    [place] []  Yes  []  No 22-       Latex Allergy:  [x]NO      []YES  Preferred Language for Healthcare:   [x]English       []other:    Functional Scale:           Date assessed:  TO physical FS primary measure score = 64; risk adjusted = 47  22    Pain level:  0/10     SUBJECTIVE:  Denies pain at current time, first time in pool today. Reports \"feeling it\" by end of session today.     OBJECTIVE: See eval      RESTRICTIONS/PRECAUTIONS: fibromyalgia    Exercises/Interventions:     Therapeutic Exercises (98137) Resistance / level Sets/sec Reps Notes   bike       IB gastroc       Stair: HSS, HFS    HEP   Tband mid row/ext    HEP                                      Therapeutic Activities (99287)                                          Neuromuscular Re-ed (40072)       SLS, Tandem, Romberg                                           Manual Intervention (44997) AquaticTherapy Dates of Service: 9/9  Aquatic Visits Exercises/Activities:   Transfers:  [] Stairs   [] Ramp  [] Chair Lift   % Immersion:            Ambulation/ Warm up:   UE Exercises: Forward  x1 Shoulder Shrugs      Lateral   x1 Shoulder Circles      Retro   x Scapular Retraction      [place]    Push Downs      Heel/Toe Walking    Punching x      Rowing x8      Elbow Flex/Ext x      Shldr Flex/Ext x8      Shldr aBd/aDd x8   LE Exercises:  Shldr Horiz aBd/aDd x8   HR/TR x8 Shldr IR/ER    Marches x8 Arm Circles x   Squats x PNF Diagonals    Hamstring Curls x8 Wall Push Ups    Hip Flexion (SLR) x8     Hip aBduction (SLR) x8     Hip Extension (SLR) x8      Hip aDduction (SLR)      Hip Circles x8 Functional:    Hip IR/Er  Step up forward x   Hip Hikes  Step up lateral  x     Step down  x      Lunges Forward      Lunges Retro      Lunges Lateral     Balance:        SLS        Tandem Stance x      NBOS eyes open x Seated:     NBOS eyes closed x Ankle pumps     Hand to Opposite Knee  Ankle Circles     Fwd Step ups to SLS  Knee Flex/Ext    Lateral Step ups to SLS  Hip aBd/aDd    Stop/Go Gait   Bicycle       Ankle DF/PF      Ankle Inv/Ev    Stretching:       Gastroc/Soleus      Hamstring  x Deep Water:    Knee Flex Stretch x Jog    Piriformis   Noodle Hang    Hip Flexor  Traction at [facility]       ITB  Cool Down:    Quad  Fwd Walking x   Mid Back   Lat Walking x   UT  Retro Walking    Post Shoulder  Noodle float    Ladder Pull      Pec Stretch            Core: Other:    TrA set      Pelvic Tilts      Multifidi Walk outs c paddle      PNF Chop/Lifts                          Aquatic Abbreviation Key  B= Belt DB= Dumbells T= Theratube   H= Hydrotone N= Noodles W= Weights   P= Paddles S= Speedo equipment K= Kickboard      Pt. Education: Gave pt tour of pool, explained how to use lockers, what to wear, that it would be in group setting, and showed pt aquatic equipment.      Modalities:     Pt. Education:  -patient educated on diagnosis, prognosis and expectations for rehab  -all patient questions were answered    Home Exercise Program:  Access Code: [email or web address]  URL: [email or web address]  Date: 08/19/2022  Prepared by: Kavitha Marrufo    Exercises  Supine Lower Trunk Rotation - 1 x daily - 7 x weekly - 3 sets - 10 reps  Clamshell - 1 x daily - 7 x weekly - 3 sets - 10 reps  Seated Long Arc Quad - 1 x daily - 7 x weekly - 3 sets - 10 reps  Standing Shoulder Row with Anchored Resistance - 1 x daily - 7 x weekly - 3 sets - 10 reps  Shoulder Extension with Resistance - 1 x daily - 7 x weekly - 3 sets - 10 reps  Seated Hamstring Stretch - 1 x daily - 7 x weekly - 3 sets - 10 reps  Seated March - 1 x daily - 7 x weekly - 3 sets - 10 reps        Therapeutic Exercise and NMR EXR  [x] (14613) Provided verbal/tactile cueing for activities related to strengthening, flexibility, endurance, ROM for improvements in  [] LE / Lumbar: LE, proximal hip, and core control with self care, mobility, lifting, ambulation. [] UE / Cervical: cervical, postural, scapular, scapulothoracic and UE control with self care, reaching, carrying, lifting, house/yardwork, driving, computer work.  [] (25067) Provided verbal/tactile cueing for activities related to improving balance, coordination, kinesthetic sense, posture, motor skill, proprioception to assist with   [] LE / lumbar: LE, proximal hip, and core control in self care, mobility, lifting, ambulation and eccentric single leg control.    [] UE / cervical: cervical, scapular, scapulothoracic and UE control with self care, reaching, carrying, lifting, house/yardwork, driving, computer work.   [] (61681) Therapist is in constant attendance of 2 or more patients providing skilled therapy interventions, but not providing any significant amount of measurable one-on-one time to either patient, for improvements in  [] LE / lumbar: LE, proximal hip, and core control in self care, mobility, lifting, ambulation and eccentric single leg control. [] UE / cervical: cervical, scapular, scapulothoracic and UE control with self care, reaching, carrying, lifting, house/yardwork, driving, computer work. NMR and Therapeutic Activities:    [x] (32413 or 54079) Provided verbal/tactile cueing for activities related to improving balance, coordination, kinesthetic sense, posture, motor skill, proprioception and motor activation to allow for proper function of   [] LE: / Lumbar core, proximal hip and LE with self care and ADLs  [] UE / Cervical: cervical, postural, scapular, scapulothoracic and UE control with self care, carrying, lifting, driving, computer work.   [] (88530) Gait Re-education- Provided training and instruction to the patient for proper LE, core and proximal hip recruitment and positioning and eccentric body weight control with ambulation re-education including up and down stairs     Home Management Training / Self Care:  [] (90448) Provided self-care/home management training related to activities of daily living and compensatory training, and/or use of adaptive equipment for improvement with: ADLs and compensatory training, meal preparation, safety procedures and instruction in use of adaptive equipment, including bathing, grooming, dressing, personal hygiene, basic household cleaning and chores.      Home Exercise Program:    [x] (86842) Reviewed/Progressed HEP activities related to strengthening, flexibility, endurance, ROM of   [] LE / Lumbar: core, proximal hip and LE for functional self-care, mobility, lifting and ambulation/stair navigation   [] UE / Cervical: cervical, postural, scapular, scapulothoracic and UE control with self care, reaching, carrying, lifting, house/yardwork, driving, computer work  [] (29736)Reviewed/Progressed HEP activities related to improving balance, coordination, kinesthetic sense, posture, motor skill, proprioception of   [] LE: core, proximal hip and LE for self care, mobility, lifting, and ambulation/stair navigation    [] UE / Cervical: cervical, postural,  scapular, scapulothoracic and UE control with self care, reaching, carrying, lifting, house/yardwork, driving, computer work    Manual Treatments:  PROM / STM / Oscillations-Mobs:  G-I, II, III, IV (PA's, Inf., Post.)  [] (17108) Provided manual therapy to mobilize LE, proximal hip and/or LS spine soft tissue/joints for the purpose of modulating pain, promoting relaxation,  increasing ROM, reducing/eliminating soft tissue swelling/inflammation/restriction, improving soft tissue extensibility and allowing for proper ROM for normal function with   [] LE / lumbar: self care, mobility, lifting and ambulation. [] UE / Cervical: self care, reaching, carrying, lifting, house/yardwork, driving, computer work. Modalities:  [] (86660) Vasopneumatic compression: Utilized vasopneumatic compression to decrease edema / swelling for the purpose of improving mobility and quad tone / recruitment which will allow for increased overall function including but not limited to self-care, transfers, ambulation, and ascending / descending stairs. Aquatic:  [x] (97012) Aquatic therapy with therapeutic exercise. Provided verbal and tactile cueing for activities related to strengthening, flexibility, endurance, ROM for improvements in  [x] LE / lumbar: LE, proximal hip, and core control in self care, mobility, lifting, ambulation and eccentric single leg control. [x] UE / cervical: cervical, scapular, scapulothoracic and UE control with self care, reaching, carrying, lifting, house/yardwork, driving, computer work.    [x] (37767) Therapist is in constant attendance of 2 or more patients providing skilled therapy interventions, but not providing any significant amount of measurable one-on-one time to either patient, for improvements in  [x] LE / lumbar: LE, proximal hip, and core control in self care, mobility, lifting, ambulation and eccentric single leg control. [x] UE / cervical: cervical, scapular, scapulothoracic and UE control with self care, reaching, carrying, lifting, house/yardwork, driving, computer work. Charges:  Timed Code Treatment Minutes: 14   Total Treatment Minutes: 27     [] EVAL - LOW (02929)   [] EVAL - MOD (22288)  [] EVAL - HIGH (06574)  [] RE-EVAL (50716)  [] JF(67268) x       [] Ionto  [] NMR (40684) x       [] Vaso  [] Manual (91433) x       [] Ultrasound  [] TA x        [] Mech Traction (08645)  [x] Aquatic Therapy x 1    [] ES (un) (38725):   [] Home Management Training x  [] ES(attended) (17038)   [] Dry Needling 1-2 muscles (03745):  [] Dry Needling 3+ muscles (784823)  [x] Group:      [] Other:     GOALS:  Patient stated goal: \" get better\"  [] Progressing: [] Met: [] Not Met: [] Adjusted    Therapist goals for Patient:   Short Term Goals: To be achieved in: 2 weeks  1. Independent in HEP and progression per patient tolerance, in order to prevent re-injury. [] Progressing: [] Met: [] Not Met: [] Adjusted  2. Patient will have a decrease in pain to facilitate improvement in movement, function, and ADLs as indicated by improvement with respect to Functional Deficits. [] Progressing: [] Met: [] Not Met: [] Adjusted    Long Term Goals: To be achieved in: 8 weeks  1. FOTO functional survey score of >/= 66  to assist with reaching prior level of function. [] Progressing: [] Met: [] Not Met: [] Adjusted  2. Patient will demonstrate increased UE/LE Strength to 5/5 with core activation to allow for proper functional mobility as indicated by patients Functional Deficits to allow pt to resume walking, stairs, and maintain independence without increase in symptoms. [] Progressing: [] Met: [] Not Met: [] Adjusted  3.  Patient will return to functional activities including ascending and descending stairs with at least one handrail to increase independence and decrease fall risk within the community without increased symptoms or restriction. [] Progressing: [] Met: [] Not Met: [] Adjusted    Electronically signed by:  Mera Fonseca, PT, DPT    Overall Progression Towards Functional goals/ Treatment Progress Update:  [] Patient is progressing as expected towards functional goals listed. [] Progression is slowed due to complexities/Impairments listed. [] Progression has been slowed due to co-morbidities. [x] Plan just implemented, too soon to assess goals progression <30days   [] Goals require adjustment due to lack of progress  [] Patient is not progressing as expected and requires additional follow up with physician  [] Other    Persisting Functional Limitations/Impairments:  [x]Sleeping [x]Sitting               [x]Standing [x]Transfers        [x]Walking []Kneeling               [x]Stairs [x]Squatting / bending   [x]ADLs [x]Reaching  [x]Lifting  [x]Housework  []Driving []Job related tasks  []Sports/Recreation []Other:        ASSESSMENT:  Education given to pt to monitor progress and reaction to therapy session today with modifications next visit if needed. Continue to monitor reaction and progress toward goals. Treatment/Activity Tolerance:  [x] Patient able to complete tx [] Patient limited by fatigue  [x] Patient limited by pain  [] Patient limited by other medical complications  [] Other:     Prognosis: [x] Good [] Fair  [] Poor    Patient Requires Follow-up: [x] Yes  [] No    Plan for next treatment session:    PLAN: See eval. PT 2x / week for 8 weeks. 1 land, 1 aquatic per week  [] Continue per plan of care [] Alter current plan (see comments)  [x] Plan of care initiated [] Hold pending MD visit [] Discharge    Electronically signed by: Win Davis, PTA [id number]    Note: If patient does not return for scheduled/ recommended follow up visits, this note will serve as a discharge from care along with most recent update on progress.

## 2022-09-13 ENCOUNTER — HOSPITAL ENCOUNTER (OUTPATIENT)
Dept: PHYSICAL THERAPY | Age: 67
Setting detail: THERAPIES SERIES
Discharge: HOME | End: 2022-09-13
Payer: MEDICARE

## 2022-09-13 PROCEDURE — 97150 GROUP THERAPEUTIC PROCEDURES: CPT

## 2022-09-13 PROCEDURE — 97113 AQUATIC THERAPY/EXERCISES: CPT

## 2022-09-13 NOTE — FLOWSHEET NOTE
[address] [facility]  Phone: [phone]   Fax: [phone]    Physical Therapy Daily Treatment Note  Date:  2022    Patient Name:  Yanira Wall    :  1955  MRN: [id number]  Medical/Treatment Diagnosis Information:  Diagnosis: Z68.35 (ICD-10-CM) - BMI 35.0-35.9,adult. M79.7 (ICD-10-CM) - Fibromyalgia  Treatment Diagnosis: pain, decreased strength and endurance, difficulty with walking          Insurance/Certification information:  Medicare  Physician Information:  Tobi Alegria MD    Plan of care signed (Y/N): []  Yes [x]  No     Date of Patient follow up with Physician:      Progress Report: []  Yes  [x]  No     Date Range for reporting period:  Beginnin22  Ending:     Progress report due (10 Rx/or 30 days whichever is less): visit #10 or 3/98/56 (date)     Recertification due (POC duration/ or 90 days whichever is less): visit #20 or 22 (date)     Visit # Insurance Allowable Auth required? Date Range   3/16 [place] []  Yes  []  No 22-       Latex Allergy:  [x]NO      []YES  Preferred Language for Healthcare:   [x]English       []other:    Functional Scale:           Date assessed:  TO physical FS primary measure score = 64; risk adjusted = 47  22    Pain level:  4/10     SUBJECTIVE:  Pt reports pin is a little higher today.      OBJECTIVE: See eval      RESTRICTIONS/PRECAUTIONS: fibromyalgia    Exercises/Interventions:     Therapeutic Exercises (72882) Resistance / level Sets/sec Reps Notes   bike       IB gastroc       Stair: HSS, HFS    HEP   Tband mid row/ext    HEP                                      Therapeutic Activities (90056)                                          Neuromuscular Re-ed (90595)       SLS, Tandem, Romberg                                           Manual Intervention (19703)                                                   AquaticTherapy Dates of Service: ,   Aquatic Visits Exercises/Activities:   Transfers:  [] Stairs   [] Ramp  [] Chair Lift   % Immersion:            Ambulation/ Warm up:   UE Exercises: Forward  x1 Shoulder Shrugs      Lateral   x1 Shoulder Circles      Retro   x Scapular Retraction      [place]    Push Downs      Heel/Toe Walking    Punching x      Rowing x8      Elbow Flex/Ext x      Shldr Flex/Ext x8      Shldr aBd/aDd x8   LE Exercises:  Shldr Horiz aBd/aDd x8   HR/TR x8 Shldr IR/ER    Marches x8 Arm Circles x   Squats x8 PNF Diagonals    Hamstring Curls x8 Wall Push Ups    Hip Flexion (SLR) X8 w/ N     Hip aBduction (SLR) X8 w/ N     Hip Extension (SLR) X8 w/ N      Hip aDduction (SLR)      Hip Circles X8 w/ N Functional:    Hip IR/Er  Step up forward x   Hip Hikes  Step up lateral  x     Step down  x      Lunges Forward      Lunges Retro      Lunges Lateral     Balance:        SLS        Tandem Stance x      NBOS eyes open x Seated:     NBOS eyes closed x Ankle pumps     Hand to Opposite Knee  Ankle Circles     Fwd Step ups to SLS  Knee Flex/Ext    Lateral Step ups to SLS  Hip aBd/aDd    Stop/Go Gait   Bicycle       Ankle DF/PF      Ankle Inv/Ev    Stretching:       Gastroc/Soleus      Hamstring  x Deep Water:    Knee Flex Stretch x Jog    Piriformis   Noodle Hang    Hip Flexor  Traction at Kashif Cons    SKTC      DKTC       ITB  Cool Down:    Quad  Fwd Walking X 1 lap   Mid Back   Lat Walking x   UT  Retro Walking    Post Shoulder  Noodle float    Ladder Pull      Pec Stretch            Core: Other:    TrA set  Seated stretching (self paced) x5'   Pelvic Tilts      Multifidi Walk outs c paddle      PNF Chop/Lifts                          Aquatic Abbreviation Key  B= Belt DB= Dumbells T= Theratube   H= Hydrotone N= Noodles W= Weights   P= Paddles S= Speedo equipment K= Kickboard      Pt. Education: Gave pt tour of pool, explained how to use lockers, what to wear, that it would be in group setting, and showed pt aquatic equipment.      Modalities:     Pt. lifting, ambulation and eccentric single leg control. [] UE / cervical: cervical, scapular, scapulothoracic and UE control with self care, reaching, carrying, lifting, house/yardwork, driving, computer work. NMR and Therapeutic Activities:    [x] (29120 or 10190) Provided verbal/tactile cueing for activities related to improving balance, coordination, kinesthetic sense, posture, motor skill, proprioception and motor activation to allow for proper function of   [] LE: / Lumbar core, proximal hip and LE with self care and ADLs  [] UE / Cervical: cervical, postural, scapular, scapulothoracic and UE control with self care, carrying, lifting, driving, computer work.   [] (37123) Gait Re-education- Provided training and instruction to the patient for proper LE, core and proximal hip recruitment and positioning and eccentric body weight control with ambulation re-education including up and down stairs     Home Management Training / Self Care:  [] (21456) Provided self-care/home management training related to activities of daily living and compensatory training, and/or use of adaptive equipment for improvement with: ADLs and compensatory training, meal preparation, safety procedures and instruction in use of adaptive equipment, including bathing, grooming, dressing, personal hygiene, basic household cleaning and chores.      Home Exercise Program:    [x] (20790) Reviewed/Progressed HEP activities related to strengthening, flexibility, endurance, ROM of   [] LE / Lumbar: core, proximal hip and LE for functional self-care, mobility, lifting and ambulation/stair navigation   [] UE / Cervical: cervical, postural, scapular, scapulothoracic and UE control with self care, reaching, carrying, lifting, house/yardwork, driving, computer work  [] (23854)Reviewed/Progressed HEP activities related to improving balance, coordination, kinesthetic sense, posture, motor skill, proprioception of   [] LE: core, proximal hip and LE for self care, mobility, lifting, and ambulation/stair navigation    [] UE / Cervical: cervical, postural,  scapular, scapulothoracic and UE control with self care, reaching, carrying, lifting, house/yardwork, driving, computer work    Manual Treatments:  PROM / STM / Oscillations-Mobs:  G-I, II, III, IV (PA's, Inf., Post.)  [] (45293) Provided manual therapy to mobilize LE, proximal hip and/or LS spine soft tissue/joints for the purpose of modulating pain, promoting relaxation,  increasing ROM, reducing/eliminating soft tissue swelling/inflammation/restriction, improving soft tissue extensibility and allowing for proper ROM for normal function with   [] LE / lumbar: self care, mobility, lifting and ambulation. [] UE / Cervical: self care, reaching, carrying, lifting, house/yardwork, driving, computer work. Modalities:  [] (40482) Vasopneumatic compression: Utilized vasopneumatic compression to decrease edema / swelling for the purpose of improving mobility and quad tone / recruitment which will allow for increased overall function including but not limited to self-care, transfers, ambulation, and ascending / descending stairs. Aquatic:  [x] (12496) Aquatic therapy with therapeutic exercise. Provided verbal and tactile cueing for activities related to strengthening, flexibility, endurance, ROM for improvements in  [x] LE / lumbar: LE, proximal hip, and core control in self care, mobility, lifting, ambulation and eccentric single leg control. [x] UE / cervical: cervical, scapular, scapulothoracic and UE control with self care, reaching, carrying, lifting, house/yardwork, driving, computer work.    [x] (95222) Therapist is in constant attendance of 2 or more patients providing skilled therapy interventions, but not providing any significant amount of measurable one-on-one time to either patient, for improvements in  [x] LE / lumbar: LE, proximal hip, and core control in self care, mobility, lifting, ambulation and eccentric single leg control. [x] UE / cervical: cervical, scapular, scapulothoracic and UE control with self care, reaching, carrying, lifting, house/yardwork, driving, computer work. Charges:  Timed Code Treatment Minutes: 15   Total Treatment Minutes: 30     [] EVAL - LOW (26767)   [] EVAL - MOD (45275)  [] EVAL - HIGH (21129)  [] RE-EVAL (23857)  [] HM(65361) x       [] Ionto  [] NMR (10356) x       [] Vaso  [] Manual (10405) x       [] Ultrasound  [] TA x        [] Mech Traction (80943)  [x] Aquatic Therapy x 1    [] ES (un) (10983):   [] Home Management Training x  [] ES(attended) (22935)   [] Dry Needling 1-2 muscles (82486):  [] Dry Needling 3+ muscles (245243)  [x] Group:      [] Other:     GOALS:  Patient stated goal: \" get better\"  [] Progressing: [] Met: [] Not Met: [] Adjusted    Therapist goals for Patient:   Short Term Goals: To be achieved in: 2 weeks  1. Independent in HEP and progression per patient tolerance, in order to prevent re-injury. [] Progressing: [] Met: [] Not Met: [] Adjusted  2. Patient will have a decrease in pain to facilitate improvement in movement, function, and ADLs as indicated by improvement with respect to Functional Deficits. [] Progressing: [] Met: [] Not Met: [] Adjusted    Long Term Goals: To be achieved in: 8 weeks  1. FOTO functional survey score of >/= 66  to assist with reaching prior level of function. [] Progressing: [] Met: [] Not Met: [] Adjusted  2. Patient will demonstrate increased UE/LE Strength to 5/5 with core activation to allow for proper functional mobility as indicated by patients Functional Deficits to allow pt to resume walking, stairs, and maintain independence without increase in symptoms. [] Progressing: [] Met: [] Not Met: [] Adjusted  3.  Patient will return to functional activities including ascending and descending stairs with at least one handrail to increase independence and decrease fall risk within the community without increased symptoms or restriction. [] Progressing: [] Met: [] Not Met: [] Adjusted    Electronically signed by:  Davie Hoyt PT, DPT    Overall Progression Towards Functional goals/ Treatment Progress Update:  [] Patient is progressing as expected towards functional goals listed. [] Progression is slowed due to complexities/Impairments listed. [] Progression has been slowed due to co-morbidities. [x] Plan just implemented, too soon to assess goals progression <30days   [] Goals require adjustment due to lack of progress  [] Patient is not progressing as expected and requires additional follow up with physician  [] Other    Persisting Functional Limitations/Impairments:  [x]Sleeping [x]Sitting               [x]Standing [x]Transfers        [x]Walking []Kneeling               [x]Stairs [x]Squatting / bending   [x]ADLs [x]Reaching  [x]Lifting  [x]Housework  []Driving []Job related tasks  []Sports/Recreation []Other:        ASSESSMENT:  Pt with soreness after last session, pt educated that soreness is likely after exercises but will need exercise for strengthening. Will slowly progress allowing time for acclimation. Consider increased reps to 10 NV. Treatment/Activity Tolerance:  [x] Patient able to complete tx [] Patient limited by fatigue  [x] Patient limited by pain  [] Patient limited by other medical complications  [] Other:     Prognosis: [x] Good [] Fair  [] Poor    Patient Requires Follow-up: [x] Yes  [] No    Plan for next treatment session:    PLAN: See eval. PT 2x / week for 8 weeks. 1 land, 1 aquatic per week  [] Continue per plan of care [] Alter current plan (see comments)  [x] Plan of care initiated [] Hold pending MD visit [] Discharge    Electronically signed by: Weston Kayser, PT, DPT    Note: If patient does not return for scheduled/ recommended follow up visits, this note will serve as a discharge from care along with most recent update on progress.

## 2022-09-16 ENCOUNTER — HOSPITAL ENCOUNTER (OUTPATIENT)
Dept: PHYSICAL THERAPY | Age: 67
Setting detail: THERAPIES SERIES
Discharge: HOME | End: 2022-09-16
Payer: MEDICARE

## 2022-09-16 PROCEDURE — 97113 AQUATIC THERAPY/EXERCISES: CPT

## 2022-09-16 PROCEDURE — 97150 GROUP THERAPEUTIC PROCEDURES: CPT

## 2022-09-16 NOTE — FLOWSHEET NOTE
[address] [facility] Physical Therapy  Phone: [phone]   Fax: [phone]    Physical Therapy Daily Treatment Note  Date:  2022    Patient Name:  Lebron Fernandez    :  1955  MRN: [id number]  Medical/Treatment Diagnosis Information:  Diagnosis: Z68.35 (ICD-10-CM) - BMI 35.0-35.9,adult. M79.7 (ICD-10-CM) - Fibromyalgia  Treatment Diagnosis: pain, decreased strength and endurance, difficulty with walking          Insurance/Certification information:  Medicare  Physician Information:  Nuria Mccann MD    Plan of care signed (Y/N): []  Yes [x]  No     Date of Patient follow up with Physician:      Progress Report: []  Yes  [x]  No     Date Range for reporting period:  Beginnin22  Ending:     Progress report due (10 Rx/or 30 days whichever is less): visit #10 or 70 (date)     Recertification due (POC duration/ or 90 days whichever is less): visit #20 or 22 (date)     Visit # Insurance Allowable Auth required? Date Range    [place] []  Yes  []  No 22-       Latex Allergy:  [x]NO      []YES  Preferred Language for Healthcare:   [x]English       []other:    Functional Scale:           Date assessed:  TO physical FS primary measure score = 64; risk adjusted = 47  22    Pain level:  1/10     SUBJECTIVE:  Pt reports her pain level is very low today.   OBJECTIVE: See eval      RESTRICTIONS/PRECAUTIONS: fibromyalgia    Exercises/Interventions:     Therapeutic Exercises (14540) Resistance / level Sets/sec Reps Notes   bike       IB gastroc       Stair: HSS, HFS    HEP   Tband mid row/ext    HEP                                      Therapeutic Activities (98900)                                          Neuromuscular Re-ed (30984)       SLS, Tandem, Romberg                                           Manual Intervention (27302)                                                   AquaticTherapy Dates of Service: , ,   Aquatic Visits Exercises/Activities:   Transfers:  [] Stairs   [] Ramp  [] Chair Lift   % Immersion:            Ambulation/ Warm up:   UE Exercises: Forward  x1 Shoulder Shrugs      Lateral   x1 Shoulder Circles      Retro   x Scapular Retraction      [place]    Push Downs      Heel/Toe Walking    Punching x      Rowing x10      Elbow Flex/Ext x      Shldr Flex/Ext x10      Shldr aBd/aDd x10   LE Exercises:  Shldr Horiz aBd/aDd x10   HR/TR x10 Shldr IR/ER    Marches x10 Arm Circles x   Squats x10 PNF Diagonals    Hamstring Curls x10 Wall Push Ups    Hip Flexion (SLR) X10w/ N     Hip aBduction (SLR) X10w/ N     Hip Extension (SLR) X10w/ N      Hip aDduction (SLR)      Hip Circles X10w/ N Functional:    Hip IR/Er  Step up forward X10 L/R small step   Hip Hikes  Step up lateral  x     Step down  x      Lunges Forward      Lunges Retro      Lunges Lateral     Balance:        SLS        Tandem Stance X10\" X2 L/R      NBOS eyes open x Seated:     NBOS eyes closed X10\" X3 Ankle pumps     Hand to Opposite Knee  Ankle Circles     Fwd Step ups to SLS  Knee Flex/Ext    Lateral Step ups to SLS  Hip aBd/aDd    Stop/Go Gait   Bicycle       Ankle DF/PF      Ankle Inv/Ev    Stretching:       Gastroc/Soleus      Hamstring  x Deep Water:    Knee Flex Stretch x Jog    Piriformis   Noodle Hang    Hip Flexor  [facility]       ITB  Cool Down:    Quad  Fwd Walking X 1 lap   Mid Back   Lat Walking x   UT  Retro Walking    Post Shoulder  Noodle float    Ladder Pull      Pec Stretch            Core: Other:    TrA set  Seated stretching (self paced) x5'   Pelvic Tilts      Multifidi Walk outs c paddle      PNF Chop/Lifts                          Aquatic Abbreviation Key  B= Belt DB= Dumbells T= Theratube   H= Hydrotone N= Noodles W= Weights   P= Paddles S= Speedo equipment K= Kickboard      Pt.  Education: Gave pt tour of pool, explained how to use lockers, what to wear, that it would be in group setting, and showed pt aquatic equipment. Modalities:     Pt. Education:  -patient educated on diagnosis, prognosis and expectations for rehab  -all patient questions were answered    Home Exercise Program:  Access Code: [email or web address]  URL: [email or web address]  Date: 08/19/2022  Prepared by: Melchor Linares    Exercises  Supine Lower Trunk Rotation - 1 x daily - 7 x weekly - 3 sets - 10 reps  Clamshell - 1 x daily - 7 x weekly - 3 sets - 10 reps  Seated Long Arc Quad - 1 x daily - 7 x weekly - 3 sets - 10 reps  Standing Shoulder Row with Anchored Resistance - 1 x daily - 7 x weekly - 3 sets - 10 reps  Shoulder Extension with Resistance - 1 x daily - 7 x weekly - 3 sets - 10 reps  Seated Hamstring Stretch - 1 x daily - 7 x weekly - 3 sets - 10 reps  Seated March - 1 x daily - 7 x weekly - 3 sets - 10 reps        Therapeutic Exercise and NMR EXR  [x] (88736) Provided verbal/tactile cueing for activities related to strengthening, flexibility, endurance, ROM for improvements in  [] LE / Lumbar: LE, proximal hip, and core control with self care, mobility, lifting, ambulation. [] UE / Cervical: cervical, postural, scapular, scapulothoracic and UE control with self care, reaching, carrying, lifting, house/yardwork, driving, computer work.  [] (70126) Provided verbal/tactile cueing for activities related to improving balance, coordination, kinesthetic sense, posture, motor skill, proprioception to assist with   [] LE / lumbar: LE, proximal hip, and core control in self care, mobility, lifting, ambulation and eccentric single leg control.    [] UE / cervical: cervical, scapular, scapulothoracic and UE control with self care, reaching, carrying, lifting, house/yardwork, driving, computer work.   [] (69835) Therapist is in constant attendance of 2 or more patients providing skilled therapy interventions, but not providing any significant amount of measurable one-on-one time to either patient, for improvements in  [] LE / lumbar: LE, proximal hip, and core control in self care, mobility, lifting, ambulation and eccentric single leg control. [] UE / cervical: cervical, scapular, scapulothoracic and UE control with self care, reaching, carrying, lifting, house/yardwork, driving, computer work. NMR and Therapeutic Activities:    [x] (83254 or 10242) Provided verbal/tactile cueing for activities related to improving balance, coordination, kinesthetic sense, posture, motor skill, proprioception and motor activation to allow for proper function of   [] LE: / Lumbar core, proximal hip and LE with self care and ADLs  [] UE / Cervical: cervical, postural, scapular, scapulothoracic and UE control with self care, carrying, lifting, driving, computer work.   [] (97878) Gait Re-education- Provided training and instruction to the patient for proper LE, core and proximal hip recruitment and positioning and eccentric body weight control with ambulation re-education including up and down stairs     Home Management Training / Self Care:  [] (88207) Provided self-care/home management training related to activities of daily living and compensatory training, and/or use of adaptive equipment for improvement with: ADLs and compensatory training, meal preparation, safety procedures and instruction in use of adaptive equipment, including bathing, grooming, dressing, personal hygiene, basic household cleaning and chores.      Home Exercise Program:    [x] (68224) Reviewed/Progressed HEP activities related to strengthening, flexibility, endurance, ROM of   [] LE / Lumbar: core, proximal hip and LE for functional self-care, mobility, lifting and ambulation/stair navigation   [] UE / Cervical: cervical, postural, scapular, scapulothoracic and UE control with self care, reaching, carrying, lifting, house/yardwork, driving, computer work  [] (91502)Reviewed/Progressed HEP activities related to improving balance, coordination, kinesthetic sense, posture, motor skill, proprioception of   [] LE: core, proximal hip and LE for self care, mobility, lifting, and ambulation/stair navigation    [] UE / Cervical: cervical, postural,  scapular, scapulothoracic and UE control with self care, reaching, carrying, lifting, house/yardwork, driving, computer work    Manual Treatments:  PROM / STM / Oscillations-Mobs:  G-I, II, III, IV (PA's, Inf., Post.)  [] (37775) Provided manual therapy to mobilize LE, proximal hip and/or LS spine soft tissue/joints for the purpose of modulating pain, promoting relaxation,  increasing ROM, reducing/eliminating soft tissue swelling/inflammation/restriction, improving soft tissue extensibility and allowing for proper ROM for normal function with   [] LE / lumbar: self care, mobility, lifting and ambulation. [] UE / Cervical: self care, reaching, carrying, lifting, house/yardwork, driving, computer work. Modalities:  [] (45517) Vasopneumatic compression: Utilized vasopneumatic compression to decrease edema / swelling for the purpose of improving mobility and quad tone / recruitment which will allow for increased overall function including but not limited to self-care, transfers, ambulation, and ascending / descending stairs. Aquatic:  [x] (98691) Aquatic therapy with therapeutic exercise. Provided verbal and tactile cueing for activities related to strengthening, flexibility, endurance, ROM for improvements in  [x] LE / lumbar: LE, proximal hip, and core control in self care, mobility, lifting, ambulation and eccentric single leg control. [x] UE / cervical: cervical, scapular, scapulothoracic and UE control with self care, reaching, carrying, lifting, house/yardwork, driving, computer work.    [x] (32036) Therapist is in constant attendance of 2 or more patients providing skilled therapy interventions, but not providing any significant amount of measurable one-on-one time to either patient, for improvements in  [x] LE / lumbar: LE, proximal hip, and core control in self care, mobility, lifting, ambulation and eccentric single leg control. [x] UE / cervical: cervical, scapular, scapulothoracic and UE control with self care, reaching, carrying, lifting, house/yardwork, driving, computer work. Charges:  Timed Code Treatment Minutes: 14   Total Treatment Minutes: 35     [] EVAL - LOW (17880)   [] EVAL - MOD (84497)  [] EVAL - HIGH (08614)  [] RE-EVAL (56581)  [] MD(93211) x       [] Ionto  [] NMR (15911) x       [] Vaso  [] Manual (51541) x       [] Ultrasound  [] TA x        [] Mech Traction (70517)  [x] Aquatic Therapy x 1    [] ES (un) (50301):   [] Home Management Training x  [] ES(attended) (98231)   [] Dry Needling 1-2 muscles (92920):  [] Dry Needling 3+ muscles (118171)  [x] Group:      [] Other:     GOALS:  Patient stated goal: \" get better\"  [] Progressing: [] Met: [] Not Met: [] Adjusted    Therapist goals for Patient:   Short Term Goals: To be achieved in: 2 weeks  1. Independent in HEP and progression per patient tolerance, in order to prevent re-injury. [] Progressing: [] Met: [] Not Met: [] Adjusted  2. Patient will have a decrease in pain to facilitate improvement in movement, function, and ADLs as indicated by improvement with respect to Functional Deficits. [] Progressing: [] Met: [] Not Met: [] Adjusted    Long Term Goals: To be achieved in: 8 weeks  1. FOTO functional survey score of >/= 66  to assist with reaching prior level of function. [] Progressing: [] Met: [] Not Met: [] Adjusted  2. Patient will demonstrate increased UE/LE Strength to 5/5 with core activation to allow for proper functional mobility as indicated by patients Functional Deficits to allow pt to resume walking, stairs, and maintain independence without increase in symptoms. [] Progressing: [] Met: [] Not Met: [] Adjusted  3.  Patient will return to functional activities including ascending and descending stairs with at least one handrail to increase independence and decrease fall risk within the community without increased symptoms or restriction. [] Progressing: [] Met: [] Not Met: [] Adjusted    Electronically signed by:  Arianna Foster, PT, DPT    Overall Progression Towards Functional goals/ Treatment Progress Update:  [] Patient is progressing as expected towards functional goals listed. [] Progression is slowed due to complexities/Impairments listed. [] Progression has been slowed due to co-morbidities. [x] Plan just implemented, too soon to assess goals progression <30days   [] Goals require adjustment due to lack of progress  [] Patient is not progressing as expected and requires additional follow up with physician  [] Other    Persisting Functional Limitations/Impairments:  [x]Sleeping [x]Sitting               [x]Standing [x]Transfers        [x]Walking []Kneeling               [x]Stairs [x]Squatting / bending   [x]ADLs [x]Reaching  [x]Lifting  [x]Housework  []Driving []Job related tasks  []Sports/Recreation []Other:        ASSESSMENT:  Pt able to tolerate  increased reps to 10 without  soreness as well as adding in low level balance activities. Monitor response to additional reps on next session. Will slowly progress allowing time for acclimation. Treatment/Activity Tolerance:  [x] Patient able to complete tx [] Patient limited by fatigue  [x] Patient limited by pain  [] Patient limited by other medical complications  [] Other:     Prognosis: [x] Good [] Fair  [] Poor    Patient Requires Follow-up: [x] Yes  [] No    Plan for next treatment session:    PLAN: See eval. PT 2x / week for 8 weeks. 1 land, 1 aquatic per week  [] Continue per plan of care [] Alter current plan (see comments)  [x] Plan of care initiated [] Hold pending MD visit [] Discharge    Electronically signed by: Kylah Bauer, PT, DPT    Note: If patient does not return for scheduled/ recommended follow up visits, this note will serve as a discharge from care along with most recent update on progress.

## 2022-09-20 ENCOUNTER — HOSPITAL ENCOUNTER (OUTPATIENT)
Dept: PHYSICAL THERAPY | Age: 67
Setting detail: THERAPIES SERIES
Discharge: HOME | End: 2022-09-20
Payer: MEDICARE

## 2022-09-20 PROCEDURE — 97150 GROUP THERAPEUTIC PROCEDURES: CPT

## 2022-09-20 PROCEDURE — 97113 AQUATIC THERAPY/EXERCISES: CPT

## 2022-09-20 NOTE — FLOWSHEET NOTE
[address] [facility] Physical Therapy  Phone: [phone]   Fax: [phone]    Physical Therapy Daily Treatment Note  Date:  2022    Patient Name:  Donavon Michel    :  1955  MRN: [id number]  Medical/Treatment Diagnosis Information:  Diagnosis: Z68.35 (ICD-10-CM) - BMI 35.0-35.9,adult. M79.7 (ICD-10-CM) - Fibromyalgia  Treatment Diagnosis: pain, decreased strength and endurance, difficulty with walking          Insurance/Certification information:  Medicare  Physician Information:  Kalyani Kim MD    Plan of care signed (Y/N): []  Yes [x]  No     Date of Patient follow up with Physician:      Progress Report: []  Yes  [x]  No     Date Range for reporting period:  Beginnin22  Ending:     Progress report due (10 Rx/or 30 days whichever is less): visit #10 or  (date)     Recertification due (POC duration/ or 90 days whichever is less): visit #20 or 22 (date)     Visit # Insurance Allowable Auth required? Date Range    [place] []  Yes  []  No 22-       Latex Allergy:  [x]NO      []YES  Preferred Language for Healthcare:   [x]English       []other:    Functional Scale:           Date assessed:  TO physical FS primary measure score = 64; risk adjusted = 47  22    Pain level:  0/10     SUBJECTIVE:  Pt reports she is doing well today. States she has no pain.      OBJECTIVE: See eval      RESTRICTIONS/PRECAUTIONS: fibromyalgia    Exercises/Interventions:     Therapeutic Exercises (08227) Resistance / level Sets/sec Reps Notes   bike       IB gastroc       Stair: HSS, HFS    HEP   Tband mid row/ext    HEP                                      Therapeutic Activities (84964)                                          Neuromuscular Re-ed (76824)       SLS, Tandem, Romberg                                           Manual Intervention (50410)                                                   AquaticTherapy Dates of Service: , , , 9/20  Aquatic Visits Exercises/Activities:   Transfers:  [x] Stairs   [] Ramp  [] Chair Lift   % Immersion:            Ambulation/ Warm up:   UE Exercises: Forward  X 2 laps Shoulder Shrugs      Lateral   X 2 laps Shoulder Circles      Retro   X 2 laps Scapular Retraction      [place]    Push Downs      Heel/Toe Walking    Punching x      Rowing x12      Elbow Flex/Ext x      Shldr Flex/Ext x12      Shldr aBd/aDd x12   LE Exercises:  Rowdy Reading aBd/aDd x12   HR/TR x12 Shldr IR/ER    Marches X12 B Arm Circles x   Squats x12 PNF Diagonals    Hamstring Curls X12 B  Wall Push Ups    Hip Flexion (SLR) X12 B     Hip aBduction (SLR) X12 B     Hip Extension (SLR) X12 B      Hip aDduction (SLR)      Hip Circles X12 CW and CCW B Functional:    Hip IR/Er  Step up forward X12 L/R small step   Hip Hikes  Step up lateral  X 12 B 4 in step      Step down  x      Lunges Forward      Lunges Retro      Lunges Lateral     Balance:        SLS        Tandem Stance X20\" X2 L/R      NBOS eyes open x Seated:     NBOS eyes closed X20\" X2 Ankle pumps     Hand to Opposite Knee  Ankle Circles     Fwd Step ups to SLS  Knee Flex/Ext    Lateral Step ups to SLS  Hip aBd/aDd    Stop/Go Gait   Bicycle       Ankle DF/PF      Ankle Inv/Ev    Stretching:       Gastroc/Soleus      Hamstring  x Deep Water:    Knee Flex Stretch x Jog    Piriformis   Noodle Hang    Hip Flexor  Traction at [facility]    SK      DKTC       ITB  Cool Down:    Quad  Fwd Walking X 1 lap   Mid Back   Lat Walking x   UT  Retro Walking    Post Shoulder  Noodle float    Ladder Pull      Pec Stretch            Core: Other:    TrA set  Seated stretching (self paced) Pelvic Tilts      Multifidi Walk outs c paddle      PNF Chop/Lifts                          Aquatic Abbreviation Key  B= Belt DB= Dumbells T= Theratube   H= Hydrotone N= Noodles W= Weights   P= Paddles S= Speedo equipment K= Kickboard      Pt.  Education: Gave pt tour of pool, explained how to use lockers, what to wear, that it would be in group setting, and showed pt aquatic equipment. Modalities:     Pt. Education:  -patient educated on diagnosis, prognosis and expectations for rehab  -all patient questions were answered    Home Exercise Program:  Access Code: [email or web address]  URL: [email or web address]  Date: 08/19/2022  Prepared by: Arnold Roots    Exercises  Supine Lower Trunk Rotation - 1 x daily - 7 x weekly - 3 sets - 10 reps  Clamshell - 1 x daily - 7 x weekly - 3 sets - 10 reps  Seated Long Arc Quad - 1 x daily - 7 x weekly - 3 sets - 10 reps  Standing Shoulder Row with Anchored Resistance - 1 x daily - 7 x weekly - 3 sets - 10 reps  Shoulder Extension with Resistance - 1 x daily - 7 x weekly - 3 sets - 10 reps  Seated Hamstring Stretch - 1 x daily - 7 x weekly - 3 sets - 10 reps  Seated March - 1 x daily - 7 x weekly - 3 sets - 10 reps        Therapeutic Exercise and NMR EXR  [x] (90228) Provided verbal/tactile cueing for activities related to strengthening, flexibility, endurance, ROM for improvements in  [] LE / Lumbar: LE, proximal hip, and core control with self care, mobility, lifting, ambulation. [] UE / Cervical: cervical, postural, scapular, scapulothoracic and UE control with self care, reaching, carrying, lifting, house/yardwork, driving, computer work.  [] (70901) Provided verbal/tactile cueing for activities related to improving balance, coordination, kinesthetic sense, posture, motor skill, proprioception to assist with   [] LE / lumbar: LE, proximal hip, and core control in self care, mobility, lifting, ambulation and eccentric single leg control.    [] UE / cervical: cervical, scapular, scapulothoracic and UE control with self care, reaching, carrying, lifting, house/yardwork, driving, computer work.   [] (76468) Therapist is in constant attendance of 2 or more patients providing skilled therapy interventions, but not providing any significant amount of measurable one-on-one time to either patient, for improvements in  [] LE / lumbar: LE, proximal hip, and core control in self care, mobility, lifting, ambulation and eccentric single leg control. [] UE / cervical: cervical, scapular, scapulothoracic and UE control with self care, reaching, carrying, lifting, house/yardwork, driving, computer work. NMR and Therapeutic Activities:    [] (31255 or 76887) Provided verbal/tactile cueing for activities related to improving balance, coordination, kinesthetic sense, posture, motor skill, proprioception and motor activation to allow for proper function of   [] LE: / Lumbar core, proximal hip and LE with self care and ADLs  [] UE / Cervical: cervical, postural, scapular, scapulothoracic and UE control with self care, carrying, lifting, driving, computer work.   [] (83200) Gait Re-education- Provided training and instruction to the patient for proper LE, core and proximal hip recruitment and positioning and eccentric body weight control with ambulation re-education including up and down stairs     Home Management Training / Self Care:  [] (75747) Provided self-care/home management training related to activities of daily living and compensatory training, and/or use of adaptive equipment for improvement with: ADLs and compensatory training, meal preparation, safety procedures and instruction in use of adaptive equipment, including bathing, grooming, dressing, personal hygiene, basic household cleaning and chores.      Home Exercise Program:    [] (22276) Reviewed/Progressed HEP activities related to strengthening, flexibility, endurance, ROM of   [] LE / Lumbar: core, proximal hip and LE for functional self-care, mobility, lifting and ambulation/stair navigation   [] UE / Cervical: cervical, postural, scapular, scapulothoracic and UE control with self care, reaching, carrying, lifting, house/yardwork, driving, computer work  [] (89380)Reviewed/Progressed HEP activities related to improving balance, coordination, kinesthetic sense, posture, motor skill, proprioception of   [] LE: core, proximal hip and LE for self care, mobility, lifting, and ambulation/stair navigation    [] UE / Cervical: cervical, postural,  scapular, scapulothoracic and UE control with self care, reaching, carrying, lifting, house/yardwork, driving, computer work    Manual Treatments:  PROM / STM / Oscillations-Mobs:  G-I, II, III, IV (PA's, Inf., Post.)  [] (13238) Provided manual therapy to mobilize LE, proximal hip and/or LS spine soft tissue/joints for the purpose of modulating pain, promoting relaxation,  increasing ROM, reducing/eliminating soft tissue swelling/inflammation/restriction, improving soft tissue extensibility and allowing for proper ROM for normal function with   [] LE / lumbar: self care, mobility, lifting and ambulation. [] UE / Cervical: self care, reaching, carrying, lifting, house/yardwork, driving, computer work. Modalities:  [] (33589) Vasopneumatic compression: Utilized vasopneumatic compression to decrease edema / swelling for the purpose of improving mobility and quad tone / recruitment which will allow for increased overall function including but not limited to self-care, transfers, ambulation, and ascending / descending stairs. Aquatic:  [x] (79504) Aquatic therapy with therapeutic exercise. Provided verbal and tactile cueing for activities related to strengthening, flexibility, endurance, ROM for improvements in  [x] LE / lumbar: LE, proximal hip, and core control in self care, mobility, lifting, ambulation and eccentric single leg control. [x] UE / cervical: cervical, scapular, scapulothoracic and UE control with self care, reaching, carrying, lifting, house/yardwork, driving, computer work.    [x] (73331) Therapist is in constant attendance of 2 or more patients providing skilled therapy interventions, but not providing any significant amount of measurable one-on-one time to either patient, for improvements in  [x] LE / lumbar: LE, proximal hip, and core control in self care, mobility, lifting, ambulation and eccentric single leg control. [x] UE / cervical: cervical, scapular, scapulothoracic and UE control with self care, reaching, carrying, lifting, house/yardwork, driving, computer work. Charges:  Timed Code Treatment Minutes: 10   Total Treatment Minutes: 37     [] EVAL - LOW (55832)   [] EVAL - MOD (22790)  [] EVAL - HIGH (17494)  [] RE-EVAL (11084)  [] GM(08231) x       [] Ionto  [] NMR (49676) x       [] Vaso  [] Manual (74935) x       [] Ultrasound  [] TA x        [] Mech Traction (78039)  [x] Aquatic Therapy x 1    [] ES (un) (23733):   [] Home Management Training x  [] ES(attended) (34437)   [] Dry Needling 1-2 muscles (13856):  [] Dry Needling 3+ muscles (665188)  [x] Group:      [] Other:     GOALS:  Patient stated goal: \" get better\"  [] Progressing: [] Met: [] Not Met: [] Adjusted    Therapist goals for Patient:   Short Term Goals: To be achieved in: 2 weeks  1. Independent in HEP and progression per patient tolerance, in order to prevent re-injury. [] Progressing: [] Met: [] Not Met: [] Adjusted  2. Patient will have a decrease in pain to facilitate improvement in movement, function, and ADLs as indicated by improvement with respect to Functional Deficits. [] Progressing: [] Met: [] Not Met: [] Adjusted    Long Term Goals: To be achieved in: 8 weeks  1. FOTO functional survey score of >/= 66  to assist with reaching prior level of function. [] Progressing: [] Met: [] Not Met: [] Adjusted  2. Patient will demonstrate increased UE/LE Strength to 5/5 with core activation to allow for proper functional mobility as indicated by patients Functional Deficits to allow pt to resume walking, stairs, and maintain independence without increase in symptoms. [] Progressing: [] Met: [] Not Met: [] Adjusted  3.  Patient will return to functional activities including ascending and descending follow up visits, this note will serve as a discharge from care along with most recent update on progress.

## 2022-09-23 ENCOUNTER — HOSPITAL ENCOUNTER (OUTPATIENT)
Dept: PHYSICAL THERAPY | Age: 67
Setting detail: THERAPIES SERIES
Discharge: HOME | End: 2022-09-23
Payer: MEDICARE

## 2022-09-23 PROCEDURE — 97113 AQUATIC THERAPY/EXERCISES: CPT

## 2022-09-23 PROCEDURE — 97150 GROUP THERAPEUTIC PROCEDURES: CPT

## 2022-09-23 NOTE — FLOWSHEET NOTE
[facility]  Phone: [phone]   Fax: [phone]    Physical Therapy Daily Treatment Note  Date:  2022    Patient Name:  Elyssa Courser    :  1955  MRN: [id number]  Medical/Treatment Diagnosis Information:  Diagnosis: Z68.35 (ICD-10-CM) - BMI 35.0-35.9,adult. M79.7 (ICD-10-CM) - Fibromyalgia  Treatment Diagnosis: pain, decreased strength and endurance, difficulty with walking          Insurance/Certification information:  Medicare  Physician Information:  Javier Pratt MD    Plan of care signed (Y/N): []  Yes [x]  No     Date of Patient follow up with Physician:      Progress Report: []  Yes  [x]  No     Date Range for reporting period:  Beginnin22  Ending:     Progress report due (10 Rx/or 30 days whichever is less): visit #10 or  (date)     Recertification due (POC duration/ or 90 days whichever is less): visit #20 or 22 (date)     Visit # Insurance Allowable Auth required? Date Range    [place] []  Yes  []  No 22-       Latex Allergy:  [x]NO      []YES  Preferred Language for Healthcare:   [x]English       []other:    Functional Scale:           Date assessed:  [facility] physical FS primary measure score = 64; risk adjusted = 47  22    Pain level:  0/10     SUBJECTIVE:  Pt reports she is doing well today. States she has no pain.      OBJECTIVE: See eval      RESTRICTIONS/PRECAUTIONS: fibromyalgia    Exercises/Interventions:     Therapeutic Exercises (10218) Resistance / level Sets/sec Reps Notes   bike       IB gastroc       Stair: HSS, HFS    HEP   Tband mid row/ext    HEP                                      Therapeutic Activities (55579)                                          Neuromuscular Re-ed (51574)       SLS, Tandem, Romberg                                           Manual Intervention (74990)                                                   AquaticTherapy Dates of Service: , , , 9/20, 9/23  Aquatic Visits Exercises/Activities:   Transfers:  [x] Stairs   [] Ramp  [] Chair Lift   % Immersion:            Ambulation/ Warm up:   UE Exercises: Forward  X 2 laps Shoulder Shrugs      Lateral   X 2 laps Shoulder Circles  Reverse circles only X12    Retro   X 2 laps Scapular Retraction      [place]    Push Downs      Heel/Toe Walking    Punching x      Rowing x12      Elbow Flex/Ext x      Shldr Flex/Ext x12      Shldr aBd/aDd x12   LE Exercises:  Ilana Olp aBd/aDd x12   HR/TR x12 Shldr IR/ER    Marches X12 B Arm Circles x   Squats x12 PNF Diagonals    Hamstring Curls X12 B  Wall Push Ups    Hip Flexion (SLR) X12 B     Hip aBduction (SLR) X12 B     Hip Extension (SLR) X12 B      Hip aDduction (SLR)      Hip Circles X12 CW and CCW B Functional:    Hip IR/Er  Step up forward X12 L/R small step   Hip Hikes  Step up lateral  X 12 B 4 in step      Step down  x      Lunges Forward      Lunges Retro      Lunges Lateral     Balance:        SLS        Tandem Stance X20\" X2 L/R      NBOS eyes open x Seated:     NBOS eyes closed X20\" X2 Ankle pumps     Hand to Opposite Knee  Ankle Circles     Fwd Step ups to SLS  Knee Flex/Ext    Lateral Step ups to SLS  Hip aBd/aDd    Stop/Go Gait   Bicycle       Ankle DF/PF      Ankle Inv/Ev    Stretching:       Gastroc/Soleus X15\" X2 L/R     Hamstring  X15\" X2 L/R Deep Water:    Knee Flex Stretch  Jog    Piriformis   Noodle Hang    Hip Flexor  Traction at [address]    SKTC      DKTC       ITB  Cool Down:    Quad  Fwd Walking X 1 lap   Mid Back   Lat Walking x   UT  Retro Walking    Post Shoulder  Noodle float    Ladder Pull      Pec Stretch            Core: Other:    TrA set  Seated stretching (self paced) Pelvic Tilts      Multifidi Walk outs c paddle      PNF Chop/Lifts                          Aquatic Abbreviation Key  B= Belt DB= Dumbells T= Theratube   H= Hydrotone N= Noodles W= Weights   P= Paddles S= Speedo equipment K= Kickboard      Pt.  Education: Gave pt tour of pool, explained how to use lockers, what to wear, that it would be in group setting, and showed pt aquatic equipment. Modalities:     Pt. Education:  -patient educated on diagnosis, prognosis and expectations for rehab  -all patient questions were answered    Home Exercise Program:  Access Code: [email or web address]  URL: [email or web address]  Date: 08/19/2022  Prepared by: Halima Neff    Exercises  Supine Lower Trunk Rotation - 1 x daily - 7 x weekly - 3 sets - 10 reps  Clamshell - 1 x daily - 7 x weekly - 3 sets - 10 reps  Seated Long Arc Quad - 1 x daily - 7 x weekly - 3 sets - 10 reps  Standing Shoulder Row with Anchored Resistance - 1 x daily - 7 x weekly - 3 sets - 10 reps  Shoulder Extension with Resistance - 1 x daily - 7 x weekly - 3 sets - 10 reps  Seated Hamstring Stretch - 1 x daily - 7 x weekly - 3 sets - 10 reps  Seated March - 1 x daily - 7 x weekly - 3 sets - 10 reps        Therapeutic Exercise and NMR EXR  [x] (43107) Provided verbal/tactile cueing for activities related to strengthening, flexibility, endurance, ROM for improvements in  [] LE / Lumbar: LE, proximal hip, and core control with self care, mobility, lifting, ambulation. [] UE / Cervical: cervical, postural, scapular, scapulothoracic and UE control with self care, reaching, carrying, lifting, house/yardwork, driving, computer work.  [] (86673) Provided verbal/tactile cueing for activities related to improving balance, coordination, kinesthetic sense, posture, motor skill, proprioception to assist with   [] LE / lumbar: LE, proximal hip, and core control in self care, mobility, lifting, ambulation and eccentric single leg control.    [] UE / cervical: cervical, scapular, scapulothoracic and UE control with self care, reaching, carrying, lifting, house/yardwork, driving, computer work.   [] (44991) Therapist is in constant attendance of 2 or more patients providing skilled therapy interventions, but not providing any significant amount of measurable one-on-one time to either patient, for improvements in  [] LE / lumbar: LE, proximal hip, and core control in self care, mobility, lifting, ambulation and eccentric single leg control. [] UE / cervical: cervical, scapular, scapulothoracic and UE control with self care, reaching, carrying, lifting, house/yardwork, driving, computer work. NMR and Therapeutic Activities:    [] (18023 or 76488) Provided verbal/tactile cueing for activities related to improving balance, coordination, kinesthetic sense, posture, motor skill, proprioception and motor activation to allow for proper function of   [] LE: / Lumbar core, proximal hip and LE with self care and ADLs  [] UE / Cervical: cervical, postural, scapular, scapulothoracic and UE control with self care, carrying, lifting, driving, computer work.   [] (59930) Gait Re-education- Provided training and instruction to the patient for proper LE, core and proximal hip recruitment and positioning and eccentric body weight control with ambulation re-education including up and down stairs     Home Management Training / Self Care:  [] (28652) Provided self-care/home management training related to activities of daily living and compensatory training, and/or use of adaptive equipment for improvement with: ADLs and compensatory training, meal preparation, safety procedures and instruction in use of adaptive equipment, including bathing, grooming, dressing, personal hygiene, basic household cleaning and chores.      Home Exercise Program:    [] (53051) Reviewed/Progressed HEP activities related to strengthening, flexibility, endurance, ROM of   [] LE / Lumbar: core, proximal hip and LE for functional self-care, mobility, lifting and ambulation/stair navigation   [] UE / Cervical: cervical, postural, scapular, scapulothoracic and UE control with self care, reaching, carrying, lifting, house/yardwork, driving, computer work  [] (31990)Reviewed/Progressed HEP activities related to improving balance, coordination, kinesthetic sense, posture, motor skill, proprioception of   [] LE: core, proximal hip and LE for self care, mobility, lifting, and ambulation/stair navigation    [] UE / Cervical: cervical, postural,  scapular, scapulothoracic and UE control with self care, reaching, carrying, lifting, house/yardwork, driving, computer work    Manual Treatments:  PROM / STM / Oscillations-Mobs:  G-I, II, III, IV (PA's, Inf., Post.)  [] (39682) Provided manual therapy to mobilize LE, proximal hip and/or LS spine soft tissue/joints for the purpose of modulating pain, promoting relaxation,  increasing ROM, reducing/eliminating soft tissue swelling/inflammation/restriction, improving soft tissue extensibility and allowing for proper ROM for normal function with   [] LE / lumbar: self care, mobility, lifting and ambulation. [] UE / Cervical: self care, reaching, carrying, lifting, house/yardwork, driving, computer work. Modalities:  [] (81010) Vasopneumatic compression: Utilized vasopneumatic compression to decrease edema / swelling for the purpose of improving mobility and quad tone / recruitment which will allow for increased overall function including but not limited to self-care, transfers, ambulation, and ascending / descending stairs. Aquatic:  [x] (56610) Aquatic therapy with therapeutic exercise. Provided verbal and tactile cueing for activities related to strengthening, flexibility, endurance, ROM for improvements in  [x] LE / lumbar: LE, proximal hip, and core control in self care, mobility, lifting, ambulation and eccentric single leg control. [x] UE / cervical: cervical, scapular, scapulothoracic and UE control with self care, reaching, carrying, lifting, house/yardwork, driving, computer work.    [x] (55944) Therapist is in constant attendance of 2 or more patients providing skilled therapy interventions, but not providing any significant amount of measurable one-on-one time to either patient, for improvements in  [x] LE / lumbar: LE, proximal hip, and core control in self care, mobility, lifting, ambulation and eccentric single leg control. [x] UE / cervical: cervical, scapular, scapulothoracic and UE control with self care, reaching, carrying, lifting, house/yardwork, driving, computer work. Charges:  Timed Code Treatment Minutes: 15   Total Treatment Minutes: 40     [] EVAL - LOW (45293)   [] EVAL - MOD (84899)  [] EVAL - HIGH (60047)  [] RE-EVAL (52097)  [] LX(78785) x       [] Ionto  [] NMR (28721) x       [] Vaso  [] Manual (77814) x       [] Ultrasound  [] TA x        [] Mech Traction (46826)  [x] Aquatic Therapy x 1    [] ES (un) (92594):   [] Home Management Training x  [] ES(attended) (90861)   [] Dry Needling 1-2 muscles (29124):  [] Dry Needling 3+ muscles (982921)  [x] Group:      [] Other:     GOALS:  Patient stated goal: \" get better\"  [] Progressing: [] Met: [] Not Met: [] Adjusted    Therapist goals for Patient:   Short Term Goals: To be achieved in: 2 weeks  1. Independent in HEP and progression per patient tolerance, in order to prevent re-injury. [] Progressing: [] Met: [] Not Met: [] Adjusted  2. Patient will have a decrease in pain to facilitate improvement in movement, function, and ADLs as indicated by improvement with respect to Functional Deficits. [] Progressing: [] Met: [] Not Met: [] Adjusted    Long Term Goals: To be achieved in: 8 weeks  1. FOTO functional survey score of >/= 66  to assist with reaching prior level of function. [] Progressing: [] Met: [] Not Met: [] Adjusted  2. Patient will demonstrate increased UE/LE Strength to 5/5 with core activation to allow for proper functional mobility as indicated by patients Functional Deficits to allow pt to resume walking, stairs, and maintain independence without increase in symptoms. [] Progressing: [] Met: [] Not Met: [] Adjusted  3.  Patient will return to functional activities including ascending and descending stairs with at least one handrail to increase independence and decrease fall risk within the community without increased symptoms or restriction. [] Progressing: [] Met: [] Not Met: [] Adjusted    Electronically signed by:  Rogelio Aguayo, PT, DPT    Overall Progression Towards Functional goals/ Treatment Progress Update:  [x] Patient is progressing as expected towards functional goals listed. [] Progression is slowed due to complexities/Impairments listed. [] Progression has been slowed due to co-morbidities. [] Plan just implemented, too soon to assess goals progression <30days   [] Goals require adjustment due to lack of progress  [] Patient is not progressing as expected and requires additional follow up with physician  [] Other    Persisting Functional Limitations/Impairments:  [x]Sleeping [x]Sitting               [x]Standing [x]Transfers        [x]Walking []Kneeling               [x]Stairs [x]Squatting / bending   [x]ADLs [x]Reaching  [x]Lifting  [x]Housework  []Driving []Job related tasks  []Sports/Recreation []Other:        ASSESSMENT:  Continued with  12 reps this date. Pt required decreased VC for proper performance of SLR X3  with reduced compensations. Monitor symptoms following increased reps. Continue to progress strength and balance to decrease pain with functional mobility. Treatment/Activity Tolerance:  [x] Patient able to complete tx [] Patient limited by fatigue  [x] Patient limited by pain  [] Patient limited by other medical complications  [] Other:     Prognosis: [x] Good [] Fair  [] Poor    Patient Requires Follow-up: [x] Yes  [] No    Plan for next treatment session:    PLAN: See eval. PT 2x / week for 8 weeks.  1 land, 1 aquatic per week  [x] Continue per plan of care [] Alter current plan (see comments)  [] Plan of care initiated [] Hold pending MD visit [] Discharge    Electronically signed by: Gisele Schneider, PT, MSPT    Note: If patient does not return for scheduled/ recommended follow up visits, this note will serve as a discharge from care along with most recent update on progress.

## 2022-09-27 ENCOUNTER — HOSPITAL ENCOUNTER (OUTPATIENT)
Dept: PHYSICAL THERAPY | Age: 67
Setting detail: THERAPIES SERIES
Discharge: HOME | End: 2022-09-27
Payer: MEDICARE

## 2022-09-27 NOTE — FLOWSHEET NOTE
[address]     Physical Therapy  Cancellation/No-show Note  Patient Name:  Lebron Fernandez  :  1955   Date:  2022  Cancelled visits to date: 1  No-shows to date: 0    Patient status for today's appointment patient:  [x]  Cancelled -   []  Rescheduled appointment  []  No-show     Reason given by patient:  []  Patient ill  []  Conflicting appointment  []  No transportation    []  Conflict with work  [x]  No reason given  []  Other:     Comments:      Phone call information:   []  Phone call made today to patient at _ time at number provided:      []  Patient answered, conversation as follows:    []  Patient did not answer, message left as follows:  []  Phone call not made today  [x]  Phone call not needed - pt contacted us to cancel and provided reason for cancellation.      Electronically signed by:  Terri Jacobs, PT, DPT

## 2022-09-30 ENCOUNTER — HOSPITAL ENCOUNTER (OUTPATIENT)
Dept: PHYSICAL THERAPY | Age: 67
Setting detail: THERAPIES SERIES
Discharge: HOME | End: 2022-09-30
Payer: MEDICARE

## 2022-09-30 PROCEDURE — 97113 AQUATIC THERAPY/EXERCISES: CPT

## 2022-09-30 PROCEDURE — 97150 GROUP THERAPEUTIC PROCEDURES: CPT

## 2022-09-30 NOTE — FLOWSHEET NOTE
[address] [facility]  Phone: [phone]   Fax: [phone]    Physical Therapy Daily Treatment Note  Date:  2022    Patient Name:  Vale Castillo    :  1955  MRN: [id number]  Medical/Treatment Diagnosis Information:  Diagnosis: Z68.35 (ICD-10-CM) - BMI 35.0-35.9,adult. M79.7 (ICD-10-CM) - Fibromyalgia  Treatment Diagnosis: pain, decreased strength and endurance, difficulty with walking          Insurance/Certification information:  Medicare  Physician Information:  Solitario Rodriguez MD    Plan of care signed (Y/N): []  Yes [x]  No     Date of Patient follow up with Physician:      Progress Report: []  Yes  [x]  No     Date Range for reporting period:  Beginnin22  Ending:     Progress report due (10 Rx/or 30 days whichever is less): visit #10 or 55 (date)     Recertification due (POC duration/ or 90 days whichever is less): visit #20 or 22 (date)     Visit # Insurance Allowable Auth required? Date Range    [place] []  Yes  []  No 22-       Latex Allergy:  [x]NO      []YES  Preferred Language for Healthcare:   [x]English       []other:    Functional Scale:           Date assessed:  TO physical FS primary measure score = 64; risk adjusted = 47  22    Pain level:  1/10     SUBJECTIVE:  Says she is doing good today, only mild pain.      OBJECTIVE: See eval      RESTRICTIONS/PRECAUTIONS: fibromyalgia    Exercises/Interventions:     Therapeutic Exercises (75508) Resistance / level Sets/sec Reps Notes   bike       IB gastroc       Stair: HSS, HFS    HEP   Tband mid row/ext    HEP                                      Therapeutic Activities (90575)                                          Neuromuscular Re-ed (91405)       SLS, Tandem, Romberg                                           Manual Intervention (49066)                                                   AquaticTherapy Dates of Service: , , , , ,  Aquatic Visits Exercises/Activities:   Transfers:  [x] Stairs   [] Ramp  [] Chair Lift   % Immersion:            Ambulation/ Warm up:   UE Exercises: Forward  X 2 laps Shoulder Shrugs      Lateral   X 2 laps Shoulder Circles  Reverse circles only X12    Retro   X 2 laps Scapular Retraction      [place]    Push Downs      Heel/Toe Walking    Punching x      Rowing x12      Elbow Flex/Ext x      Shldr Flex/Ext x12      Shldr aBd/aDd x12   LE Exercises:  Schroeder Shave aBd/aDd x12   HR/TR x15 Shldr IR/ER    Marches X15 B Arm Circles x   Squats x15 PNF Diagonals    Hamstring Curls X15 B  Wall Push Ups    Hip Flexion (SLR) X15 B     Hip aBduction (SLR) X15 B     Hip Extension (SLR) X15 B      Hip aDduction (SLR)      Hip Circles X15 CW and CCW B Functional:    Hip IR/Er  Step up forward X12 L/R small step   Hip Hikes  Step up lateral  X 12 B 4 in step      Step down  x      Lunges Forward      Lunges Retro      Lunges Lateral     Balance:        SLS        Tandem Stance X20\" X2 L/R      NBOS eyes open x Seated:     NBOS eyes closed X20\" X2 Ankle pumps     Hand to Opposite Knee  Ankle Circles     Fwd Step ups to SLS  Knee Flex/Ext    Lateral Step ups to SLS  Hip aBd/aDd    Stop/Go Gait   Bicycle       Ankle DF/PF      Ankle Inv/Ev    Stretching:       Gastroc/Soleus X15\" X2 L/R     Hamstring  X15\" X2 L/R Deep Water:    Knee Flex Stretch  Jog    Piriformis   Noodle Hang    Hip Flexor  Traction at [address]    SKTC      DKTC       ITB  Cool Down:    Quad  Fwd Walking X 1 lap   Mid Back   Lat Walking x   UT  Retro Walking    Post Shoulder  Noodle float    Ladder Pull      Pec Stretch            Core: Other:    TrA set  Seated stretching (self paced) Pelvic Tilts      Multifidi Walk outs c paddle      PNF Chop/Lifts                          Aquatic Abbreviation Key  B= Belt DB= Dumbells T= Theratube   H= Hydrotone N= Noodles W= Weights   P= Paddles S= Speedo equipment K= Kickboard      Pt.  Education: Gave pt tour of pool, explained how to use lockers, what to wear, that it would be in group setting, and showed pt aquatic equipment. Modalities:     Pt. Education:  -patient educated on diagnosis, prognosis and expectations for rehab  -all patient questions were answered    Home Exercise Program:  Access Code: [email or web address]  URL: [email or web address]  Date: 08/19/2022  Prepared by: Dennise Stafford    Exercises  Supine Lower Trunk Rotation - 1 x daily - 7 x weekly - 3 sets - 10 reps  Clamshell - 1 x daily - 7 x weekly - 3 sets - 10 reps  Seated Long Arc Quad - 1 x daily - 7 x weekly - 3 sets - 10 reps  Standing Shoulder Row with Anchored Resistance - 1 x daily - 7 x weekly - 3 sets - 10 reps  Shoulder Extension with Resistance - 1 x daily - 7 x weekly - 3 sets - 10 reps  Seated Hamstring Stretch - 1 x daily - 7 x weekly - 3 sets - 10 reps  Seated March - 1 x daily - 7 x weekly - 3 sets - 10 reps        Therapeutic Exercise and NMR EXR  [x] (30189) Provided verbal/tactile cueing for activities related to strengthening, flexibility, endurance, ROM for improvements in  [] LE / Lumbar: LE, proximal hip, and core control with self care, mobility, lifting, ambulation. [] UE / Cervical: cervical, postural, scapular, scapulothoracic and UE control with self care, reaching, carrying, lifting, house/yardwork, driving, computer work.  [] (52752) Provided verbal/tactile cueing for activities related to improving balance, coordination, kinesthetic sense, posture, motor skill, proprioception to assist with   [] LE / lumbar: LE, proximal hip, and core control in self care, mobility, lifting, ambulation and eccentric single leg control.    [] UE / cervical: cervical, scapular, scapulothoracic and UE control with self care, reaching, carrying, lifting, house/yardwork, driving, computer work.   [] (10561) Therapist is in constant attendance of 2 or more patients providing skilled therapy interventions, but not providing any significant amount of measurable one-on-one time to either patient, for improvements in  [] LE / lumbar: LE, proximal hip, and core control in self care, mobility, lifting, ambulation and eccentric single leg control. [] UE / cervical: cervical, scapular, scapulothoracic and UE control with self care, reaching, carrying, lifting, house/yardwork, driving, computer work. NMR and Therapeutic Activities:    [] (80015 or 76903) Provided verbal/tactile cueing for activities related to improving balance, coordination, kinesthetic sense, posture, motor skill, proprioception and motor activation to allow for proper function of   [] LE: / Lumbar core, proximal hip and LE with self care and ADLs  [] UE / Cervical: cervical, postural, scapular, scapulothoracic and UE control with self care, carrying, lifting, driving, computer work.   [] (85048) Gait Re-education- Provided training and instruction to the patient for proper LE, core and proximal hip recruitment and positioning and eccentric body weight control with ambulation re-education including up and down stairs     Home Management Training / Self Care:  [] (55718) Provided self-care/home management training related to activities of daily living and compensatory training, and/or use of adaptive equipment for improvement with: ADLs and compensatory training, meal preparation, safety procedures and instruction in use of adaptive equipment, including bathing, grooming, dressing, personal hygiene, basic household cleaning and chores.      Home Exercise Program:    [] (93689) Reviewed/Progressed HEP activities related to strengthening, flexibility, endurance, ROM of   [] LE / Lumbar: core, proximal hip and LE for functional self-care, mobility, lifting and ambulation/stair navigation   [] UE / Cervical: cervical, postural, scapular, scapulothoracic and UE control with self care, reaching, carrying, lifting, house/yardwork, driving, computer work  [] (23701)Reviewed/Progressed HEP activities related to improving balance, coordination, kinesthetic sense, posture, motor skill, proprioception of   [] LE: core, proximal hip and LE for self care, mobility, lifting, and ambulation/stair navigation    [] UE / Cervical: cervical, postural,  scapular, scapulothoracic and UE control with self care, reaching, carrying, lifting, house/yardwork, driving, computer work    Manual Treatments:  PROM / STM / Oscillations-Mobs:  G-I, II, III, IV (PA's, Inf., Post.)  [] (43095) Provided manual therapy to mobilize LE, proximal hip and/or LS spine soft tissue/joints for the purpose of modulating pain, promoting relaxation,  increasing ROM, reducing/eliminating soft tissue swelling/inflammation/restriction, improving soft tissue extensibility and allowing for proper ROM for normal function with   [] LE / lumbar: self care, mobility, lifting and ambulation. [] UE / Cervical: self care, reaching, carrying, lifting, house/yardwork, driving, computer work. Modalities:  [] (70847) Vasopneumatic compression: Utilized vasopneumatic compression to decrease edema / swelling for the purpose of improving mobility and quad tone / recruitment which will allow for increased overall function including but not limited to self-care, transfers, ambulation, and ascending / descending stairs. Aquatic:  [x] (47088) Aquatic therapy with therapeutic exercise. Provided verbal and tactile cueing for activities related to strengthening, flexibility, endurance, ROM for improvements in  [x] LE / lumbar: LE, proximal hip, and core control in self care, mobility, lifting, ambulation and eccentric single leg control. [x] UE / cervical: cervical, scapular, scapulothoracic and UE control with self care, reaching, carrying, lifting, house/yardwork, driving, computer work.    [x] (16538) Therapist is in constant attendance of 2 or more patients providing skilled therapy interventions, but not providing any significant amount of measurable one-on-one time to either patient, for improvements in  [x] LE / lumbar: LE, proximal hip, and core control in self care, mobility, lifting, ambulation and eccentric single leg control. [x] UE / cervical: cervical, scapular, scapulothoracic and UE control with self care, reaching, carrying, lifting, house/yardwork, driving, computer work. Charges:  Timed Code Treatment Minutes: 15   Total Treatment Minutes: 40     [] EVAL - LOW (84300)   [] EVAL - MOD (04662)  [] EVAL - HIGH (92680)  [] RE-EVAL (22914)  [] ZZ(20676) x       [] Ionto  [] NMR (02476) x       [] Vaso  [] Manual (13365) x       [] Ultrasound  [] TA x        [] Mech Traction (89919)  [x] Aquatic Therapy x 1    [] ES (un) (92478):   [] Home Management Training x  [] ES(attended) (27554)   [] Dry Needling 1-2 muscles (89889):  [] Dry Needling 3+ muscles (614062)  [x] Group:      [] Other:     GOALS:  Patient stated goal: \" get better\"  [] Progressing: [] Met: [] Not Met: [] Adjusted    Therapist goals for Patient:   Short Term Goals: To be achieved in: 2 weeks  1. Independent in HEP and progression per patient tolerance, in order to prevent re-injury. [] Progressing: [] Met: [] Not Met: [] Adjusted  2. Patient will have a decrease in pain to facilitate improvement in movement, function, and ADLs as indicated by improvement with respect to Functional Deficits. [] Progressing: [] Met: [] Not Met: [] Adjusted    Long Term Goals: To be achieved in: 8 weeks  1. FOTO functional survey score of >/= 66  to assist with reaching prior level of function. [] Progressing: [] Met: [] Not Met: [] Adjusted  2. Patient will demonstrate increased UE/LE Strength to 5/5 with core activation to allow for proper functional mobility as indicated by patients Functional Deficits to allow pt to resume walking, stairs, and maintain independence without increase in symptoms. [] Progressing: [] Met: [] Not Met: [] Adjusted  3.  Patient will return to functional activities including ascending and descending stairs with at least one handrail to increase independence and decrease fall risk within the community without increased symptoms or restriction. [] Progressing: [] Met: [] Not Met: [] Adjusted    Electronically signed by:  Lc Victor, PT, DPT    Overall Progression Towards Functional goals/ Treatment Progress Update:  [x] Patient is progressing as expected towards functional goals listed. [] Progression is slowed due to complexities/Impairments listed. [] Progression has been slowed due to co-morbidities. [] Plan just implemented, too soon to assess goals progression <30days   [] Goals require adjustment due to lack of progress  [] Patient is not progressing as expected and requires additional follow up with physician  [] Other    Persisting Functional Limitations/Impairments:  [x]Sleeping [x]Sitting               [x]Standing [x]Transfers        [x]Walking []Kneeling               [x]Stairs [x]Squatting / bending   [x]ADLs [x]Reaching  [x]Lifting  [x]Housework  []Driving []Job related tasks  []Sports/Recreation []Other:        ASSESSMENT:  Increased to 15 reps this date. Pt required no VC's for proper performance of exercises. Continue to progress strength and balance to decrease pain with functional mobility. Treatment/Activity Tolerance:  [x] Patient able to complete tx [] Patient limited by fatigue  [x] Patient limited by pain  [] Patient limited by other medical complications  [] Other:     Prognosis: [x] Good [] Fair  [] Poor    Patient Requires Follow-up: [x] Yes  [] No    Plan for next treatment session:    PLAN: See eval. PT 2x / week for 8 weeks.  1 land, 1 aquatic per week  [x] Continue per plan of care [] Alter current plan (see comments)  [] Plan of care initiated [] Hold pending MD visit [] Discharge    Electronically signed by: Clarice Woods, PT, PT    Note: If patient does not return for scheduled/ recommended follow up visits, this note will serve as a discharge from care along with most recent update on progress.

## 2022-10-11 ENCOUNTER — APPOINTMENT (OUTPATIENT)
Dept: PHYSICAL THERAPY | Age: 67
End: 2022-10-11
Payer: MEDICARE

## 2022-10-14 ENCOUNTER — HOSPITAL ENCOUNTER (OUTPATIENT)
Dept: PHYSICAL THERAPY | Age: 67
Setting detail: THERAPIES SERIES
Discharge: HOME | End: 2022-10-14
Payer: MEDICARE

## 2022-10-14 PROCEDURE — 97150 GROUP THERAPEUTIC PROCEDURES: CPT

## 2022-10-14 PROCEDURE — 97113 AQUATIC THERAPY/EXERCISES: CPT

## 2022-10-14 NOTE — FLOWSHEET NOTE
[address] [facility] Physical Therapy  Phone: [phone]   Fax: [phone]    Physical Therapy Daily Treatment Note  Date:  10/14/2022    Patient Name:  Roni Lopez    :  1955  MRN: [id number]  Medical/Treatment Diagnosis Information:  Diagnosis: Z68.35 (ICD-10-CM) - BMI 35.0-35.9,adult. M79.7 (ICD-10-CM) - Fibromyalgia  Treatment Diagnosis: pain, decreased strength and endurance, difficulty with walking          Insurance/Certification information:  Medicare  Physician Information:  Wilmer Quijano MD    Plan of care signed (Y/N): []  Yes [x]  No     Date of Patient follow up with Physician:      Progress Report: []  Yes  [x]  No     Date Range for reporting period:  Beginnin22  Ending:     Progress report due (10 Rx/or 30 days whichever is less): visit #10 or  (date)     Recertification due (POC duration/ or 90 days whichever is less): visit #20 or 22 (date)     Visit # Insurance Allowable Auth required? Date Range    [place] []  Yes  []  No 22-       Latex Allergy:  [x]NO      []YES  Preferred Language for Healthcare:   [x]English       []other:    Functional Scale:           Date assessed:  TO physical FS primary measure score = 64; risk adjusted = 47  22    Pain level:  2/10     SUBJECTIVE:   Doing okay, just having a little bit of pain more than usual.  Ana Maria Bellamy out of town last week to [place], states that she feels that therapy has been helping and was feeling better but LE pain started to develop and is getting frustrated as she doesn't think she did anything different than her typical days at home.  -Encouraged pt to discuss at land based session next visit and possibly switch to land sessions.         OBJECTIVE: See eval      RESTRICTIONS/PRECAUTIONS: fibromyalgia    Exercises/Interventions:     Therapeutic Exercises (66114) Resistance / level Sets/sec Reps Notes   bike       IB gastroc       Stair: HSS, HFS    HEP   Tband mid row/ext    HEP                                      Therapeutic Activities (40527)                                          Neuromuscular Re-ed (91658)       SLS, Tandem, Romberg                                           Manual Intervention (21237)                                                   AquaticTherapy Dates of Service: 9/9, 9/13, 9/16, 9/20, 9/23, 9/30, 10/14   Aquatic Visits Exercises/Activities:   Transfers:  [x] Stairs   [] Ramp  [] Chair Lift   % Immersion:            Ambulation/ Warm up:   UE Exercises:       Forward  X 2 laps Shoulder Shrugs      Lateral   X 2 laps Shoulder Circles  Reverse circles only X12    Retro   X 2 laps Scapular Retraction      [place]    Push Downs      Heel/Toe Walking    Punching x      Rowing x12      Elbow Flex/Ext x      Shldr Flex/Ext x12      Shldr aBd/aDd x12   LE Exercises:  Lela Fells aBd/aDd x12   HR/TR x15 Shldr IR/ER    Marches X15 B Arm Circles x   Squats x15 PNF Diagonals    Hamstring Curls X15 B  Wall Push Ups    Hip Flexion (SLR) X15 B     Hip aBduction (SLR) X15 B     Hip Extension (SLR) X15 B      Hip aDduction (SLR)      Hip Circles X15 CW and CCW B Functional:    Hip IR/Er  Step up forward X12 L/R small step   Hip Hikes  Step up lateral  X 12 B 4 in step      Step down  x      Lunges Forward      Lunges Retro      Lunges Lateral     Balance:        SLS        Tandem Stance X20\" X2 L/R      NBOS eyes open x Seated:     NBOS eyes closed X20\" X2 Ankle pumps     Hand to Opposite Knee  Ankle Circles     Fwd Step ups to SLS  Knee Flex/Ext    Lateral Step ups to SLS  Hip aBd/aDd    Stop/Go Gait   Bicycle       Ankle DF/PF      Ankle Inv/Ev    Stretching:       Gastroc/Soleus X15\" X2 L/R     Hamstring  X15\" X2 L/R Deep Water:    Knee Flex Stretch  Jog    Piriformis   Noodle Hang    Hip Flexor  Traction at [address]    SKTC      DKTC       ITB  Cool Down:    Quad  Fwd Walking XMid Back   Lat Walking x   UT  Retro Walking    Post Shoulder  Noodle float    Ladder Pull      Pec Stretch            Core: Other:    TrA set  Seated stretching (self paced) Pelvic Tilts      Multifidi Walk outs c paddle      PNF Chop/Lifts                          Aquatic Abbreviation Key  B= Belt DB= Dumbells T= Theratube   H= Hydrotone N= Noodles W= Weights   P= Paddles S= Speedo equipment K= Kickboard      Pt. Education: Gave pt tour of pool, explained how to use lockers, what to wear, that it would be in group setting, and showed pt aquatic equipment. Modalities:     Pt. Education:  -patient educated on diagnosis, prognosis and expectations for rehab  -all patient questions were answered    Home Exercise Program:  Access Code: [email or web address]  URL: [email or web address]  Date: 08/19/2022  Prepared by: Herbie Britt    Exercises  Supine Lower Trunk Rotation - 1 x daily - 7 x weekly - 3 sets - 10 reps  Clamshell - 1 x daily - 7 x weekly - 3 sets - 10 reps  Seated Long Arc Quad - 1 x daily - 7 x weekly - 3 sets - 10 reps  Standing Shoulder Row with Anchored Resistance - 1 x daily - 7 x weekly - 3 sets - 10 reps  Shoulder Extension with Resistance - 1 x daily - 7 x weekly - 3 sets - 10 reps  Seated Hamstring Stretch - 1 x daily - 7 x weekly - 3 sets - 10 reps  Seated March - 1 x daily - 7 x weekly - 3 sets - 10 reps        Therapeutic Exercise and NMR EXR  [x] (93858) Provided verbal/tactile cueing for activities related to strengthening, flexibility, endurance, ROM for improvements in  [] LE / Lumbar: LE, proximal hip, and core control with self care, mobility, lifting, ambulation.   [] UE / Cervical: cervical, postural, scapular, scapulothoracic and UE control with self care, reaching, carrying, lifting, house/yardwork, driving, computer work.  [] (43208) Provided verbal/tactile cueing for activities related to improving balance, coordination, kinesthetic sense, posture, motor skill, proprioception to assist with   [] LE / lumbar: LE, proximal hip, and core control in self care, mobility, lifting, ambulation and eccentric single leg control. [] UE / cervical: cervical, scapular, scapulothoracic and UE control with self care, reaching, carrying, lifting, house/yardwork, driving, computer work.   [] (75263) Therapist is in constant attendance of 2 or more patients providing skilled therapy interventions, but not providing any significant amount of measurable one-on-one time to either patient, for improvements in  [] LE / lumbar: LE, proximal hip, and core control in self care, mobility, lifting, ambulation and eccentric single leg control. [] UE / cervical: cervical, scapular, scapulothoracic and UE control with self care, reaching, carrying, lifting, house/yardwork, driving, computer work. NMR and Therapeutic Activities:    [] (99010 or 44939) Provided verbal/tactile cueing for activities related to improving balance, coordination, kinesthetic sense, posture, motor skill, proprioception and motor activation to allow for proper function of   [] LE: / Lumbar core, proximal hip and LE with self care and ADLs  [] UE / Cervical: cervical, postural, scapular, scapulothoracic and UE control with self care, carrying, lifting, driving, computer work.   [] (09119) Gait Re-education- Provided training and instruction to the patient for proper LE, core and proximal hip recruitment and positioning and eccentric body weight control with ambulation re-education including up and down stairs     Home Management Training / Self Care:  [] (58175) Provided self-care/home management training related to activities of daily living and compensatory training, and/or use of adaptive equipment for improvement with: ADLs and compensatory training, meal preparation, safety procedures and instruction in use of adaptive equipment, including bathing, grooming, dressing, personal hygiene, basic household cleaning and chores.      Home Exercise Program:    [] (37589) Reviewed/Progressed HEP activities related to strengthening, flexibility, endurance, ROM of   [] LE / Lumbar: core, proximal hip and LE for functional self-care, mobility, lifting and ambulation/stair navigation   [] UE / Cervical: cervical, postural, scapular, scapulothoracic and UE control with self care, reaching, carrying, lifting, house/yardwork, driving, computer work  [] (71921)Reviewed/Progressed HEP activities related to improving balance, coordination, kinesthetic sense, posture, motor skill, proprioception of   [] LE: core, proximal hip and LE for self care, mobility, lifting, and ambulation/stair navigation    [] UE / Cervical: cervical, postural,  scapular, scapulothoracic and UE control with self care, reaching, carrying, lifting, house/yardwork, driving, computer work    Manual Treatments:  PROM / STM / Oscillations-Mobs:  G-I, II, III, IV (PA's, Inf., Post.)  [] (33780) Provided manual therapy to mobilize LE, proximal hip and/or LS spine soft tissue/joints for the purpose of modulating pain, promoting relaxation,  increasing ROM, reducing/eliminating soft tissue swelling/inflammation/restriction, improving soft tissue extensibility and allowing for proper ROM for normal function with   [] LE / lumbar: self care, mobility, lifting and ambulation. [] UE / Cervical: self care, reaching, carrying, lifting, house/yardwork, driving, computer work. Modalities:  [] (85958) Vasopneumatic compression: Utilized vasopneumatic compression to decrease edema / swelling for the purpose of improving mobility and quad tone / recruitment which will allow for increased overall function including but not limited to self-care, transfers, ambulation, and ascending / descending stairs. Aquatic:  [x] (77511) Aquatic therapy with therapeutic exercise.  Provided verbal and tactile cueing for activities related to strengthening, flexibility, endurance, ROM for improvements in  [x] LE / lumbar: LE, proximal hip, and core control in self care, mobility, lifting, ambulation and eccentric single leg control. [x] UE / cervical: cervical, scapular, scapulothoracic and UE control with self care, reaching, carrying, lifting, house/yardwork, driving, computer work. [x] (79964) Therapist is in constant attendance of 2 or more patients providing skilled therapy interventions, but not providing any significant amount of measurable one-on-one time to either patient, for improvements in  [x] LE / lumbar: LE, proximal hip, and core control in self care, mobility, lifting, ambulation and eccentric single leg control. [x] UE / cervical: cervical, scapular, scapulothoracic and UE control with self care, reaching, carrying, lifting, house/yardwork, driving, computer work. Charges:  Timed Code Treatment Minutes: 10   Total Treatment Minutes: 44     [] EVAL - LOW (28999)   [] EVAL - MOD (65010)  [] EVAL - HIGH (87649)  [] RE-EVAL (62321)  [] QH(31755) x       [] Ionto  [] NMR (96536) x       [] Vaso  [] Manual (75934) x       [] Ultrasound  [] TA x        [] Mech Traction (57351)  [x] Aquatic Therapy x 1    [] ES (un) (18898):   [] Home Management Training x  [] ES(attended) (47388)   [] Dry Needling 1-2 muscles (27459):  [] Dry Needling 3+ muscles (589643)  [x] Group: 1      [] Other:     GOALS:  Patient stated goal: \" get better\"  [] Progressing: [] Met: [] Not Met: [] Adjusted    Therapist goals for Patient:   Short Term Goals: To be achieved in: 2 weeks  1. Independent in HEP and progression per patient tolerance, in order to prevent re-injury. [] Progressing: [] Met: [] Not Met: [] Adjusted  2. Patient will have a decrease in pain to facilitate improvement in movement, function, and ADLs as indicated by improvement with respect to Functional Deficits. [] Progressing: [] Met: [] Not Met: [] Adjusted    Long Term Goals: To be achieved in: 8 weeks  1. FOTO functional survey score of >/= 66  to assist with reaching prior level of function.    [] Progressing: [] Met: [] Not Met: [] Adjusted  2. Patient will demonstrate increased UE/LE Strength to 5/5 with core activation to allow for proper functional mobility as indicated by patients Functional Deficits to allow pt to resume walking, stairs, and maintain independence without increase in symptoms. [] Progressing: [] Met: [] Not Met: [] Adjusted  3. Patient will return to functional activities including ascending and descending stairs with at least one handrail to increase independence and decrease fall risk within the community without increased symptoms or restriction. [] Progressing: [] Met: [] Not Met: [] Adjusted      Overall Progression Towards Functional goals/ Treatment Progress Update:  [x] Patient is progressing as expected towards functional goals listed. [] Progression is slowed due to complexities/Impairments listed. [] Progression has been slowed due to co-morbidities. [] Plan just implemented, too soon to assess goals progression <30days   [] Goals require adjustment due to lack of progress  [] Patient is not progressing as expected and requires additional follow up with physician  [] Other    Persisting Functional Limitations/Impairments:  [x]Sleeping [x]Sitting               [x]Standing [x]Transfers        [x]Walking []Kneeling               [x]Stairs [x]Squatting / bending   [x]ADLs [x]Reaching  [x]Lifting  [x]Housework  []Driving []Job related tasks  []Sports/Recreation []Other:        ASSESSMENT:      Tolerated return to treatment well, some difficulty remembering ex's, but did well overall. Pt would most likely benefit from land based treatment to better address deficits, but to be determined at next assessment.       Treatment/Activity Tolerance:  [x] Patient able to complete tx [] Patient limited by fatigue  [x] Patient limited by pain  [] Patient limited by other medical complications  [] Other:     Prognosis: [x] Good [] Fair  [] Poor    Patient Requires Follow-up: [x] Yes  [] No    Plan for next treatment session:    PLAN: See eval. PT 2x / week for 8 weeks. 1 land, 1 aquatic per week  [x] Continue per plan of care [] Alter current plan (see comments)  [] Plan of care initiated [] Hold pending MD visit [] Discharge    Electronically signed by: Nitin Noble, PT, PT    Note: If patient does not return for scheduled/ recommended follow up visits, this note will serve as a discharge from care along with most recent update on progress. Gangrene of toe

## 2022-10-18 ENCOUNTER — HOSPITAL ENCOUNTER (OUTPATIENT)
Dept: PHYSICAL THERAPY | Age: 67
Setting detail: THERAPIES SERIES
Discharge: HOME | End: 2022-10-18
Payer: MEDICARE

## 2022-10-18 PROCEDURE — 97112 NEUROMUSCULAR REEDUCATION: CPT

## 2022-10-18 PROCEDURE — 97530 THERAPEUTIC ACTIVITIES: CPT

## 2022-10-21 ENCOUNTER — APPOINTMENT (OUTPATIENT)
Dept: PHYSICAL THERAPY | Age: 67
End: 2022-10-21
Payer: MEDICARE

## 2022-10-25 ENCOUNTER — APPOINTMENT (OUTPATIENT)
Dept: PHYSICAL THERAPY | Age: 67
End: 2022-10-25
Payer: MEDICARE

## 2022-10-25 ENCOUNTER — HOSPITAL ENCOUNTER (OUTPATIENT)
Dept: GENERAL RADIOLOGY | Age: 67
Discharge: HOME | End: 2022-10-25
Payer: MEDICARE

## 2022-10-25 ENCOUNTER — OFFICE VISIT (OUTPATIENT)
Dept: PRIMARY CARE CLINIC | Age: 67
End: 2022-10-25
Payer: MEDICARE

## 2022-10-25 ENCOUNTER — HOSPITAL ENCOUNTER (OUTPATIENT)
Age: 67
Discharge: HOME | End: 2022-10-25
Payer: MEDICARE

## 2022-10-25 ENCOUNTER — HOSPITAL ENCOUNTER (OUTPATIENT)
Dept: PHYSICAL THERAPY | Age: 67
Setting detail: THERAPIES SERIES
Discharge: HOME | End: 2022-10-25
Payer: MEDICARE

## 2022-10-25 VITALS
SYSTOLIC BLOOD PRESSURE: 134 MMHG | TEMPERATURE: 97.3 F | HEART RATE: 66 BPM | OXYGEN SATURATION: 98 % | BODY MASS INDEX: 35.51 KG/M2 | WEIGHT: 220 LBS | DIASTOLIC BLOOD PRESSURE: 82 MMHG

## 2022-10-25 DIAGNOSIS — M89.8X1 PAIN OF LEFT SCAPULA: ICD-10-CM

## 2022-10-25 DIAGNOSIS — M79.2 RADICULAR PAIN IN LEFT ARM: ICD-10-CM

## 2022-10-25 DIAGNOSIS — R07.89 OTHER CHEST PAIN: ICD-10-CM

## 2022-10-25 DIAGNOSIS — M89.8X1 PAIN OF LEFT SCAPULA: Primary | ICD-10-CM

## 2022-10-25 PROCEDURE — 3074F SYST BP LT 130 MM HG: CPT | Performed by: INTERNAL MEDICINE

## 2022-10-25 PROCEDURE — 72040 X-RAY EXAM NECK SPINE 2-3 VW: CPT

## 2022-10-25 PROCEDURE — G8399 PT W/DXA RESULTS DOCUMENT: HCPCS | Performed by: INTERNAL MEDICINE

## 2022-10-25 PROCEDURE — 72070 X-RAY EXAM THORAC SPINE 2VWS: CPT

## 2022-10-25 PROCEDURE — G8417 CALC BMI ABV UP PARAM F/U: HCPCS | Performed by: INTERNAL MEDICINE

## 2022-10-25 PROCEDURE — G8427 DOCREV CUR MEDS BY ELIG CLIN: HCPCS | Performed by: INTERNAL MEDICINE

## 2022-10-25 PROCEDURE — 99213 OFFICE O/P EST LOW 20 MIN: CPT | Performed by: INTERNAL MEDICINE

## 2022-10-25 PROCEDURE — 3078F DIAST BP <80 MM HG: CPT | Performed by: INTERNAL MEDICINE

## 2022-10-25 PROCEDURE — 71046 X-RAY EXAM CHEST 2 VIEWS: CPT

## 2022-10-25 PROCEDURE — 1123F ACP DISCUSS/DSCN MKR DOCD: CPT | Performed by: INTERNAL MEDICINE

## 2022-10-25 PROCEDURE — 97530 THERAPEUTIC ACTIVITIES: CPT

## 2022-10-25 PROCEDURE — 73010 X-RAY EXAM OF SHOULDER BLADE: CPT

## 2022-10-25 PROCEDURE — 97140 MANUAL THERAPY 1/> REGIONS: CPT

## 2022-10-25 PROCEDURE — 3017F COLORECTAL CA SCREEN DOC REV: CPT | Performed by: INTERNAL MEDICINE

## 2022-10-25 PROCEDURE — G8484 FLU IMMUNIZE NO ADMIN: HCPCS | Performed by: INTERNAL MEDICINE

## 2022-10-25 PROCEDURE — 1036F TOBACCO NON-USER: CPT | Performed by: INTERNAL MEDICINE

## 2022-10-25 PROCEDURE — 97110 THERAPEUTIC EXERCISES: CPT

## 2022-10-25 PROCEDURE — 1090F PRES/ABSN URINE INCON ASSESS: CPT | Performed by: INTERNAL MEDICINE

## 2022-10-25 RX ORDER — PREDNISONE 10 MG/1
10 TABLET ORAL DAILY
Qty: 10 TABLET | Refills: 0 | Status: SHIPPED | OUTPATIENT
Start: 2022-10-25 | End: 2022-11-04

## 2022-10-25 ASSESSMENT — ENCOUNTER SYMPTOMS
NAUSEA: 0
ABDOMINAL PAIN: 0
COUGH: 0
VOMITING: 0
BOWEL INCONTINENCE: 0
CONSTIPATION: 0
SHORTNESS OF BREATH: 0
BACK PAIN: 1
WHEEZING: 0
EYES NEGATIVE: 1
DIARRHEA: 0
CHEST TIGHTNESS: 0
SINUS PRESSURE: 0
ABDOMINAL DISTENTION: 0

## 2022-10-25 NOTE — PROGRESS NOTES
Chrystal Terrazas (:  1955) is a 73 y.o. female,Established patient, here for evaluation of the following chief complaint(s):  Back Pain (Upper back shoulder blades area had PT this AM w/o relief )         ASSESSMENT/PLAN:  1. Pain of left scapula: Reproducible on exam  Well hydrated, no rash. Uloric. Not a candidate for NSAIDs for pain, due to hypertension and has had edema in the past.  We will treat with Tylenol and give a 10-day course of low-dose prednisone and x-ray. -     XR SCAPULA LEFT (COMPLETE); Future  -     predniSONE (DELTASONE) 10 MG tablet; Take 1 tablet by mouth daily for 10 days, Disp-10 tablet, R-0Normal  2. Radicular pain in left arm will x-ray and treated with a 10-day course of low-dose of prednisone. -     XR CERVICAL SPINE (2-3 VIEWS); Future  -     XR THORACIC SPINE (2 VIEWS); Future  3. Other chest pain. Seems radicular no shortness of breath. Will get x-ray  -     XR CHEST STANDARD (2 VW); Future      Return in about 2 weeks (around 2022). Subjective   SUBJECTIVE/OBJECTIVE:  Back Pain  Chronicity: upper chest posterior pain. The problem occurs intermittently. The problem has been waxing and waning since onset. The pain is present in the thoracic spine. The quality of the pain is described as aching and shooting (dull with resting and sharp with movement). The pain is at a severity of 6/10. The symptoms are aggravated by twisting and bending. Pertinent negatives include no abdominal pain, bladder incontinence, bowel incontinence, chest pain, dysuria, fever, headaches, leg pain, numbness, tingling, weakness or weight loss. (No shortness of breath or coughing) Risk factors include menopause and obesity. Review of Systems   Constitutional:  Negative for activity change, appetite change, fatigue, fever, unexpected weight change and weight loss. Flu Vac    HENT: Negative. Negative for sinus pressure. Eyes: Negative.     Respiratory:  Negative for present. Mental Status: She is alert and oriented to person, place, and time. Cranial Nerves: No cranial nerve deficit. Sensory: No sensory deficit. Motor: No weakness. Coordination: Coordination normal.      Gait: Gait normal.      Deep Tendon Reflexes: Reflexes normal.   Psychiatric:         Mood and Affect: Mood normal.         Behavior: Behavior normal.         Thought Content: Thought content normal.         Judgment: Judgment normal.                An electronic signature was used to authenticate this note.     --Ginna Cochran MD

## 2022-10-25 NOTE — FLOWSHEET NOTE
[facility]  Phone: [phone]   Fax: [phone]    Physical Therapy Daily Treatment Note  Date:  10/25/2022    Patient Name:  Heriberto Oreilly    :  1955  MRN: [id number]  Medical/Treatment Diagnosis Information:  Diagnosis: Z68.35 (ICD-10-CM) - BMI 35.0-35.9,adult. M79.7 (ICD-10-CM) - Fibromyalgia  Treatment Diagnosis: pain, decreased strength and endurance, difficulty with walking          Insurance/Certification information:  Medicare  Physician Information:  Mimi Jacobsen MD    Plan of care signed (Y/N): [x]  Yes []  No     Date of Patient follow up with Physician: prn     Progress Report: []  Yes  [x]  No     Date Range for reporting period:  Beginnin22  PN: 10/18/22  Ending:     Progress report due (10 Rx/or 30 days whichever is less): visit #10 or  (date)     Recertification due (POC duration/ or 90 days whichever is less): visit #20 or 22 (date)     Visit # Insurance Allowable Auth required? Date Range   10/16 [place] []  Yes  []  No 22-       Latex Allergy:  [x]NO      []YES  Preferred Language for Healthcare:   [x]English       []other:    Functional Scale:           Date assessed:  FOTO physical FS primary measure score = 64; risk adjusted = 47  22  FOTO physical FS primary measure score = 64     10/18/22      Pain level:  9/10 L shoulder blade     SUBJECTIVE:   Pt states she is having worsening L shoulder blade pain. The pain radiates to her L elbow. Pt can't lay on her sides at night and laying supine the pain subsides.         OBJECTIVE:   10/25: tender and painful along T2-3 and T6-8 SP with prone P-A; very tender trigger point in RUT    10/18: bold denotes improvement  Palpation:   Myofascial pain/tenderness: globally d/t fibromyalgia     ROM RIGHT AROM LEFT AROM Comments   LE             Hip Flexion         Hip Abd         Hip ER         Hip IR         Hip Extension         Knee Ext         Knee Flex         DF         PF         Ankle INV         Ankle Ever                   UE            Shoulder flex 150 150     Shoulder AB         Shoulder ER T3 T4 Painful on RUE, L no p! Shoulder IR T8 T5 Painful on RUE, L no p!                           Palpation: global tenderness     Joint mobility:               []Normal                      [x]Hypo              []Hyper     MMT testing RLE with more pain during testing compared to LLE           Strength / Myotomes RIGHT LEFT   Multifidus       Transverse Ab       LE       Hip Flexors (L1-2) - seated 4 4   Quads (L2-4) 4 4   Ankle Dorsiflexion (L4-5) 4 4   Great Toe Extension (L5)       Ankle Eversion (S1-2)       Ankle Plantarflexion (S1-2) 4+ 4+   Hip Abductors       Hip Extensors       Hip Internal Rotators       Hip External Rotators       Hamstrings        Ankle Inversion                       Strength / Myotomes RIGHT LEFT   Cervical Flexion (C1-2)       Cervical SB (C3)       Shoulder Shrug (C4)       Shoulder Abduction (C5) 4+* 4+   Shoulder ER (C5) 4 4   Biceps (C6) 4 4   Triceps (C7) 4 4   Wrist Extension (C6)       Wrist Flexion (C7)        (C8)       Thumb Abduction (C8)       Finger Abduction (T1)       Shoulder Flex       Shoulder Scap       Shoulder IR                       **pain with all MMT                    RESTRICTIONS/PRECAUTIONS: fibromyalgia    Exercises/Interventions:     Therapeutic Exercises (06474) Resistance / level Sets/sec Reps Notes   bike  5 min  10/18   pulleys 3 min   10/25   IB gastroc       Stair: HSS, HFS    HEP   Tband mid row/ext    HEP   Chin tuck w/ ball + B ER against wall tan 1 10 10/25: inc pain                               Therapeutic Activities (37455)       PN Measurements/education    Step Ups 10/18: education on safety at home x 5 min   Sit<>stand 10/18: using UE   Pt education X 10 min   10/25          Neuromuscular Re-ed (81647)       Tandem 10/18   SLS 10/18   Side steps  Monster walks 10/18 Manual Intervention (82349)       Prone SP: P-A  3 min  10/25   RUT TrP release  2 min  10/25   L ULTT + stretch  3 min  10/25   STM periscapular  X10 min  10/25: focused on medial and superior borders of L scapula with many TrP. Pt very tender during, but reports relief after. Complete x2 min in prone, x8 min seated in chair                   AquaticTherapy Dates of Service: 9/9, 9/13, 9/16, 9/20, 9/23, 9/30, 10/14   Aquatic Visits Exercises/Activities:   Transfers:  [x] Stairs   [] Ramp  [] Chair Lift   % Immersion:            Ambulation/ Warm up:   UE Exercises:       Forward  X 2 laps Shoulder Shrugs      Lateral   X 2 laps Shoulder Circles  Reverse circles only X12    Retro   X 2 laps Scapular Retraction      [place]    Push Downs      Heel/Toe Walking    Punching x      Rowing x12      Elbow Flex/Ext x      Shldr Flex/Ext x12      Shldr aBd/aDd x12   LE Exercises:  Evin Linton aBd/aDd x12   HR/TR x15 Shldr IR/ER    Marches X15 B Arm Circles x   Squats x15 PNF Diagonals    Hamstring Curls X15 B  Wall Push Ups    Hip Flexion (SLR) X15 B     Hip aBduction (SLR) X15 B     Hip Extension (SLR) X15 B      Hip aDduction (SLR)      Hip Circles X15 CW and CCW B Functional:    Hip IR/Er  Step up forward X12 L/R small step   Hip Hikes  Step up lateral  X 12 B 4 in step      Step down  x      Lunges Forward      Lunges Retro      Lunges Lateral     Balance:        SLS        Tandem Stance X20\" X2 L/R      NBOS eyes open x Seated:     NBOS eyes closed X20\" X2 Ankle pumps     Hand to Opposite Knee  Ankle Circles     Fwd Step ups to SLS  Knee Flex/Ext    Lateral Step ups to SLS  Hip aBd/aDd    Stop/Go Gait   Bicycle       Ankle DF/PF      Ankle Inv/Ev    Stretching:       Gastroc/Soleus X15\" X2 L/R     Hamstring  X15\" X2 L/R Deep Water:    Knee Flex Stretch  Jog    Piriformis   Noodle Hang    Hip Flexor  [facility]       ITB  Cool Down:    Quad  Fwd Walking XMid Back   Lat Walking x   UT  Retro Walking    Post Shoulder  Noodle float    Ladder Pull      Pec Stretch            Core: Other:    TrA set  Seated stretching (self paced) Pelvic Tilts      Multifidi Walk outs c paddle      PNF Chop/Lifts                          Aquatic Abbreviation Key  B= Belt DB= Dumbells T= Theratube   H= Hydrotone N= Noodles W= Weights   P= Paddles S= Speedo equipment K= Kickboard      Pt. Education: Gave pt tour of pool, explained how to use lockers, what to wear, that it would be in group setting, and showed pt aquatic equipment. Modalities:     Pt. Education:  -patient educated on diagnosis, prognosis and expectations for rehab  -all patient questions were answered  10/18:  -progress thus far in therapy  -how to complete updated HEP at home safely  -plans for future land visits  10/25:   -educated on using tennis ball at home on trigger points, utilizing heating pads and hot showers in safe ways to decrease pain, sleeping positions. All questions answered. Home Exercise Program:  Access Code: [email or web address]  URL: [employer]/  Date: 08/19/2022  Prepared by: Adrian Najera    Exercises  Supine Lower Trunk Rotation - 1 x daily - 7 x weekly - 3 sets - 10 reps  Clamshell - 1 x daily - 7 x weekly - 3 sets - 10 reps  Seated Long Arc Quad - 1 x daily - 7 x weekly - 3 sets - 10 reps  Standing Shoulder Row with Anchored Resistance - 1 x daily - 7 x weekly - 3 sets - 10 reps  Shoulder Extension with Resistance - 1 x daily - 7 x weekly - 3 sets - 10 reps  Seated Hamstring Stretch - 1 x daily - 7 x weekly - 3 sets - 10 reps  Seated March - 1 x daily - 7 x weekly - 3 sets - 10 reps    10/18:  Sit to Stand - 1 x daily - 7 x weekly - 3 sets - 10 reps  Step Up - 1 x daily - 7 x weekly - 3 sets - 10 reps  Standing Tandem Balance with Counter Support - 1 x daily - 7 x weekly - 1 sets - 2-5 reps - 20 sec hold  Standing Single Leg Stance with Counter Support - 1 x daily - 7 x weekly - 1 sets - 2-5 reps - 10-15 sec hold  Side Stepping with Resistance at Thighs and Counter Support - 1 x daily - 7 x weekly - 1-3 sets - 10 reps      Therapeutic Exercise and NMR EXR  [x] (34944) Provided verbal/tactile cueing for activities related to strengthening, flexibility, endurance, ROM for improvements in  [x] LE / Lumbar: LE, proximal hip, and core control with self care, mobility, lifting, ambulation. [x] UE / Cervical: cervical, postural, scapular, scapulothoracic and UE control with self care, reaching, carrying, lifting, house/yardwork, driving, computer work.  [] (63694) Provided verbal/tactile cueing for activities related to improving balance, coordination, kinesthetic sense, posture, motor skill, proprioception to assist with   [] LE / lumbar: LE, proximal hip, and core control in self care, mobility, lifting, ambulation and eccentric single leg control. [] UE / cervical: cervical, scapular, scapulothoracic and UE control with self care, reaching, carrying, lifting, house/yardwork, driving, computer work.   [] (53966) Therapist is in constant attendance of 2 or more patients providing skilled therapy interventions, but not providing any significant amount of measurable one-on-one time to either patient, for improvements in  [] LE / lumbar: LE, proximal hip, and core control in self care, mobility, lifting, ambulation and eccentric single leg control. [] UE / cervical: cervical, scapular, scapulothoracic and UE control with self care, reaching, carrying, lifting, house/yardwork, driving, computer work.      NMR and Therapeutic Activities:    [x] (25204 or 73640) Provided verbal/tactile cueing for activities related to improving balance, coordination, kinesthetic sense, posture, motor skill, proprioception and motor activation to allow for proper function of   [] LE: / Lumbar core, proximal hip and LE with self care and ADLs  [x] UE / Cervical: cervical, postural, scapular, scapulothoracic and UE control with self care, carrying, lifting, driving, computer work.   [] (57916) Gait Re-education- Provided training and instruction to the patient for proper LE, core and proximal hip recruitment and positioning and eccentric body weight control with ambulation re-education including up and down stairs     Home Management Training / Self Care:  [] (33249) Provided self-care/home management training related to activities of daily living and compensatory training, and/or use of adaptive equipment for improvement with: ADLs and compensatory training, meal preparation, safety procedures and instruction in use of adaptive equipment, including bathing, grooming, dressing, personal hygiene, basic household cleaning and chores.      Home Exercise Program:    [] (54075) Reviewed/Progressed HEP activities related to strengthening, flexibility, endurance, ROM of   [] LE / Lumbar: core, proximal hip and LE for functional self-care, mobility, lifting and ambulation/stair navigation   [] UE / Cervical: cervical, postural, scapular, scapulothoracic and UE control with self care, reaching, carrying, lifting, house/yardwork, driving, computer work  [] (20337)Reviewed/Progressed HEP activities related to improving balance, coordination, kinesthetic sense, posture, motor skill, proprioception of   [] LE: core, proximal hip and LE for self care, mobility, lifting, and ambulation/stair navigation    [] UE / Cervical: cervical, postural,  scapular, scapulothoracic and UE control with self care, reaching, carrying, lifting, house/yardwork, driving, computer work    Manual Treatments:  PROM / STM / Oscillations-Mobs:  G-I, II, III, IV (PA's, Inf., Post.)  [x] (96103) Provided manual therapy to mobilize LE, proximal hip and/or LS spine soft tissue/joints for the purpose of modulating pain, promoting relaxation,  increasing ROM, reducing/eliminating soft tissue swelling/inflammation/restriction, improving soft tissue extensibility and allowing for proper ROM for normal function with [x] LE / lumbar: self care, mobility, lifting and ambulation. [x] UE / Cervical: self care, reaching, carrying, lifting, house/yardwork, driving, computer work. Modalities:  [] (42433) Vasopneumatic compression: Utilized vasopneumatic compression to decrease edema / swelling for the purpose of improving mobility and quad tone / recruitment which will allow for increased overall function including but not limited to self-care, transfers, ambulation, and ascending / descending stairs. Aquatic:  [] (67360) Aquatic therapy with therapeutic exercise. Provided verbal and tactile cueing for activities related to strengthening, flexibility, endurance, ROM for improvements in  [] LE / lumbar: LE, proximal hip, and core control in self care, mobility, lifting, ambulation and eccentric single leg control. [] UE / cervical: cervical, scapular, scapulothoracic and UE control with self care, reaching, carrying, lifting, house/yardwork, driving, computer work.   [] (48090) Therapist is in constant attendance of 2 or more patients providing skilled therapy interventions, but not providing any significant amount of measurable one-on-one time to either patient, for improvements in   LE / lumbar: LE, proximal hip, and core control in self care, mobility, lifting, ambulation and eccentric single leg control. [][] UE / cervical: cervical, scapular, scapulothoracic and UE control with self care, reaching, carrying, lifting, house/yardwork, driving, computer work.        Charges:  Timed Code Treatment Minutes: 42   Total Treatment Minutes: 42     [] EVAL - LOW (97789)   [] EVAL - MOD (36078)  [] EVAL - HIGH (07986)  [] RE-EVAL (50255)  [x] MO(10376) x  1     [] Ionto  [] NMR (52656) x  1     [] Vaso  [x] Manual (67994) x       [] Ultrasound  [x] TA x  1      [] Mech Traction (25528)  [] Aquatic Therapy x 1    [] ES (un) (36209):   [] Home Management Training x  [] ES(attended) (63614)   [] Dry Needling 1-2 muscles (73574): [] Dry Needling 3+ muscles (709778)  [] Group: 1      [] Other:     GOALS:  Patient stated goal: \" get better\"  [x] Progressing: [] Met: [] Not Met: [] Adjusted    Therapist goals for Patient:   Short Term Goals: To be achieved in: 2 weeks  1. Independent in HEP and progression per patient tolerance, in order to prevent re-injury. [x] Progressing: [] Met: [] Not Met: [] Adjusted  2. Patient will have a decrease in pain to facilitate improvement in movement, function, and ADLs as indicated by improvement with respect to Functional Deficits. [x] Progressing: [] Met: [] Not Met: [] Adjusted    Long Term Goals: To be achieved in: 8 weeks  1. FOTO functional survey score of >/= 66  to assist with reaching prior level of function. [x] Progressing: [] Met: [] Not Met: [] Adjusted  2. Patient will demonstrate increased UE/LE Strength to 5/5 with core activation to allow for proper functional mobility as indicated by patients Functional Deficits to allow pt to resume walking, stairs, and maintain independence without increase in symptoms. [x] Progressing: [] Met: [] Not Met: [] Adjusted  3. Patient will return to functional activities including ascending and descending stairs with at least one handrail to increase independence and decrease fall risk within the community without increased symptoms or restriction. [x] Progressing: [] Met: [] Not Met: [] Adjusted      Overall Progression Towards Functional goals/ Treatment Progress Update:  [x] Patient is progressing as expected towards functional goals listed. [] Progression is slowed due to complexities/Impairments listed. [] Progression has been slowed due to co-morbidities.   [] Plan just implemented, too soon to assess goals progression <30days   [] Goals require adjustment due to lack of progress  [] Patient is not progressing as expected and requires additional follow up with physician  [] Other    Persisting Functional Limitations/Impairments:  [x]Sleeping [x]Sitting               [x]Standing [x]Transfers        [x]Walking []Kneeling               [x]Stairs [x]Squatting / bending   [x]ADLs [x]Reaching  [x]Lifting  [x]Housework  []Driving []Job related tasks  []Sports/Recreation []Other:        ASSESSMENT:   Pt with increased pain this date. Pt with soreness and tenderness after manual this date, but reports decreased pain from 9 to 7/10. Pt with relief of symptoms in supine and seated. Pt with increased symptoms in sidelying and against wall or other extension based. Continue to assess and progress pt per tolerance to improve general strength, endurance, and activity tolerance to return to [facility]. Treatment/Activity Tolerance:  [x] Patient able to complete tx [] Patient limited by fatigue  [x] Patient limited by pain  [] Patient limited by other medical complications  [] Other:     Prognosis: [x] Good [] Fair  [] Poor    Patient Requires Follow-up: [x] Yes  [] No    Plan for next treatment session:    PLAN: See bianca. PT 2x / week for 8 weeks. 1 land, 1 aquatic per week  [x] Continue per plan of care [] Alter current plan (see comments)  [] Plan of care initiated [] Hold pending MD visit [] Discharge    Electronically signed by: Jorje Burrows PT, DPT    Note: If patient does not return for scheduled/ recommended follow up visits, this note will serve as a discharge from care along with most recent update on progress.

## 2022-10-28 ENCOUNTER — APPOINTMENT (OUTPATIENT)
Dept: PHYSICAL THERAPY | Age: 67
End: 2022-10-28
Payer: MEDICARE

## 2022-10-28 ENCOUNTER — HOSPITAL ENCOUNTER (OUTPATIENT)
Dept: PHYSICAL THERAPY | Age: 67
Setting detail: THERAPIES SERIES
Discharge: HOME | End: 2022-10-28
Payer: MEDICARE

## 2022-10-28 PROCEDURE — 97110 THERAPEUTIC EXERCISES: CPT

## 2022-10-28 PROCEDURE — 97112 NEUROMUSCULAR REEDUCATION: CPT

## 2022-10-28 PROCEDURE — 97530 THERAPEUTIC ACTIVITIES: CPT

## 2022-10-28 NOTE — FLOWSHEET NOTE
[address] [facility]  Phone: [phone]   Fax: [phone]    Physical Therapy Daily Treatment Note  Date:  10/28/2022    Patient Name:  Joelle Mayfield    :  1955  MRN: [id number]  Medical/Treatment Diagnosis Information:  Diagnosis: Z68.35 (ICD-10-CM) - BMI 35.0-35.9,adult. M79.7 (ICD-10-CM) - Fibromyalgia  Treatment Diagnosis: pain, decreased strength and endurance, difficulty with walking          Insurance/Certification information:  Medicare  Physician Information:  Rosena Hodgkins, MD    Plan of care signed (Y/N): [x]  Yes []  No     Date of Patient follow up with Physician: prn     Progress Report: []  Yes  [x]  No     Date Range for reporting period:  Beginnin22  PN: 10/18/22  Ending:     Progress report due (10 Rx/or 30 days whichever is less): visit #10 or 4/10/39 (date)     Recertification due (POC duration/ or 90 days whichever is less): visit #20 or 22 (date)     Visit # Insurance Allowable Auth required? Date Range    [place] []  Yes  []  No 22-       Latex Allergy:  [x]NO      []YES  Preferred Language for Healthcare:   [x]English       []other:    Functional Scale:           Date assessed:  FOTO physical FS primary measure score = 64; risk adjusted = 47  22  FOTO physical FS primary measure score = 64     10/18/22      Pain level:  4/10 L shoulder blade     SUBJECTIVE:   Pt reports she went to PCP after last visit. She received Xrays that showed DDD in neck/thoracic area. Pt received steroid dosepak that has helped with pain tremendously.        OBJECTIVE:   10/25: tender and painful along T2-3 and T6-8 SP with prone P-A; very tender trigger point in RUT    10/18: bold denotes improvement  Palpation:   Myofascial pain/tenderness: globally d/t fibromyalgia     ROM RIGHT AROM LEFT AROM Comments   LE             Hip Flexion         Hip Abd         Hip ER         Hip IR         Hip Extension         Knee Ext Knee Flex         DF         PF         Ankle INV         Ankle Ever                   UE            Shoulder flex 150 150     Shoulder AB         Shoulder ER T3 T4 Painful on RUE, L no p! Shoulder IR T8 T5 Painful on RUE, L no p!                           Palpation: global tenderness     Joint mobility:               []Normal                      [x]Hypo              []Hyper     MMT testing RLE with more pain during testing compared to LLE           Strength / Myotomes RIGHT LEFT   Multifidus       Transverse Ab       LE       Hip Flexors (L1-2) - seated 4 4   Quads (L2-4) 4 4   Ankle Dorsiflexion (L4-5) 4 4   Great Toe Extension (L5)       Ankle Eversion (S1-2)       Ankle Plantarflexion (S1-2) 4+ 4+   Hip Abductors       Hip Extensors       Hip Internal Rotators       Hip External Rotators       Hamstrings        Ankle Inversion                       Strength / Myotomes RIGHT LEFT   Cervical Flexion (C1-2)       Cervical SB (C3)       Shoulder Shrug (C4)       Shoulder Abduction (C5) 4+* 4+   Shoulder ER (C5) 4 4   Biceps (C6) 4 4   Triceps (C7) 4 4   Wrist Extension (C6)       Wrist Flexion (C7)        (C8)       Thumb Abduction (C8)       Finger Abduction (T1)       Shoulder Flex       Shoulder Scap       Shoulder IR                       **pain with all MMT                    RESTRICTIONS/PRECAUTIONS: fibromyalgia    Exercises/Interventions:     Therapeutic Exercises (46780) Resistance / level Sets/sec Reps Notes   bike L2 5 min  10/18   pulleys 3 min   10/25   IB gastroc       Stair: HSS, HFS    HEP   Tband mid row/ext lime 1 20 ea HEP   Chin tuck w/ ball + B ER against wall 10/25: inc pain   1/2 FR [place] S 6 min   10/28                        Therapeutic Activities (33251)       PN Measurements/education    Step Ups + bicep curl 6\"/3# 1 6 B 10/18: education on safety at home x 5 min   Sit<>stand + OH press 3# 3 5 10/18: using UE   Pt education   10/25   SB Wall Squat green 2 10 10/28 Neuromuscular Re-ed (31263)       Tandem floor 15\" 2B 10/18   SLS floor 10\" 3 B 10/18   Side steps  Monster walks Orange  orange 1  1 15ft  15ft 10/18   Foot on AE + crossbody punch 1# 1 15 B 10/28                 Manual Intervention (71306)       Prone SP: P-A   10/25   RUT TrP release   10/25   L ULTT + stretch   10/25   STM periscapular   10/25: focused on medial and superior borders of L scapula with many TrP. Pt very tender during, but reports relief after. Complete x2 min in prone, x8 min seated in chair                   AquaticTherapy Dates of Service: 9/9, 9/13, 9/16, 9/20, 9/23, 9/30, 10/14   Aquatic Visits Exercises/Activities:   Transfers:  [x] Stairs   [] Ramp  [] Chair Lift   % Immersion:            Ambulation/ Warm up:   UE Exercises:       Forward  X 2 laps Shoulder Shrugs      Lateral   X 2 laps Shoulder Circles  Reverse circles only X12    Retro   X 2 laps Scapular Retraction      [place]    Push Downs      Heel/Toe Walking    Punching x      Rowing x12      Elbow Flex/Ext x      Shldr Flex/Ext x12      Shldr aBd/aDd x12   LE Exercises:  [place] aBd/aDd x12   HR/TR x15 Shldr IR/ER    Marches X15 B Arm Circles x   Squats x15 PNF Diagonals    Hamstring Curls X15 B  Wall Push Ups    Hip Flexion (SLR) X15 B     Hip aBduction (SLR) X15 B     Hip Extension (SLR) X15 B      Hip aDduction (SLR)      Hip Circles X15 CW and CCW B Functional:    Hip IR/Er  Step up forward X12 L/R small step   Hip Hikes  Step up lateral  X 12 B 4 in step      Step down  x      Lunges Forward      Lunges Retro      Lunges Lateral     Balance:        SLS        Tandem Stance X20\" X2 L/R      NBOS eyes open x Seated:     NBOS eyes closed X20\" X2 Ankle pumps     Hand to Opposite Knee  Ankle Circles     Fwd Step ups to SLS  Knee Flex/Ext    Lateral Step ups to SLS  Hip aBd/aDd    Stop/Go Gait   Bicycle       Ankle DF/PF      Ankle Inv/Ev    Stretching:       Gastroc/Soleus X15\" X2 L/R     Hamstring  X15\" X2 L/R [place]: Knee Flex Stretch  Jog    Piriformis   Noodle Hang    Hip Flexor  Traction at [place]    SK      DKTC       ITB  Cool Down:    Quad  Fwd Walking XMid Back   Lat Walking x   UT  Retro Walking    Post Shoulder  Noodle float    Ladder Pull      Pec Stretch            Core: Other:    TrA set  Seated stretching (self paced) Pelvic Tilts      Multifidi Walk outs c paddle      PNF Chop/Lifts                          Aquatic Abbreviation Key  B= Belt DB= Dumbells T= Theratube   H= Hydrotone N= Noodles W= Weights   P= Paddles S= Speedo equipment K= Kickboard      Pt. Education: Gave pt tour of pool, explained how to use lockers, what to wear, that it would be in group setting, and showed pt aquatic equipment. Modalities:     Pt. Education:  -patient educated on diagnosis, prognosis and expectations for rehab  -all patient questions were answered  10/18:  -progress thus far in therapy  -how to complete updated HEP at home safely  -plans for future land visits  10/25:   -educated on using tennis ball at home on trigger points, utilizing heating pads and hot showers in safe ways to decrease pain, sleeping positions. All questions answered. Home Exercise Program:  Access Code: [email or web address]  URL: [email or web address]  Date: 08/19/2022  Prepared by: Korina Montenegro    Exercises  Supine Lower Trunk Rotation - 1 x daily - 7 x weekly - 3 sets - 10 reps  Clamshell - 1 x daily - 7 x weekly - 3 sets - 10 reps  Seated Long Arc Quad - 1 x daily - 7 x weekly - 3 sets - 10 reps  Standing Shoulder Row with Anchored Resistance - 1 x daily - 7 x weekly - 3 sets - 10 reps  Shoulder Extension with Resistance - 1 x daily - 7 x weekly - 3 sets - 10 reps  Seated Hamstring Stretch - 1 x daily - 7 x weekly - 3 sets - 10 reps  Seated [place] - 1 x daily - 7 x weekly - 3 sets - 10 reps    10/18:  Sit to Stand - 1 x daily - 7 x weekly - 3 sets - 10 reps  Step Up - 1 x daily - 7 x weekly - 3 sets - 10 reps  Standing Tandem Balance with Counter Support - 1 x daily - 7 x weekly - 1 sets - 2-5 reps - 20 sec hold  Standing Single Leg Stance with Counter Support - 1 x daily - 7 x weekly - 1 sets - 2-5 reps - 10-15 sec hold  Side Stepping with Resistance at Thighs and Counter Support - 1 x daily - 7 x weekly - 1-3 sets - 10 reps      Therapeutic Exercise and NMR EXR  [x] (34071) Provided verbal/tactile cueing for activities related to strengthening, flexibility, endurance, ROM for improvements in  [x] LE / Lumbar: LE, proximal hip, and core control with self care, mobility, lifting, ambulation. [x] UE / Cervical: cervical, postural, scapular, scapulothoracic and UE control with self care, reaching, carrying, lifting, house/yardwork, driving, computer work.  [] (35815) Provided verbal/tactile cueing for activities related to improving balance, coordination, kinesthetic sense, posture, motor skill, proprioception to assist with   [] LE / lumbar: LE, proximal hip, and core control in self care, mobility, lifting, ambulation and eccentric single leg control. [] UE / cervical: cervical, scapular, scapulothoracic and UE control with self care, reaching, carrying, lifting, house/yardwork, driving, computer work.   [] (09474) Therapist is in constant attendance of 2 or more patients providing skilled therapy interventions, but not providing any significant amount of measurable one-on-one time to either patient, for improvements in  [] LE / lumbar: LE, proximal hip, and core control in self care, mobility, lifting, ambulation and eccentric single leg control. [] UE / cervical: cervical, scapular, scapulothoracic and UE control with self care, reaching, carrying, lifting, house/yardwork, driving, computer work.      NMR and Therapeutic Activities:    [x] (60156 or 79453) Provided verbal/tactile cueing for activities related to improving balance, coordination, kinesthetic sense, posture, motor skill, proprioception and motor activation to allow for proper function of [] LE: / Lumbar core, proximal hip and LE with self care and ADLs  [x] UE / Cervical: cervical, postural, scapular, scapulothoracic and UE control with self care, carrying, lifting, driving, computer work.   [] (15452) Gait Re-education- Provided training and instruction to the patient for proper LE, core and proximal hip recruitment and positioning and eccentric body weight control with ambulation re-education including up and down stairs     Home Management Training / Self Care:  [] (14016) Provided self-care/home management training related to activities of daily living and compensatory training, and/or use of adaptive equipment for improvement with: ADLs and compensatory training, meal preparation, safety procedures and instruction in use of adaptive equipment, including bathing, grooming, dressing, personal hygiene, basic household cleaning and chores.      Home Exercise Program:    [] (08397) Reviewed/Progressed HEP activities related to strengthening, flexibility, endurance, ROM of   [] LE / Lumbar: core, proximal hip and LE for functional self-care, mobility, lifting and ambulation/stair navigation   [] UE / Cervical: cervical, postural, scapular, scapulothoracic and UE control with self care, reaching, carrying, lifting, house/yardwork, driving, computer work  [] (83140)Reviewed/Progressed HEP activities related to improving balance, coordination, kinesthetic sense, posture, motor skill, proprioception of   [] LE: core, proximal hip and LE for self care, mobility, lifting, and ambulation/stair navigation    [] UE / Cervical: cervical, postural,  scapular, scapulothoracic and UE control with self care, reaching, carrying, lifting, house/yardwork, driving, computer work    Manual Treatments:  PROM / STM / Oscillations-Mobs:  G-I, II, III, IV (PA's, Inf., Post.)  [x] (48111) Provided manual therapy to mobilize LE, proximal hip and/or LS spine soft tissue/joints for the purpose of modulating pain, promoting relaxation,  increasing ROM, reducing/eliminating soft tissue swelling/inflammation/restriction, improving soft tissue extensibility and allowing for proper ROM for normal function with   [x] LE / lumbar: self care, mobility, lifting and ambulation. [x] UE / Cervical: self care, reaching, carrying, lifting, house/yardwork, driving, computer work. Modalities:  [] (16512) Vasopneumatic compression: Utilized vasopneumatic compression to decrease edema / swelling for the purpose of improving mobility and quad tone / recruitment which will allow for increased overall function including but not limited to self-care, transfers, ambulation, and ascending / descending stairs. Aquatic:  [] (45727) Aquatic therapy with therapeutic exercise. Provided verbal and tactile cueing for activities related to strengthening, flexibility, endurance, ROM for improvements in  [] LE / lumbar: LE, proximal hip, and core control in self care, mobility, lifting, ambulation and eccentric single leg control. [] UE / cervical: cervical, scapular, scapulothoracic and UE control with self care, reaching, carrying, lifting, house/yardwork, driving, computer work.   [] (72655) Therapist is in constant attendance of 2 or more patients providing skilled therapy interventions, but not providing any significant amount of measurable one-on-one time to either patient, for improvements in   LE / lumbar: LE, proximal hip, and core control in self care, mobility, lifting, ambulation and eccentric single leg control. [][] UE / cervical: cervical, scapular, scapulothoracic and UE control with self care, reaching, carrying, lifting, house/yardwork, driving, computer work.        Charges:  Timed Code Treatment Minutes: 43   Total Treatment Minutes: 43     [] EVAL - LOW (10494)   [] EVAL - MOD (91571)  [] EVAL - HIGH (54091)  [] RE-EVAL (89351)  [x] TM(51779) x  1     [] Ionto  [x] NMR (59223) x  1     [] Vaso  [] Manual (93257) x       [] Ultrasound  [x] TA x  1      [] [facility] (09091[facility]  [] Aquatic Therapy x 1    [] ES (un) (30307):   [] Home Management Training x  [] ES(attended) (48301)   [] Dry Needling 1-2 muscles (72515):  [] Dry Needling 3+ muscles (531982)  [] Group: 1      [] Other:     GOALS:  Patient stated goal: \" get better\"  [x] Progressing: [] Met: [] Not Met: [] Adjusted    Therapist goals for Patient:   Short Term Goals: To be achieved in: 2 weeks  1. Independent in HEP and progression per patient tolerance, in order to prevent re-injury. [x] Progressing: [] Met: [] Not Met: [] Adjusted  2. Patient will have a decrease in pain to facilitate improvement in movement, function, and ADLs as indicated by improvement with respect to Functional Deficits. [x] Progressing: [] Met: [] Not Met: [] Adjusted    Long Term Goals: To be achieved in: 8 weeks  1. FOTO functional survey score of >/= 66  to assist with reaching prior level of function. [x] Progressing: [] Met: [] Not Met: [] Adjusted  2. Patient will demonstrate increased UE/LE Strength to 5/5 with core activation to allow for proper functional mobility as indicated by patients Functional Deficits to allow pt to resume walking, stairs, and maintain independence without increase in symptoms. [x] Progressing: [] Met: [] Not Met: [] Adjusted  3. Patient will return to functional activities including ascending and descending stairs with at least one handrail to increase independence and decrease fall risk within the community without increased symptoms or restriction. [x] Progressing: [] Met: [] Not Met: [] Adjusted      Overall Progression Towards Functional goals/ Treatment Progress Update:  [x] Patient is progressing as expected towards functional goals listed. [] Progression is slowed due to complexities/Impairments listed. [] Progression has been slowed due to co-morbidities.   [] Plan just implemented, too soon to assess goals progression <30days   [] Goals require adjustment due to lack of progress  [] Patient is not progressing as expected and requires additional follow up with physician  [] Other    Persisting Functional Limitations/Impairments:  [x]Sleeping [x]Sitting               [x]Standing [x]Transfers        [x]Walking []Kneeling               [x]Stairs [x]Squatting / bending   [x]ADLs [x]Reaching  [x]Lifting  [x]Housework  []Driving []Job related tasks  []Sports/Recreation []Other:        ASSESSMENT:   Pt with improved pain and tolerance to activities this session. Pt with minimal soreness and aching in shoulders with GH strengthening. Pt with relief after added 1/2 FR anterior chain stretching. Continue to assess and progress pt per tolerance to improve general strength, endurance, and activity tolerance to return to PLOF. Treatment/Activity Tolerance:  [x] Patient able to complete tx [] Patient limited by fatigue  [x] Patient limited by pain  [] Patient limited by other medical complications  [] Other:     Prognosis: [x] Good [] Fair  [] Poor    Patient Requires Follow-up: [x] Yes  [] No    Plan for next treatment session:    PLAN: See eval. PT 2x / week for 8 weeks. 1 land, 1 aquatic per week  [x] Continue per plan of care [] Alter current plan (see comments)  [] Plan of care initiated [] Hold pending MD visit [] Discharge    Electronically signed by: Frankie Lancaster PT, DPT    Note: If patient does not return for scheduled/ recommended follow up visits, this note will serve as a discharge from care along with most recent update on progress.

## 2022-10-29 NOTE — RESULT ENCOUNTER NOTE
If the pain does not resolved with medication from the bulging disc in neck and mid back, I will send you to therapy at the [address] [place] [address]. Let me know.

## 2022-10-29 NOTE — RESULT ENCOUNTER NOTE
The xray shows degenerative disc/ bulging disc , in the neck and lower back that can cause the pain you were experiencing. If it is not better with medication, let me know and I will send you to physical therapy.

## 2022-11-01 ENCOUNTER — APPOINTMENT (OUTPATIENT)
Dept: PHYSICAL THERAPY | Age: 67
End: 2022-11-01
Payer: MEDICARE

## 2022-11-01 ENCOUNTER — HOSPITAL ENCOUNTER (OUTPATIENT)
Dept: PHYSICAL THERAPY | Age: 67
Setting detail: THERAPIES SERIES
Discharge: HOME | End: 2022-11-01
Payer: MEDICARE

## 2022-11-01 PROCEDURE — 97530 THERAPEUTIC ACTIVITIES: CPT

## 2022-11-01 PROCEDURE — 97110 THERAPEUTIC EXERCISES: CPT

## 2022-11-01 PROCEDURE — 97112 NEUROMUSCULAR REEDUCATION: CPT

## 2022-11-01 NOTE — FLOWSHEET NOTE
[address] [facility] Therapy  Phone: [phone]   Fax: [phone]    Physical Therapy Daily Treatment Note  Date:  2022    Patient Name:  Cely Jovel    :  1955  MRN: [id number]  Medical/Treatment Diagnosis Information:  Diagnosis: Z68.35 (ICD-10-CM) - BMI 35.0-35.9,adult. M79.7 (ICD-10-CM) - Fibromyalgia  Treatment Diagnosis: pain, decreased strength and endurance, difficulty with walking          Insurance/Certification information:  Medicare  Physician Information:  Denis Alvarez MD    Plan of care signed (Y/N): [x]  Yes []  No     Date of Patient follow up with Physician: prn     Progress Report: []  Yes  [x]  No     Date Range for reporting period:  Beginnin22  PN: 10/18/22  Ending:     Progress report due (10 Rx/or 30 days whichever is less): visit #10 or  (date)     Recertification due (POC duration/ or 90 days whichever is less): visit #20 or 22 (date)     Visit # Insurance Allowable Auth required? Date Range    [place] []  Yes  []  No 22-       Latex Allergy:  [x]NO      []YES  Preferred Language for Healthcare:   [x]English       []other:    Functional Scale:           Date assessed:  FOTO physical FS primary measure score = 64; risk adjusted = 47  22  FOTO physical FS primary measure score = 64     10/18/22      Pain level:  3/10 L shoulder blade     SUBJECTIVE:   Pt reports shoulder still sore, but not having dull ache or tingling. L shoulder not causing her pain when sleeping.        OBJECTIVE:   10/25: tender and painful along T2-3 and T6-8 SP with prone P-A; very tender trigger point in RUT    10/18: bold denotes improvement  Palpation:   Myofascial pain/tenderness: globally d/t fibromyalgia     ROM RIGHT AROM LEFT AROM Comments   LE             Hip Flexion         Hip Abd         Hip ER         Hip IR         Hip Extension         Knee Ext         Knee Flex         DF         PF         Ankle INV Ankle Ever                   UE            Shoulder flex 150 150     Shoulder AB         Shoulder ER T3 T4 Painful on RUE, L no p! Shoulder IR T8 T5 Painful on RUE, L no p!                           Palpation: global tenderness     Joint mobility:               []Normal                      [x]Hypo              []Hyper     MMT testing RLE with more pain during testing compared to LLE           Strength / Myotomes RIGHT LEFT   Multifidus       Transverse Ab       LE       Hip Flexors (L1-2) - seated 4 4   Quads (L2-4) 4 4   Ankle Dorsiflexion (L4-5) 4 4   Great Toe Extension (L5)       Ankle Eversion (S1-2)       Ankle Plantarflexion (S1-2) 4+ 4+           Strength / Myotomes RIGHT LEFT   Cervical Flexion (C1-2)       Cervical SB (C3)       Shoulder Shrug (C4)       Shoulder Abduction (C5) 4+* 4+   Shoulder ER (C5) 4 4   Biceps (C6) 4 4   Triceps (C7) 4 4   **pain with all MMT                    RESTRICTIONS/PRECAUTIONS: fibromyalgia    Exercises/Interventions:     Therapeutic Exercises (37495) Resistance / level Sets/sec Reps Notes   bike L2 5 min  10/18   pulleys 3 min   10/25   IB gastroc       Stair: HSS, HFS    HEP   Tband mid row/ext lime 1 20 ea HEP   Chin tuck w/ ball + B ER against wall 10/25: inc pain   1/2 FR [place] S 6 min   10/28   Lat Step up and Over 4\" 1 10 ea 11/1                 Therapeutic Activities (74503)       PN Measurements/education    Step Ups + bicep curl 6\"/3# 1 10 B 10/18: education on safety at home x 5 min   Sit<>stand + OH press 3# 4 5 10/18: using UE   Pt education   10/25   SB Wall Squat 10/28   Step Tap + Lat Raise 4\"/1# 2 20 11/1: alt feet          Neuromuscular Re-ed (11655)       Tandem AE 20\" 2B 10/18   SLS floor 15\" 4 B 10/18   Side steps  Monster walks Orange  orange 2  1 15ft  15ft 10/18   Foot on AE + crossbody punch 1# 1 15 B 10/28                 Manual Intervention (36294)       Prone SP: P-A   10/25   RUT TrP release   10/25   L ULTT + stretch   10/25   STM periscapular   10/25: focused on medial and superior borders of L scapula with many TrP. Pt very tender during, but reports relief after. Complete x2 min in prone, x8 min seated in chair                   AquaticTherapy Dates of Service: 9/9, 9/13, 9/16, 9/20, 9/23, 9/30, 10/14   Aquatic Visits Exercises/Activities:   Transfers:  [x] Stairs   [] Ramp  [] Chair Lift   % Immersion:            Ambulation/ Warm up:   UE Exercises: Forward  X 2 laps Shoulder Shrugs      Lateral   X 2 laps Shoulder Circles  Reverse circles only X12    Retro   X 2 laps Scapular Retraction      [place]    Push Downs      Heel/Toe Walking    Punching x      Rowing x12      Elbow Flex/Ext x      Shldr Flex/Ext x12      Shldr aBd/aDd x12   LE Exercises:  Reyne Sue aBd/aDd x12   HR/TR x15 Shldr IR/ER    Marches X15 B Arm Circles x   Squats x15 PNF Diagonals    Hamstring Curls X15 B  Wall Push Ups    Hip Flexion (SLR) X15 B     Hip aBduction (SLR) X15 B     Hip Extension (SLR) X15 B      Hip aDduction (SLR)      Hip Circles X15 CW and CCW B Functional:    Hip IR/Er  Step up forward X12 L/R small step   Hip Hikes  Step up lateral  X 12 B 4 in step      Step down  x      Lunges Forward      Lunges Retro      Lunges Lateral     Balance:        SLS        Tandem Stance X20\" X2 L/R      NBOS eyes open x Seated:     NBOS eyes closed X20\" X2 Ankle pumps     Hand to Opposite Knee  Ankle Circles     Fwd Step ups to SLS  Knee Flex/Ext    Lateral Step ups to SLS  Hip aBd/aDd    Stop/Go Gait   Bicycle       Ankle DF/PF      Ankle Inv/Ev    Stretching:       Gastroc/Soleus X15\" X2 L/R     Hamstring  X15\" X2 L/R Deep Water:    Knee Flex Stretch  Jog    Piriformis   Noodle Hang    Hip Flexor  Traction at [facility]       ITB  Cool Down:    Quad  Fwd Walking XMid Back   Lat Walking x   UT  Retro Walking    Post Shoulder  Noodle float    Ladder Pull      Pec Stretch            Core:   Other:    TrA set  Seated stretching (self paced) Pelvic Tilts      Multifidi Walk outs c paddle      PNF Chop/Lifts                          Aquatic Abbreviation Key  B= Belt DB= Dumbells T= Theratube   H= Hydrotone N= Noodles W= Weights   P= Paddles S= Speedo equipment K= Kickboard      Pt. Education: Gave pt tour of pool, explained how to use lockers, what to wear, that it would be in group setting, and showed pt aquatic equipment. Modalities:     Pt. Education:  -patient educated on diagnosis, prognosis and expectations for rehab  -all patient questions were answered  10/18:  -progress thus far in therapy  -how to complete updated HEP at home safely  -plans for future land visits  10/25:   -educated on using tennis ball at home on trigger points, utilizing heating pads and hot showers in safe ways to decrease pain, sleeping positions. All questions answered. Home Exercise Program:  Access Code: [email or web address]  URL: [email or web address]  Date: 08/19/2022  Prepared by: Olga Mccann    Exercises  Supine Lower Trunk Rotation - 1 x daily - 7 x weekly - 3 sets - 10 reps  Clamshell - 1 x daily - 7 x weekly - 3 sets - 10 reps  Seated Long Arc Quad - 1 x daily - 7 x weekly - 3 sets - 10 reps  Standing Shoulder Row with Anchored Resistance - 1 x daily - 7 x weekly - 3 sets - 10 reps  Shoulder Extension with Resistance - 1 x daily - 7 x weekly - 3 sets - 10 reps  Seated Hamstring Stretch - 1 x daily - 7 x weekly - 3 sets - 10 reps  Seated March - 1 x daily - 7 x weekly - 3 sets - 10 reps    10/18:  Sit to Stand - 1 x daily - 7 x weekly - 3 sets - 10 reps  Step Up - 1 x daily - 7 x weekly - 3 sets - 10 reps  Standing Tandem Balance with Counter Support - 1 x daily - 7 x weekly - 1 sets - 2-5 reps - 20 sec hold  Standing Single Leg Stance with Counter Support - 1 x daily - 7 x weekly - 1 sets - 2-5 reps - 10-15 sec hold  Side Stepping with Resistance at Thighs and Counter Support - 1 x daily - 7 x weekly - 1-3 sets - 10 reps      Therapeutic Exercise and NMR EXR  [x] (15554) Provided verbal/tactile cueing for activities related to strengthening, flexibility, endurance, ROM for improvements in  [x] LE / Lumbar: LE, proximal hip, and core control with self care, mobility, lifting, ambulation. [x] UE / Cervical: cervical, postural, scapular, scapulothoracic and UE control with self care, reaching, carrying, lifting, house/yardwork, driving, computer work.  [] (28581) Provided verbal/tactile cueing for activities related to improving balance, coordination, kinesthetic sense, posture, motor skill, proprioception to assist with   [] LE / lumbar: LE, proximal hip, and core control in self care, mobility, lifting, ambulation and eccentric single leg control. [] UE / cervical: cervical, scapular, scapulothoracic and UE control with self care, reaching, carrying, lifting, house/yardwork, driving, computer work.   [] (35993) Therapist is in constant attendance of 2 or more patients providing skilled therapy interventions, but not providing any significant amount of measurable one-on-one time to either patient, for improvements in  [] LE / lumbar: LE, proximal hip, and core control in self care, mobility, lifting, ambulation and eccentric single leg control. [] UE / cervical: cervical, scapular, scapulothoracic and UE control with self care, reaching, carrying, lifting, house/yardwork, driving, computer work.      NMR and Therapeutic Activities:    [x] (85444 or 53987) Provided verbal/tactile cueing for activities related to improving balance, coordination, kinesthetic sense, posture, motor skill, proprioception and motor activation to allow for proper function of   [] LE: / Lumbar core, proximal hip and LE with self care and ADLs  [x] UE / Cervical: cervical, postural, scapular, scapulothoracic and UE control with self care, carrying, lifting, driving, computer work.   [] (52738) Gait Re-education- Provided training and instruction to the patient for proper LE, core and proximal hip recruitment and positioning and eccentric body weight control with ambulation re-education including up and down stairs     Home Management Training / Self Care:  [] (79388) Provided self-care/home management training related to activities of daily living and compensatory training, and/or use of adaptive equipment for improvement with: ADLs and compensatory training, meal preparation, safety procedures and instruction in use of adaptive equipment, including bathing, grooming, dressing, personal hygiene, basic household cleaning and chores. Home Exercise Program:    [] (68209) Reviewed/Progressed HEP activities related to strengthening, flexibility, endurance, ROM of   [] LE / Lumbar: core, proximal hip and LE for functional self-care, mobility, lifting and ambulation/stair navigation   [] UE / Cervical: cervical, postural, scapular, scapulothoracic and UE control with self care, reaching, carrying, lifting, house/yardwork, driving, computer work  [] (91143)Reviewed/Progressed HEP activities related to improving balance, coordination, kinesthetic sense, posture, motor skill, proprioception of   [] LE: core, proximal hip and LE for self care, mobility, lifting, and ambulation/stair navigation    [] UE / Cervical: cervical, postural,  scapular, scapulothoracic and UE control with self care, reaching, carrying, lifting, house/yardwork, driving, computer work    Manual Treatments:  PROM / STM / Oscillations-Mobs:  G-I, II, III, IV (PA's, Inf., Post.)  [x] (52165) Provided manual therapy to mobilize LE, proximal hip and/or LS spine soft tissue/joints for the purpose of modulating pain, promoting relaxation,  increasing ROM, reducing/eliminating soft tissue swelling/inflammation/restriction, improving soft tissue extensibility and allowing for proper ROM for normal function with   [x] LE / lumbar: self care, mobility, lifting and ambulation.     [x] UE / Cervical: self care, reaching, carrying, lifting, house/yardwork, driving, computer work. Modalities:  [] (26550) Vasopneumatic compression: Utilized vasopneumatic compression to decrease edema / swelling for the purpose of improving mobility and quad tone / recruitment which will allow for increased overall function including but not limited to self-care, transfers, ambulation, and ascending / descending stairs. Aquatic:  [] (12096) Aquatic therapy with therapeutic exercise. Provided verbal and tactile cueing for activities related to strengthening, flexibility, endurance, ROM for improvements in  [] LE / lumbar: LE, proximal hip, and core control in self care, mobility, lifting, ambulation and eccentric single leg control. [] UE / cervical: cervical, scapular, scapulothoracic and UE control with self care, reaching, carrying, lifting, house/yardwork, driving, computer work.   [] (52373) Therapist is in constant attendance of 2 or more patients providing skilled therapy interventions, but not providing any significant amount of measurable one-on-one time to either patient, for improvements in   LE / lumbar: LE, proximal hip, and core control in self care, mobility, lifting, ambulation and eccentric single leg control. [][] UE / cervical: cervical, scapular, scapulothoracic and UE control with self care, reaching, carrying, lifting, house/yardwork, driving, computer work.        Charges:  Timed Code Treatment Minutes: 44   Total Treatment Minutes: 44     [] EVAL - LOW (54329)   [] EVAL - MOD (14540)  [] EVAL - HIGH (41085)  [] RE-EVAL (00244)  [x] NZ(43908) x  1     [] Ionto  [x] NMR (24654) x  1     [] Vaso  [] Manual (01981) x       [] Ultrasound  [x] TA x  1      [] Mech Traction (10224)  [] Aquatic Therapy x 1    [] ES (un) (57401):   [] Home Management Training x  [] ES(attended) (86934)   [] Dry Needling 1-2 muscles (18139):  [] Dry Needling 3+ muscles (543675)  [] Group: 1      [] Other:     GOALS:  Patient stated goal: \" get better\"  [x] Progressing: [] Met: [] Not Met: [] Adjusted    Therapist goals for Patient:   Short Term Goals: To be achieved in: 2 weeks  1. Independent in HEP and progression per patient tolerance, in order to prevent re-injury. [x] Progressing: [] Met: [] Not Met: [] Adjusted  2. Patient will have a decrease in pain to facilitate improvement in movement, function, and ADLs as indicated by improvement with respect to Functional Deficits. [x] Progressing: [] Met: [] Not Met: [] Adjusted    Long Term Goals: To be achieved in: 8 weeks  1. FOTO functional survey score of >/= 66  to assist with reaching prior level of function. [x] Progressing: [] Met: [] Not Met: [] Adjusted  2. Patient will demonstrate increased UE/LE Strength to 5/5 with core activation to allow for proper functional mobility as indicated by patients Functional Deficits to allow pt to resume walking, stairs, and maintain independence without increase in symptoms. [x] Progressing: [] Met: [] Not Met: [] Adjusted  3. Patient will return to functional activities including ascending and descending stairs with at least one handrail to increase independence and decrease fall risk within the community without increased symptoms or restriction. [x] Progressing: [] Met: [] Not Met: [] Adjusted      Overall Progression Towards Functional goals/ Treatment Progress Update:  [x] Patient is progressing as expected towards functional goals listed. [] Progression is slowed due to complexities/Impairments listed. [] Progression has been slowed due to co-morbidities.   [] Plan just implemented, too soon to assess goals progression <30days   [] Goals require adjustment due to lack of progress  [] Patient is not progressing as expected and requires additional follow up with physician  [] Other    Persisting Functional Limitations/Impairments:  [x]Sleeping [x]Sitting               [x]Standing [x]Transfers        [x]Walking []Kneeling               [x]Stairs [x]Squatting / bending   [x]ADLs [x]Reaching  [x]Lifting  [x]Housework  []Driving []Job related tasks  []Sports/Recreation []Other:        ASSESSMENT:   Pt with improved pain and tolerance to activities this session. Pt with minimal soreness and aching in shoulders with GH strengthening. Pt able to progress with full body exercises with dec seated rest breaks this date. Continue to assess and progress pt per tolerance to improve general strength, endurance, and activity tolerance to return to PLOF. Treatment/Activity Tolerance:  [x] Patient able to complete tx [] Patient limited by fatigue  [x] Patient limited by pain  [] Patient limited by other medical complications  [] Other:     Prognosis: [x] Good [] Fair  [] Poor    Patient Requires Follow-up: [x] Yes  [] No    Plan for next treatment session:    PLAN: See eval. PT 2x / week for 8 weeks. 1 land, 1 aquatic per week  [x] Continue per plan of care [] Alter current plan (see comments)  [] Plan of care initiated [] Hold pending MD visit [] Discharge    Electronically signed by: Lala Stiles, PT, DPT    Note: If patient does not return for scheduled/ recommended follow up visits, this note will serve as a discharge from care along with most recent update on progress.

## 2022-11-04 ENCOUNTER — HOSPITAL ENCOUNTER (OUTPATIENT)
Dept: PHYSICAL THERAPY | Age: 67
Setting detail: THERAPIES SERIES
Discharge: HOME | End: 2022-11-04
Payer: MEDICARE

## 2022-11-04 ENCOUNTER — APPOINTMENT (OUTPATIENT)
Dept: PHYSICAL THERAPY | Age: 67
End: 2022-11-04
Payer: MEDICARE

## 2022-11-04 PROCEDURE — 97110 THERAPEUTIC EXERCISES: CPT

## 2022-11-04 PROCEDURE — 97530 THERAPEUTIC ACTIVITIES: CPT

## 2022-11-04 PROCEDURE — 97112 NEUROMUSCULAR REEDUCATION: CPT

## 2022-11-04 NOTE — FLOWSHEET NOTE
[address] [facility] Physical Therapy  Phone: [phone]   Fax: [phone]    Physical Therapy Daily Treatment Note  Date:  2022    Patient Name:  Anastacio Weber    :  1955  MRN: [id number]  Medical/Treatment Diagnosis Information:  Diagnosis: Z68.35 (ICD-10-CM) - BMI 35.0-35.9,adult. M79.7 (ICD-10-CM) - Fibromyalgia  Treatment Diagnosis: pain, decreased strength and endurance, difficulty with walking          Insurance/Certification information:  Medicare  Physician Information:  Sonia Rodrigues MD    Plan of care signed (Y/N): [x]  Yes []  No     Date of Patient follow up with Physician: prn     Progress Report: []  Yes  [x]  No     Date Range for reporting period:  Beginnin22  PN: 10/18/22  Ending:     Progress report due (10 Rx/or 30 days whichever is less): visit #10 or  (date)     Recertification due (POC duration/ or 90 days whichever is less): visit #20 or 22 (date)     Visit # Insurance Allowable Auth required? Date Range    [place] []  Yes  []  No 22-       Latex Allergy:  [x]NO      []YES  Preferred Language for Healthcare:   [x]English       []other:    Functional Scale:           Date assessed:  FOTO physical FS primary measure score = 64; risk adjusted = 47  22  FOTO physical FS primary measure score = 64     10/18/22      Pain level:  3/10 L shoulder blade     SUBJECTIVE:   Pt reports she felt good two days after therapy and then she had a lot of pain.        OBJECTIVE:   : less fatigue/heavy breathing with squat to [place] [employer] press this date  10/25: tender and painful along T2-3 and T6-8 SP with prone P-A; very tender trigger point in RUT    10/18: bold denotes improvement  Palpation:   Myofascial pain/tenderness: globally d/t fibromyalgia     ROM RIGHT AROM LEFT AROM Comments   LE             Hip Flexion         Hip Abd         Hip ER         Hip IR         Hip Extension         Knee Ext         Knee Flex         DF PF         Ankle INV         Ankle Ever                   UE            Shoulder flex 150 150     Shoulder AB         Shoulder ER T3 T4 Painful on RUE, L no p! Shoulder IR T8 T5 Painful on RUE, L no p!                           Palpation: global tenderness     Joint mobility:               []Normal                      [x]Hypo              []Hyper     MMT testing RLE with more pain during testing compared to LLE           Strength / Myotomes RIGHT LEFT   Multifidus       Transverse Ab       LE       Hip Flexors (L1-2) - seated 4 4   Quads (L2-4) 4 4   Ankle Dorsiflexion (L4-5) 4 4   Great Toe Extension (L5)       Ankle Eversion (S1-2)       Ankle Plantarflexion (S1-2) 4+ 4+           Strength / Myotomes RIGHT LEFT   Cervical Flexion (C1-2)       Cervical SB (C3)       Shoulder Shrug (C4)       Shoulder Abduction (C5) 4+* 4+   Shoulder ER (C5) 4 4   Biceps (C6) 4 4   Triceps (C7) 4 4   **pain with all MMT                    RESTRICTIONS/PRECAUTIONS: fibromyalgia    Exercises/Interventions:     Therapeutic Exercises (38451) Resistance / level Sets/sec Reps Notes   bike  10/18   pulleys 3 min   10/25   IB gastroc       Stair: HSS, HFS    HEP   Tband mid row/ext HEP   Chin tuck w/ ball + B ER against wall 10/25: inc pain   1/2 FR [place] S   10/28   Lat Step up and Over 4\" 1 10 ea 11/1   Cybex  -row  -ext 3 plates 2 10 ea 37/1          Therapeutic Activities (71325)       PN Measurements/education    Step Ups + bicep curl 6\"/3# 2 10 B 10/18: education on safety at home x 5 min   Sit<>stand + [place] press 3# 4 5 10/18: using UE   Pt education   10/25   SB Wall Squat 10/28   Step Tap + Lat Raise 4\"/2# 2 20 11/1: alt feet          Neuromuscular Re-ed (16959)       Tandem AE 20\" 2B 10/18   SLS floor 15\" 4 B 10/18   Side steps  Monster walks Orange  orange 2  2 15ft  15ft 10/18   Foot on AE + crossbody punch 2# 2 15 B 10/28   Marching on AE  1 20 alt 11/4          Manual Intervention (58821)       Prone SP: P-A   10/25 RUT TrP release   10/25   L ULTT + stretch   10/25   STM periscapular   10/25: focused on medial and superior borders of L scapula with many TrP. Pt very tender during, but reports relief after. Complete x2 min in prone, x8 min seated in chair                   AquaticTherapy Dates of Service: 9/9, 9/13, 9/16, 9/20, 9/23, 9/30, 10/14   Aquatic Visits Exercises/Activities:   Transfers:  [x] Stairs   [] Ramp  [] Chair Lift   % Immersion:            Ambulation/ Warm up:   UE Exercises:       Forward  X 2 laps Shoulder Shrugs      Lateral   X 2 laps Shoulder Circles  Reverse circles only X12    Retro   X 2 laps Scapular Retraction      [place]    Push Downs      Heel/Toe Walking    Punching x      Rowing x12      Elbow Flex/Ext x      Shldr Flex/Ext x12      Shldr aBd/aDd x12   LE Exercises:  Anna Menghini aBd/aDd x12   HR/TR x15 Shldr IR/ER    Marches X15 B Arm Circles x   Squats x15 PNF Diagonals    Hamstring Curls X15 B  Wall Push Ups    Hip Flexion (SLR) X15 B     Hip aBduction (SLR) X15 B     Hip Extension (SLR) X15 B      Hip aDduction (SLR)      Hip Circles X15 CW and CCW B Functional:    Hip IR/Er  Step up forward X12 L/R small step   Hip Hikes  Step up lateral  X 12 B 4 in step      Step down  x      Lunges Forward      Lunges Retro      Lunges Lateral     Balance:        SLS        Tandem Stance X20\" X2 L/R      NBOS eyes open x Seated:     NBOS eyes closed X20\" X2 Ankle pumps     Hand to Opposite Knee  Ankle Circles     Fwd Step ups to SLS  Knee Flex/Ext    Lateral Step ups to SLS  Hip aBd/aDd    Stop/Go Gait   Bicycle       Ankle DF/PF      Ankle Inv/Ev    Stretching:       Gastroc/Soleus X15\" X2 L/R     Hamstring  X15\" X2 L/R [place]:    Knee Flex Stretch  Jog    Piriformis   Noodle Hang    Hip Flexor  [facility]       ITB  Cool Down:    Quad  Fwd Walking XMid Back   Lat Walking x   UT  Retro Walking    Post Shoulder  Noodle float    Ladder Pull      Pec Stretch            Core: Other:    TrA set  Seated stretching (self paced) Pelvic Tilts      Multifidi Walk outs c paddle      PNF Chop/Lifts                          Aquatic Abbreviation Key  B= Belt DB= Dumbells T= Theratube   H= Hydrotone N= Noodles W= Weights   P= Paddles S= Speedo equipment K= Kickboard      Pt. Education: Gave pt tour of pool, explained how to use lockers, what to wear, that it would be in group setting, and showed pt aquatic equipment. Modalities:     Pt. Education:  -patient educated on diagnosis, prognosis and expectations for rehab  -all patient questions were answered  10/18:  -progress thus far in therapy  -how to complete updated HEP at home safely  -plans for future land visits  10/25:   -educated on using tennis ball at home on trigger points, utilizing heating pads and hot showers in safe ways to decrease pain, sleeping positions. All questions answered. Home Exercise Program:  Access Code: [email or web address]  URL: [email or web address]  Date: 08/19/2022  Prepared by: Candis Pack    Exercises  Supine Lower Trunk Rotation - 1 x daily - 7 x weekly - 3 sets - 10 reps  Clamshell - 1 x daily - 7 x weekly - 3 sets - 10 reps  Seated Long Arc Quad - 1 x daily - 7 x weekly - 3 sets - 10 reps  Standing Shoulder Row with Anchored Resistance - 1 x daily - 7 x weekly - 3 sets - 10 reps  Shoulder Extension with Resistance - 1 x daily - 7 x weekly - 3 sets - 10 reps  Seated Hamstring Stretch - 1 x daily - 7 x weekly - 3 sets - 10 reps  Seated March - 1 x daily - 7 x weekly - 3 sets - 10 reps    10/18:  Sit to Stand - 1 x daily - 7 x weekly - 3 sets - 10 reps  Step Up - 1 x daily - 7 x weekly - 3 sets - 10 reps  Standing Tandem Balance with Counter Support - 1 x daily - 7 x weekly - 1 sets - 2-5 reps - 20 sec hold  Standing Single Leg Stance with Counter Support - 1 x daily - 7 x weekly - 1 sets - 2-5 reps - 10-15 sec hold  Side Stepping with Resistance at Thighs and Counter Support - 1 x daily - 7 x weekly - 1-3 sets to the patient for proper LE, core and proximal hip recruitment and positioning and eccentric body weight control with ambulation re-education including up and down stairs     Home Management Training / Self Care:  [] (46254) Provided self-care/home management training related to activities of daily living and compensatory training, and/or use of adaptive equipment for improvement with: ADLs and compensatory training, meal preparation, safety procedures and instruction in use of adaptive equipment, including bathing, grooming, dressing, personal hygiene, basic household cleaning and chores. Home Exercise Program:    [] (23472) Reviewed/Progressed HEP activities related to strengthening, flexibility, endurance, ROM of   [] LE / Lumbar: core, proximal hip and LE for functional self-care, mobility, lifting and ambulation/stair navigation   [] UE / Cervical: cervical, postural, scapular, scapulothoracic and UE control with self care, reaching, carrying, lifting, house/yardwork, driving, computer work  [] (53194)Reviewed/Progressed HEP activities related to improving balance, coordination, kinesthetic sense, posture, motor skill, proprioception of   [] LE: core, proximal hip and LE for self care, mobility, lifting, and ambulation/stair navigation    [] UE / Cervical: cervical, postural,  scapular, scapulothoracic and UE control with self care, reaching, carrying, lifting, house/yardwork, driving, computer work    Manual Treatments:  PROM / STM / Oscillations-Mobs:  G-I, II, III, IV (PA's, Inf., Post.)  [x] (24401) Provided manual therapy to mobilize LE, proximal hip and/or LS spine soft tissue/joints for the purpose of modulating pain, promoting relaxation,  increasing ROM, reducing/eliminating soft tissue swelling/inflammation/restriction, improving soft tissue extensibility and allowing for proper ROM for normal function with   [x] LE / lumbar: self care, mobility, lifting and ambulation.     [x] UE / Cervical: self care, reaching, carrying, lifting, house/yardwork, driving, computer work. Modalities:  [] (18005) Vasopneumatic compression: Utilized vasopneumatic compression to decrease edema / swelling for the purpose of improving mobility and quad tone / recruitment which will allow for increased overall function including but not limited to self-care, transfers, ambulation, and ascending / descending stairs. Aquatic:  [] (18514) Aquatic therapy with therapeutic exercise. Provided verbal and tactile cueing for activities related to strengthening, flexibility, endurance, ROM for improvements in  [] LE / lumbar: LE, proximal hip, and core control in self care, mobility, lifting, ambulation and eccentric single leg control. [] UE / cervical: cervical, scapular, scapulothoracic and UE control with self care, reaching, carrying, lifting, house/yardwork, driving, computer work.   [] (84905) Therapist is in constant attendance of 2 or more patients providing skilled therapy interventions, but not providing any significant amount of measurable one-on-one time to either patient, for improvements in   LE / lumbar: LE, proximal hip, and core control in self care, mobility, lifting, ambulation and eccentric single leg control. [][] UE / cervical: cervical, scapular, scapulothoracic and UE control with self care, reaching, carrying, lifting, house/yardwork, driving, computer work.        Charges:  Timed Code Treatment Minutes: 44   Total Treatment Minutes: 44     [] EVAL - LOW (25351)   [] EVAL - MOD (45670)  [] EVAL - HIGH (38280)  [] RE-EVAL (95076)  [x] GUERRERO(08475) x  1     [] Ionto  [x] NMR (66148) x  1     [] Vaso  [] Manual (89465) x       [] Ultrasound  [x] TA x  1      [] Mech Traction (30652)  [] Aquatic Therapy x 1    [] ES (un) (44191):   [] Home Management Training x  [] ES(attended) (39413)   [] Dry Needling 1-2 muscles (32978):  [] Dry Needling 3+ muscles (407747)  [] Group: 1      [] Other:     GOALS:  Patient stated goal: \" get better\"  [x] Progressing: [] Met: [] Not Met: [] Adjusted    Therapist goals for Patient:   Short Term Goals: To be achieved in: 2 weeks  1. Independent in HEP and progression per patient tolerance, in order to prevent re-injury. [x] Progressing: [] Met: [] Not Met: [] Adjusted  2. Patient will have a decrease in pain to facilitate improvement in movement, function, and ADLs as indicated by improvement with respect to Functional Deficits. [x] Progressing: [] Met: [] Not Met: [] Adjusted    Long Term Goals: To be achieved in: 8 weeks  1. FOTO functional survey score of >/= 66  to assist with reaching prior level of function. [x] Progressing: [] Met: [] Not Met: [] Adjusted  2. Patient will demonstrate increased UE/LE Strength to 5/5 with core activation to allow for proper functional mobility as indicated by patients Functional Deficits to allow pt to resume walking, stairs, and maintain independence without increase in symptoms. [x] Progressing: [] Met: [] Not Met: [] Adjusted  3. Patient will return to functional activities including ascending and descending stairs with at least one handrail to increase independence and decrease fall risk within the community without increased symptoms or restriction. [x] Progressing: [] Met: [] Not Met: [] Adjusted      Overall Progression Towards Functional goals/ Treatment Progress Update:  [x] Patient is progressing as expected towards functional goals listed. [] Progression is slowed due to complexities/Impairments listed. [] Progression has been slowed due to co-morbidities.   [] Plan just implemented, too soon to assess goals progression <30days   [] Goals require adjustment due to lack of progress  [] Patient is not progressing as expected and requires additional follow up with physician  [] Other    Persisting Functional Limitations/Impairments:  [x]Sleeping [x]Sitting               [x]Standing [x]Transfers        [x]Walking []Kneeling               [x]Stairs [x]Squatting / bending   [x]ADLs [x]Reaching  [x]Lifting  [x]Housework  []Driving []Job related tasks  []Sports/Recreation []Other:        ASSESSMENT:   Pt with improved pain and tolerance to activities this session. Pt able to complete sit<>stands faster and without UE support. Pt notes even at home she can complete 20 sit<>stands throughout the day and no longer has to use the grab bars in her bathroom to stand up from the toilet. Pt able to add endurance and strength by adding reps and sets to program this date. Continue to assess and progress pt per tolerance to improve general strength, endurance, and activity tolerance to return to [facility]. Treatment/Activity Tolerance:  [x] Patient able to complete tx [] Patient limited by fatigue  [x] Patient limited by pain  [] Patient limited by other medical complications  [] Other:     Prognosis: [x] Good [] Fair  [] Poor    Patient Requires Follow-up: [x] Yes  [] No    Plan for next treatment session:    PLAN: See eval. PT 2x / week for 8 weeks. 1 land, 1 aquatic per week  [x] Continue per plan of care [] Alter current plan (see comments)  [] Plan of care initiated [] Hold pending MD visit [] Discharge    Electronically signed by: Lorena Patel, PT, DPT    Note: If patient does not return for scheduled/ recommended follow up visits, this note will serve as a discharge from care along with most recent update on progress.

## 2022-11-08 ENCOUNTER — APPOINTMENT (OUTPATIENT)
Dept: PHYSICAL THERAPY | Age: 67
End: 2022-11-08
Payer: MEDICARE

## 2022-11-08 ENCOUNTER — HOSPITAL ENCOUNTER (OUTPATIENT)
Dept: PHYSICAL THERAPY | Age: 67
Setting detail: THERAPIES SERIES
Discharge: HOME | End: 2022-11-08
Payer: MEDICARE

## 2022-11-08 ENCOUNTER — OFFICE VISIT (OUTPATIENT)
Dept: PRIMARY CARE CLINIC | Age: 67
End: 2022-11-08
Payer: MEDICARE

## 2022-11-08 VITALS
TEMPERATURE: 97.3 F | OXYGEN SATURATION: 98 % | DIASTOLIC BLOOD PRESSURE: 73 MMHG | BODY MASS INDEX: 35.83 KG/M2 | SYSTOLIC BLOOD PRESSURE: 128 MMHG | WEIGHT: 222 LBS | HEART RATE: 67 BPM

## 2022-11-08 DIAGNOSIS — L65.9 ALOPECIA: ICD-10-CM

## 2022-11-08 DIAGNOSIS — L65.9 HAIR THINNING: ICD-10-CM

## 2022-11-08 DIAGNOSIS — M89.8X1 PAIN OF LEFT SCAPULA: ICD-10-CM

## 2022-11-08 DIAGNOSIS — I10 ESSENTIAL HYPERTENSION: ICD-10-CM

## 2022-11-08 DIAGNOSIS — M79.10 MYALGIA: ICD-10-CM

## 2022-11-08 DIAGNOSIS — E78.2 MIXED HYPERLIPIDEMIA: Primary | ICD-10-CM

## 2022-11-08 LAB
C-REACTIVE PROTEIN: <3 MG/L (ref 0–5.1)
RHEUMATOID FACTOR: <10 IU/ML
SEDIMENTATION RATE, ERYTHROCYTE: 50 MM/HR (ref 0–30)
TOTAL CK: 53 U/L (ref 26–192)
TSH REFLEX FT4: 0.92 UIU/ML (ref 0.27–4.2)

## 2022-11-08 PROCEDURE — 97110 THERAPEUTIC EXERCISES: CPT

## 2022-11-08 PROCEDURE — 97530 THERAPEUTIC ACTIVITIES: CPT

## 2022-11-08 PROCEDURE — 3078F DIAST BP <80 MM HG: CPT | Performed by: INTERNAL MEDICINE

## 2022-11-08 PROCEDURE — G8417 CALC BMI ABV UP PARAM F/U: HCPCS | Performed by: INTERNAL MEDICINE

## 2022-11-08 PROCEDURE — 1090F PRES/ABSN URINE INCON ASSESS: CPT | Performed by: INTERNAL MEDICINE

## 2022-11-08 PROCEDURE — 1036F TOBACCO NON-USER: CPT | Performed by: INTERNAL MEDICINE

## 2022-11-08 PROCEDURE — 3074F SYST BP LT 130 MM HG: CPT | Performed by: INTERNAL MEDICINE

## 2022-11-08 PROCEDURE — 1123F ACP DISCUSS/DSCN MKR DOCD: CPT | Performed by: INTERNAL MEDICINE

## 2022-11-08 PROCEDURE — G8399 PT W/DXA RESULTS DOCUMENT: HCPCS | Performed by: INTERNAL MEDICINE

## 2022-11-08 PROCEDURE — G8484 FLU IMMUNIZE NO ADMIN: HCPCS | Performed by: INTERNAL MEDICINE

## 2022-11-08 PROCEDURE — 3017F COLORECTAL CA SCREEN DOC REV: CPT | Performed by: INTERNAL MEDICINE

## 2022-11-08 PROCEDURE — 99214 OFFICE O/P EST MOD 30 MIN: CPT | Performed by: INTERNAL MEDICINE

## 2022-11-08 PROCEDURE — G8427 DOCREV CUR MEDS BY ELIG CLIN: HCPCS | Performed by: INTERNAL MEDICINE

## 2022-11-08 PROCEDURE — 97112 NEUROMUSCULAR REEDUCATION: CPT

## 2022-11-08 RX ORDER — BETAMETHASONE DIPROPIONATE 0.05 %
OINTMENT (GRAM) TOPICAL
Qty: 15 G | Refills: 3 | Status: SHIPPED | OUTPATIENT
Start: 2022-11-08

## 2022-11-08 ASSESSMENT — PATIENT HEALTH QUESTIONNAIRE - PHQ9
SUM OF ALL RESPONSES TO PHQ QUESTIONS 1-9: 0
SUM OF ALL RESPONSES TO PHQ QUESTIONS 1-9: 0
2. FEELING DOWN, DEPRESSED OR HOPELESS: 0
SUM OF ALL RESPONSES TO PHQ9 QUESTIONS 1 & 2: 0
SUM OF ALL RESPONSES TO PHQ QUESTIONS 1-9: 0
SUM OF ALL RESPONSES TO PHQ QUESTIONS 1-9: 0
1. LITTLE INTEREST OR PLEASURE IN DOING THINGS: 0

## 2022-11-08 NOTE — PROGRESS NOTES
0  Vale Castillo (:  1955) is a 78 y.o. female,Established patient, here for evaluation of the following chief complaint(s):  Follow-up         ASSESSMENT/PLAN:  1. Mixed hyperlipidemia: Controlled on current dose of simvastatin. But due to myalgias we will hold. Explained to patient we will need to find a substitute within a month because she is at increased cardiovascular risk and the simvastatin has been protecting her. The 10-year ASCVD risk score (Conor HARDY, et al., 2019) is: 7.4%    Values used to calculate the score:      Age: 78 years      Sex: Female      Is Non- : Yes      Diabetic: No      Tobacco smoker: No      Systolic Blood Pressure: 326 mmHg      Is BP treated: No      HDL Cholesterol: 72 mg/dL      Total Cholesterol: 178 mg/dL    Lab Results   Component Value Date    CHOL 178 2022    CHOL 177 2020    CHOL 234 (H) 2020     Lab Results   Component Value Date    TRIG 41 2022    TRIG 54 2020    TRIG 42 2020     Lab Results   Component Value Date    HDL 72 (H) 2022    HDL 73 (H) 2020    HDL 77 (H) 2020     Lab Results   Component Value Date    LDLCALC 98 2022    LDLCALC 93 2020    LDLCALC 149 (H) 2020     Lab Results   Component Value Date    LABVLDL 8 2022    LABVLDL 11 2020    LABVLDL 8 2020     No results found for: CHOLHDLRATIO    2. Myalgia myalgias we will first hold the statin and evaluate for inflammatory disorders with C-reactive protein sed rate rheumatoid factor DIANA and check CK on statin therapy  -     C-Reactive Protein; Future  -     Sedimentation Rate; Future  -     RHEUMATOID FACTOR; Future  -     DIANA Reflex to Antibody Cascade; Future  -     CK; Future  3. Essential hypertension is controlled on current medication continue. Normal renal function. Continue metoprolol XL 25 mg daily and low-sodium diet.   Labs Renal Latest Ref Rng & Units 2022 2/10/2022 7/11/2021 5/25/2021   BUN 7 - 20 mg/dL 11 7 12 10 19   Cr 0.6 - 1.2 mg/dL 0.9 0.9 1.1 0.8 1.0   K 3.5 - 5.1 mmol/L 4.1 3.5 4.0 3.8 4.6   Na 136 - 145 mmol/L 140 138 134(L) 139 138       BP Readings from Last 3 Encounters:   11/08/22 128/73   10/25/22 134/82   08/15/22 (!) 143/85       4. Alopecia, with area scalp in a male pattern baldness thinning. We will rule out associated collagen vascular disease and hypothyroidism and also have her use steroid ointment and Rogaine for women as directed  -     betamethasone dipropionate 0.05 % ointment; Apply topically daily. , Disp-15 g, R-3, Normal  5. Hair thinning  -     TSH with Reflex to FT4; Future  -     C-Reactive Protein; Future  -     Sedimentation Rate; Future  -     RHEUMATOID FACTOR; Future  -     DIANA Reflex to Antibody Cascade; Future  -     CK; Future  6. Pain of left scapula, improving with physical therapy, continue. Labs to rule out inflammatory disorder. Return in about 2 months (around 1/8/2023) for follow up on myalgia, hair thinning. .         Subjective   SUBJECTIVE/OBJECTIVE:  Hyperlipidemia  This is a chronic problem. The current episode started more than 1 year ago. The problem is controlled. Recent lipid tests were reviewed and are normal. Exacerbating diseases include obesity. She has no history of chronic renal disease, diabetes, hypothyroidism, liver disease or nephrotic syndrome. There are no known factors aggravating her hyperlipidemia. Pertinent negatives include no chest pain, focal sensory loss, focal weakness, leg pain, myalgias or shortness of breath. Current antihyperlipidemic treatment includes statins. The current treatment provides significant improvement of lipids. Compliance problems include adherence to exercise and adherence to diet. Risk factors for coronary artery disease include hypertension, obesity and post-menopausal.   Hypertension  This is a chronic problem. The current episode started more than 1 year ago. The problem is unchanged. The problem is controlled. Pertinent negatives include no anxiety, blurred vision, chest pain, headaches, malaise/fatigue, neck pain, orthopnea, palpitations, peripheral edema, PND, shortness of breath or sweats. There are no associated agents to hypertension. Risk factors for coronary artery disease include dyslipidemia and diabetes mellitus. Past treatments include beta blockers. The current treatment provides significant improvement. There is no history of angina, kidney disease, CAD/MI, CVA, heart failure, left ventricular hypertrophy, PVD or retinopathy. There is no history of chronic renal disease. Review of Systems   Constitutional:  Negative for activity change, appetite change, fatigue, fever, malaise/fatigue and unexpected weight change. Flu Vac    HENT: Negative. Negative for sinus pressure. Eyes: Negative. Negative for blurred vision. Respiratory:  Negative for cough, chest tightness, shortness of breath and wheezing. Does not smoke   No etoh   No asthma    Cardiovascular: Negative. Negative for chest pain, palpitations, orthopnea and PND. No HTN / CAD    Gastrointestinal:  Negative for abdominal distention, abdominal pain, constipation, diarrhea, nausea and vomiting. Genitourinary:  Negative for dysuria, frequency, hematuria, menstrual problem, urgency and vaginal discharge. Musculoskeletal:  Positive for back pain. Negative for arthralgias, myalgias and neck pain. Skin: Negative. Negative for rash. Neurological:  Negative for dizziness, focal weakness, weakness, numbness and headaches. Hematological: Negative. Psychiatric/Behavioral: Negative. Negative for behavioral problems and sleep disturbance. The patient is not nervous/anxious. Objective   Physical Exam  Vitals reviewed. Constitutional:       General: She is not in acute distress. HENT:      Head: Normocephalic.       Nose: Nose normal.   Eyes: Conjunctiva/sclera: Conjunctivae normal.   Cardiovascular:      Rate and Rhythm: Normal rate and regular rhythm. Heart sounds: Normal heart sounds. Pulmonary:      Effort: Pulmonary effort is normal.      Breath sounds: Normal breath sounds. Abdominal:      General: There is no distension. Palpations: Abdomen is soft. There is no mass. Tenderness: There is no abdominal tenderness. There is no right CVA tenderness, left CVA tenderness, guarding or rebound. Hernia: No hernia is present. Musculoskeletal:         General: Normal range of motion. Cervical back: Neck supple. Comments: Reproduced pain with palpation of scapula on left posterior back. Pain pattern in the left arm follows with C8 distribution/T1 for the neck. Lymphadenopathy:      Cervical: No cervical adenopathy. Skin:     General: Skin is warm and dry. Comments: Male pattern thinning   Neurological:      General: No focal deficit present. Mental Status: She is alert and oriented to person, place, and time. Cranial Nerves: No cranial nerve deficit. Sensory: No sensory deficit. Motor: No weakness. Coordination: Coordination normal.      Gait: Gait normal.      Deep Tendon Reflexes: Reflexes normal.   Psychiatric:         Mood and Affect: Mood normal.         Behavior: Behavior normal.         Thought Content: Thought content normal.         Judgment: Judgment normal.                An electronic signature was used to authenticate this note.     --Win Sandoval MD

## 2022-11-08 NOTE — FLOWSHEET NOTE
[address] [facility] Physical Therapy  Phone: [phone]   Fax: [phone]    Physical Therapy Daily Treatment Note  Date:  2022    Patient Name:  Brandi Woodruff    :  1955  MRN: [id number]  Medical/Treatment Diagnosis Information:  Diagnosis: Z68.35 (ICD-10-CM) - BMI 35.0-35.9,adult. M79.7 (ICD-10-CM) - Fibromyalgia  Treatment Diagnosis: pain, decreased strength and endurance, difficulty with walking          Insurance/Certification information:  Medicare  Physician Information:  Leda Razo MD    Plan of care signed (Y/N): [x]  Yes []  No     Date of Patient follow up with Physician: prn     Progress Report: []  Yes  [x]  No     Date Range for reporting period:  Beginnin22  PN: 10/18/22  Ending:     Progress report due (10 Rx/or 30 days whichever is less): visit #10 or 71     Recertification due (POC duration/ or 90 days whichever is less): visit #16 or 22     Visit # Insurance Allowable Auth required? Date Range    [place] []  Yes  [x]  No 22-       Latex Allergy:  [x]NO      []YES  Preferred Language for Healthcare:   [x]English       []other:    Functional Scale:           Date assessed:  FOTO physical FS primary measure score = 64; risk adjusted = 47  22  FOTO physical FS primary measure score = 64     10/18/22      Pain level:  2/10 L shoulder blade     SUBJECTIVE:   Pt reports she's fine one day and the next she's in a ton of pain. Pt has f/u to PCP who prescribed her steroids when her L shoulder was in pain.       OBJECTIVE:   : less fatigue/heavy breathing with squat to [employer] press this date  10/25: tender and painful along T2-3 and T6-8 SP with prone P-A; very tender trigger point in RUT    10/18: bold denotes improvement  Palpation:   Myofascial pain/tenderness: globally d/t fibromyalgia     ROM RIGHT AROM LEFT AROM Comments   LE             Hip Flexion         Hip Abd         Hip ER         Hip IR         Hip Extension         Knee Ext         Knee Flex         DF         PF         Ankle INV         Ankle Ever                   UE            Shoulder flex 150 150     Shoulder AB         Shoulder ER T3 T4 Painful on RUE, L no p! Shoulder IR T8 T5 Painful on RUE, L no p!                           Palpation: global tenderness     Joint mobility:               []Normal                      [x]Hypo              []Hyper     MMT testing RLE with more pain during testing compared to LLE           Strength / Myotomes RIGHT LEFT   Multifidus       Transverse Ab       LE       Hip Flexors (L1-2) - seated 4 4   Quads (L2-4) 4 4   Ankle Dorsiflexion (L4-5) 4 4   Great Toe Extension (L5)       Ankle Eversion (S1-2)       Ankle Plantarflexion (S1-2) 4+ 4+           Strength / Myotomes RIGHT LEFT   Cervical Flexion (C1-2)       Cervical SB (C3)       Shoulder Shrug (C4)       Shoulder Abduction (C5) 4+* 4+   Shoulder ER (C5) 4 4   Biceps (C6) 4 4   Triceps (C7) 4 4   **pain with all MMT                    RESTRICTIONS/PRECAUTIONS: fibromyalgia    Exercises/Interventions:     Therapeutic Exercises (82973) Resistance / level Sets/sec Reps Notes   bike L2 5 min  10/18   pulleys 3 min   10/25   IB gastroc       Stair: HSS, HFS    HEP   Tband mid row/ext HEP   Chin tuck w/ ball + B ER against wall 10/25: inc pain   1/2 FR [place] S   10/28   Lat Step up and Over 11/1   Cybex  -row  -ext 3 plates 2 10 ea 86/0   DL Leg Press 70# 1 20 11/8                 Therapeutic Activities (05239)       PN Measurements/education    Step Ups + bicep curl 6\"/3# 2 10 B 10/18: education on safety at home x 5 min   Sit<>stand + [place] press 3# 4 6 10/18: using UE   Pt education   10/25   SB Wall Squat 10/28   Step Tap + Lat Raise 4\"/2# 2 20 11/1: alt feet   Deadlift 10# KB/12\" 1 10 11/8                 Neuromuscular Re-ed (32257)       Tandem AE 20\" 2B 10/18. 11/8: int UE support   SLS floor 15\" 4 B 10/18   Side steps  Monster walks 10/18   Foot on AE + crossbody punch 3#/AE on 4\" 2 20 B 10/28   Marching on AE  2 20 alt 11/4          Manual Intervention (55932)       Prone SP: P-A   10/25   RUT TrP release   10/25   L ULTT + stretch   10/25   STM periscapular   10/25: focused on medial and superior borders of L scapula with many TrP. Pt very tender during, but reports relief after. Complete x2 min in prone, x8 min seated in chair                   AquaticTherapy Dates of Service: 9/9, 9/13, 9/16, 9/20, 9/23, 9/30, 10/14   Aquatic Visits Exercises/Activities:   Transfers:  [x] Stairs   [] Ramp  [] Chair Lift   % Immersion:            Ambulation/ Warm up:   UE Exercises:       Forward  X 2 laps Shoulder Shrugs      Lateral   X 2 laps Shoulder Circles  Reverse circles only X12    Retro   X 2 laps Scapular Retraction      [place]    Push Downs      Heel/Toe Walking    Punching x      Rowing x12      Elbow Flex/Ext x      Shldr Flex/Ext x12      Shldr aBd/aDd x12   LE Exercises:  Magdalena Leys aBd/aDd x12   HR/TR x15 Shldr IR/ER    Marches X15 B Arm Circles x   Squats x15 PNF Diagonals    Hamstring Curls X15 B  Wall Push Ups    Hip Flexion (SLR) X15 B     Hip aBduction (SLR) X15 B     Hip Extension (SLR) X15 B      Hip aDduction (SLR)      Hip Circles X15 CW and CCW B Functional:    Hip IR/Er  Step up forward X12 L/R small step   Hip Hikes  Step up lateral  X 12 B 4 in step      Step down  x      Lunges Forward      Lunges Retro      Lunges Lateral     Balance:        SLS        Tandem Stance X20\" X2 L/R      NBOS eyes open x Seated:     NBOS eyes closed X20\" X2 Ankle pumps     Hand to Opposite Knee  Ankle Circles     Fwd Step ups to SLS  Knee Flex/Ext    Lateral Step ups to SLS  Hip aBd/aDd    Stop/Go Gait   Bicycle       Ankle DF/PF      Ankle Inv/Ev    Stretching:       Gastroc/Soleus X15\" X2 L/R     Hamstring  X15\" X2 L/R Deep Water:    Knee Flex Stretch  Jog    Piriformis   Noodle Hang    Hip Flexor  [facility]       ITB  Cool Down:    [employer] XMid Back   Lat Walking x   UT  Retro Walking    Post Shoulder  Noodle float    Ladder Pull      Pec Stretch            Core: Other:    TrA set  Seated stretching (self paced) Pelvic Tilts      Multifidi Walk outs c paddle      PNF Chop/Lifts                          Aquatic Abbreviation Key  B= Belt DB= Dumbells T= Theratube   H= Hydrotone N= Noodles W= Weights   P= Paddles S= Speedo equipment K= Kickboard      Pt. Education: Gave pt tour of pool, explained how to use lockers, what to wear, that it would be in group setting, and showed pt aquatic equipment. Modalities:     Pt. Education:  -patient educated on diagnosis, prognosis and expectations for rehab  -all patient questions were answered  10/18:  -progress thus far in therapy  -how to complete updated HEP at home safely  -plans for future land visits  10/25:   -educated on using tennis ball at home on trigger points, utilizing heating pads and hot showers in safe ways to decrease pain, sleeping positions. All questions answered. Home Exercise Program:  Access Code: [email or web address]  URL: [employer]/  Date: 08/19/2022  Prepared by: Komal Mohan    Exercises  Supine Lower Trunk Rotation - 1 x daily - 7 x weekly - 3 sets - 10 reps  Clamshell - 1 x daily - 7 x weekly - 3 sets - 10 reps  Seated Long Arc Quad - 1 x daily - 7 x weekly - 3 sets - 10 reps  Standing Shoulder Row with Anchored Resistance - 1 x daily - 7 x weekly - 3 sets - 10 reps  Shoulder Extension with Resistance - 1 x daily - 7 x weekly - 3 sets - 10 reps  Seated Hamstring Stretch - 1 x daily - 7 x weekly - 3 sets - 10 reps  Seated March - 1 x daily - 7 x weekly - 3 sets - 10 reps    10/18:  Sit to Stand - 1 x daily - 7 x weekly - 3 sets - 10 reps  Step Up - 1 x daily - 7 x weekly - 3 sets - 10 reps  Standing Tandem Balance with Counter Support - 1 x daily - 7 x weekly - 1 sets - 2-5 reps - 20 sec hold  Standing Single Leg Stance with Counter Support - 1 x daily - 7 x weekly - 1 sets - 2-5 reps - 10-15 sec hold  Side Stepping with Resistance at Thighs and Counter Support - 1 x daily - 7 x weekly - 1-3 sets - 10 reps      Therapeutic Exercise and NMR EXR  [x] (01617) Provided verbal/tactile cueing for activities related to strengthening, flexibility, endurance, ROM for improvements in  [x] LE / Lumbar: LE, proximal hip, and core control with self care, mobility, lifting, ambulation. [x] UE / Cervical: cervical, postural, scapular, scapulothoracic and UE control with self care, reaching, carrying, lifting, house/yardwork, driving, computer work.  [] (32122) Provided verbal/tactile cueing for activities related to improving balance, coordination, kinesthetic sense, posture, motor skill, proprioception to assist with   [] LE / lumbar: LE, proximal hip, and core control in self care, mobility, lifting, ambulation and eccentric single leg control. [] UE / cervical: cervical, scapular, scapulothoracic and UE control with self care, reaching, carrying, lifting, house/yardwork, driving, computer work.   [] (90623) Therapist is in constant attendance of 2 or more patients providing skilled therapy interventions, but not providing any significant amount of measurable one-on-one time to either patient, for improvements in  [] LE / lumbar: LE, proximal hip, and core control in self care, mobility, lifting, ambulation and eccentric single leg control. [] UE / cervical: cervical, scapular, scapulothoracic and UE control with self care, reaching, carrying, lifting, house/yardwork, driving, computer work.      NMR and Therapeutic Activities:    [x] (99370 or 78224) Provided verbal/tactile cueing for activities related to improving balance, coordination, kinesthetic sense, posture, motor skill, proprioception and motor activation to allow for proper function of   [x] LE: / Lumbar core, proximal hip and LE with self care and ADLs  [x] UE / Cervical: cervical, postural, scapular, scapulothoracic and UE control with self care, carrying, lifting, driving, computer work.   [] (02241) Gait Re-education- Provided training and instruction to the patient for proper LE, core and proximal hip recruitment and positioning and eccentric body weight control with ambulation re-education including up and down stairs     Home Management Training / Self Care:  [] (67764) Provided self-care/home management training related to activities of daily living and compensatory training, and/or use of adaptive equipment for improvement with: ADLs and compensatory training, meal preparation, safety procedures and instruction in use of adaptive equipment, including bathing, grooming, dressing, personal hygiene, basic household cleaning and chores.      Home Exercise Program:    [] (09813) Reviewed/Progressed HEP activities related to strengthening, flexibility, endurance, ROM of   [] LE / Lumbar: core, proximal hip and LE for functional self-care, mobility, lifting and ambulation/stair navigation   [] UE / Cervical: cervical, postural, scapular, scapulothoracic and UE control with self care, reaching, carrying, lifting, house/yardwork, driving, computer work  [] (34240)Reviewed/Progressed HEP activities related to improving balance, coordination, kinesthetic sense, posture, motor skill, proprioception of   [] LE: core, proximal hip and LE for self care, mobility, lifting, and ambulation/stair navigation    [] UE / Cervical: cervical, postural,  scapular, scapulothoracic and UE control with self care, reaching, carrying, lifting, house/yardwork, driving, computer work    Manual Treatments:  PROM / STM / Oscillations-Mobs:  G-I, II, III, IV (PA's, Inf., Post.)  [] (89461) Provided manual therapy to mobilize LE, proximal hip and/or LS spine soft tissue/joints for the purpose of modulating pain, promoting relaxation,  increasing ROM, reducing/eliminating soft tissue swelling/inflammation/restriction, improving soft tissue extensibility and allowing for proper ROM for normal function with   [] LE / lumbar: self care, mobility, lifting and ambulation. [] UE / Cervical: self care, reaching, carrying, lifting, house/yardwork, driving, computer work. Modalities:  [] (59789) Vasopneumatic compression: Utilized vasopneumatic compression to decrease edema / swelling for the purpose of improving mobility and quad tone / recruitment which will allow for increased overall function including but not limited to self-care, transfers, ambulation, and ascending / descending stairs. Aquatic:  [] (79543) Aquatic therapy with therapeutic exercise. Provided verbal and tactile cueing for activities related to strengthening, flexibility, endurance, ROM for improvements in  [] LE / lumbar: LE, proximal hip, and core control in self care, mobility, lifting, ambulation and eccentric single leg control. [] UE / cervical: cervical, scapular, scapulothoracic and UE control with self care, reaching, carrying, lifting, house/yardwork, driving, computer work.   [] (64173) Therapist is in constant attendance of 2 or more patients providing skilled therapy interventions, but not providing any significant amount of measurable one-on-one time to either patient, for improvements in   LE / lumbar: LE, proximal hip, and core control in self care, mobility, lifting, ambulation and eccentric single leg control. [][] UE / cervical: cervical, scapular, scapulothoracic and UE control with self care, reaching, carrying, lifting, house/yardwork, driving, computer work.        Charges:  Timed Code Treatment Minutes: 44   Total Treatment Minutes: 44     [] EVAL - LOW (51115)   [] EVAL - MOD (73702)  [] EVAL - HIGH (51732)  [] RE-EVAL (43536)  [x] OE(45023) x  1     [] Ionto  [x] NMR (02851) x  1     [] Vaso  [] Manual (14887) x       [] Ultrasound  [x] TA x  1      [] Mech Traction (38006)  [] Aquatic Therapy x 1    [] ES (un) (58805):   [] Home Management Training x  [] ES(attended) (81360)   [] Dry Needling 1-2 muscles (20548):  [] Dry Needling 3+ muscles (931837)  [] Group: 1      [] Other:     GOALS:  Patient stated goal: \" get better\"  [x] Progressing: [] Met: [] Not Met: [] Adjusted    Therapist goals for Patient:   Short Term Goals: To be achieved in: 2 weeks  1. Independent in HEP and progression per patient tolerance, in order to prevent re-injury. [x] Progressing: [] Met: [] Not Met: [] Adjusted  2. Patient will have a decrease in pain to facilitate improvement in movement, function, and ADLs as indicated by improvement with respect to Functional Deficits. [x] Progressing: [] Met: [] Not Met: [] Adjusted    Long Term Goals: To be achieved in: 8 weeks  1. FOTO functional survey score of >/= 66  to assist with reaching prior level of function. [x] Progressing: [] Met: [] Not Met: [] Adjusted  2. Patient will demonstrate increased UE/LE Strength to 5/5 with core activation to allow for proper functional mobility as indicated by patients Functional Deficits to allow pt to resume walking, stairs, and maintain independence without increase in symptoms. [x] Progressing: [] Met: [] Not Met: [] Adjusted  3. Patient will return to functional activities including ascending and descending stairs with at least one handrail to increase independence and decrease fall risk within the community without increased symptoms or restriction. [x] Progressing: [] Met: [] Not Met: [] Adjusted      Overall Progression Towards Functional goals/ Treatment Progress Update:  [x] Patient is progressing as expected towards functional goals listed. [] Progression is slowed due to complexities/Impairments listed. [] Progression has been slowed due to co-morbidities.   [] Plan just implemented, too soon to assess goals progression <30days   [] Goals require adjustment due to lack of progress  [] Patient is not progressing as expected and requires additional follow up with physician  [] Other    Persisting Functional Limitations/Impairments:  [x]Sleeping [x]Sitting               [x]Standing [x]Transfers        [x]Walking []Kneeling               [x]Stairs [x]Squatting / bending   [x]ADLs [x]Reaching  [x]Lifting  [x]Housework  []Driving []Job related tasks  []Sports/Recreation []Other:        ASSESSMENT:   Pt with improved pain and tolerance to activities this session. Pt able to perform activities with attack and power instead of going through the motions this date while also completing more reps or with more weight. Continue to assess and progress pt per tolerance to improve general strength, endurance, and activity tolerance to return to [facility]. Treatment/Activity Tolerance:  [x] Patient able to complete tx [] Patient limited by fatigue  [x] Patient limited by pain  [] Patient limited by other medical complications  [] Other:     Prognosis: [x] Good [] Fair  [] Poor    Patient Requires Follow-up: [x] Yes  [] No    Plan for next treatment session:    PLAN: See eval. PT 2x / week for 8 weeks. 1 land, 1 aquatic per week  [x] Continue per plan of care [] Alter current plan (see comments)  [] Plan of care initiated [] Hold pending MD visit [] Discharge    Electronically signed by: Maximo Morocho, PT, DPT    Note: If patient does not return for scheduled/ recommended follow up visits, this note will serve as a discharge from care along with most recent update on progress.

## 2022-11-09 DIAGNOSIS — M79.10 MYALGIA: ICD-10-CM

## 2022-11-09 DIAGNOSIS — R70.0 ELEVATED SED RATE: Primary | ICD-10-CM

## 2022-11-09 LAB — ANTI-NUCLEAR ANTIBODY (ANA): NEGATIVE

## 2022-11-09 NOTE — RESULT ENCOUNTER NOTE
The lupus blood test is negative. The muscle test is normal so it looks like its not the simvastatin. Hold it for 1 week and if you do not notice any difference start back on it because with the test being normal it does not appear to be the simvastatin which is good because it gives you cardiovascular protection. The rheumatoid arthritis test is negative. The C-reactive protein which is another inflammation test was normal.  The thyroid level is normal.  The sed rate which is another type of inflammation test was strongly positive. I will send you to a rheumatologist for further evaluation. Call the number below for an appointment. Scheduling Instructions    MALI R. [facility] - [place]um [place] [address] [place] [address]   Ph: [phone]             [employer] message sent.

## 2022-11-11 ENCOUNTER — APPOINTMENT (OUTPATIENT)
Dept: PHYSICAL THERAPY | Age: 67
End: 2022-11-11
Payer: MEDICARE

## 2022-11-11 ENCOUNTER — HOSPITAL ENCOUNTER (OUTPATIENT)
Dept: PHYSICAL THERAPY | Age: 67
Setting detail: THERAPIES SERIES
Discharge: HOME | End: 2022-11-11
Payer: MEDICARE

## 2022-11-11 PROCEDURE — 97110 THERAPEUTIC EXERCISES: CPT

## 2022-11-11 PROCEDURE — 97530 THERAPEUTIC ACTIVITIES: CPT

## 2022-11-11 PROCEDURE — 97112 NEUROMUSCULAR REEDUCATION: CPT

## 2022-11-11 NOTE — FLOWSHEET NOTE
[address] [facility]  Phone: [phone]   Fax: [phone]    Physical Therapy Daily Treatment Note  Date:  2022    Patient Name:  Heriberto Oreilly    :  1955  MRN: [id number]  Medical/Treatment Diagnosis Information:  Diagnosis: Z68.35 (ICD-10-CM) - BMI 35.0-35.9,adult. M79.7 (ICD-10-CM) - Fibromyalgia  Treatment Diagnosis: pain, decreased strength and endurance, difficulty with walking          Insurance/Certification information:  Medicare  Physician Information:  Mimi Jacobsen MD    Plan of care signed (Y/N): [x]  Yes []  No     Date of Patient follow up with Physician: prn     Progress Report: []  Yes  [x]  No     Date Range for reporting period:  Beginnin22  PN: 10/18/22  Ending: D/C nv w/ HP pass    Progress report due (10 Rx/or 30 days whichever is less): visit #10 or      Recertification due (POC duration/ or 90 days whichever is less): visit #16 or 22     Visit # Insurance Allowable Auth required? Date Range   15/16 [place] []  Yes  [x]  No 22-       Latex Allergy:  [x]NO      []YES  Preferred Language for Healthcare:   [x]English       []other:    Functional Scale:           Date assessed:  FOTO physical FS primary measure score = 64; risk adjusted = 47  22  FOTO physical FS primary measure score = 64     10/18/22  FOTO physical FS primary measure score = D/C nv w/ HP pass     22      Pain level:  0-2/10 L shoulder blade     SUBJECTIVE:   Pt reports she had blood tests done for Lupus and OA. Pt does not have either, but did have elevated  noting increased inflammation in body.        OBJECTIVE:   : less fatigue/heavy breathing with squat to [place] press this date  10/25: tender and painful along T2-3 and T6-8 SP with prone P-A; very tender trigger point in RUT    10/18: bold denotes improvement  Palpation:   Myofascial pain/tenderness: globally d/t fibromyalgia     ROM RIGHT AROM LEFT AROM Comments   LE             Hip Flexion         Hip Abd         Hip ER         Hip IR         Hip Extension         Knee Ext         Knee Flex         DF         PF         Ankle INV         Ankle Ever                   UE            Shoulder flex 150 150     Shoulder AB         Shoulder ER T3 T4 Painful on RUE, L no p! Shoulder IR T8 T5 Painful on RUE, L no p!                           Palpation: global tenderness     Joint mobility:               []Normal                      [x]Hypo              []Hyper     MMT testing RLE with more pain during testing compared to LLE           Strength / Myotomes RIGHT LEFT   Multifidus       Transverse Ab       LE       Hip Flexors (L1-2) - seated 4 4   Quads (L2-4) 4 4   Ankle Dorsiflexion (L4-5) 4 4   Great Toe Extension (L5)       Ankle Eversion (S1-2)       Ankle Plantarflexion (S1-2) 4+ 4+           Strength / Myotomes RIGHT LEFT   Cervical Flexion (C1-2)       Cervical SB (C3)       Shoulder Shrug (C4)       Shoulder Abduction (C5) 4+* 4+   Shoulder ER (C5) 4 4   Biceps (C6) 4 4   Triceps (C7) 4 4   **pain with all MMT                    RESTRICTIONS/PRECAUTIONS: fibromyalgia    Exercises/Interventions:     Therapeutic Exercises (85917) Resistance / level Sets/sec Reps Notes   bike L2 5 min  10/18   pulleys 3 min   10/25   IB gastroc       Stair: HSS, HFS    HEP   Tband mid row/ext HEP   Chin tuck w/ ball + B ER against wall 10/25: inc pain   1/2 FR [place] S   10/28   Lat Step up and Over 11/1   Cybex  -row  -ext 3 plates 3 10 ea 84/2   DL Leg Press 80# 2 10 11/8                 Therapeutic Activities (51025)       PN Measurements/education    Step Ups + bicep curl 6\"/3# 2 10 B 10/18: education on safety at home x 5 min   Sit<>stand + [place] press 3# 4 6 10/18: using UE   Pt education   10/25   SB Wall Squat 10/28   Step Tap + Lat Raise 6\"/3# 2 20 11/1: alt feet   Deadlift  TB 10# KB/12\"  Red long loop 3 10 11/8. 11/11: switched to TB                 Neuromuscular Re-ed (92640)       Tandem AE 20\" 2B 10/18. 11/8: int UE support   SLS floor 15\" 4 B 10/18   Side steps  Monster walks 10/18   Foot on AE + crossbody punch 3#/AE on 4\" 2 20 B 10/28   Marching on AE  2 20 alt 11/4          Manual Intervention (48595)       Prone SP: P-A   10/25   RUT TrP release   10/25   L ULTT + stretch   10/25   STM periscapular   10/25: focused on medial and superior borders of L scapula with many TrP. Pt very tender during, but reports relief after. Complete x2 min in prone, x8 min seated in chair                   AquaticTherapy Dates of Service: 9/9, 9/13, 9/16, 9/20, 9/23, 9/30, 10/14   Aquatic Visits Exercises/Activities:   Transfers:  [x] Stairs   [] Ramp  [] Chair Lift   % Immersion:            Ambulation/ Warm up:   UE Exercises:       Forward  X 2 laps Shoulder Shrugs      Lateral   X 2 laps Shoulder Circles  Reverse circles only X12    Retro   X 2 laps Scapular Retraction      [place]    Push Downs      Heel/Toe Walking    Punching x      Rowing x12      Elbow Flex/Ext x      Shldr Flex/Ext x12      Shldr aBd/aDd x12   LE Exercises:  Poli Bone aBd/aDd x12   HR/TR x15 Shldr IR/ER    Marches X15 B Arm Circles x   Squats x15 PNF Diagonals    Hamstring Curls X15 B  Wall Push Ups    Hip Flexion (SLR) X15 B     Hip aBduction (SLR) X15 B     Hip Extension (SLR) X15 B      Hip aDduction (SLR)      Hip Circles X15 CW and CCW B Functional:    Hip IR/Er  Step up forward X12 L/R small step   Hip Hikes  Step up lateral  X 12 B 4 in step      Step down  x      Lunges Forward      Lunges Retro      Lunges Lateral     Balance:        SLS        Tandem Stance X20\" X2 L/R      NBOS eyes open x Seated:     NBOS eyes closed X20\" X2 Ankle pumps     Hand to Opposite Knee  Ankle Circles     Fwd Step ups to SLS  Knee Flex/Ext    Lateral Step ups to SLS  Hip aBd/aDd    Stop/Go Gait   Bicycle       Ankle DF/PF      Ankle Inv/Ev    Stretching:       Gastroc/Soleus X15\" X2 L/R     Hamstring  X15\" X2 L/R [place]: Knee Flex Stretch  Jog    Piriformis   Noodle Hang    Hip Flexor  Traction at [address]    SKTC      DKTC       ITB  Cool Down:    Quad  Fwd Walking XMid Back   Lat Walking x   UT  Retro Walking    Post Shoulder  Noodle float    Ladder Pull      Pec Stretch            Core: Other:    TrA set  Seated stretching (self paced) Pelvic Tilts      Multifidi Walk outs c paddle      PNF Chop/Lifts                          Aquatic Abbreviation Key  B= Belt DB= Dumbells T= Theratube   H= Hydrotone N= Noodles W= Weights   P= Paddles S= Speedo equipment K= Kickboard      Pt. Education: Gave pt tour of pool, explained how to use lockers, what to wear, that it would be in group setting, and showed pt aquatic equipment. Modalities:     Pt. Education:  -patient educated on diagnosis, prognosis and expectations for rehab  -all patient questions were answered  10/18:  -progress thus far in therapy  -how to complete updated HEP at home safely  -plans for future land visits  10/25:   -educated on using tennis ball at home on trigger points, utilizing heating pads and hot showers in safe ways to decrease pain, sleeping positions. All questions answered. Home Exercise Program:  Access Code: [email or web address]  URL: [email or web address]  Date: 08/19/2022  Prepared by: Mina Barrett    Exercises  Supine Lower Trunk Rotation - 1 x daily - 7 x weekly - 3 sets - 10 reps  Clamshell - 1 x daily - 7 x weekly - 3 sets - 10 reps  Seated Long Arc Quad - 1 x daily - 7 x weekly - 3 sets - 10 reps  Standing Shoulder Row with Anchored Resistance - 1 x daily - 7 x weekly - 3 sets - 10 reps  Shoulder Extension with Resistance - 1 x daily - 7 x weekly - 3 sets - 10 reps  Seated Hamstring Stretch - 1 x daily - 7 x weekly - 3 sets - 10 reps  Seated March - 1 x daily - 7 x weekly - 3 sets - 10 reps    10/18:  Sit to Stand - 1 x daily - 7 x weekly - 3 sets - 10 reps  Step Up - 1 x daily - 7 x weekly - 3 sets - 10 reps  Standing Tandem Balance with Counter Support - 1 x daily - 7 x weekly - 1 sets - 2-5 reps - 20 sec hold  Standing Single Leg Stance with Counter Support - 1 x daily - 7 x weekly - 1 sets - 2-5 reps - 10-15 sec hold  Side Stepping with Resistance at Thighs and Counter Support - 1 x daily - 7 x weekly - 1-3 sets - 10 reps      Therapeutic Exercise and NMR EXR  [x] (05221) Provided verbal/tactile cueing for activities related to strengthening, flexibility, endurance, ROM for improvements in  [x] LE / Lumbar: LE, proximal hip, and core control with self care, mobility, lifting, ambulation. [x] UE / Cervical: cervical, postural, scapular, scapulothoracic and UE control with self care, reaching, carrying, lifting, house/yardwork, driving, computer work.  [] (15857) Provided verbal/tactile cueing for activities related to improving balance, coordination, kinesthetic sense, posture, motor skill, proprioception to assist with   [] LE / lumbar: LE, proximal hip, and core control in self care, mobility, lifting, ambulation and eccentric single leg control. [] UE / cervical: cervical, scapular, scapulothoracic and UE control with self care, reaching, carrying, lifting, house/yardwork, driving, computer work.   [] (27634) Therapist is in constant attendance of 2 or more patients providing skilled therapy interventions, but not providing any significant amount of measurable one-on-one time to either patient, for improvements in  [] LE / lumbar: LE, proximal hip, and core control in self care, mobility, lifting, ambulation and eccentric single leg control. [] UE / cervical: cervical, scapular, scapulothoracic and UE control with self care, reaching, carrying, lifting, house/yardwork, driving, computer work.      NMR and Therapeutic Activities:    [x] (43509 or 26188) Provided verbal/tactile cueing for activities related to improving balance, coordination, kinesthetic sense, posture, motor skill, proprioception and motor activation to allow for proper function of [x] LE: / Lumbar core, proximal hip and LE with self care and ADLs  [x] UE / Cervical: cervical, postural, scapular, scapulothoracic and UE control with self care, carrying, lifting, driving, computer work.   [] (97432) Gait Re-education- Provided training and instruction to the patient for proper LE, core and proximal hip recruitment and positioning and eccentric body weight control with ambulation re-education including up and down stairs     Home Management Training / Self Care:  [] (53176) Provided self-care/home management training related to activities of daily living and compensatory training, and/or use of adaptive equipment for improvement with: ADLs and compensatory training, meal preparation, safety procedures and instruction in use of adaptive equipment, including bathing, grooming, dressing, personal hygiene, basic household cleaning and chores.      Home Exercise Program:    [] (84736) Reviewed/Progressed HEP activities related to strengthening, flexibility, endurance, ROM of   [] LE / Lumbar: core, proximal hip and LE for functional self-care, mobility, lifting and ambulation/stair navigation   [] UE / Cervical: cervical, postural, scapular, scapulothoracic and UE control with self care, reaching, carrying, lifting, house/yardwork, driving, computer work  [] (04896)Reviewed/Progressed HEP activities related to improving balance, coordination, kinesthetic sense, posture, motor skill, proprioception of   [] LE: core, proximal hip and LE for self care, mobility, lifting, and ambulation/stair navigation    [] UE / Cervical: cervical, postural,  scapular, scapulothoracic and UE control with self care, reaching, carrying, lifting, house/yardwork, driving, computer work    Manual Treatments:  PROM / STM / Oscillations-Mobs:  G-I, II, III, IV (PA's, Inf., Post.)  [] (82021) Provided manual therapy to mobilize LE, proximal hip and/or LS spine soft tissue/joints for the purpose of modulating pain, promoting relaxation,  increasing ROM, reducing/eliminating soft tissue swelling/inflammation/restriction, improving soft tissue extensibility and allowing for proper ROM for normal function with   [] LE / lumbar: self care, mobility, lifting and ambulation. [] UE / Cervical: self care, reaching, carrying, lifting, house/yardwork, driving, computer work. Modalities:  [] (28751) Vasopneumatic compression: Utilized vasopneumatic compression to decrease edema / swelling for the purpose of improving mobility and quad tone / recruitment which will allow for increased overall function including but not limited to self-care, transfers, ambulation, and ascending / descending stairs. Aquatic:  [] (97427) Aquatic therapy with therapeutic exercise. Provided verbal and tactile cueing for activities related to strengthening, flexibility, endurance, ROM for improvements in  [] LE / lumbar: LE, proximal hip, and core control in self care, mobility, lifting, ambulation and eccentric single leg control. [] UE / cervical: cervical, scapular, scapulothoracic and UE control with self care, reaching, carrying, lifting, house/yardwork, driving, computer work.   [] (41583) Therapist is in constant attendance of 2 or more patients providing skilled therapy interventions, but not providing any significant amount of measurable one-on-one time to either patient, for improvements in   LE / lumbar: LE, proximal hip, and core control in self care, mobility, lifting, ambulation and eccentric single leg control. [][] UE / cervical: cervical, scapular, scapulothoracic and UE control with self care, reaching, carrying, lifting, house/yardwork, driving, computer work.        Charges:  Timed Code Treatment Minutes: 44   Total Treatment Minutes: 44     [] EVAL - LOW (11997)   [] EVAL - MOD (81458)  [] EVAL - HIGH (84342)  [] RE-EVAL (76379)  [x] VV(86814) x  1     [] Ionto  [x] NMR (86487) x  1     [] Vaso  [] Manual (09723) x       [] Ultrasound  [x] TA x  1      [] [facility] Traction (40457)  [] Aquatic Therapy x 1    [] ES (un) (54326):   [] Home Management Training x  [] ES(attended) (82777)   [] Dry Needling 1-2 muscles (31787):  [] Dry Needling 3+ muscles (411976)  [] Group: 1      [] Other:     GOALS:  Patient stated goal: \" get better\"  [x] Progressing: [] Met: [] Not Met: [] Adjusted    Therapist goals for Patient:   Short Term Goals: To be achieved in: 2 weeks  1. Independent in HEP and progression per patient tolerance, in order to prevent re-injury. [x] Progressing: [] Met: [] Not Met: [] Adjusted  2. Patient will have a decrease in pain to facilitate improvement in movement, function, and ADLs as indicated by improvement with respect to Functional Deficits. [x] Progressing: [] Met: [] Not Met: [] Adjusted    Long Term Goals: To be achieved in: 8 weeks  1. FOTO functional survey score of >/= 66  to assist with reaching prior level of function. [x] Progressing: [] Met: [] Not Met: [] Adjusted  2. Patient will demonstrate increased UE/LE Strength to 5/5 with core activation to allow for proper functional mobility as indicated by patients Functional Deficits to allow pt to resume walking, stairs, and maintain independence without increase in symptoms. [x] Progressing: [] Met: [] Not Met: [] Adjusted  3. Patient will return to functional activities including ascending and descending stairs with at least one handrail to increase independence and decrease fall risk within the community without increased symptoms or restriction. [x] Progressing: [] Met: [] Not Met: [] Adjusted      Overall Progression Towards Functional goals/ Treatment Progress Update:  [x] Patient is progressing as expected towards functional goals listed. [] Progression is slowed due to complexities/Impairments listed. [] Progression has been slowed due to co-morbidities.   [] Plan just implemented, too soon to assess goals progression <30days   [] Goals require adjustment due to lack of progress  [] Patient is not progressing as expected and requires additional follow up with physician  [] Other    Persisting Functional Limitations/Impairments:  [x]Sleeping [x]Sitting               [x]Standing [x]Transfers        [x]Walking []Kneeling               [x]Stairs [x]Squatting / bending   [x]ADLs [x]Reaching  [x]Lifting  [x]Housework  []Driving []Job related tasks  []Sports/Recreation []Other:        ASSESSMENT:   Pt able to make improvements noted in bold above. Pt with no increased symptoms or restrictions throughout session. Pt required cuing to perform activities with improved control and body positioning. Plan to d/c with HP pass and updated HEP nv. Treatment/Activity Tolerance:  [x] Patient able to complete tx [] Patient limited by fatigue  [x] Patient limited by pain  [] Patient limited by other medical complications  [] Other:     Prognosis: [x] Good [] Fair  [] Poor    Patient Requires Follow-up: [x] Yes  [] No    Plan for next treatment session:    PLAN: See eval. PT 2x / week for 8 weeks. 1 land, 1 aquatic per week  [x] Continue per plan of care [] Alter current plan (see comments)  [] Plan of care initiated [] Hold pending MD visit [] Discharge    Electronically signed by: Azul Cortés, PT, DPT    Note: If patient does not return for scheduled/ recommended follow up visits, this note will serve as a discharge from care along with most recent update on progress.

## 2022-11-13 ASSESSMENT — ENCOUNTER SYMPTOMS
CHEST TIGHTNESS: 0
CONSTIPATION: 0
COUGH: 0
WHEEZING: 0
NAUSEA: 0
DIARRHEA: 0
BLURRED VISION: 0
ABDOMINAL PAIN: 0
ORTHOPNEA: 0
EYES NEGATIVE: 1
SHORTNESS OF BREATH: 0
VOMITING: 0
SINUS PRESSURE: 0
BACK PAIN: 1
ABDOMINAL DISTENTION: 0

## 2022-11-16 DIAGNOSIS — M89.8X1 PAIN OF LEFT SCAPULA: ICD-10-CM

## 2022-11-16 DIAGNOSIS — R70.0 ELEVATED SED RATE: Primary | ICD-10-CM

## 2022-11-16 DIAGNOSIS — M79.10 MYALGIA: ICD-10-CM

## 2022-11-16 NOTE — PROGRESS NOTES
Scheduling Instructions    [facility], [address] [facility], MD, MPH   [address] [facility], [address] [place] [address], [address]   Phone: [phone]
No

## 2022-11-22 ENCOUNTER — HOSPITAL ENCOUNTER (OUTPATIENT)
Dept: PHYSICAL THERAPY | Age: 67
Setting detail: THERAPIES SERIES
Discharge: HOME | End: 2022-11-22
Payer: MEDICARE

## 2022-11-22 PROCEDURE — 97530 THERAPEUTIC ACTIVITIES: CPT

## 2022-11-22 NOTE — PLAN OF CARE
[facility]  Phone: [phone]   Fax: [phone]   Physical Therapy Discharge Summary    Dear Lee Shook MD  ,    We had the pleasure of treating the following patient for physical therapy services at [facility]. A summary of our findings can be found in the discharge summary below. If you have any questions or concerns regarding these findings, please do not hesitate to contact me at the office phone number checked above. Thank you for the referral.     Physician Signature:________________________________Date:__________________  By signing above (or electronic signature), therapists plan is approved by physician      Functional Outcome:     FOTO: 75      Overall Response to Treatment:   []Patient is responding well to treatment and improvement is noted with regards  to goals   [x]Patient should continue to improve in reasonable time if they continue HEP. Pt has been able to meet all but 1 strength goal that should continue to improve as pt continues building upon therapy gains. Pt limited by endurance and pain in joints when she doesn't participate in much activity the day before. Pt educated on further progressions, HP usage, HEP, and how to maintain current progress. All pt questions answered and pt in agreement to D/C with HEP and HP pass. []Patient has plateaued and is no longer responding to skilled PT intervention    []Patient is getting worse and would benefit from return to referring MD   []Patient unable to adhere to initial POC   []Other:     Date range of Visits: 22-22  Total Visits: 16    Recommendation:    [x] Discharge to HEP. Follow up with PT or MD PRN.            Physical Therapy Daily Treatment Note  Date:  2022    Patient Name:  Tc Nix    :  1955  MRN: [id number]  Medical/Treatment Diagnosis Information:  Diagnosis: Z68.35 (ICD-10-CM) - BMI 35.0-35.9,adult. M79.7 (ICD-10-CM) - Fibromyalgia  Treatment Diagnosis: pain, decreased strength and endurance, difficulty with walking          Insurance/Certification information:  Medicare  Physician Information:  Link Owusu MD    Plan of care signed (Y/N): [x]  Yes []  No     Date of Patient follow up with Physician: prn     Progress Report: []  Yes  [x]  No     Date Range for reporting period:  Beginnin22  PN: 10/18/22  Endin22    Progress report due (10 Rx/or 30 days whichever is less): visit #10 or 3/30/39     Recertification due (POC duration/ or 90 days whichever is less): visit #16 or 22     Visit # Insurance Allowable Auth required? Date Range    [place] []  Yes  [x]  No 22-       Latex Allergy:  [x]NO      []YES  Preferred Language for Healthcare:   [x]English       []other:    Functional Scale:           Date assessed:  FOTO physical FS primary measure score = 64; risk adjusted = 47  22  FOTO physical FS primary measure score = 64     10/18/22  FOTO physical FS primary measure score = 75     22      Pain level:  0/10    SUBJECTIVE:   Pt reports she has good and bad days with joints.       OBJECTIVE:   : less fatigue/heavy breathing with squat to [place] press this date  10/25: tender and painful along T2-3 and T6-8 SP with prone P-A; very tender trigger point in RUT    10/18. 22: bold denotes improvement       ROM RIGHT AROM LEFT AROM Comments   LE             Hip Flexion         Hip Abd         Hip ER         Hip IR         Hip Extension         Knee Ext         Knee Flex         DF         PF         Ankle INV         Ankle Ever                   UE            Shoulder flex 150 160     Shoulder AB         Shoulder ER T3 T4 Painful on RUE   Shoulder IR T8 T5 Painful on RUE                          Palpation: global tenderness     Joint mobility:               []Normal                      [x]Hypo              []Hyper     MMT testing RLE with more pain during testing compared to LLE           Strength / Myotomes RIGHT LEFT   Multifidus       Transverse Ab       LE       Hip Flexors (L1-2) - seated 4 4   Quads (L2-4) 4+ 4   Ankle Dorsiflexion (L4-5) 4+ 4   Great Toe Extension (L5)       Ankle Eversion (S1-2)       Ankle Plantarflexion (S1-2) 4+ 4+           Strength / Myotomes RIGHT LEFT   Cervical Flexion (C1-2)       Cervical SB (C3)       Shoulder Shrug (C4)       Shoulder Abduction (C5) 4+ 4+   Shoulder ER (C5) 4+ 4+   Biceps (C6) 4+ 4+   Triceps (C7) 4 4   **pain with all MMT                    RESTRICTIONS/PRECAUTIONS: fibromyalgia    Exercises/Interventions:     Therapeutic Exercises (44486) Resistance / level Sets/sec Reps Notes   bike 10/18   pulleys 10/25   IB gastroc    Stair: HSS, HFS HEP   Tband mid row/ext HEP   Chin tuck w/ ball + B ER against wall 10/25: inc pain   1/2 FR Bayard S 10/28   Lat Step up and Over 11/1   Cybex  -row  -ext 11/4   DL Leg Press 11/8                 Therapeutic Activities (94051)       PN Measurements/education    Step Ups + bicep curl 10/18: education on safety at home x 5 min   Sit<>stand + [place] press 10/18: using UE   Pt education 10/25   SB Wall Squat 10/28   Step Tap + Lat Raise 11/1: alt feet   Deadlift  TB 11/8. 11/11: switched to    D/C Measurements, education, HP tour X 30 minutes   11/22          Neuromuscular Re-ed (61032)       Tandem 10/18. 11/8: int UE support   SLS 10/18   Side steps  Monster walks 10/18   Foot on AE + crossbody punch 10/28   Marching on AE 11/4          Manual Intervention (79091)       Prone SP: P-A   10/25   RUT TrP release   10/25   L ULTT + stretch   10/25   STM periscapular   10/25: focused on medial and superior borders of L scapula with many TrP. Pt very tender during, but reports relief after.  Complete x2 min in prone, x8 min seated in chair                   AquaticTherapy Dates of Service: 9/9, 9/13, 9/16, 9/20, 9/23, 9/30, 10/14   Aquatic Visits Exercises/Activities: Transfers:  [x] Stairs   [] Ramp  [] Chair Lift   % Immersion:            Ambulation/ Warm up:   UE Exercises: Forward  X 2 laps Shoulder Shrugs      Lateral   X 2 laps Shoulder Circles  Reverse circles only X12    Retro   X 2 laps Scapular Retraction      [place]    Push Downs      Heel/Toe Walking    Punching x      Rowing x12      Elbow Flex/Ext x      Shldr Flex/Ext x12      Shldr aBd/aDd x12   LE Exercises:  Havery Finlayson aBd/aDd x12   HR/TR x15 Shldr IR/ER    Marches X15 B Arm Circles x   Squats x15 PNF Diagonals    Hamstring Curls X15 B  Wall Push Ups    Hip Flexion (SLR) X15 B     Hip aBduction (SLR) X15 B     Hip Extension (SLR) X15 B      Hip aDduction (SLR)      Hip Circles X15 CW and CCW B Functional:    Hip IR/Er  Step up forward X12 L/R small step   Hip Hikes  Step up lateral  X 12 B 4 in step      Step down  x      Lunges Forward      Lunges Retro      Lunges Lateral     Balance:        SLS        Tandem Stance X20\" X2 L/R      NBOS eyes open x Seated:     NBOS eyes closed X20\" X2 Ankle pumps     Hand to Opposite Knee  Ankle Circles     Fwd Step ups to SLS  Knee Flex/Ext    Lateral Step ups to SLS  Hip aBd/aDd    Stop/Go Gait   Bicycle       Ankle DF/PF      Ankle Inv/Ev    Stretching:       Gastroc/Soleus X15\" X2 L/R     Hamstring  X15\" X2 L/R [place]:    Knee Flex Stretch  Jog    Piriformis   Noodle Hang    Hip Flexor  Traction at [facility]       ITB  Cool Down:    Quad  Fwd Walking XMid Back   Lat Walking x   UT  Retro Walking    Post Shoulder  Noodle float    Ladder Pull      Pec Stretch            Core: Other:    TrA set  Seated stretching (self paced) Pelvic Tilts      Multifidi Walk outs c paddle      PNF Chop/Lifts                          Aquatic Abbreviation Key  B= Belt DB= Dumbells T= Theratube   H= Hydrotone N= Noodles W= Weights   P= Paddles S= Speedo equipment K= Kickboard      Pt.  Education: Gave pt tour of pool, explained how to use lockers, what to wear, that it would be in group setting, and showed pt aquatic equipment. Modalities:     Pt. Education:  -patient educated on diagnosis, prognosis and expectations for rehab  -all patient questions were answered  10/18:  -progress thus far in therapy  -how to complete updated HEP at home safely  -plans for future land visits  10/25:   -educated on using tennis ball at home on trigger points, utilizing heating pads and hot showers in safe ways to decrease pain, sleeping positions. All questions answered. Home Exercise Program:  Access Code: [email or web address]  URL: [email or web address] / ?????[email or web address]??/  Date: 08/19/2022  Prepared by: Ashleigh Armenta    Exercises  Supine Lower Trunk Rotation - 1 x daily - 7 x weekly - 3 sets - 10 reps  Clamshell - 1 x daily - 7 x weekly - 3 sets - 10 reps  Seated Long Arc Quad - 1 x daily - 7 x weekly - 3 sets - 10 reps  Standing Shoulder Row with Anchored Resistance - 1 x daily - 7 x weekly - 3 sets - 10 reps  Shoulder Extension with Resistance - 1 x daily - 7 x weekly - 3 sets - 10 reps  Seated Hamstring Stretch - 1 x daily - 7 x weekly - 3 sets - 10 reps  Seated March - 1 x daily - 7 x weekly - 3 sets - 10 reps    10/18:  Sit to Stand - 1 x daily - 7 x weekly - 3 sets - 10 reps  Step Up - 1 x daily - 7 x weekly - 3 sets - 10 reps  Standing Tandem Balance with Counter Support - 1 x daily - 7 x weekly - 1 sets - 2-5 reps - 20 sec hold  Standing Single Leg Stance with Counter Support - 1 x daily - 7 x weekly - 1 sets - 2-5 reps - 10-15 sec hold  Side Stepping with Resistance at Thighs and Counter Support - 1 x daily - 7 x weekly - 1-3 sets - 10 reps      Therapeutic Exercise and NMR EXR  [] (41746) Provided verbal/tactile cueing for activities related to strengthening, flexibility, endurance, ROM for improvements in  [] LE / Lumbar: LE, proximal hip, and core control with self care, mobility, lifting, ambulation.   [] UE / Cervical: cervical, postural, scapular, scapulothoracic and UE control with self care, reaching, carrying, lifting, house/yardwork, driving, computer work.  [] (43888) Provided verbal/tactile cueing for activities related to improving balance, coordination, kinesthetic sense, posture, motor skill, proprioception to assist with   [] LE / lumbar: LE, proximal hip, and core control in self care, mobility, lifting, ambulation and eccentric single leg control. [] UE / cervical: cervical, scapular, scapulothoracic and UE control with self care, reaching, carrying, lifting, house/yardwork, driving, computer work.   [] (25234) Therapist is in constant attendance of 2 or more patients providing skilled therapy interventions, but not providing any significant amount of measurable one-on-one time to either patient, for improvements in  [] LE / lumbar: LE, proximal hip, and core control in self care, mobility, lifting, ambulation and eccentric single leg control. [] UE / cervical: cervical, scapular, scapulothoracic and UE control with self care, reaching, carrying, lifting, house/yardwork, driving, computer work.      NMR and Therapeutic Activities:    [x] (26776 or 44211) Provided verbal/tactile cueing for activities related to improving balance, coordination, kinesthetic sense, posture, motor skill, proprioception and motor activation to allow for proper function of   [x] LE: / Lumbar core, proximal hip and LE with self care and ADLs  [x] UE / Cervical: cervical, postural, scapular, scapulothoracic and UE control with self care, carrying, lifting, driving, computer work.   [] (94096) Gait Re-education- Provided training and instruction to the patient for proper LE, core and proximal hip recruitment and positioning and eccentric body weight control with ambulation re-education including up and down stairs     Home Management Training / Self Care:  [] (68492) Provided self-care/home management training related to activities of daily living and compensatory training, and/or use of adaptive equipment for improvement with: ADLs and compensatory training, meal preparation, safety procedures and instruction in use of adaptive equipment, including bathing, grooming, dressing, personal hygiene, basic household cleaning and chores. Home Exercise Program:    [] (34326) Reviewed/Progressed HEP activities related to strengthening, flexibility, endurance, ROM of   [] LE / Lumbar: core, proximal hip and LE for functional self-care, mobility, lifting and ambulation/stair navigation   [] UE / Cervical: cervical, postural, scapular, scapulothoracic and UE control with self care, reaching, carrying, lifting, house/yardwork, driving, computer work  [] (41937)Reviewed/Progressed HEP activities related to improving balance, coordination, kinesthetic sense, posture, motor skill, proprioception of   [] LE: core, proximal hip and LE for self care, mobility, lifting, and ambulation/stair navigation    [] UE / Cervical: cervical, postural,  scapular, scapulothoracic and UE control with self care, reaching, carrying, lifting, house/yardwork, driving, computer work    Manual Treatments:  PROM / STM / Oscillations-Mobs:  G-I, II, III, IV (PA's, Inf., Post.)  [] (85384) Provided manual therapy to mobilize LE, proximal hip and/or LS spine soft tissue/joints for the purpose of modulating pain, promoting relaxation,  increasing ROM, reducing/eliminating soft tissue swelling/inflammation/restriction, improving soft tissue extensibility and allowing for proper ROM for normal function with   [] LE / lumbar: self care, mobility, lifting and ambulation. [] UE / Cervical: self care, reaching, carrying, lifting, house/yardwork, driving, computer work.      Modalities:  [] (18918) Vasopneumatic compression: Utilized vasopneumatic compression to decrease edema / swelling for the purpose of improving mobility and quad tone / recruitment which will allow for increased overall function including but not limited to self-care, transfers, ambulation, and ascending / descending stairs. Aquatic:  [] (13433) Aquatic therapy with therapeutic exercise. Provided verbal and tactile cueing for activities related to strengthening, flexibility, endurance, ROM for improvements in  [] LE / lumbar: LE, proximal hip, and core control in self care, mobility, lifting, ambulation and eccentric single leg control. [] UE / cervical: cervical, scapular, scapulothoracic and UE control with self care, reaching, carrying, lifting, house/yardwork, driving, computer work.   [] (37113) Therapist is in constant attendance of 2 or more patients providing skilled therapy interventions, but not providing any significant amount of measurable one-on-one time to either patient, for improvements in   LE / lumbar: LE, proximal hip, and core control in self care, mobility, lifting, ambulation and eccentric single leg control. [][] UE / cervical: cervical, scapular, scapulothoracic and UE control with self care, reaching, carrying, lifting, house/yardwork, driving, computer work. Charges:  Timed Code Treatment Minutes: 30   Total Treatment Minutes: 30     [] EVAL - LOW (01709)   [] EVAL - MOD (80875)  [] EVAL - HIGH (54154)  [] RE-EVAL (92603)  [] ML(67748) x  1     [] Ionto  [] NMR (25800) x  1     [] Vaso  [] Manual (68761) x       [] Ultrasound  [x] TA x  2      [] Mech Traction (83989)  [] Aquatic Therapy x 1    [] ES (un) (23253):   [] Home Management Training x  [] ES(attended) (65382)   [] Dry Needling 1-2 muscles (92570):  [] Dry Needling 3+ muscles (162438)  [] Group: 1      [] Other:     GOALS:  Patient stated goal: \" get better\"  [x] Progressing: [x] Met: [] Not Met: [] Adjusted    Therapist goals for Patient:   Short Term Goals: To be achieved in: 2 weeks  1. Independent in HEP and progression per patient tolerance, in order to prevent re-injury. [] Progressing: [x] Met: [] Not Met: [] Adjusted  2.  Patient will have a decrease in pain to facilitate improvement in movement, function, and ADLs as indicated by improvement with respect to Functional Deficits. [] Progressing: [x] Met: [] Not Met: [] Adjusted    Long Term Goals: To be achieved in: 8 weeks  1. FOTO functional survey score of >/= 66  to assist with reaching prior level of function. [] Progressing: [x] Met: [] Not Met: [] Adjusted  2. Patient will demonstrate increased UE/LE Strength to 5/5 with core activation to allow for proper functional mobility as indicated by patients Functional Deficits to allow pt to resume walking, stairs, and maintain independence without increase in symptoms. [] Progressing: [] Met: [x] Not Met: [] Adjusted  3. Patient will return to functional activities including ascending and descending stairs with at least one handrail to increase independence and decrease fall risk within the community without increased symptoms or restriction. [] Progressing: [x] Met: [] Not Met: [] Adjusted      Overall Progression Towards Functional goals/ Treatment Progress Update:  [x] Patient is progressing as expected towards functional goals listed. [] Progression is slowed due to complexities/Impairments listed. [] Progression has been slowed due to co-morbidities.   [] Plan just implemented, too soon to assess goals progression <30days   [] Goals require adjustment due to lack of progress  [] Patient is not progressing as expected and requires additional follow up with physician  [] Other    Persisting Functional Limitations/Impairments:  []Sleeping []Sitting               []Standing []Transfers        []Walking []Kneeling               [x]Stairs []Squatting / bending   []ADLs []Reaching  []Lifting  []Housework  []Driving []Job related tasks  []Sports/Recreation []Other:        ASSESSMENT:   see above    Treatment/Activity Tolerance:  [x] Patient able to complete tx [] Patient limited by fatigue  [] Patient limited by pain  [] Patient limited by other medical complications  [] Other:     Prognosis: [x] Good [] Fair  [] Poor    Patient Requires Follow-up: [x] Yes  [] No    Plan for next treatment session:    PLAN: See eval. PT 2x / week for 8 weeks. 1 land, 1 aquatic per week  [] Continue per plan of care [] Alter current plan (see comments)  [] Plan of care initiated [] Hold pending MD visit [x] Discharge    Electronically signed by: Barb Luther PT, DPT    Note: If patient does not return for scheduled/ recommended follow up visits, this note will serve as a discharge from care along with most recent update on progress.

## 2022-11-29 ENCOUNTER — HOSPITAL ENCOUNTER (OUTPATIENT)
Dept: GENERAL RADIOLOGY | Age: 67
Discharge: HOME | End: 2022-11-29
Payer: MEDICARE

## 2022-11-29 DIAGNOSIS — M15.0 PRIMARY GENERALIZED (OSTEO)ARTHRITIS: ICD-10-CM

## 2022-11-29 DIAGNOSIS — M25.40 JOINT SWELLING: ICD-10-CM

## 2022-11-29 DIAGNOSIS — M19.90 INFLAMMATORY ARTHRITIS: ICD-10-CM

## 2022-11-29 PROCEDURE — 73130 X-RAY EXAM OF HAND: CPT

## 2022-11-29 PROCEDURE — 72202 X-RAY EXAM SI JOINTS 3/> VWS: CPT

## 2022-11-29 PROCEDURE — 73521 X-RAY EXAM HIPS BI 2 VIEWS: CPT

## 2023-01-03 NOTE — PROGRESS NOTES
[address] [facility] Physical Therapy  Phone: [phone]   Fax: [phone]    Physical Therapy Daily Treatment Note  Date:  10/18/2022    Patient Name:  China Muñoz    :  1955  MRN: [id number]  Medical/Treatment Diagnosis Information:  Diagnosis: Z68.35 (ICD-10-CM) - BMI 35.0-35.9,adult. M79.7 (ICD-10-CM) - Fibromyalgia  Treatment Diagnosis: pain, decreased strength and endurance, difficulty with walking          Insurance/Certification information:  Medicare  Physician Information:  Janny Larsen MD    Plan of care signed (Y/N): [x]  Yes []  No     Date of Patient follow up with Physician: prn     Progress Report: []  Yes  [x]  No     Date Range for reporting period:  Beginnin22  PN: 10/181/22  Ending:     Progress report due (10 Rx/or 30 days whichever is less): visit #10 or  (date)     Recertification due (POC duration/ or 90 days whichever is less): visit #20 or 22 (date)     Visit # Insurance Allowable Auth required? Date Range    [place] []  Yes  []  No 22-       Latex Allergy:  [x]NO      []YES  Preferred Language for Healthcare:   [x]English       []other:    Functional Scale:           Date assessed:  FOTO physical FS primary measure score = 64; risk adjusted = 47  22  FOTO physical FS primary measure score = 64     10/18/22      Pain level:  2/10     SUBJECTIVE:   Pt reports she feels she's 50% improved since beginning therapy. She went to get up and her left thigh was burning and stabbing. Pt thinks it's d/t the wobbly table, but no symptoms later that evening.        OBJECTIVE:   10/18: bold denotes improvement  Palpation:   Myofascial pain/tenderness: globally d/t fibromyalgia     ROM RIGHT AROM LEFT AROM Comments   LE             Hip Flexion         Hip Abd         Hip ER         Hip IR         Hip Extension         Knee Ext         Knee Flex         DF         PF         Ankle INV         Ankle Ever [UE            Shoulder flex 150 150     Shoulder AB         Shoulder ER T3 T4 Painful on RUE, L no p! Shoulder IR T8 T5 Painful on RUE, L no p!                           Palpation: global tenderness     Joint mobility:               []Normal                      [x]Hypo              []Hyper     MMT testing RLE with more pain during testing compared to LLE           Strength / Myotomes RIGHT LEFT   Multifidus       Transverse Ab       LE       Hip Flexors (L1-2) - seated 4 4   Quads (L2-4) 4 4   Ankle Dorsiflexion (L4-5) 4 4   Great Toe Extension (L5)       Ankle Eversion (S1-2)       Ankle Plantarflexion (S1-2) 4+ 4+   Hip Abductors       Hip Extensors       Hip Internal Rotators       Hip External Rotators       Hamstrings        Ankle Inversion                       Strength / Myotomes RIGHT LEFT   Cervical Flexion (C1-2)       Cervical SB (C3)       Shoulder Shrug (C4)       Shoulder Abduction (C5) 4+* 4+   Shoulder ER (C5) 4 4   Biceps (C6) 4 4   Triceps (C7) 4 4   Wrist Extension (C6)       Wrist Flexion (C7)        (C8)       Thumb Abduction (C8)       Finger Abduction (T1)       Shoulder Flex       Shoulder Scap       Shoulder IR                       **pain with all MMT                    RESTRICTIONS/PRECAUTIONS: fibromyalgia    Exercises/Interventions:     Therapeutic Exercises (25595) Resistance / level Sets/sec Reps Notes   bike  5 min  10/18   IB gastroc       Stair: HSS, HFS    HEP   Tband mid row/ext    HEP                                      Therapeutic Activities (97684)       PN Measurements/education X 15 min      Step Ups 6\" 1 10 B 10/18: education on safety at home x 5 min   Sit<>stand  1 10 10/18: using UE                 Neuromuscular Re-ed (94930)       Tandem floor 15\" 2B 10/18   SLS floor 10\" 2 B 10/18   Side steps  Monster walks Orange  orange 1  1 15ft  15ft 10/18                        Manual Intervention (53350)                                                   AquaticTherapy Dates of Service: 9/9, 9/13, 9/16, 9/20, 9/23, 9/30, 10/14   Aquatic Visits Exercises/Activities:   Transfers:  [x] Stairs   [] Ramp  [] Chair Lift   % Immersion:            Ambulation/ Warm up:   UE Exercises: Forward  X 2 laps Shoulder Shrugs      Lateral   X 2 laps Shoulder Circles  Reverse circles only X12    Retro   X 2 laps Scapular Retraction      [place]    Push Downs      Heel/Toe Walking    Punching x      Rowing x12      Elbow Flex/Ext x      Shldr Flex/Ext x12      Shldr aBd/aDd x12   LE Exercises:  Ju Siad aBd/aDd x12   HR/TR x15 Shldr IR/ER    Marches X15 B Arm Circles x   Squats x15 PNF Diagonals    Hamstring Curls X15 B  Wall Push Ups    Hip Flexion (SLR) X15 B     Hip aBduction (SLR) X15 B     Hip Extension (SLR) X15 B      Hip aDduction (SLR)      Hip Circles X15 CW and CCW B Functional:    Hip IR/Er  Step up forward X12 L/R small step   Hip Hikes  Step up lateral  X 12 B 4 in step      Step down  x      Lunges Forward      Lunges Retro      Lunges Lateral     Balance:        SLS        Tandem Stance X20\" X2 L/R      NBOS eyes open x Seated:     NBOS eyes closed X20\" X2 Ankle pumps     Hand to Opposite Knee  Ankle Circles     Fwd Step ups to SLS  Knee Flex/Ext    Lateral Step ups to SLS  Hip aBd/aDd    Stop/Go Gait   Bicycle       Ankle DF/PF      Ankle Inv/Ev    Stretching:       Gastroc/Soleus X15\" X2 L/R     Hamstring  X15\" X2 L/R Deep Water:    Knee Flex Stretch  Jog    Piriformis   Noodle Hang    Hip Flexor  Traction at [address]    SKTC      DKTC       ITB  Cool Down:    Quad  Fwd Walking XMid Back   Lat Walking x   UT  Retro Walking    Post Shoulder  Noodle float    Ladder Pull      Pec Stretch            Core:   Other:    TrA set  Seated stretching (self paced) Pelvic Tilts      Multifidi Walk outs c paddle      PNF Chop/Lifts                          Aquatic Abbreviation Key  B= Belt DB= Dumbells T= Theratube   H= Hydrotone N= Noodles W= Weights   P= Paddles S= Speedo equipment K= Kickboard      Pt. Education: Gave pt tour of pool, explained how to use lockers, what to wear, that it would be in group setting, and showed pt aquatic equipment. Modalities:     Pt. Education:  -patient educated on diagnosis, prognosis and expectations for rehab  -all patient questions were answered  10/18:  -progress thus far in therapy  -how to complete updated HEP at home safely  -plans for future land visits    Home Exercise Program:  Access Code: [email or web address]  URL: Infinit/  Date: 08/19/2022  Prepared by: Emilee Huntley    Exercises  Supine Lower Trunk Rotation - 1 x daily - 7 x weekly - 3 sets - 10 reps  Clamshell - 1 x daily - 7 x weekly - 3 sets - 10 reps  Seated Long Arc Quad - 1 x daily - 7 x weekly - 3 sets - 10 reps  Standing Shoulder Row with Anchored Resistance - 1 x daily - 7 x weekly - 3 sets - 10 reps  Shoulder Extension with Resistance - 1 x daily - 7 x weekly - 3 sets - 10 reps  Seated Hamstring Stretch - 1 x daily - 7 x weekly - 3 sets - 10 reps  Seated March - 1 x daily - 7 x weekly - 3 sets - 10 reps    10/18:  Sit to Stand - 1 x daily - 7 x weekly - 3 sets - 10 reps  Step Up - 1 x daily - 7 x weekly - 3 sets - 10 reps  Standing Tandem Balance with Counter Support - 1 x daily - 7 x weekly - 1 sets - 2-5 reps - 20 sec hold  Standing Single Leg Stance with Counter Support - 1 x daily - 7 x weekly - 1 sets - 2-5 reps - 10-15 sec hold  Side Stepping with Resistance at Thighs and Counter Support - 1 x daily - 7 x weekly - 1-3 sets - 10 reps      Therapeutic Exercise and NMR EXR  [x] (05328) Provided verbal/tactile cueing for activities related to strengthening, flexibility, endurance, ROM for improvements in  [] LE / Lumbar: LE, proximal hip, and core control with self care, mobility, lifting, ambulation.   [] UE / Cervical: cervical, postural, scapular, scapulothoracic and UE control with self care, reaching, carrying, lifting, house/yardwork, driving, computer work.  [] (81824) Provided verbal/tactile cueing for activities related to improving balance, coordination, kinesthetic sense, posture, motor skill, proprioception to assist with   [] LE / lumbar: LE, proximal hip, and core control in self care, mobility, lifting, ambulation and eccentric single leg control. [] UE / cervical: cervical, scapular, scapulothoracic and UE control with self care, reaching, carrying, lifting, house/yardwork, driving, computer work.   [] (46817) Therapist is in constant attendance of 2 or more patients providing skilled therapy interventions, but not providing any significant amount of measurable one-on-one time to either patient, for improvements in  [] LE / lumbar: LE, proximal hip, and core control in self care, mobility, lifting, ambulation and eccentric single leg control. [] UE / cervical: cervical, scapular, scapulothoracic and UE control with self care, reaching, carrying, lifting, house/yardwork, driving, computer work.      NMR and Therapeutic Activities:    [x] (01224 or 86402) Provided verbal/tactile cueing for activities related to improving balance, coordination, kinesthetic sense, posture, motor skill, proprioception and motor activation to allow for proper function of   [] LE: / Lumbar core, proximal hip and LE with self care and ADLs  [x] UE / Cervical: cervical, postural, scapular, scapulothoracic and UE control with self care, carrying, lifting, driving, computer work.   [] (99411) Gait Re-education- Provided training and instruction to the patient for proper LE, core and proximal hip recruitment and positioning and eccentric body weight control with ambulation re-education including up and down stairs     Home Management Training / Self Care:  [] (75697) Provided self-care/home management training related to activities of daily living and compensatory training, and/or use of adaptive equipment for improvement with: ADLs and compensatory training, meal preparation, safety procedures and instruction in use of adaptive equipment, including bathing, grooming, dressing, personal hygiene, basic household cleaning and chores. Home Exercise Program:    [x] (14488) Reviewed/Progressed HEP activities related to strengthening, flexibility, endurance, ROM of   [x] LE / Lumbar: core, proximal hip and LE for functional self-care, mobility, lifting and ambulation/stair navigation   [x] UE / Cervical: cervical, postural, scapular, scapulothoracic and UE control with self care, reaching, carrying, lifting, house/yardwork, driving, computer work  [] (31181)Reviewed/Progressed HEP activities related to improving balance, coordination, kinesthetic sense, posture, motor skill, proprioception of   [] LE: core, proximal hip and LE for self care, mobility, lifting, and ambulation/stair navigation    [] UE / Cervical: cervical, postural,  scapular, scapulothoracic and UE control with self care, reaching, carrying, lifting, house/yardwork, driving, computer work    Manual Treatments:  PROM / STM / Oscillations-Mobs:  G-I, II, III, IV (PA's, Inf., Post.)  [] (11295) Provided manual therapy to mobilize LE, proximal hip and/or LS spine soft tissue/joints for the purpose of modulating pain, promoting relaxation,  increasing ROM, reducing/eliminating soft tissue swelling/inflammation/restriction, improving soft tissue extensibility and allowing for proper ROM for normal function with   [] LE / lumbar: self care, mobility, lifting and ambulation. [] UE / Cervical: self care, reaching, carrying, lifting, house/yardwork, driving, computer work. Modalities:  [] (97003) Vasopneumatic compression: Utilized vasopneumatic compression to decrease edema / swelling for the purpose of improving mobility and quad tone / recruitment which will allow for increased overall function including but not limited to self-care, transfers, ambulation, and ascending / descending stairs.      Aquatic:  [] (48382) Aquatic therapy with therapeutic exercise. Provided verbal and tactile cueing for activities related to strengthening, flexibility, endurance, ROM for improvements in  [] LE / lumbar: LE, proximal hip, and core control in self care, mobility, lifting, ambulation and eccentric single leg control. [] UE / cervical: cervical, scapular, scapulothoracic and UE control with self care, reaching, carrying, lifting, house/yardwork, driving, computer work.   [] (02497) Therapist is in constant attendance of 2 or more patients providing skilled therapy interventions, but not providing any significant amount of measurable one-on-one time to either patient, for improvements in   LE / lumbar: LE, proximal hip, and core control in self care, mobility, lifting, ambulation and eccentric single leg control. [][] UE / cervical: cervical, scapular, scapulothoracic and UE control with self care, reaching, carrying, lifting, house/yardwork, driving, computer work. Charges:  Timed Code Treatment Minutes: 42   Total Treatment Minutes: 42     [] EVAL - LOW (27198)   [] EVAL - MOD (57072)  [] EVAL - HIGH (05649)  [] RE-EVAL (66593)  [] TF(68357) x       [] Ionto  [x] NMR (45377) x  1     [] Vaso  [] Manual (69908) x       [] Ultrasound  [x] TA x  2      [] Mech Traction (89237)  [] Aquatic Therapy x 1    [] ES (un) (08515):   [] Home Management Training x  [] ES(attended) (39890)   [] Dry Needling 1-2 muscles (82635):  [] Dry Needling 3+ muscles (668893)  [] Group: 1      [] Other:     GOALS:  Patient stated goal: \" get better\"  [x] Progressing: [] Met: [] Not Met: [] Adjusted    Therapist goals for Patient:   Short Term Goals: To be achieved in: 2 weeks  1. Independent in HEP and progression per patient tolerance, in order to prevent re-injury. [x] Progressing: [] Met: [] Not Met: [] Adjusted  2.  Patient will have a decrease in pain to facilitate improvement in movement, function, and ADLs as indicated by improvement with respect to Functional Deficits. [x] Progressing: [] Met: [] Not Met: [] Adjusted    Long Term Goals: To be achieved in: 8 weeks  1. FOTO functional survey score of >/= 66  to assist with reaching prior level of function. [x] Progressing: [] Met: [] Not Met: [] Adjusted  2. Patient will demonstrate increased UE/LE Strength to 5/5 with core activation to allow for proper functional mobility as indicated by patients Functional Deficits to allow pt to resume walking, stairs, and maintain independence without increase in symptoms. [x] Progressing: [] Met: [] Not Met: [] Adjusted  3. Patient will return to functional activities including ascending and descending stairs with at least one handrail to increase independence and decrease fall risk within the community without increased symptoms or restriction. [x] Progressing: [] Met: [] Not Met: [] Adjusted      Overall Progression Towards Functional goals/ Treatment Progress Update:  [x] Patient is progressing as expected towards functional goals listed. [] Progression is slowed due to complexities/Impairments listed. [] Progression has been slowed due to co-morbidities. [] Plan just implemented, too soon to assess goals progression <30days   [] Goals require adjustment due to lack of progress  [] Patient is not progressing as expected and requires additional follow up with physician  [] Other    Persisting Functional Limitations/Impairments:  [x]Sleeping [x]Sitting               [x]Standing [x]Transfers        [x]Walking []Kneeling               [x]Stairs [x]Squatting / bending   [x]ADLs [x]Reaching  [x]Lifting  [x]Housework  []Driving []Job related tasks  []Sports/Recreation []Other:        ASSESSMENT:   Pt tolerated brief session for strengthening, neuro, and functional activities well. Pt educated on updated measurements and progress thus far in therapy. Pt not able to meet any goals yet at this time, but rates personal progress at 50% improved since beginning therapy.  Will continue to progress full body strengthening to maintain functional activities and improve ability to complete ADLs. Continue to progress strength, ROM, and  balance to decrease fall risk and maintain independence. Treatment/Activity Tolerance:  [x] Patient able to complete tx [] Patient limited by fatigue  [x] Patient limited by pain  [] Patient limited by other medical complications  [] Other:     Prognosis: [x] Good [] Fair  [] Poor    Patient Requires Follow-up: [x] Yes  [] No    Plan for next treatment session:    PLAN: See eval. PT 2x / week for 8 weeks. 1 land, 1 aquatic per week  [x] Continue per plan of care [] Alter current plan (see comments)  [] Plan of care initiated [] Hold pending MD visit [] Discharge    Electronically signed by: Rivas Quach PT, DPT    Note: If patient does not return for scheduled/ recommended follow up visits, this note will serve as a discharge from care along with most recent update on progress.

## 2023-01-10 ENCOUNTER — OFFICE VISIT (OUTPATIENT)
Dept: PRIMARY CARE CLINIC | Age: 68
End: 2023-01-10

## 2023-01-10 VITALS
HEART RATE: 65 BPM | DIASTOLIC BLOOD PRESSURE: 82 MMHG | TEMPERATURE: 97.3 F | BODY MASS INDEX: 36.8 KG/M2 | WEIGHT: 228 LBS | SYSTOLIC BLOOD PRESSURE: 140 MMHG | OXYGEN SATURATION: 98 %

## 2023-01-10 DIAGNOSIS — I10 ESSENTIAL HYPERTENSION: Primary | ICD-10-CM

## 2023-01-10 DIAGNOSIS — R73.03 PREDIABETES: ICD-10-CM

## 2023-01-10 DIAGNOSIS — N64.4 PAIN OF BOTH BREASTS: ICD-10-CM

## 2023-01-10 DIAGNOSIS — R63.5 WEIGHT GAIN: ICD-10-CM

## 2023-01-10 DIAGNOSIS — M79.7 FIBROMYALGIA: ICD-10-CM

## 2023-01-10 DIAGNOSIS — M47.22 CERVICAL SPONDYLOSIS WITH RADICULOPATHY: ICD-10-CM

## 2023-01-10 LAB — HBA1C MFR BLD: 5.8 %

## 2023-01-10 RX ORDER — GABAPENTIN 400 MG/1
400 CAPSULE ORAL 3 TIMES DAILY
COMMUNITY

## 2023-01-10 RX ORDER — HYDROCHLOROTHIAZIDE 25 MG/1
25 TABLET ORAL EVERY MORNING
Qty: 90 TABLET | Refills: 1 | Status: SHIPPED | OUTPATIENT
Start: 2023-01-10

## 2023-01-10 RX ORDER — SIMVASTATIN 20 MG
20 TABLET ORAL NIGHTLY
COMMUNITY

## 2023-01-10 ASSESSMENT — ENCOUNTER SYMPTOMS
SPUTUM PRODUCTION: 0
BLURRED VISION: 0
SHORTNESS OF BREATH: 0
ORTHOPNEA: 0
COUGH: 0
CHEST TIGHTNESS: 0
DIARRHEA: 0
BACK PAIN: 0
SINUS PRESSURE: 0
VOMITING: 0
CONSTIPATION: 0
EYES NEGATIVE: 1
ABDOMINAL PAIN: 0
NAUSEA: 0
ABDOMINAL DISTENTION: 0
WHEEZING: 0

## 2023-01-10 ASSESSMENT — PATIENT HEALTH QUESTIONNAIRE - PHQ9
2. FEELING DOWN, DEPRESSED OR HOPELESS: 0
1. LITTLE INTEREST OR PLEASURE IN DOING THINGS: 0
SUM OF ALL RESPONSES TO PHQ QUESTIONS 1-9: 0
SUM OF ALL RESPONSES TO PHQ9 QUESTIONS 1 & 2: 0
SUM OF ALL RESPONSES TO PHQ QUESTIONS 1-9: 0

## 2023-01-10 NOTE — PROGRESS NOTES
Heriberto Oreilly (:  1955) is a 78 y.o. female,Established patient, here for evaluation of the following chief complaint(s):  Follow-up         ASSESSMENT/PLAN:  1. Essential hypertension: Frequently elevated in the 140 systolic range and mid 71Q diastolic range. Metoprolol XL 25 mg is controlling pulse rate and no more palpitations but not controlling blood pressures so we will add hydrochlorothiazide 25 mg daily. BP Readings from Last 3 Encounters:   01/10/23 (!) 140/82   22 128/73   10/25/22 134/82     Pulse Readings from Last 3 Encounters:   01/10/23 65   22 67   10/25/22 66      -     hydroCHLOROthiazide (HYDRODIURIL) 25 MG tablet; Take 1 tablet by mouth every morning, Disp-90 tablet, R-1Normal  2. Fibromyalgia this controls symptoms with gabapentin 400 mg 3 times a day. Patient saw endocrinology lupus rheumatoid arthritis ruled out. She was referred because of elevated sed rate. 3. Pain of both breasts, will get diagnostic mammogram, fibrocystic type of breast exam today without dominant mass and no axillary adenopathy.  -     [facility] PINKY DIGITAL DIAGNOSTIC BILATERAL; Future  4. Cervical spondylosis with radiculopathy  5. Weight gain we will rule out prediabetes. -     POCT glycosylated hemoglobin (Hb A1C)  Will start low-carb diet and walk daily and Ozempic 0.25 mg weekly. Lab Results   Component Value Date    LABA1C 5.8 01/10/2023    LABA1C 5.6 2022    LABA1C 5.5 2020     Lab Results   Component Value Date    LABMICR YES 02/10/2022    LDLCALC 98 2022    CREATININE 0.9 2022       Return in about 6 weeks (around 2023). Subjective   SUBJECTIVE/OBJECTIVE:  Chest Pain   This is a new (prickly pains over left precordial area.) problem. The problem occurs intermittently. The problem has been unchanged. The pain is present in the lateral region. The pain is at a severity of 4/10. The pain is mild. Quality: prickly.  The pain radiates to the precordial region. Pertinent negatives include no abdominal pain, back pain, cough, diaphoresis, dizziness, fever, headaches, irregular heartbeat, leg pain, malaise/fatigue, nausea, near-syncope, numbness, orthopnea, palpitations, PND, shortness of breath, sputum production, syncope, vomiting or weakness. Hypertension  This is a chronic problem. The current episode started more than 1 year ago. The problem is unchanged. The problem is uncontrolled. Associated symptoms include chest pain. Pertinent negatives include no blurred vision, headaches, malaise/fatigue, neck pain, orthopnea, palpitations, PND or shortness of breath. There are no associated agents to hypertension. Risk factors for coronary artery disease include dyslipidemia (prediabetes). Past treatments include beta blockers and lifestyle changes. The current treatment provides mild improvement. Compliance problems include diet. There is no history of kidney disease, CAD/MI, CVA or heart failure. Review of Systems   Constitutional:  Negative for activity change, appetite change, diaphoresis, fatigue, fever, malaise/fatigue and unexpected weight change. Flu Vac    HENT: Negative. Negative for sinus pressure. Eyes: Negative. Negative for blurred vision. Respiratory:  Negative for cough, sputum production, chest tightness, shortness of breath and wheezing. Does not smoke   No etoh   No asthma    Cardiovascular:  Positive for chest pain. Negative for palpitations, orthopnea, syncope, PND and near-syncope. No HTN / CAD    Gastrointestinal:  Negative for abdominal distention, abdominal pain, constipation, diarrhea, nausea and vomiting. Genitourinary:  Negative for dysuria, frequency, hematuria, menstrual problem, urgency and vaginal discharge. Musculoskeletal:  Negative for arthralgias, back pain, myalgias and neck pain. Skin: Negative. Negative for rash.    Neurological:  Negative for dizziness, weakness, numbness and headaches. Hematological: Negative. Psychiatric/Behavioral: Negative. Negative for behavioral problems and sleep disturbance. The patient is not nervous/anxious. Objective   Physical Exam  Vitals reviewed. Constitutional:       General: She is not in acute distress. HENT:      Head: Normocephalic. Nose: Nose normal.   Eyes:      Conjunctiva/sclera: Conjunctivae normal.   Cardiovascular:      Rate and Rhythm: Normal rate and regular rhythm. Heart sounds: Normal heart sounds. Comments: Chest wall pain bilaterally with palpation and bilateral breast pain upper outer left and lower mid on right  Pulmonary:      Effort: Pulmonary effort is normal.      Breath sounds: Normal breath sounds. Abdominal:      General: Abdomen is flat. There is no distension. Palpations: Abdomen is soft. There is no mass. Tenderness: There is no abdominal tenderness. There is no right CVA tenderness, left CVA tenderness, guarding or rebound. Hernia: No hernia is present. Musculoskeletal:         General: Normal range of motion. Cervical back: Neck supple. Comments: Reproduced pain with palpation of scapula on left posterior back. Pain pattern in the left arm follows with C8 distribution/T1 for the neck. Lymphadenopathy:      Cervical: No cervical adenopathy. Skin:     General: Skin is warm and dry. Comments: Male pattern thinning   Neurological:      General: No focal deficit present. Mental Status: She is alert and oriented to person, place, and time. Cranial Nerves: No cranial nerve deficit. Sensory: No sensory deficit. Motor: No weakness. Coordination: Coordination normal.      Gait: Gait normal.      Deep Tendon Reflexes: Reflexes normal.   Psychiatric:         Mood and Affect: Mood normal.         Behavior: Behavior normal.         Thought Content:  Thought content normal.         Judgment: Judgment normal.              An electronic signature was used to authenticate this note.     --Shashank Dover MD

## 2023-01-13 NOTE — RESULT ENCOUNTER NOTE
The A1c shows prediabetes. We have already started the Ozempic that we will help this and also help with weight loss.

## 2023-01-19 DIAGNOSIS — N64.4 PAIN OF BOTH BREASTS: Primary | ICD-10-CM

## 2023-01-19 DIAGNOSIS — E78.2 MIXED HYPERLIPIDEMIA: ICD-10-CM

## 2023-01-19 DIAGNOSIS — R73.03 PREDIABETES: ICD-10-CM

## 2023-01-19 RX ORDER — SIMVASTATIN 20 MG
20 TABLET ORAL NIGHTLY
Qty: 90 TABLET | Refills: 3 | Status: SHIPPED | OUTPATIENT
Start: 2023-01-19

## 2023-02-16 DIAGNOSIS — I10 ESSENTIAL HYPERTENSION: ICD-10-CM

## 2023-02-16 DIAGNOSIS — R00.2 PALPITATIONS: ICD-10-CM

## 2023-02-16 RX ORDER — METOPROLOL SUCCINATE 25 MG/1
TABLET, EXTENDED RELEASE ORAL
Qty: 90 TABLET | Refills: 1 | Status: SHIPPED | OUTPATIENT
Start: 2023-02-16

## 2023-02-16 NOTE — TELEPHONE ENCOUNTER
PT called in requesting a refill for her Metoprolol. She says that she will not have enough to last through the weekend and would like this to be sent to the pharmacy ASAP. Please send to the [facility] on [facility].      Best call back number: [phone]

## 2023-02-19 ENCOUNTER — HOSPITAL ENCOUNTER (EMERGENCY)
Age: 68
Discharge: HOME | End: 2023-02-19
Attending: EMERGENCY MEDICINE
Payer: MEDICARE

## 2023-02-19 VITALS
RESPIRATION RATE: 18 BRPM | BODY MASS INDEX: 35.68 KG/M2 | HEART RATE: 79 BPM | SYSTOLIC BLOOD PRESSURE: 135 MMHG | HEIGHT: 66 IN | OXYGEN SATURATION: 99 % | WEIGHT: 222 LBS | TEMPERATURE: 98.4 F | DIASTOLIC BLOOD PRESSURE: 94 MMHG

## 2023-02-19 DIAGNOSIS — J02.9 ACUTE PHARYNGITIS, UNSPECIFIED ETIOLOGY: ICD-10-CM

## 2023-02-19 DIAGNOSIS — U07.1 COVID-19: Primary | ICD-10-CM

## 2023-02-19 PROCEDURE — 99282 EMERGENCY DEPT VISIT SF MDM: CPT

## 2023-02-19 ASSESSMENT — PAIN - FUNCTIONAL ASSESSMENT: PAIN_FUNCTIONAL_ASSESSMENT: 0-10

## 2023-02-19 ASSESSMENT — ENCOUNTER SYMPTOMS
GASTROINTESTINAL NEGATIVE: 1
RESPIRATORY NEGATIVE: 1
TROUBLE SWALLOWING: 0
SORE THROAT: 1
EYES NEGATIVE: 1

## 2023-02-19 ASSESSMENT — PAIN DESCRIPTION - PAIN TYPE: TYPE: ACUTE PAIN

## 2023-02-19 ASSESSMENT — PAIN DESCRIPTION - DESCRIPTORS: DESCRIPTORS: BURNING;ACHING;DULL

## 2023-02-19 ASSESSMENT — PAIN SCALES - GENERAL: PAINLEVEL_OUTOF10: 4

## 2023-02-19 ASSESSMENT — PAIN DESCRIPTION - LOCATION: LOCATION: MOUTH

## 2023-02-20 NOTE — ED PROVIDER NOTES
[facility]          ATTENDING PHYSICIAN NOTE       Date of evaluation: 2/19/2023    Chief Complaint     Oral Pain (Feel like she has a burning in mouth/feels tenderness when eating)      History of Present Illness     Melissa Chandler is a 78 y.o. female who presents complaining of pain when swallowing. Patient states that for the last several days she has been having URI type of symptoms. She states that symptoms mostly been sinus congestion but over the last several days she has also developed pain in her throat. She states the pain is worse with swallowing. She denies any difficulty swallowing. She denies any fevers or chills. She does note a minimal cough is nonproductive of sputum. She denies any vomiting or diarrhea. She states she did take a COVID test today which was negative. She states she has been doing salt water gargles with help with hersymptoms but was concerned that they were not getting better so came to the emergency department. ASSESSMENT / PLAN  (MEDICAL DECISION MAKING)     INITIAL VITALS: BP: (!) 135/94, Temp: 98.4 °F (36.9 °C), Heart Rate: 79, Resp: 18, SpO2: 99 %      Melissa Chandler is a 78 y.o. female who presents complaining of pain with swallowing. Patient denies any dysphagia but only notes odynophagia after having URI symptoms for last several days. Patient has an unremarkable physical exam with no oropharyngeal abnormalities. Trachea is midline with no tracheal tenderness. She has no cervical lymphadenopathy. She has clear breath sounds bilaterally. Patient did take a home COVID test today which she states was negative so I do not feel this needs to be repeated. After further discussion with the patient, she stated that she actually had a positive test 7 days ago when her symptoms started. I feel most likely her symptoms are related to her coronavirus infection.   She has no evidence of bacterial infection on exam so do not feel antibiotics are indicated. Patient be instructed to do supportive care and follow-up with her primary care provider. Medical Decision Making  Problems Addressed:  Acute pharyngitis, unspecified etiology: acute illness or injury  COVID-19: acute illness or injury    Risk  OTC drugs. Clinical Impression     1. COVID-19    2. Acute pharyngitis, unspecified etiology        Disposition     PATIENT REFERRED TO:  Bebeto Arciniega, [address]  [id number]  [phone]          DISCHARGE MEDICATIONS:  Discharge Medication List as of 2/19/2023 10:10 PM          DISPOSITION Decision To Discharge 02/19/2023 10:05:42 PM        Diagnostic Results and Other Data       MOST RECENT VITALS:  BP: (!) 135/94,Temp: 98.4 °F (36.9 °C), Heart Rate: 79, Resp: 18, SpO2: 99 %     Procedures     N/A    ED Course     Nursing Notes, Past Medical Hx, Past Surgical Hx, Social Hx,Allergies, and Family Hx were reviewed. The patient was given the following medications:  No orders of the defined types were placed in this encounter. CONSULTS:  None    Review of Systems     Review of Systems   Constitutional: Negative. HENT:  Positive for congestion and sore throat. Negative for trouble swallowing. Eyes: Negative. Respiratory: Negative. Cardiovascular: Negative. Gastrointestinal: Negative. Genitourinary: Negative. Musculoskeletal: Negative. Neurological: Negative. All other systems reviewed and are negative. Past Medical, Surgical, Family, and Social History     She has a past medical history of Allergic rhinitis, Chronic back pain, Fibroids, Fibromyalgia, Hyperlipidemia, Hypertension, STEPHANIE (obstructive sleep apnea), and Sleep apnea. She has a past surgical history that includes Appendectomy; Cervix surgery; Endometrial biopsy; Achilles tendon surgery (2007); Breast cyst aspiration; Colonoscopy (2006); Tubal ligation; and Colonoscopy (N/A, 6/24/2022).   Her family history includes Cancer in her maternal grandmother, mother, and paternal grandmother; Cancer (age of onset: 47) in her paternal uncle; Diabetes in her maternal aunt and paternal grandmother; Heart Disease in her mother; High Blood Pressure in her father; High Cholesterol in her sister; Kidney Disease in her paternal uncle; Stroke in her maternal grandmother. She reports that she quit smoking about 40 years ago. Her smoking use included cigarettes. She has a 3.00 pack-year smoking history. She has never used smokeless tobacco. She reports current alcohol use. She reports that she does not use drugs. Medications     Discharge Medication List as of 2/19/2023 10:10 PM        CONTINUE these medications which have NOT CHANGED    Details   metoprolol succinate (TOPROL XL) 25 MG extended release tablet TAKE ONE TABLET BY MOUTH ONCE NIGHTLY, Disp-90 tablet, R-1Normal      simvastatin (ZOCOR) 20 MG tablet Take 1 tablet by mouth nightly, Disp-90 tablet, R-3Normal      gabapentin (NEURONTIN) 400 MG capsule Take 400 mg by mouth 3 times daily. Historical Med      hydroCHLOROthiazide (HYDRODIURIL) 25 MG tablet Take 1 tablet by mouth every morning, Disp-90 tablet, R-1Normal      Semaglutide,0.25 or 0.5MG/DOS, 2 MG/1.5ML SOPN Inject 0.25 mg into the skin once a week Call for increased dose at refill., Disp-4 Adjustable Dose Pre-filled Pen Syringe, R-0Normal      betamethasone dipropionate 0.05 % ointment Apply topically daily. , Disp-15 g, R-3, Normal      vitamin D3 (CHOLECALCIFEROL) 125 MCG (5000 UT) TABS tablet Take 1 tablet by mouth in the morning., Disp-90 tablet, R-1Normal      linaclotide (LINZESS) 145 MCG capsule Take 1 capsule by mouth every morning (before breakfast), Disp-90 capsule, R-3D/c 290mg rxNormal      Multiple Vitamins-Minerals (CENTRUM SILVER 50+WOMEN) TABS dailyHistorical Med             Allergies     She is allergic to lisinopril, flagyl [metronidazole hcl], and metronidazole.     Physical Exam     INITIAL VITALS: BP: (!) 135/94, Temp: 98.4 °F (36.9 °C), Heart Rate: 79, Resp: 18, SpO2: 99 %   Physical Exam  Vitals and nursing note reviewed. Constitutional:       General: She is not in acute distress. HENT:      Head: Normocephalic and atraumatic. Mouth/Throat:      Mouth: Mucous membranes are moist.      Pharynx: No oropharyngeal exudate. Eyes:      General: No scleral icterus. Extraocular Movements: Extraocular movements intact. Conjunctiva/sclera: Conjunctivae normal.      Pupils: Pupils are equal, round, and reactive to light. Cardiovascular:      Rate and Rhythm: Normal rate and regular rhythm. Heart sounds: Normal heart sounds. Pulmonary:      Effort: Pulmonary effort is normal.      Breath sounds: Normal breath sounds. No wheezing, rhonchi or rales. Musculoskeletal:      Cervical back: Normal range of motion and neck supple. Neurological:      General: No focal deficit present. Mental Status: She is alert and oriented to person, place, and time. Cranial Nerves: No cranial nerve deficit.                   Saul Reyna MD  02/19/23 [id number]

## 2023-02-20 NOTE — DISCHARGE INSTRUCTIONS
Continue your current medications. Continue using Tylenol and ibuprofen as needed for pain as well as salt water gargles for symptom relief. Follow-up with your primary care provider for further evaluation. Return to the emergency department for inability to swallow or any other concerns.

## 2023-02-22 ENCOUNTER — OFFICE VISIT (OUTPATIENT)
Dept: PRIMARY CARE CLINIC | Age: 68
End: 2023-02-22

## 2023-02-22 VITALS
OXYGEN SATURATION: 98 % | BODY MASS INDEX: 35.84 KG/M2 | HEART RATE: 67 BPM | DIASTOLIC BLOOD PRESSURE: 76 MMHG | WEIGHT: 223 LBS | HEIGHT: 66 IN | SYSTOLIC BLOOD PRESSURE: 111 MMHG | TEMPERATURE: 97 F

## 2023-02-22 DIAGNOSIS — I10 ESSENTIAL HYPERTENSION: ICD-10-CM

## 2023-02-22 DIAGNOSIS — Z00.00 INITIAL MEDICARE ANNUAL WELLNESS VISIT: Primary | ICD-10-CM

## 2023-02-22 DIAGNOSIS — J02.9 PHARYNGITIS WITH VIRAL SYNDROME: ICD-10-CM

## 2023-02-22 DIAGNOSIS — E66.09 CLASS 2 OBESITY DUE TO EXCESS CALORIES WITHOUT SERIOUS COMORBIDITY WITH BODY MASS INDEX (BMI) OF 36.0 TO 36.9 IN ADULT: ICD-10-CM

## 2023-02-22 DIAGNOSIS — R73.03 PREDIABETES: ICD-10-CM

## 2023-02-22 DIAGNOSIS — G47.33 OSA (OBSTRUCTIVE SLEEP APNEA): ICD-10-CM

## 2023-02-22 DIAGNOSIS — E78.2 MIXED HYPERLIPIDEMIA: ICD-10-CM

## 2023-02-22 DIAGNOSIS — B37.0 THRUSH: ICD-10-CM

## 2023-02-22 DIAGNOSIS — B34.9 PHARYNGITIS WITH VIRAL SYNDROME: ICD-10-CM

## 2023-02-22 DIAGNOSIS — R09.82 POST-NASAL DRIP: ICD-10-CM

## 2023-02-22 DIAGNOSIS — E66.01 SEVERE OBESITY (BMI 35.0-39.9) WITH COMORBIDITY (HCC): ICD-10-CM

## 2023-02-22 DIAGNOSIS — E74.89 OTHER SPECIFIED DISORDERS OF CARBOHYDRATE METABOLISM (HCC): ICD-10-CM

## 2023-02-22 PROBLEM — E66.812 CLASS 2 OBESITY DUE TO EXCESS CALORIES WITHOUT SERIOUS COMORBIDITY WITH BODY MASS INDEX (BMI) OF 36.0 TO 36.9 IN ADULT: Status: ACTIVE | Noted: 2023-02-22

## 2023-02-22 RX ORDER — GABAPENTIN 100 MG/1
CAPSULE ORAL
COMMUNITY
Start: 2023-02-18

## 2023-02-22 RX ORDER — CLOTRIMAZOLE 10 MG/1
10 LOZENGE ORAL; TOPICAL
Qty: 50 TABLET | Refills: 0 | Status: SHIPPED | OUTPATIENT
Start: 2023-02-22 | End: 2023-03-04

## 2023-02-22 RX ORDER — CETIRIZINE HYDROCHLORIDE 10 MG/1
10 TABLET ORAL DAILY
Qty: 30 TABLET | Refills: 5 | Status: SHIPPED | OUTPATIENT
Start: 2023-02-22

## 2023-02-22 RX ORDER — VALACYCLOVIR HYDROCHLORIDE 1 G/1
1000 TABLET, FILM COATED ORAL DAILY
Qty: 5 TABLET | Refills: 0 | Status: SHIPPED | OUTPATIENT
Start: 2023-02-22 | End: 2023-02-27

## 2023-02-22 SDOH — HEALTH STABILITY: PHYSICAL HEALTH: ON AVERAGE, HOW MANY MINUTES DO YOU ENGAGE IN EXERCISE AT THIS LEVEL?: 20 MIN

## 2023-02-22 SDOH — ECONOMIC STABILITY: TRANSPORTATION INSECURITY
IN THE PAST 12 MONTHS, HAS LACK OF TRANSPORTATION KEPT YOU FROM MEETINGS, WORK, OR FROM GETTING THINGS NEEDED FOR DAILY LIVING?: NO

## 2023-02-22 SDOH — HEALTH STABILITY: PHYSICAL HEALTH: ON AVERAGE, HOW MANY DAYS PER WEEK DO YOU ENGAGE IN MODERATE TO STRENUOUS EXERCISE (LIKE A BRISK WALK)?: 2 DAYS

## 2023-02-22 SDOH — ECONOMIC STABILITY: FOOD INSECURITY: WITHIN THE PAST 12 MONTHS, THE FOOD YOU BOUGHT JUST DIDN'T LAST AND YOU DIDN'T HAVE MONEY TO GET MORE.: NEVER TRUE

## 2023-02-22 SDOH — ECONOMIC STABILITY: HOUSING INSECURITY
IN THE LAST 12 MONTHS, WAS THERE A TIME WHEN YOU DID NOT HAVE A STEADY PLACE TO SLEEP OR SLEPT IN A SHELTER (INCLUDING NOW)?: NO

## 2023-02-22 SDOH — ECONOMIC STABILITY: INCOME INSECURITY: HOW HARD IS IT FOR YOU TO PAY FOR THE VERY BASICS LIKE FOOD, HOUSING, MEDICAL CARE, AND HEATING?: NOT HARD AT ALL

## 2023-02-22 SDOH — ECONOMIC STABILITY: FOOD INSECURITY: WITHIN THE PAST 12 MONTHS, YOU WORRIED THAT YOUR FOOD WOULD RUN OUT BEFORE YOU GOT MONEY TO BUY MORE.: NEVER TRUE

## 2023-02-22 ASSESSMENT — PATIENT HEALTH QUESTIONNAIRE - PHQ9
1. LITTLE INTEREST OR PLEASURE IN DOING THINGS: 0
SUM OF ALL RESPONSES TO PHQ QUESTIONS 1-9: 0
2. FEELING DOWN, DEPRESSED OR HOPELESS: 0
SUM OF ALL RESPONSES TO PHQ9 QUESTIONS 1 & 2: 0
SUM OF ALL RESPONSES TO PHQ QUESTIONS 1-9: 0

## 2023-02-22 ASSESSMENT — LIFESTYLE VARIABLES
HOW OFTEN DO YOU HAVE A DRINK CONTAINING ALCOHOL: NEVER
HOW OFTEN DO YOU HAVE A DRINK CONTAINING ALCOHOL: 1
HOW MANY STANDARD DRINKS CONTAINING ALCOHOL DO YOU HAVE ON A TYPICAL DAY: 0
HOW OFTEN DO YOU HAVE SIX OR MORE DRINKS ON ONE OCCASION: 1
HOW OFTEN DO YOU HAVE A DRINK CONTAINING ALCOHOL: NEVER
HOW MANY STANDARD DRINKS CONTAINING ALCOHOL DO YOU HAVE ON A TYPICAL DAY: PATIENT DOES NOT DRINK

## 2023-02-22 NOTE — PROGRESS NOTES
Medicare Annual Wellness Visit    Rashawn Marquez is here for Medicare AWV    Assessment & Plan   Initial Medicare annual wellness visit      Recommendations for Preventive Services Due: see orders and patient instructions/AVS.  Recommended screening schedule for the next 5-10 years is provided to the patient in written form: see Patient Instructions/AVS.     Return in 8 weeks (on 4/19/2023) for Medicare Annual Wellness Visit in 1 year. Subjective   The following acute and/or chronic problems were also addressed today:   Diagnosis Orders   1. Initial Medicare annual wellness visit completed. Ambulatory Referral to [facility] Clinical Specialist      2. Pharyngitis with viral syndrome patient had COVID on February 12 and tested negative when the painful mouth started on February 20. Feels like she has cuts on the top of her mouth. The tongue has also has a white covering. Picture looks like a viral infection with cold sores so we will give Valtrex. valACYclovir (VALTREX) 1 g tablet      3. Severe obesity (BMI 35.0-39. 9) with comorbidity ([address] patient will start back on walking daily since cervical spondylosis pain control with gabapentin. Start Mediterranean low-carb diet       4. Other specified disorders of carbohydrate metabolism ([address] start low-carb diet       5. Prediabetes out of Ozempic for couple weeks but will be coming soon and will restart weekly along with diet and exercise to help prevent progression to diabetes  Lab Results   Component Value Date    LABA1C 5.8 01/10/2023    LABA1C 5.6 07/22/2022    LABA1C 5.5 08/17/2020     Lab Results   Component Value Date    LABMICR YES 02/10/2022    LDLCALC 98 07/22/2022    CREATININE 0.9 07/22/2022      CBC with Auto Differential    Renal Function Panel      6. STEPHANIE (obstructive sleep apnea) patient working controlling with weight loss       7. Mixed hyperlipidemia controlled with simvastatin 20 mg nightly no myalgias or weakness.   Lab Results   Component Value Date    CHOL 178 07/22/2022    CHOL 177 12/22/2020    CHOL 234 (H) 08/17/2020     Lab Results   Component Value Date    TRIG 41 07/22/2022    TRIG 54 12/22/2020    TRIG 42 08/17/2020     Lab Results   Component Value Date    HDL 72 (H) 07/22/2022    HDL 73 (H) 12/22/2020    HDL 77 (H) 08/17/2020     Lab Results   Component Value Date    LDLCALC 98 07/22/2022    LDLCALC 93 12/22/2020    LDLCALC 149 (H) 08/17/2020     Lab Results   Component Value Date    LABVLDL 8 07/22/2022    LABVLDL 11 12/22/2020    LABVLDL 8 08/17/2020     No results found for: CHOLHDLRATIO        8. Essential hypertension controlled with hydrochlorothiazide and metoprolol, continue continue to monitor renal function  BP Readings from Last 3 Encounters:   02/22/23 111/76   02/19/23 (!) 135/94   01/10/23 (!) 140/82            9. Class 2 obesity due to excess calories without serious comorbidity with body mass index (BMI) of 36.0 to 36.9 in adult, start low-carb diet walking daily       10. Post-nasal drip, start Zyrtec nightly cetirizine (ZYRTEC) 10 MG tablet        11. Oral thrush we will treat with Mycelex troches    Patient's complete Health Risk Assessment and screening values have been reviewed and are found in Flowsheets. The following problems were reviewed today and where indicated follow up appointments were made and/or referrals ordered. Positive Risk Factor Screenings with Interventions:                 Weight and Activity:  Physical Activity: Insufficiently Active    Days of Exercise per Week: 2 days    Minutes of Exercise per Session: 20 min     On average, how many days per week do you engage in moderate to strenuous exercise (like a brisk walk)?: 2 days  Have you lost any weight without trying in the past 3 months?: No       Obesity Interventions:  30 minute walk daily and daily weights. And low carb diet.              Safety:  Do you have any tripping hazards - loose or unsecured carpets or rugs?: (!) Yes  Interventions:  See AVS for additional education material     Advanced Directives:  Do you have a Living Will?: (!) No    Intervention:  has NO advanced directive  - referred to ACP Coordinator                                   Objective   There were no vitals filed for this visit. There is no height or weight on file to calculate BMI. Physical Exam  Vitals reviewed. Constitutional:       General: She is not in acute distress. HENT:      Head: Normocephalic. Nose: Nose normal.   Eyes:      Conjunctiva/sclera: Conjunctivae normal.   Cardiovascular:      Rate and Rhythm: Normal rate and regular rhythm. Heart sounds: Normal heart sounds. Comments: Chest wall pain bilaterally with palpation and bilateral breast pain upper outer left and lower mid on right is improving. Pulmonary:      Effort: Pulmonary effort is normal.      Breath sounds: Normal breath sounds. Abdominal:      General: Abdomen is flat. There is no distension. Palpations: Abdomen is soft. There is no mass. Tenderness: There is no abdominal tenderness. There is no right CVA tenderness, left CVA tenderness, guarding or rebound. Hernia: No hernia is present. Musculoskeletal:         General: Normal range of motion. Cervical back: Neck supple. Comments: Reproduced pain with palpation of scapula on left posterior back. Pain pattern in the left arm follows with C8 distribution/T1 for the neck. Lymphadenopathy:      Cervical: No cervical adenopathy. Skin:     General: Skin is warm and dry. Comments: Male pattern thinning   Neurological:      General: No focal deficit present. Mental Status: She is alert and oriented to person, place, and time. Cranial Nerves: No cranial nerve deficit. Sensory: No sensory deficit. Motor: No weakness.       Coordination: Coordination normal.      Gait: Gait normal.      Deep Tendon Reflexes: Reflexes normal.   Psychiatric: Mood and Affect: Mood normal.         Behavior: Behavior normal.         Thought Content: Thought content normal.         Judgment: Judgment normal.            Allergies   Allergen Reactions    Lisinopril Anaphylaxis and Itching     sweating    Flagyl [Metronidazole Hcl] Other (See Comments)     \"metallic taste\"    Metronidazole Nausea And Vomiting     Prior to Visit Medications    Medication Sig Taking? Authorizing Provider   metoprolol succinate (TOPROL XL) 25 MG extended release tablet TAKE ONE TABLET BY MOUTH ONCE NIGHTLY  Ta Joseph MD   simvastatin (ZOCOR) 20 MG tablet Take 1 tablet by mouth nightly  Ta Joseph MD   gabapentin (NEURONTIN) 400 MG capsule Take 400 mg by mouth 3 times daily. Historical Provider, MD   hydroCHLOROthiazide (HYDRODIURIL) 25 MG tablet Take 1 tablet by mouth every morning  Ta Joseph MD   Semaglutide,0.25 or 0.5MG/DOS, 2 MG/1.5ML SOPN Inject 0.25 mg into the skin once a week Call for increased dose at refill. Ta Joseph MD   betamethasone dipropionate 0.05 % ointment Apply topically daily. Ta Joseph MD   vitamin D3 (CHOLECALCIFEROL) 125 MCG (5000 UT) TABS tablet Take 1 tablet by mouth in the morning.   Divya Bentley MD   linaclotide [facility]) 145 MCG capsule Take 1 capsule by mouth every morning (before breakfast)  Patient not taking: Reported on 2/19/2023  Divya Bentley MD   Multiple Vitamins-Minerals (CENTRUM SILVER 50+WOMEN) TABS daily  Historical Provider, MD Carter (Including outside providers/suppliers regularly involved in providing care):   Patient Care Team:  Ta Joseph MD as PCP - General (Internal Medicine)  Ta Joseph MD as PCP - Empaneled Provider  Altagracia Bah (Ophthalmology)  Sasha Gómez MD as Consulting Physician (Gastroenterology)  Bebe Burkitt (Gynecology)  Shantell Gutierrez MD as Obstetrician (Obstetrics & Gynecology)     Reviewed and updated this visit:              COLBY WILLIAMSON MD

## 2023-02-22 NOTE — PATIENT INSTRUCTIONS
Advance Directives: Care Instructions  Overview  An advance directive is a legal way to state your wishes at the end of your life. It tells your family and your doctor what to do if you can't say what you want. There are two main types of advance directives. You can change them any time your wishes change. Living will. This form tells your family and your doctor your wishes about life support and other treatment. The form is also called a declaration. Medical power of . This form lets you name a person to make treatment decisions for you when you can't speak for yourself. This person is called a health care agent (health care proxy, health care surrogate). The form is also called a durable power of  for health care. If you do not have an advance directive, decisions about your medical care may be made by a family member, or by a doctor or a  who doesn't know you. It may help to think of an advance directive as a gift to the people who care for you. If you have one, they won't have to make tough decisions by themselves. For more information, including forms for your state, see the [address] website (www.caringinfo.org/planning/advance-directives/). Follow-up care is a key part of your treatment and safety. Be sure to make and go to all appointments, and call your doctor if you are having problems. It's also a good idea to know your test results and keep a list of the medicines you take. What should you include in an advance directive? Many states have a unique advance directive form. (It may ask you to address specific issues.) Or you might use a universal form that's approved by many states. If your form doesn't tell you what to address, it may be hard to know what to include in your advance directive. Use the questions below to help you get started. Who do you want to make decisions about your medical care if you are not able to?   What life-support measures do you want if you have a serious illness that gets worse over time or can't be cured? What are you most afraid of that might happen? (Maybe you're afraid of having pain, losing your independence, or being kept alive by machines.)  Where would you prefer to die? (Your home? A hospital? A nursing home?)  Do you want to donate your organs when you die? Do you want certain Amish practices performed before you die? When should you call for help? Be sure to contact your doctor if you have any questions. Where can you learn more? Go to http://www.butler.com/ and enter R264 to learn more about \"Advance Directives: Care Instructions. \"  Current as of: June 16, 2022               Content Version: 13.5  © 9886-2059 Healthwise, [employer]. Care instructions adapted under license by [facility] ([facility]). If you have questions about a medical condition or this instruction, always ask your healthcare professional. [place] [address] any warranty or liability for your use of this information. Starting a Weight Loss Plan: Care Instructions  Overview     If you're thinking about losing weight, it can be hard to know where to start. Your doctor can help you set up a weight loss plan that best meets your needs. You may want to take a class on nutrition or exercise, or you could join a weight loss support group. If you have questions about how to make changes to your eating or exercise habits, ask your doctor about seeing a registered dietitian or an exercise specialist.  It can be a big challenge to lose weight. But you don't have to make huge changes at once. Make small changes, and stick with them. When those changes become habit, add a few more changes. If you don't think you're ready to make changes right now, try to pick a date in the future. Make an appointment to see your doctor to discuss whether the time is right for you to start a plan. Follow-up care is a key part of your treatment and safety.  Be sure to make and go to all appointments, and call your doctor if you are having problems. It's also a good idea to know your test results and keep a list of the medicines you take. How can you care for yourself at home? Set realistic goals. Many people expect to lose much more weight than is likely. A weight loss of 5% to 10% of your body weight may be enough to improve your health. Get family and friends involved to provide support. Talk to them about why you are trying to lose weight, and ask them to help. They can help by participating in exercise and having meals with you, even if they may be eating something different. Find what works best for you. If you do not have time or do not like to cook, a program that offers meal replacement bars or shakes may be better for you. Or if you like to prepare meals, finding a plan that includes daily menus and recipes may be best.  Ask your doctor about other health professionals who can help you achieve your weight loss goals. A dietitian can help you make healthy changes in your diet. An exercise specialist or  can help you develop a safe and effective exercise program.  A counselor or psychiatrist can help you cope with issues such as depression, anxiety, or family problems that can make it hard to focus on weight loss. Consider joining a support group for people who are trying to lose weight. Your doctor can suggest groups in your area. Where can you learn more? Go to http://www.woods.com/ and enter U357 to learn more about \"Starting a Weight Loss Plan: Care Instructions. \"  Current as of: August 25, 2022               Content Version: 13.5  © 2006-2022 Healthwise, Incorporated. Care instructions adapted under license by [facility] [employer] [facility] ([facility]).  If you have questions about a medical condition or this instruction, always ask your healthcare professional. [address] any warranty or liability for your use of this information. A Healthy Heart: Care Instructions  Your Care Instructions     Coronary artery disease, also called heart disease, occurs when a substance called plaque builds up in the vessels that supply oxygen-rich blood to your heart muscle. This can narrow the blood vessels and reduce blood flow. A heart attack happens when blood flow is completely blocked. A high-fat diet, smoking, and other factors increase the risk of heart disease. Your doctor has found that you have a chance of having heart disease. You can do lots of things to keep your heart healthy. It may not be easy, but you can change your diet, exercise more, and quit smoking. These steps really work to lower your chance of heart disease. Follow-up care is a key part of your treatment and safety. Be sure to make and go to all appointments, and call your doctor if you are having problems. It's also a good idea to know your test results and keep a list of the medicines you take. How can you care for yourself at home? Diet    Use less salt when you cook and eat. This helps lower your blood pressure. Taste food before salting. Add only a little salt when you think you need it. With time, your taste buds will adjust to less salt. Eat fewer snack items, fast foods, canned soups, and other high-salt, high-fat, processed foods. Read food labels and try to avoid saturated and trans fats. They increase your risk of heart disease by raising cholesterol levels. Limit the amount of solid fat-butter, margarine, and shortening-you eat. Use olive, peanut, or canola oil when you cook. Bake, broil, and steam foods instead of frying them. Eat a variety of fruit and vegetables every day. Dark green, deep orange, red, or yellow fruits and vegetables are especially good for you. Examples include spinach, carrots, peaches, and berries. Foods high in fiber can reduce your cholesterol and provide important vitamins and minerals.  High-fiber foods include whole-grain cereals and breads, oatmeal, beans, brown rice, citrus fruits, and apples. Eat lean proteins. Heart-healthy proteins include seafood, lean meats and poultry, eggs, beans, peas, nuts, seeds, and soy products. Limit drinks and foods with added sugar. These include candy, desserts, and soda pop. Lifestyle changes    If your doctor recommends it, get more exercise. Walking is a good choice. Bit by bit, increase the amount you walk every day. Try for at least 30 minutes on most days of the week. You also may want to swim, bike, or do other activities. Do not smoke. If you need help quitting, talk to your doctor about stop-smoking programs and medicines. These can increase your chances of quitting for good. Quitting smoking may be the most important step you can take to protect your heart. It is never too late to quit. Limit alcohol to 2 drinks a day for men and 1 drink a day for women. Too much alcohol can cause health problems. Manage other health problems such as diabetes, high blood pressure, and high cholesterol. If you think you may have a problem with alcohol or drug use, talk to your doctor. Medicines    Take your medicines exactly as prescribed. Call your doctor if you think you are having a problem with your medicine. If your doctor recommends aspirin, take the amount directed each day. Make sure you take aspirin and not another kind of pain reliever, such as acetaminophen (Tylenol). When should you call for help? Call 911 if you have symptoms of a heart attack. These may include:    Chest pain or pressure, or a strange feeling in the chest.     Sweating. Shortness of breath. Pain, pressure, or a strange feeling in the back, neck, jaw, or upper belly or in one or both shoulders or arms. Lightheadedness or sudden weakness. A fast or irregular heartbeat.    After you call 911, the  may tell you to chew 1 adult-strength or 2 to 4 low-dose aspirin. Wait for an ambulance. Do not try to drive yourself. Watch closely for changes in your health, and be sure to contact your doctor if you have any problems. Where can you learn more? Go to http://www.butler.com/ and enter F075 to learn more about \"A Healthy Heart: Care Instructions. \"  Current as of: September 7, 2022               Content Version: 13.5  © 2006-2022 [employer]. Care instructions adapted under license by [facility] ([facility]). If you have questions about a medical condition or this instruction, always ask your healthcare professional. [place] [address] any warranty or liability for your use of this information. Personalized Preventive Plan for Anastacio Weber - 2/22/2023  Medicare offers a range of preventive health benefits. Some of the tests and screenings are paid in full while other may be subject to a deductible, co-insurance, and/or copay. Some of these benefits include a comprehensive review of your medical history including lifestyle, illnesses that may run in your family, and various assessments and screenings as appropriate. After reviewing your medical record and screening and assessments performed today your provider may have ordered immunizations, labs, imaging, and/or referrals for you. A list of these orders (if applicable) as well as your Preventive Care list are included within your After Visit Summary for your review. Other Preventive Recommendations:    A preventive eye exam performed by an eye specialist is recommended every 1-2 years to screen for glaucoma; cataracts, macular degeneration, and other eye disorders. A preventive dental visit is recommended every 6 months. Try to get at least 150 minutes of exercise per week or 10,000 steps per day on a pedometer . Order or download the FREE \"Exercise & Physical Activity: Your Everyday Guide\" from [employer].  Call 0-899.921.8472 or search [employer] online. You need 5972-6906 mg of calcium and 5719-3647 IU of vitamin D per day. It is possible to meet your calcium requirement with diet alone, but a vitamin D supplement is usually necessary to meet this goal.  When exposed to the sun, use a sunscreen that protects against both UVA and UVB radiation with an SPF of 30 or greater. Reapply every 2 to 3 hours or after sweating, drying off with a towel, or swimming. Always wear a seat belt when traveling in a car. Always wear a helmet when riding a bicycle or motorcycle.           Go to pharmacy for covid booster , pneumonia vaccine and shingrix

## 2023-02-23 ENCOUNTER — CLINICAL DOCUMENTATION (OUTPATIENT)
Dept: SPIRITUAL SERVICES | Age: 68
End: 2023-02-23

## 2023-02-23 NOTE — ACP (ADVANCE CARE PLANNING)
Advance Care Planning   Ambulatory ACP Specialist Patient Outreach    Date:  2/23/2023  ACP Specialist:  Gianluca Sosa    Outreach call to patient in follow-up to ACP Specialist referral from: Andrea Griffith MD    [x] PCP  [] Provider   [] Ambulatory Care Management [] Other for Reason:    [x] Advance Directive Assistance  [] Code Status Discussion  [] Complete Portable DNR Order  [] Discuss Goals of Care  [] Complete POST/MOST  [] Early ACP Decision-Making  [] Other    Date Referral Received:2/22/23    Today's Outreach:  [x] First   [] Second  [] Third                               First outreach made by [x]  phone  [] email []   AlwaysFashiont     Intervention:  [x] Spoke with Patient  [] Left VM requesting return call      Outcome: Left detailed VM for Patient to return call. 2/23: Patient returned call. Scheduled ACP Conversation Call for Monday March 6th at 9am.        Next Step:   [x] ACP scheduled conversation  [] Outreach again in one week               [] Email / Mail ACP Info Sheets  [] Email / Mail Advance Directive            [] Close Referral. Routing closure to referring provider/staff and to ACP Specialist . [] Closure Letter mailed to Patient with Invitation to Contact ACP Specialist if/when ready.     Thank you for this referral.

## 2023-02-24 ENCOUNTER — APPOINTMENT (OUTPATIENT)
Dept: ULTRASOUND IMAGING | Age: 68
End: 2023-02-24
Payer: MEDICARE

## 2023-02-24 ENCOUNTER — HOSPITAL ENCOUNTER (OUTPATIENT)
Dept: WOMENS IMAGING | Age: 68
Discharge: HOME | End: 2023-02-24
Payer: MEDICARE

## 2023-02-24 DIAGNOSIS — N64.4 PAIN OF BOTH BREASTS: ICD-10-CM

## 2023-02-24 PROCEDURE — 77066 DX MAMMO INCL CAD BI: CPT

## 2023-02-25 NOTE — RESULT ENCOUNTER NOTE
The bilateral mammograms were normal.  Follow through with the bilateral breast ultrasound due to breast pain.

## 2023-03-03 ENCOUNTER — CLINICAL DOCUMENTATION (OUTPATIENT)
Dept: SPIRITUAL SERVICES | Age: 68
End: 2023-03-03

## 2023-03-03 NOTE — ACP (ADVANCE CARE PLANNING)
Advance Care Planning   Ambulatory ACP Specialist Patient Outreach    Date:  3/3/2023  ACP Specialist:  GALLO Maldonado    Outreach call to patient in follow-up to ACP Specialist referral from: Addi Culver MD    [x] PCP  [] Provider   [] Ambulatory Care Management [] Other for Reason:    [x] Advance Directive Assistance  [] Code Status Discussion  [] Complete Portable DNR Order  [] Discuss Goals of Care  [] Complete POST/MOST  [] Early ACP Decision-Making  [] Other    Date Referral Received: 02/22/2023    Today's Outreach:  [] First   [] Second  [] Third       [x] Reminder call    Third outreach made by []  phone  [] email []   [employer]Apphart     Intervention:  [] Spoke with Patient  [] Left VM requesting return call      Outcome: ACP specialist made a reminder call to pt for her scheduled acp conversation appt for Monday 03/06 at 9am. Pt did not answer this call, which resulted in a VM left encouraging this pt to return this call if she would need to reschedule/cancel this appt. Next Step:   [] ACP scheduled conversation  [] Outreach again in one week               [] Email / Mail ACP Info Sheets  [] Email / Mail Advance Directive            [] Close Referral. Routing closure to referring provider/staff and to ACP Specialist . [] Closure Letter mailed to Patient with Invitation to Contact ACP Specialist if/when ready.     Thank you for this referral.

## 2023-03-16 DIAGNOSIS — R73.03 PREDIABETES: Primary | ICD-10-CM

## 2023-03-16 RX ORDER — ORAL SEMAGLUTIDE 3 MG/1
3 TABLET ORAL DAILY
Qty: 30 TABLET | Refills: 1 | Status: SHIPPED | OUTPATIENT
Start: 2023-03-16

## 2023-05-01 DIAGNOSIS — R73.03 PREDIABETES: ICD-10-CM

## 2023-05-01 RX ORDER — SEMAGLUTIDE 1.34 MG/ML
0.5 INJECTION, SOLUTION SUBCUTANEOUS WEEKLY
Qty: 3 ML | Refills: 2 | Status: SHIPPED | OUTPATIENT
Start: 2023-05-01

## 2023-05-21 DIAGNOSIS — R73.03 PREDIABETES: ICD-10-CM

## 2023-05-23 ENCOUNTER — OFFICE VISIT (OUTPATIENT)
Dept: PRIMARY CARE CLINIC | Age: 68
End: 2023-05-23

## 2023-05-23 VITALS
BODY MASS INDEX: 36.8 KG/M2 | TEMPERATURE: 97.2 F | HEART RATE: 67 BPM | OXYGEN SATURATION: 99 % | SYSTOLIC BLOOD PRESSURE: 104 MMHG | WEIGHT: 228 LBS | DIASTOLIC BLOOD PRESSURE: 69 MMHG

## 2023-05-23 DIAGNOSIS — E53.8 VITAMIN B12 DEFICIENCY: ICD-10-CM

## 2023-05-23 DIAGNOSIS — R20.2 PARESTHESIA OF LOWER LIMB: ICD-10-CM

## 2023-05-23 DIAGNOSIS — E78.2 MIXED HYPERLIPIDEMIA: ICD-10-CM

## 2023-05-23 DIAGNOSIS — R22.41 MASS OF RIGHT THIGH: ICD-10-CM

## 2023-05-23 DIAGNOSIS — R73.03 PREDIABETES: ICD-10-CM

## 2023-05-23 DIAGNOSIS — G47.33 OSA (OBSTRUCTIVE SLEEP APNEA): ICD-10-CM

## 2023-05-23 DIAGNOSIS — E55.9 VITAMIN D DEFICIENCY: Primary | ICD-10-CM

## 2023-05-23 DIAGNOSIS — E55.9 VITAMIN D DEFICIENCY: ICD-10-CM

## 2023-05-23 DIAGNOSIS — I10 ESSENTIAL HYPERTENSION: ICD-10-CM

## 2023-05-23 LAB
25(OH)D3 SERPL-MCNC: 47.3 NG/ML
ALBUMIN SERPL-MCNC: 4.6 G/DL (ref 3.4–5)
ALBUMIN/GLOB SERPL: 1.6 {RATIO} (ref 1.1–2.2)
ALP SERPL-CCNC: 91 U/L (ref 40–129)
ALT SERPL-CCNC: 12 U/L (ref 10–40)
ANION GAP SERPL CALCULATED.3IONS-SCNC: 12 MMOL/L (ref 3–16)
AST SERPL-CCNC: 17 U/L (ref 15–37)
BASOPHILS # BLD: 0 K/UL (ref 0–0.2)
BASOPHILS NFR BLD: 0.7 %
BILIRUB SERPL-MCNC: 0.5 MG/DL (ref 0–1)
BUN SERPL-MCNC: 12 MG/DL (ref 7–20)
CALCIUM SERPL-MCNC: 9.9 MG/DL (ref 8.3–10.6)
CHLORIDE SERPL-SCNC: 99 MMOL/L (ref 99–110)
CHOLEST SERPL-MCNC: 185 MG/DL (ref 0–199)
CO2 SERPL-SCNC: 28 MMOL/L (ref 21–32)
CREAT SERPL-MCNC: 1 MG/DL (ref 0.6–1.2)
DEPRECATED RDW RBC AUTO: 14.2 % (ref 12.4–15.4)
EOSINOPHIL # BLD: 0.1 K/UL (ref 0–0.6)
EOSINOPHIL NFR BLD: 1.9 %
ERYTHROCYTE [SEDIMENTATION RATE] IN BLOOD BY WESTERGREN METHOD: 57 MM/HR (ref 0–30)
GFR SERPLBLD CREATININE-BSD FMLA CKD-EPI: >60 ML/MIN/{1.73_M2}
GLUCOSE SERPL-MCNC: 82 MG/DL (ref 70–99)
HCT VFR BLD AUTO: 39.3 % (ref 36–48)
HDLC SERPL-MCNC: 84 MG/DL (ref 40–60)
HGB BLD-MCNC: 13.1 G/DL (ref 12–16)
LDLC SERPL CALC-MCNC: 89 MG/DL
LYMPHOCYTES # BLD: 2.4 K/UL (ref 1–5.1)
LYMPHOCYTES NFR BLD: 42.7 %
MCH RBC QN AUTO: 29.3 PG (ref 26–34)
MCHC RBC AUTO-ENTMCNC: 33.4 G/DL (ref 31–36)
MCV RBC AUTO: 87.6 FL (ref 80–100)
MONOCYTES # BLD: 0.6 K/UL (ref 0–1.3)
MONOCYTES NFR BLD: 10 %
NEUTROPHILS # BLD: 2.6 K/UL (ref 1.7–7.7)
NEUTROPHILS NFR BLD: 44.7 %
PHOSPHATE SERPL-MCNC: 3.6 MG/DL (ref 2.5–4.9)
PLATELET # BLD AUTO: 254 K/UL (ref 135–450)
PMV BLD AUTO: 8.6 FL (ref 5–10.5)
POTASSIUM SERPL-SCNC: 3.9 MMOL/L (ref 3.5–5.1)
PROT SERPL-MCNC: 7.4 G/DL (ref 6.4–8.2)
RBC # BLD AUTO: 4.49 M/UL (ref 4–5.2)
SODIUM SERPL-SCNC: 139 MMOL/L (ref 136–145)
TRIGL SERPL-MCNC: 60 MG/DL (ref 0–150)
TSH SERPL DL<=0.005 MIU/L-ACNC: 1.51 UIU/ML (ref 0.27–4.2)
VIT B12 SERPL-MCNC: 718 PG/ML (ref 211–911)
VLDLC SERPL CALC-MCNC: 12 MG/DL
WBC # BLD AUTO: 5.7 K/UL (ref 4–11)

## 2023-05-23 ASSESSMENT — ENCOUNTER SYMPTOMS
DIARRHEA: 0
NAUSEA: 0
BACK PAIN: 0
WHEEZING: 0
BLURRED VISION: 0
ORTHOPNEA: 0
SHORTNESS OF BREATH: 0
CONSTIPATION: 0
SINUS PRESSURE: 0
CHEST TIGHTNESS: 0
VOMITING: 0
ABDOMINAL PAIN: 0
ABDOMINAL DISTENTION: 0
EYES NEGATIVE: 1
COUGH: 0

## 2023-05-23 ASSESSMENT — PATIENT HEALTH QUESTIONNAIRE - PHQ9
SUM OF ALL RESPONSES TO PHQ9 QUESTIONS 1 & 2: 0
SUM OF ALL RESPONSES TO PHQ QUESTIONS 1-9: 0
1. LITTLE INTEREST OR PLEASURE IN DOING THINGS: 0
SUM OF ALL RESPONSES TO PHQ QUESTIONS 1-9: 0
2. FEELING DOWN, DEPRESSED OR HOPELESS: 0
SUM OF ALL RESPONSES TO PHQ QUESTIONS 1-9: 0
SUM OF ALL RESPONSES TO PHQ QUESTIONS 1-9: 0

## 2023-05-23 NOTE — PROGRESS NOTES
Shayla Estevez (:  1955) is a 78 y.o. female,Established patient, here for evaluation of the following chief complaint(s):  Follow-up         ASSESSMENT/PLAN:  1. Vitamin D deficiency taking Centrum Silver and vitamin D3 5000 units daily we will check level to make sure adequate between 30 and 100 or if dosage needs to be adjusted. -     Vitamin D 25 Hydroxy; Future  2. Vitamin B12 deficiency on multivitamin daily check level to make sure between 300 and 900  -     Vitamin B12; Future  3. Mixed hyperlipidemia taking simvastatin 20 mg daily without myalgias or weakness. Concern about weight gain and will start the diabetic type diet plate with half green vegetables one fourth fresh fruits and one fourth lean protein with a 1200-calorie restriction which is a low-fat diet and encouraged exercise. Lab Results   Component Value Date    CHOL 178 2022    CHOL 177 2020    CHOL 234 (H) 2020     Lab Results   Component Value Date    TRIG 41 2022    TRIG 54 2020    TRIG 42 2020     Lab Results   Component Value Date    HDL 72 (H) 2022    HDL 73 (H) 2020    HDL 77 (H) 2020     Lab Results   Component Value Date    LDLCALC 98 2022    LDLCALC 93 2020    LDLCALC 149 (H) 2020     Lab Results   Component Value Date    LABVLDL 8 2022    LABVLDL 11 2020    LABVLDL 8 2020     No results found for: CHOLHDLRATIO    -     Lipid Panel; Future  -     TSH with Reflex to FT4; Future  4. Essential hypertension controlled with metoprolol. Continue to monitor renal function. Labs Renal Latest Ref Rng & Units 2022 2022 2/10/2022 2021 2021   BUN 7 - 20 mg/dL 11 7 12 10 19   Cr 0.6 - 1.2 mg/dL 0.9 0.9 1.1 0.8 1.0   K 3.5 - 5.1 mmol/L 4.1 3.5 4.0 3.8 4.6   Na 136 - 145 mmol/L 140 138 134(L) 139 138         BP Readings from Last 3 Encounters:   23 104/69   23 111/76   02/19/23 (!) 135/94        5.  STEPHANIE

## 2023-05-24 DIAGNOSIS — R70.0 ELEVATED SED RATE: Primary | ICD-10-CM

## 2023-05-24 LAB
EST. AVERAGE GLUCOSE BLD GHB EST-MCNC: 111.2 MG/DL
FRUCTOSAMINE SERPL-SCNC: 258 UMOL/L (ref 205–285)
HBA1C MFR BLD: 5.5 %

## 2023-05-24 RX ORDER — SEMAGLUTIDE 1.34 MG/ML
INJECTION, SOLUTION SUBCUTANEOUS
Qty: 1.5 ML | OUTPATIENT
Start: 2023-05-24

## 2023-05-28 ENCOUNTER — HOSPITAL ENCOUNTER (OUTPATIENT)
Age: 68
Discharge: HOME | End: 2023-05-28
Payer: MEDICARE

## 2023-05-28 ENCOUNTER — HOSPITAL ENCOUNTER (OUTPATIENT)
Dept: GENERAL RADIOLOGY | Age: 68
Discharge: HOME | End: 2023-05-28
Payer: MEDICARE

## 2023-05-28 DIAGNOSIS — R70.0 ELEVATED SED RATE: ICD-10-CM

## 2023-05-28 PROCEDURE — 71046 X-RAY EXAM CHEST 2 VIEWS: CPT

## 2023-06-04 ENCOUNTER — PATIENT MESSAGE (OUTPATIENT)
Dept: PRIMARY CARE CLINIC | Age: 68
End: 2023-06-04

## 2023-06-04 DIAGNOSIS — K58.1 IRRITABLE BOWEL SYNDROME WITH CONSTIPATION: ICD-10-CM

## 2023-06-05 NOTE — TELEPHONE ENCOUNTER
From: Tc Nix  To: Dr. Severino Aguero: 6/4/2023 4:56 PM EDT  Subject: [facility] says they don't have a current prescription to fill linzess. Please send in a prescription as I'm almost out of medication.    Thanks   Paola Vanegas   [phone]

## 2023-06-20 ENCOUNTER — TELEPHONE (OUTPATIENT)
Dept: PRIMARY CARE CLINIC | Age: 68
End: 2023-06-20

## 2023-06-20 NOTE — TELEPHONE ENCOUNTER
Pt dropped off application for Linzess. Need script to be attached with form. Please notify patient when fax is sent. Fax #[phone].     Patient contact [phone]

## 2023-06-21 DIAGNOSIS — K58.1 IRRITABLE BOWEL SYNDROME WITH CONSTIPATION: ICD-10-CM

## 2023-06-28 ENCOUNTER — HOSPITAL ENCOUNTER (OUTPATIENT)
Dept: MRI IMAGING | Age: 68
Discharge: HOME | End: 2023-06-28
Payer: MEDICARE

## 2023-06-28 ENCOUNTER — HOSPITAL ENCOUNTER (OUTPATIENT)
Dept: CT IMAGING | Age: 68
Discharge: HOME | End: 2023-06-28
Payer: MEDICARE

## 2023-06-28 DIAGNOSIS — R70.0 ELEVATED SED RATE: ICD-10-CM

## 2023-06-28 DIAGNOSIS — R22.41 MASS OF RIGHT THIGH: ICD-10-CM

## 2023-06-28 LAB
PERFORMED ON: ABNORMAL
POC CREATININE: 1.1 MG/DL (ref 0.6–1.2)
POC SAMPLE TYPE: ABNORMAL

## 2023-06-28 PROCEDURE — 6360000004 HC RX CONTRAST MEDICATION: Performed by: INTERNAL MEDICINE

## 2023-06-28 PROCEDURE — 73720 MRI LWR EXTREMITY W/O&W/DYE: CPT

## 2023-06-28 PROCEDURE — A9576 INJ PROHANCE MULTIPACK: HCPCS | Performed by: INTERNAL MEDICINE

## 2023-06-28 PROCEDURE — 82565 ASSAY OF CREATININE: CPT

## 2023-06-28 PROCEDURE — 74177 CT ABD & PELVIS W/CONTRAST: CPT

## 2023-06-28 RX ADMIN — IOPAMIDOL 75 ML: 755 INJECTION, SOLUTION INTRAVENOUS at 09:26

## 2023-06-28 RX ADMIN — GADOTERIDOL 20 ML: 279.3 INJECTION, SOLUTION INTRAVENOUS at 11:22

## 2023-07-14 DIAGNOSIS — I10 ESSENTIAL HYPERTENSION: ICD-10-CM

## 2023-07-14 RX ORDER — HYDROCHLOROTHIAZIDE 25 MG/1
TABLET ORAL
Qty: 90 TABLET | Refills: 1 | Status: SHIPPED | OUTPATIENT
Start: 2023-07-14

## 2023-07-14 NOTE — TELEPHONE ENCOUNTER
Future Appointments    Encounter Information    Provider Department Appt Notes   8/23/2023 Zakiya Coffman MD [address] [facility] Primary Care Return in about 3 months (around 8/23/2023).      Past Visits    Date Provider Specialty Visit Type Primary Dx   05/23/2023 Zakiya Coffman MD Primary Care Office Visit Vitamin D deficiency   02/22/2023 Zakiya Coffman MD Primary Care Office Visit Initial Medicare annual wellness visit

## 2023-07-24 RX ORDER — SEMAGLUTIDE 0.68 MG/ML
INJECTION, SOLUTION SUBCUTANEOUS
Qty: 3 ML | Refills: 2 | Status: SHIPPED | OUTPATIENT
Start: 2023-07-24

## 2023-07-29 DIAGNOSIS — I10 ESSENTIAL HYPERTENSION: ICD-10-CM

## 2023-07-29 DIAGNOSIS — R00.2 PALPITATIONS: ICD-10-CM

## 2023-07-31 NOTE — TELEPHONE ENCOUNTER
Future Appointments    Encounter Information    Provider Department Appt Notes   8/23/2023 Petey Marino MD [address] [facility] Primary Care Return in about 3 months (around 8/23/2023).      Past Visits    Date Provider Specialty Visit Type Primary Dx   05/23/2023 Petey Marino MD Primary Care Office Visit Vitamin D deficiency

## 2023-08-01 ENCOUNTER — TELEPHONE (OUTPATIENT)
Dept: PRIMARY CARE CLINIC | Age: 68
End: 2023-08-01

## 2023-08-01 RX ORDER — METOPROLOL SUCCINATE 25 MG/1
TABLET, EXTENDED RELEASE ORAL
Qty: 90 TABLET | Refills: 1 | Status: SHIPPED | OUTPATIENT
Start: 2023-08-01

## 2023-08-01 NOTE — TELEPHONE ENCOUNTER
PT came into the office with an envelope with a financial assistance paperwork that she needs Dr. Hipolito Smith to review and complete. Pt says that she has completed her part of it and then to have it faxed to the # that is on the form. Placed envelope in Dr. Brittny Marrufo by the fax machine.

## 2023-08-10 ENCOUNTER — PATIENT MESSAGE (OUTPATIENT)
Dept: PRIMARY CARE CLINIC | Age: 68
End: 2023-08-10

## 2023-08-10 NOTE — TELEPHONE ENCOUNTER
From: Laina Serrano  To: Dr. Briana Wilson: 8/10/2023 10:57 AM EDT  Subject: Charles Gore follow up     Hello Dr German Arana   I just spoke with representative from [facility] on 8/10/23, about my patients assistant application and they have not received it yet. Just a friendly reminder trying not to have to pay another $183 in 2 weeks.    Aye Hargrove

## 2023-08-10 NOTE — TELEPHONE ENCOUNTER
Called and spoke with pt making her aware that her forms were faxed on 8/7/23 and we receive confirmation that it was receive.

## 2023-08-23 ENCOUNTER — OFFICE VISIT (OUTPATIENT)
Dept: PRIMARY CARE CLINIC | Age: 68
End: 2023-08-23
Payer: MEDICARE

## 2023-08-23 VITALS
DIASTOLIC BLOOD PRESSURE: 69 MMHG | SYSTOLIC BLOOD PRESSURE: 103 MMHG | BODY MASS INDEX: 35.83 KG/M2 | WEIGHT: 222 LBS | HEART RATE: 81 BPM | OXYGEN SATURATION: 98 % | TEMPERATURE: 97.7 F

## 2023-08-23 DIAGNOSIS — R73.03 PREDIABETES: ICD-10-CM

## 2023-08-23 DIAGNOSIS — M79.7 FIBROMYALGIA: ICD-10-CM

## 2023-08-23 DIAGNOSIS — E78.2 MIXED HYPERLIPIDEMIA: ICD-10-CM

## 2023-08-23 DIAGNOSIS — I10 ESSENTIAL HYPERTENSION: Primary | ICD-10-CM

## 2023-08-23 DIAGNOSIS — Z12.11 COLON CANCER SCREENING: ICD-10-CM

## 2023-08-23 PROCEDURE — 3074F SYST BP LT 130 MM HG: CPT | Performed by: INTERNAL MEDICINE

## 2023-08-23 PROCEDURE — 1090F PRES/ABSN URINE INCON ASSESS: CPT | Performed by: INTERNAL MEDICINE

## 2023-08-23 PROCEDURE — 1036F TOBACCO NON-USER: CPT | Performed by: INTERNAL MEDICINE

## 2023-08-23 PROCEDURE — G8427 DOCREV CUR MEDS BY ELIG CLIN: HCPCS | Performed by: INTERNAL MEDICINE

## 2023-08-23 PROCEDURE — 3017F COLORECTAL CA SCREEN DOC REV: CPT | Performed by: INTERNAL MEDICINE

## 2023-08-23 PROCEDURE — G8399 PT W/DXA RESULTS DOCUMENT: HCPCS | Performed by: INTERNAL MEDICINE

## 2023-08-23 PROCEDURE — G8417 CALC BMI ABV UP PARAM F/U: HCPCS | Performed by: INTERNAL MEDICINE

## 2023-08-23 PROCEDURE — 3078F DIAST BP <80 MM HG: CPT | Performed by: INTERNAL MEDICINE

## 2023-08-23 PROCEDURE — 1123F ACP DISCUSS/DSCN MKR DOCD: CPT | Performed by: INTERNAL MEDICINE

## 2023-08-23 PROCEDURE — 99214 OFFICE O/P EST MOD 30 MIN: CPT | Performed by: INTERNAL MEDICINE

## 2023-08-23 ASSESSMENT — ENCOUNTER SYMPTOMS
CONSTIPATION: 0
DIARRHEA: 0
SHORTNESS OF BREATH: 0
WHEEZING: 0
EYES NEGATIVE: 1
SINUS PRESSURE: 0
ABDOMINAL PAIN: 0
ABDOMINAL DISTENTION: 0
COUGH: 0
BACK PAIN: 0
CHEST TIGHTNESS: 0
VOMITING: 0
NAUSEA: 0

## 2023-08-23 NOTE — PROGRESS NOTES
Cervical back: Neck supple. Comments: Reproduced pain with palpation of scapula on left posterior back. Pain pattern in the left arm follows with C8 distribution/T1 for the neck. Lymphadenopathy:      Cervical: No cervical adenopathy. Skin:     General: Skin is warm and dry. Comments: Male pattern thinning   Neurological:      General: No focal deficit present. Mental Status: She is alert and oriented to person, place, and time. Cranial Nerves: No cranial nerve deficit. Sensory: No sensory deficit. Motor: No weakness. Coordination: Coordination normal.      Gait: Gait normal.      Deep Tendon Reflexes: Reflexes normal.   Psychiatric:         Mood and Affect: Mood normal.         Behavior: Behavior normal.         Thought Content: Thought content normal.         Judgment: Judgment normal.                An electronic signature was used to authenticate this note.     --Lindy Meyers MD

## 2023-08-25 ENCOUNTER — TELEPHONE (OUTPATIENT)
Dept: PRIMARY CARE CLINIC | Age: 68
End: 2023-08-25

## 2023-08-25 NOTE — TELEPHONE ENCOUNTER
----- Message from Julio Kyle sent at 8/25/2023 12:13 PM EDT -----  Subject: Message to Provider    QUESTIONS  Information for Provider? Patient called to see if the office received   delivery of her medication Ozempic. Please call patient.  ---------------------------------------------------------------------------  --------------  Umang Silverman [facility]  [phone]; OK to leave message on voicemail  ---------------------------------------------------------------------------  --------------  SCRIPT ANSWERS  Relationship to Patient?  Self

## 2023-08-28 ENCOUNTER — TELEPHONE (OUTPATIENT)
Dept: PRIMARY CARE CLINIC | Age: 68
End: 2023-08-28

## 2023-08-28 NOTE — TELEPHONE ENCOUNTER
----- Message from Valery Vishal sent at 8/28/2023  3:24 PM EDT -----  Subject: Message to Provider    QUESTIONS  Information for Provider? called to see if her medication has made it to   the office . Please reach out to confirm   ---------------------------------------------------------------------------  --------------  [address]  [phone]; OK to leave message on voicemail  ---------------------------------------------------------------------------  --------------  SCRIPT ANSWERS  Relationship to Patient?  Self

## 2023-08-28 NOTE — TELEPHONE ENCOUNTER
Called and spoke with pt making her aware that her medication has not arrived yet to the office. Pt verbalized understanding.

## 2023-08-31 ASSESSMENT — ENCOUNTER SYMPTOMS
ORTHOPNEA: 0
BLURRED VISION: 0

## 2023-09-01 DIAGNOSIS — I10 ESSENTIAL HYPERTENSION: ICD-10-CM

## 2023-09-01 DIAGNOSIS — R73.03 PREDIABETES: ICD-10-CM

## 2023-09-01 DIAGNOSIS — M79.7 FIBROMYALGIA: ICD-10-CM

## 2023-09-01 LAB — ERYTHROCYTE [SEDIMENTATION RATE] IN BLOOD BY WESTERGREN METHOD: 40 MM/HR (ref 0–30)

## 2023-09-02 LAB
ALBUMIN SERPL-MCNC: 4.5 G/DL (ref 3.4–5)
ANION GAP SERPL CALCULATED.3IONS-SCNC: 12 MMOL/L (ref 3–16)
BASOPHILS # BLD: 0 K/UL (ref 0–0.2)
BASOPHILS NFR BLD: 0.7 %
BUN SERPL-MCNC: 13 MG/DL (ref 7–20)
CALCIUM SERPL-MCNC: 9.9 MG/DL (ref 8.3–10.6)
CHLORIDE SERPL-SCNC: 95 MMOL/L (ref 99–110)
CO2 SERPL-SCNC: 28 MMOL/L (ref 21–32)
CREAT SERPL-MCNC: 1 MG/DL (ref 0.6–1.2)
DEPRECATED RDW RBC AUTO: 14.1 % (ref 12.4–15.4)
EOSINOPHIL # BLD: 0.1 K/UL (ref 0–0.6)
EOSINOPHIL NFR BLD: 2.4 %
EST. AVERAGE GLUCOSE BLD GHB EST-MCNC: 114 MG/DL
GFR SERPLBLD CREATININE-BSD FMLA CKD-EPI: >60 ML/MIN/{1.73_M2}
GLUCOSE SERPL-MCNC: 76 MG/DL (ref 70–99)
HBA1C MFR BLD: 5.6 %
HCT VFR BLD AUTO: 36.8 % (ref 36–48)
HGB BLD-MCNC: 12.5 G/DL (ref 12–16)
LYMPHOCYTES # BLD: 2.3 K/UL (ref 1–5.1)
LYMPHOCYTES NFR BLD: 44.3 %
MCH RBC QN AUTO: 29.9 PG (ref 26–34)
MCHC RBC AUTO-ENTMCNC: 34.1 G/DL (ref 31–36)
MCV RBC AUTO: 87.7 FL (ref 80–100)
MONOCYTES # BLD: 0.5 K/UL (ref 0–1.3)
MONOCYTES NFR BLD: 9.8 %
NEUTROPHILS # BLD: 2.2 K/UL (ref 1.7–7.7)
NEUTROPHILS NFR BLD: 42.8 %
PHOSPHATE SERPL-MCNC: 4 MG/DL (ref 2.5–4.9)
PLATELET # BLD AUTO: NORMAL K/UL (ref 135–450)
PLATELET BLD QL SMEAR: NORMAL
PMV BLD AUTO: NORMAL FL (ref 5–10.5)
POTASSIUM SERPL-SCNC: 4 MMOL/L (ref 3.5–5.1)
RBC # BLD AUTO: 4.2 M/UL (ref 4–5.2)
SLIDE REVIEW: NORMAL
SODIUM SERPL-SCNC: 135 MMOL/L (ref 136–145)
WBC # BLD AUTO: 5.2 K/UL (ref 4–11)

## 2023-09-02 NOTE — RESULT ENCOUNTER NOTE
Karolyn is calling to speak with the nurse regarding her medications.   Karolyn can be reached at [phone].   Stable labs with normal renal function and sed rate is decreasing. No Diabetes on A1c. No anemia.

## 2023-11-06 DIAGNOSIS — E78.2 MIXED HYPERLIPIDEMIA: ICD-10-CM

## 2023-11-07 RX ORDER — SIMVASTATIN 20 MG
20 TABLET ORAL NIGHTLY
Qty: 90 TABLET | Refills: 3 | Status: SHIPPED | OUTPATIENT
Start: 2023-11-07

## 2023-11-07 NOTE — TELEPHONE ENCOUNTER
Medication:   Requested Prescriptions     Pending Prescriptions Disp Refills    simvastatin (ZOCOR) 20 MG tablet 90 tablet 3     Sig: Take 1 tablet by mouth nightly        Last Filled:      Patient Phone Number: [phone] (home)     Last appt: 8/23/2023   Next appt: Visit date not found    Last OARRS:        No data to display

## 2023-12-20 DIAGNOSIS — R73.03 PREDIABETES: ICD-10-CM

## 2023-12-20 DIAGNOSIS — R70.0 ELEVATED SED RATE: ICD-10-CM

## 2023-12-20 PROBLEM — J01.20 ACUTE ETHMOIDAL SINUSITIS: Status: ACTIVE | Noted: 2023-12-20

## 2023-12-20 LAB
ALBUMIN SERPL-MCNC: 4.5 G/DL (ref 3.4–5)
ALBUMIN/GLOB SERPL: 1.7 {RATIO} (ref 1.1–2.2)
ALP SERPL-CCNC: 92 U/L (ref 40–129)
ALT SERPL-CCNC: 11 U/L (ref 10–40)
ANION GAP SERPL CALCULATED.3IONS-SCNC: 10 MMOL/L (ref 3–16)
AST SERPL-CCNC: 16 U/L (ref 15–37)
BILIRUB SERPL-MCNC: 0.4 MG/DL (ref 0–1)
BUN SERPL-MCNC: 13 MG/DL (ref 7–20)
CALCIUM SERPL-MCNC: 9.7 MG/DL (ref 8.3–10.6)
CHLORIDE SERPL-SCNC: 99 MMOL/L (ref 99–110)
CO2 SERPL-SCNC: 31 MMOL/L (ref 21–32)
CREAT SERPL-MCNC: 1.1 MG/DL (ref 0.6–1.2)
CRP SERPL-MCNC: <3 MG/L (ref 0–5.1)
ERYTHROCYTE [SEDIMENTATION RATE] IN BLOOD BY WESTERGREN METHOD: 31 MM/HR (ref 0–30)
GFR SERPLBLD CREATININE-BSD FMLA CKD-EPI: 55 ML/MIN/{1.73_M2}
GLUCOSE SERPL-MCNC: 89 MG/DL (ref 70–99)
POTASSIUM SERPL-SCNC: 4.1 MMOL/L (ref 3.5–5.1)
PROT SERPL-MCNC: 7.1 G/DL (ref 6.4–8.2)
SODIUM SERPL-SCNC: 140 MMOL/L (ref 136–145)

## 2023-12-21 LAB
EST. AVERAGE GLUCOSE BLD GHB EST-MCNC: 105.4 MG/DL
HBA1C MFR BLD: 5.3 %

## 2023-12-21 NOTE — RESULT ENCOUNTER NOTE
The sed rate is decreasing and almost normal.  We should repeat in 3 months.  The CRP , which is another inflammation test is normal. Avoid any NSAID's such as motrin or aleve or ibuprofen.  If you are not taking any NSAID's let me know.

## 2023-12-21 NOTE — RESULT ENCOUNTER NOTE
So proud of you!!  The blood sugar has been in the non diabetic range for the past 7 months.  Keep up the good work with diet and exercise and we are working on weight loss.

## 2023-12-22 LAB — FRUCTOSAMINE SERPL-SCNC: 245 UMOL/L (ref 205–285)

## 2024-01-03 ENCOUNTER — TELEPHONE (OUTPATIENT)
Dept: PRIMARY CARE CLINIC | Age: 69
End: 2024-01-03

## 2024-01-03 NOTE — TELEPHONE ENCOUNTER
Ozempic was in the back for patient should she still take it ? Or do we just hold it for someone else

## 2024-01-10 NOTE — TELEPHONE ENCOUNTER
Spoke with patient in regards to med. She said to check again and see if that is what you want her to take since she really didn't lose and weight on it

## 2024-01-11 NOTE — TELEPHONE ENCOUNTER
Voicemail message left to start on the Ozempic 0.25 mg weekly and after a month increase to 0.5 mg weekly.  It will help with the weight loss when the dose is increased and use up all the samples.

## 2024-01-14 NOTE — TELEPHONE ENCOUNTER
01/14/24                            Dory Farfan  [address]  [facility] [address]    To Whom It May Concern:    This is to certify Dory Farfan was evaluated with Mackenzie Rzio PA-C on 01/14/24 and can return to regular work on 1-15-24.  Patient presents complaining of symptoms since 01-09-24        Electronically signed by:  Mackenzie Rizo PA-C  [address] [address]  Dept Phone: [phone]        Future Appointments    Encounter Information    Provider Department Appt Notes   8/23/2023 Bonifacio Friedman MD [facility] Primary Care Return in about 3 months (around 8/23/2023).      Past Visits    Date Provider Specialty Visit Type Primary Dx   05/23/2023 Bonifacio Friedman MD Primary Care Office Visit Vitamin D deficiency   02/22/2023 Bonifacio Friedman MD Primary Care Office Visit Initial Medicare annual wellness visit

## 2024-01-29 DIAGNOSIS — R00.2 PALPITATIONS: ICD-10-CM

## 2024-01-29 DIAGNOSIS — I10 ESSENTIAL HYPERTENSION: ICD-10-CM

## 2024-01-29 NOTE — TELEPHONE ENCOUNTER
Medication:   Requested Prescriptions     Pending Prescriptions Disp Refills    metoprolol succinate (TOPROL XL) 25 MG extended release tablet [Pharmacy Med Name: METOPROLOL SUCC ER 25 MG TAB] 90 tablet 1     Sig: TAKE ONE TABLET BY MOUTH ONCE NIGHTLY        Last Filled:      Patient Phone Number: [phone] (home)     Last appt: 12/20/2023   Next appt: Visit date not found    Last OARRS:        No data to display

## 2024-01-30 RX ORDER — METOPROLOL SUCCINATE 25 MG/1
TABLET, EXTENDED RELEASE ORAL
Qty: 90 TABLET | Refills: 1 | Status: SHIPPED | OUTPATIENT
Start: 2024-01-30

## 2024-02-08 DIAGNOSIS — I10 ESSENTIAL HYPERTENSION: ICD-10-CM

## 2024-02-08 RX ORDER — HYDROCHLOROTHIAZIDE 25 MG/1
25 TABLET ORAL EVERY MORNING
Qty: 90 TABLET | Refills: 1 | Status: SHIPPED | OUTPATIENT
Start: 2024-02-08

## 2024-02-08 NOTE — TELEPHONE ENCOUNTER
Medication:   Requested Prescriptions     Pending Prescriptions Disp Refills    hydroCHLOROthiazide (HYDRODIURIL) 25 MG tablet 90 tablet 1     Sig: Take 1 tablet by mouth every morning        Last appt: 12/20/2023

## 2024-03-26 ENCOUNTER — HOSPITAL ENCOUNTER (OUTPATIENT)
Dept: WOMENS IMAGING | Age: 69
Discharge: HOME | End: 2024-03-26
Payer: MEDICARE

## 2024-03-26 DIAGNOSIS — Z12.31 SCREENING MAMMOGRAM FOR BREAST CANCER: ICD-10-CM

## 2024-03-26 PROCEDURE — 77063 BREAST TOMOSYNTHESIS BI: CPT

## 2024-03-29 NOTE — RESULT ENCOUNTER NOTE
Your mammogram did not show signs of cancer.  You should have a repeat mammogram in one year.  Remember to do monthly self breast exams. Notify me if you notice any lumps, hardening or the skin or changes.

## 2024-03-30 ENCOUNTER — OFFICE VISIT (OUTPATIENT)
Age: 69
End: 2024-03-30

## 2024-03-30 VITALS
HEART RATE: 83 BPM | BODY MASS INDEX: 36.26 KG/M2 | SYSTOLIC BLOOD PRESSURE: 107 MMHG | WEIGHT: 225.6 LBS | TEMPERATURE: 98.2 F | HEIGHT: 66 IN | DIASTOLIC BLOOD PRESSURE: 69 MMHG | OXYGEN SATURATION: 95 %

## 2024-03-30 DIAGNOSIS — R05.1 ACUTE COUGH: ICD-10-CM

## 2024-03-30 DIAGNOSIS — J10.1 INFLUENZA A: Primary | ICD-10-CM

## 2024-03-30 LAB
INFLUENZA A ANTIGEN, POC: POSITIVE
INFLUENZA B ANTIGEN, POC: NEGATIVE

## 2024-03-30 RX ORDER — OSELTAMIVIR PHOSPHATE 75 MG/1
75 CAPSULE ORAL 2 TIMES DAILY
Qty: 10 CAPSULE | Refills: 0 | Status: SHIPPED | OUTPATIENT
Start: 2024-03-30 | End: 2024-04-04

## 2024-03-30 RX ORDER — BENZONATATE 200 MG/1
200 CAPSULE ORAL 3 TIMES DAILY PRN
Qty: 21 CAPSULE | Refills: 0 | Status: SHIPPED | OUTPATIENT
Start: 2024-03-30 | End: 2024-04-06

## 2024-03-30 ASSESSMENT — ENCOUNTER SYMPTOMS
VOMITING: 0
SINUS PRESSURE: 1
RHINORRHEA: 1
SHORTNESS OF BREATH: 0
COUGH: 1
TROUBLE SWALLOWING: 0
SINUS PAIN: 1
NAUSEA: 0
DIARRHEA: 0
ABDOMINAL PAIN: 0

## 2024-03-30 NOTE — PROGRESS NOTES
Adri Anthony (:  1955) is a 68 y.o. female, New patient, here for evaluation of the following chief complaint(s):  Sinusitis (PT. C/O FACIAL PRESSURE, COUGH, NASAL CONGESTION, FEVER, X 3 DAY, STATES TOOK COVID TEST THIS MORNING WAS NEGATIVE.)    ASSESSMENT/PLAN:    ICD-10-CM    1. Influenza A  J10.1 benzonatate (TESSALON) 200 MG capsule     POCT Influenza A/B Antigen (BD Veritor)      2. Acute cough  R05.1 POCT Influenza A/B Antigen (BD Veritor)        POC testing: Discussed result(s) with patient  Results for POC orders placed in visit on 24   POCT Influenza A/B Antigen (BD Veritor)   Result Value Ref Range    Inflenza A Ag Positive     Influenza B Ag Negative      Ddx includes: URI, Influenza A/B, Sinusitis, Pneumonia  Disposition: Pt is 69yo female here with URI s/s. VSS. Physical Exam as below. Influenza A positive. Tamiflu and PRN Tessalon pearls rx sent to her pharmacy.   Reviewed AVS with patient. All questions answered. If symptoms worsen go to the Emergency Department. Follow up in clinic or with PCP as needed. Patient is agreeable with plan.      SUBJECTIVE/OBJECTIVE:  69yo female here with c/o fever, chills, cough, congestion, bodyaches, sinus pressure and runny nose x2-3dys. Denies sick contacts. Denies cp or sob. Care prior to arrival includes Tylenol, zyrtec and mucinex dm. Negative Covid test at home this am.       History provided by:  Patient   used: No      Vitals:    24 1531   BP: 107/69   Site: Right Upper Arm   Position: Sitting   Cuff Size: Large Adult   Pulse: 83   Temp: 98.2 °F (36.8 °C)   TempSrc: Oral   SpO2: 95%   Weight: 102.3 kg (225 lb 9.6 oz)   Height: 1.676 m (5' 6\")     Review of Systems   Constitutional:  Positive for fatigue and fever.   HENT:  Positive for congestion, rhinorrhea, sinus pressure and sinus pain. Negative for trouble swallowing.    Respiratory:  Positive for cough. Negative for shortness of breath.    Cardiovascular:

## 2024-04-02 DIAGNOSIS — R09.82 POST-NASAL DRIP: ICD-10-CM

## 2024-04-02 RX ORDER — CETIRIZINE HYDROCHLORIDE 10 MG/1
10 TABLET ORAL DAILY
Qty: 30 TABLET | Refills: 5 | Status: SHIPPED | OUTPATIENT
Start: 2024-04-02

## 2024-04-02 NOTE — TELEPHONE ENCOUNTER
Medication:   Requested Prescriptions     Pending Prescriptions Disp Refills    cetirizine (ZYRTEC) 10 MG tablet [Pharmacy Med Name: CETIRIZINE HCL 10 MG TABLET] 30 tablet 5     Sig: TAKE ONE TABLET BY MOUTH DAILY        Last Filled:      Patient Phone Number: [phone] (home)     Last appt: 12/20/2023   Next appt: 4/8/2024    Last OARRS:        No data to display

## 2024-04-03 ENCOUNTER — TELEPHONE (OUTPATIENT)
Dept: PRIMARY CARE CLINIC | Age: 69
End: 2024-04-03

## 2024-04-03 NOTE — TELEPHONE ENCOUNTER
----- Message from  Gunnar Jerome sent at 4/2/2024  2:04 PM EDT -----  Regarding: E[facility] Appointment Request  E[facility] Appointment Request    Patient needs appointment for ECC Appointment Type: Existing Condition Follow Up.    Reason for Appointment Request: No appointments available during search  Wanted a morning appointment for her up coming Monday schedule april 8   --------------------------------------------------------------------------------------------------------------------------    Relationship to Patient: Self     Call Back Information: OK to leave message on voicemail  Preferred Call Back Number: Phone [phone]

## 2024-04-08 ENCOUNTER — OFFICE VISIT (OUTPATIENT)
Dept: PRIMARY CARE CLINIC | Age: 69
End: 2024-04-08
Payer: MEDICARE

## 2024-04-08 VITALS
WEIGHT: 226 LBS | DIASTOLIC BLOOD PRESSURE: 64 MMHG | OXYGEN SATURATION: 97 % | TEMPERATURE: 97.3 F | HEIGHT: 66 IN | BODY MASS INDEX: 36.32 KG/M2 | HEART RATE: 73 BPM | RESPIRATION RATE: 16 BRPM | SYSTOLIC BLOOD PRESSURE: 126 MMHG

## 2024-04-08 DIAGNOSIS — E66.01 SEVERE OBESITY (BMI 35.0-39.9) WITH COMORBIDITY (HCC): ICD-10-CM

## 2024-04-08 DIAGNOSIS — R25.2 MUSCLE CRAMPS: ICD-10-CM

## 2024-04-08 DIAGNOSIS — E74.89 OTHER SPECIFIED DISORDERS OF CARBOHYDRATE METABOLISM (HCC): ICD-10-CM

## 2024-04-08 DIAGNOSIS — R09.82 POST-NASAL DRIP: ICD-10-CM

## 2024-04-08 DIAGNOSIS — R11.0 CHRONIC NAUSEA: Primary | ICD-10-CM

## 2024-04-08 DIAGNOSIS — R20.0 BILATERAL HAND NUMBNESS: ICD-10-CM

## 2024-04-08 PROCEDURE — 99214 OFFICE O/P EST MOD 30 MIN: CPT | Performed by: INTERNAL MEDICINE

## 2024-04-08 PROCEDURE — G8427 DOCREV CUR MEDS BY ELIG CLIN: HCPCS | Performed by: INTERNAL MEDICINE

## 2024-04-08 PROCEDURE — 3078F DIAST BP <80 MM HG: CPT | Performed by: INTERNAL MEDICINE

## 2024-04-08 PROCEDURE — 1123F ACP DISCUSS/DSCN MKR DOCD: CPT | Performed by: INTERNAL MEDICINE

## 2024-04-08 PROCEDURE — G8417 CALC BMI ABV UP PARAM F/U: HCPCS | Performed by: INTERNAL MEDICINE

## 2024-04-08 PROCEDURE — 3017F COLORECTAL CA SCREEN DOC REV: CPT | Performed by: INTERNAL MEDICINE

## 2024-04-08 PROCEDURE — 1090F PRES/ABSN URINE INCON ASSESS: CPT | Performed by: INTERNAL MEDICINE

## 2024-04-08 PROCEDURE — 3074F SYST BP LT 130 MM HG: CPT | Performed by: INTERNAL MEDICINE

## 2024-04-08 PROCEDURE — G8399 PT W/DXA RESULTS DOCUMENT: HCPCS | Performed by: INTERNAL MEDICINE

## 2024-04-08 PROCEDURE — 1036F TOBACCO NON-USER: CPT | Performed by: INTERNAL MEDICINE

## 2024-04-08 RX ORDER — CETIRIZINE HYDROCHLORIDE 10 MG/1
10 TABLET ORAL DAILY
Qty: 30 TABLET | Refills: 5 | Status: SHIPPED | OUTPATIENT
Start: 2024-04-08

## 2024-04-08 ASSESSMENT — PATIENT HEALTH QUESTIONNAIRE - PHQ9
1. LITTLE INTEREST OR PLEASURE IN DOING THINGS: SEVERAL DAYS
SUM OF ALL RESPONSES TO PHQ QUESTIONS 1-9: 2
SUM OF ALL RESPONSES TO PHQ QUESTIONS 1-9: 2
SUM OF ALL RESPONSES TO PHQ9 QUESTIONS 1 & 2: 2
2. FEELING DOWN, DEPRESSED OR HOPELESS: SEVERAL DAYS
SUM OF ALL RESPONSES TO PHQ QUESTIONS 1-9: 2
SUM OF ALL RESPONSES TO PHQ QUESTIONS 1-9: 2

## 2024-04-08 ASSESSMENT — ENCOUNTER SYMPTOMS
BLURRED VISION: 0
SORE THROAT: 0
COUGH: 1
SINUS PAIN: 0
SHORTNESS OF BREATH: 0
WHEEZING: 0
DIARRHEA: 0
ORTHOPNEA: 0
VOMITING: 0

## 2024-04-08 NOTE — PROGRESS NOTES
Adri Anthony (:  1955) is a 68 y.o. female,Established patient, here for evaluation of the following chief complaint(s):  Hypertension         ASSESSMENT/PLAN:  1. Chronic nausea was improving but had recent gastroenteritis type symptoms.  Patient is now on a GLP-1 but feels like she still is on 1 so we will check lipase.  Check CBC to make sure no anemia where she would need EGD.  No abdominal pain.  Benign abdominal exam.  No lymphadenopathy.  -     Lipase; Future  2. Post-nasal drip with changing seasons start Zyrtec and Flonase.  -     cetirizine (ZYRTEC) 10 MG tablet; Take 1 tablet by mouth daily, Disp-30 tablet, R-5Normal  3. Severe obesity (BMI 35.0-39.9) with comorbidity (HCC) will see if can get patient assistance form for Wegovy.  4. Other specified disorders of carbohydrate metabolism (HCC) metabolic syndrome work on diet and exercise and see if can start Wegovy  5. Muscle cramps take calcium supplement and magnesium check electrolytes  -     Comprehensive Metabolic Panel; Future  -     CBC with Auto Differential; Future  -     Magnesium; Future  6. Bilateral hand numbness will get EMG to rule out carpal tunnel syndrome versus radicular pain history of elevated inflammatory markers will repeat.  -     Sedimentation Rate; Future  -     C-Reactive Protein; Future  -     AFL - Danilo Rodriguez MD, (EMG, NCV), [facility]      Return in about 3 months (around 2024) for AWV.         Subjective   SUBJECTIVE/OBJECTIVE:  Hypertension  This is a chronic problem. The current episode started more than 1 year ago. The problem is unchanged. The problem is controlled. Pertinent negatives include no anxiety, blurred vision, chest pain, headaches, malaise/fatigue, neck pain, orthopnea, palpitations, peripheral edema, PND, shortness of breath or sweats. There are no associated agents to hypertension. Risk factors for coronary artery disease include dyslipidemia. Past treatments include beta

## 2024-04-09 DIAGNOSIS — R11.0 CHRONIC NAUSEA: ICD-10-CM

## 2024-04-09 DIAGNOSIS — R25.2 MUSCLE CRAMPS: ICD-10-CM

## 2024-04-09 DIAGNOSIS — R20.0 BILATERAL HAND NUMBNESS: ICD-10-CM

## 2024-04-09 LAB
ALBUMIN SERPL-MCNC: 4.2 G/DL (ref 3.4–5)
ALBUMIN/GLOB SERPL: 1.5 {RATIO} (ref 1.1–2.2)
ALP SERPL-CCNC: 77 U/L (ref 40–129)
ALT SERPL-CCNC: 13 U/L (ref 10–40)
ANION GAP SERPL CALCULATED.3IONS-SCNC: 10 MMOL/L (ref 3–16)
AST SERPL-CCNC: 18 U/L (ref 15–37)
BASOPHILS # BLD: 0 K/UL (ref 0–0.2)
BASOPHILS NFR BLD: 0.8 %
BILIRUB SERPL-MCNC: 0.7 MG/DL (ref 0–1)
BUN SERPL-MCNC: 12 MG/DL (ref 7–20)
CALCIUM SERPL-MCNC: 9.7 MG/DL (ref 8.3–10.6)
CHLORIDE SERPL-SCNC: 97 MMOL/L (ref 99–110)
CO2 SERPL-SCNC: 29 MMOL/L (ref 21–32)
CREAT SERPL-MCNC: 0.9 MG/DL (ref 0.6–1.2)
CRP SERPL-MCNC: <3 MG/L (ref 0–5.1)
DEPRECATED RDW RBC AUTO: 14.3 % (ref 12.4–15.4)
EOSINOPHIL # BLD: 0.1 K/UL (ref 0–0.6)
EOSINOPHIL NFR BLD: 1.9 %
ERYTHROCYTE [SEDIMENTATION RATE] IN BLOOD BY WESTERGREN METHOD: 20 MM/HR (ref 0–30)
GFR SERPLBLD CREATININE-BSD FMLA CKD-EPI: 70 ML/MIN/{1.73_M2}
GLUCOSE SERPL-MCNC: 82 MG/DL (ref 70–99)
HCT VFR BLD AUTO: 37.7 % (ref 36–48)
HGB BLD-MCNC: 12.7 G/DL (ref 12–16)
LIPASE SERPL-CCNC: 22 U/L (ref 13–60)
LYMPHOCYTES # BLD: 2.4 K/UL (ref 1–5.1)
LYMPHOCYTES NFR BLD: 42.7 %
MAGNESIUM SERPL-MCNC: 2 MG/DL (ref 1.8–2.4)
MCH RBC QN AUTO: 29.1 PG (ref 26–34)
MCHC RBC AUTO-ENTMCNC: 33.6 G/DL (ref 31–36)
MCV RBC AUTO: 86.7 FL (ref 80–100)
MONOCYTES # BLD: 0.8 K/UL (ref 0–1.3)
MONOCYTES NFR BLD: 13.9 %
NEUTROPHILS # BLD: 2.3 K/UL (ref 1.7–7.7)
NEUTROPHILS NFR BLD: 40.7 %
PLATELET # BLD AUTO: 304 K/UL (ref 135–450)
PMV BLD AUTO: 7.8 FL (ref 5–10.5)
POTASSIUM SERPL-SCNC: 4.1 MMOL/L (ref 3.5–5.1)
PROT SERPL-MCNC: 7 G/DL (ref 6.4–8.2)
RBC # BLD AUTO: 4.35 M/UL (ref 4–5.2)
SODIUM SERPL-SCNC: 136 MMOL/L (ref 136–145)
WBC # BLD AUTO: 5.6 K/UL (ref 4–11)

## 2024-04-10 ASSESSMENT — ENCOUNTER SYMPTOMS
BACK PAIN: 0
EYES NEGATIVE: 1
ABDOMINAL PAIN: 0
CONSTIPATION: 0
SINUS PRESSURE: 1
NAUSEA: 0
ABDOMINAL DISTENTION: 0

## 2024-04-10 NOTE — RESULT ENCOUNTER NOTE
Normal kidney blood tests normal blood sugar.  Normal liver blood test.  The pancreas test is normal and there is no anemia.  The magnesium level is normal.  Ankle Lord!  The inflammation test is normal with a set rate of 20 and normal CRP.

## 2024-04-18 ENCOUNTER — OFFICE VISIT (OUTPATIENT)
Dept: GYNECOLOGY | Age: 69
End: 2024-04-18

## 2024-04-18 VITALS
RESPIRATION RATE: 17 BRPM | BODY MASS INDEX: 36.16 KG/M2 | WEIGHT: 225 LBS | DIASTOLIC BLOOD PRESSURE: 68 MMHG | HEART RATE: 78 BPM | HEIGHT: 66 IN | SYSTOLIC BLOOD PRESSURE: 112 MMHG

## 2024-04-18 DIAGNOSIS — Z01.419 WELL WOMAN EXAM WITH ROUTINE GYNECOLOGICAL EXAM: Primary | ICD-10-CM

## 2024-06-25 ENCOUNTER — TELEPHONE (OUTPATIENT)
Dept: GYNECOLOGY | Age: 69
End: 2024-06-25

## 2024-06-26 NOTE — TELEPHONE ENCOUNTER
Called and spoke to patient, she says she is doing better now and says its gets worse she will just go to urgent care

## 2024-06-28 DIAGNOSIS — G56.03 BILATERAL CARPAL TUNNEL SYNDROME: Primary | ICD-10-CM

## 2024-06-28 DIAGNOSIS — G56.23 ULNAR NEUROPATHY OF BOTH UPPER EXTREMITIES: ICD-10-CM

## 2024-06-30 DIAGNOSIS — G56.23 ULNAR NEUROPATHY OF BOTH UPPER EXTREMITIES: ICD-10-CM

## 2024-06-30 DIAGNOSIS — G56.03 BILATERAL CARPAL TUNNEL SYNDROME: Primary | ICD-10-CM

## 2024-07-03 ENCOUNTER — OFFICE VISIT (OUTPATIENT)
Dept: PRIMARY CARE CLINIC | Age: 69
End: 2024-07-03

## 2024-07-03 ENCOUNTER — HOSPITAL ENCOUNTER (OUTPATIENT)
Age: 69
Discharge: HOME | End: 2024-07-03
Payer: MEDICARE

## 2024-07-03 ENCOUNTER — HOSPITAL ENCOUNTER (OUTPATIENT)
Dept: GENERAL RADIOLOGY | Age: 69
Discharge: HOME | End: 2024-07-03
Attending: INTERNAL MEDICINE
Payer: MEDICARE

## 2024-07-03 VITALS
TEMPERATURE: 97.9 F | SYSTOLIC BLOOD PRESSURE: 110 MMHG | OXYGEN SATURATION: 98 % | DIASTOLIC BLOOD PRESSURE: 68 MMHG | HEIGHT: 66 IN | HEART RATE: 92 BPM | BODY MASS INDEX: 35.9 KG/M2 | WEIGHT: 223.4 LBS

## 2024-07-03 DIAGNOSIS — R05.3 CHRONIC COUGH: Primary | ICD-10-CM

## 2024-07-03 DIAGNOSIS — Z02.89 ENCOUNTER FOR COMPLETION OF FORM WITH PATIENT: ICD-10-CM

## 2024-07-03 DIAGNOSIS — R05.3 CHRONIC COUGH: ICD-10-CM

## 2024-07-03 DIAGNOSIS — R00.2 PALPITATIONS: ICD-10-CM

## 2024-07-03 DIAGNOSIS — J45.991 COUGH VARIANT ASTHMA: ICD-10-CM

## 2024-07-03 DIAGNOSIS — K58.1 IRRITABLE BOWEL SYNDROME WITH CONSTIPATION: ICD-10-CM

## 2024-07-03 LAB
ALBUMIN SERPL-MCNC: 4.4 G/DL (ref 3.4–5)
ANION GAP SERPL CALCULATED.3IONS-SCNC: 13 MMOL/L (ref 3–16)
BASOPHILS # BLD: 0 K/UL (ref 0–0.2)
BASOPHILS NFR BLD: 0.4 %
BUN SERPL-MCNC: 19 MG/DL (ref 7–20)
CALCIUM SERPL-MCNC: 9.9 MG/DL (ref 8.3–10.6)
CHLORIDE SERPL-SCNC: 94 MMOL/L (ref 99–110)
CO2 SERPL-SCNC: 27 MMOL/L (ref 21–32)
CREAT SERPL-MCNC: 1.1 MG/DL (ref 0.6–1.2)
DEPRECATED RDW RBC AUTO: 14.3 % (ref 12.4–15.4)
EOSINOPHIL # BLD: 0.1 K/UL (ref 0–0.6)
EOSINOPHIL NFR BLD: 2 %
GFR SERPLBLD CREATININE-BSD FMLA CKD-EPI: 55 ML/MIN/{1.73_M2}
GLUCOSE SERPL-MCNC: 92 MG/DL (ref 70–99)
HCT VFR BLD AUTO: 39 % (ref 36–48)
HGB BLD-MCNC: 13.1 G/DL (ref 12–16)
LYMPHOCYTES # BLD: 3.1 K/UL (ref 1–5.1)
LYMPHOCYTES NFR BLD: 44.6 %
MAGNESIUM SERPL-MCNC: 2.2 MG/DL (ref 1.8–2.4)
MCH RBC QN AUTO: 29.1 PG (ref 26–34)
MCHC RBC AUTO-ENTMCNC: 33.6 G/DL (ref 31–36)
MCV RBC AUTO: 86.8 FL (ref 80–100)
MONOCYTES # BLD: 0.7 K/UL (ref 0–1.3)
MONOCYTES NFR BLD: 10.7 %
NEUTROPHILS # BLD: 3 K/UL (ref 1.7–7.7)
NEUTROPHILS NFR BLD: 42.3 %
PHOSPHATE SERPL-MCNC: 3.8 MG/DL (ref 2.5–4.9)
PLATELET # BLD AUTO: 277 K/UL (ref 135–450)
PMV BLD AUTO: 8.6 FL (ref 5–10.5)
POTASSIUM SERPL-SCNC: 3.9 MMOL/L (ref 3.5–5.1)
RBC # BLD AUTO: 4.49 M/UL (ref 4–5.2)
SODIUM SERPL-SCNC: 134 MMOL/L (ref 136–145)
T3FREE SERPL-MCNC: 2.6 PG/ML (ref 2.3–4.2)
TSH SERPL DL<=0.005 MIU/L-ACNC: 1.29 UIU/ML (ref 0.27–4.2)
WBC # BLD AUTO: 7 K/UL (ref 4–11)

## 2024-07-03 PROCEDURE — 71046 X-RAY EXAM CHEST 2 VIEWS: CPT

## 2024-07-03 RX ORDER — BUDESONIDE AND FORMOTEROL FUMARATE DIHYDRATE 160; 4.5 UG/1; UG/1
2 AEROSOL RESPIRATORY (INHALATION) 2 TIMES DAILY
Qty: 10.2 G | Refills: 5 | Status: SHIPPED | OUTPATIENT
Start: 2024-07-03

## 2024-07-03 SDOH — ECONOMIC STABILITY: FOOD INSECURITY: WITHIN THE PAST 12 MONTHS, THE FOOD YOU BOUGHT JUST DIDN'T LAST AND YOU DIDN'T HAVE MONEY TO GET MORE.: NEVER TRUE

## 2024-07-03 SDOH — ECONOMIC STABILITY: FOOD INSECURITY: WITHIN THE PAST 12 MONTHS, YOU WORRIED THAT YOUR FOOD WOULD RUN OUT BEFORE YOU GOT MONEY TO BUY MORE.: NEVER TRUE

## 2024-07-03 SDOH — ECONOMIC STABILITY: INCOME INSECURITY: HOW HARD IS IT FOR YOU TO PAY FOR THE VERY BASICS LIKE FOOD, HOUSING, MEDICAL CARE, AND HEATING?: NOT HARD AT ALL

## 2024-07-03 ASSESSMENT — ENCOUNTER SYMPTOMS
ABDOMINAL DISTENTION: 0
DIARRHEA: 0
EYES NEGATIVE: 1
SINUS PRESSURE: 1
WHEEZING: 1
VOMITING: 0
SINUS PAIN: 0
CONSTIPATION: 0
SHORTNESS OF BREATH: 0
COUGH: 1
NAUSEA: 0
BACK PAIN: 0
SORE THROAT: 0
ABDOMINAL PAIN: 0

## 2024-07-03 ASSESSMENT — PATIENT HEALTH QUESTIONNAIRE - PHQ9
SUM OF ALL RESPONSES TO PHQ QUESTIONS 1-9: 0
1. LITTLE INTEREST OR PLEASURE IN DOING THINGS: NOT AT ALL
2. FEELING DOWN, DEPRESSED OR HOPELESS: NOT AT ALL
SUM OF ALL RESPONSES TO PHQ QUESTIONS 1-9: 0
SUM OF ALL RESPONSES TO PHQ9 QUESTIONS 1 & 2: 0
SUM OF ALL RESPONSES TO PHQ QUESTIONS 1-9: 0
SUM OF ALL RESPONSES TO PHQ QUESTIONS 1-9: 0

## 2024-07-03 NOTE — PROGRESS NOTES
weakness, numbness and headaches.   Hematological: Negative.    Psychiatric/Behavioral: Negative.  Negative for behavioral problems, confusion and sleep disturbance. The patient is not nervous/anxious.       No smoking since 1982    Objective   Physical Exam  Vitals reviewed.   Constitutional:       General: She is not in acute distress.  HENT:      Head: Normocephalic.      Nose: Nose normal.   Eyes:      Conjunctiva/sclera: Conjunctivae normal.   Cardiovascular:      Rate and Rhythm: Normal rate and regular rhythm.      Heart sounds: Normal heart sounds.   Pulmonary:      Effort: Pulmonary effort is normal.      Breath sounds: Normal breath sounds.   Abdominal:      General: Abdomen is flat. There is no distension.      Palpations: Abdomen is soft. There is no mass.      Tenderness: There is no abdominal tenderness. There is no right CVA tenderness, left CVA tenderness, guarding or rebound.      Hernia: No hernia is present.      Comments: Negative payton sign   Musculoskeletal:         General: Normal range of motion.      Cervical back: Neck supple.      Comments: Reproduced pain with palpation of scapula on left posterior back.  Pain pattern in the left arm follows with C8 distribution/T1 for the neck.   Lymphadenopathy:      Cervical: No cervical adenopathy.   Skin:     General: Skin is warm and dry.      Comments: Male pattern thinning   Neurological:      General: No focal deficit present.      Mental Status: She is alert and oriented to person, place, and time.      Cranial Nerves: No cranial nerve deficit.      Sensory: No sensory deficit.      Motor: No weakness.      Coordination: Coordination normal.      Gait: Gait normal.      Deep Tendon Reflexes: Reflexes normal.   Psychiatric:         Mood and Affect: Mood normal.         Behavior: Behavior normal.         Thought Content: Thought content normal.         Judgment: Judgment normal.              An electronic signature was used to authenticate this

## 2024-07-04 DIAGNOSIS — N17.9 AKI (ACUTE KIDNEY INJURY) (HCC): Primary | ICD-10-CM

## 2024-07-04 PROBLEM — R00.2 PALPITATIONS: Status: ACTIVE | Noted: 2024-07-04

## 2024-07-04 PROBLEM — Z02.89 ENCOUNTER FOR COMPLETION OF FORM WITH PATIENT: Status: ACTIVE | Noted: 2024-07-04

## 2024-07-04 PROBLEM — J45.991 COUGH VARIANT ASTHMA: Status: ACTIVE | Noted: 2024-07-04

## 2024-07-04 PROBLEM — K58.1 IRRITABLE BOWEL SYNDROME WITH CONSTIPATION: Status: ACTIVE | Noted: 2024-07-04

## 2024-07-05 NOTE — RESULT ENCOUNTER NOTE
ments   Edit Notifications    The sodium level is slightly low. Normal potassium.  The kidney blood test is mildly reduced. I want to get a urine micro albumin test .  I also want you to see a kidney doctor to makes sure we are doing everything to keep your kidneys healthy.   The GFR is not bad, but you should be 70 .  You have good function at 55.    People on dialysis are 15 or less.    Normal magnesium and thyroid test. Normal white and red blood cells.      Referred By: Milena Crespo MD NPI: [id number]  Referral Reason: Specialty Services Required    Referred To: [facility]  [facility] [address]  Louisa Rivera  [address] [place] [address] Loc/POS:      Phone: [phone] Phone:  Fax: [phone] Fax:

## 2024-07-05 NOTE — RESULT ENCOUNTER NOTE
The sodium level is slightly low. Normal potassium.  The kidney blood test is mildly reduced. I want to get a urine micro albumin test .  I also want you to see a kidney doctor to makes sure we are doing everything to keep your kidneys healthy.   The GFR is not bad, but you should be 70 .  You have good function at 55.    People on dialysis are 15 or less.     Normal magnesium and thyroid test. Normal white and red blood cells.      Referred By: Milena Crespo MD NPI: [id number]  Referral Reason: Specialty Services Required       Referred To: [facility] [place] [address] [address]  [facility] [address]   Louisa Rivera  [address] [place] [address] Loc/POS:        Phone: [phone] Phone:    Fax: [phone] Fax:

## 2024-07-10 DIAGNOSIS — N18.31 STAGE 3A CHRONIC KIDNEY DISEASE (HCC): ICD-10-CM

## 2024-07-10 LAB
CREAT UR-MCNC: 61.8 MG/DL (ref 28–259)
CREAT UR-MCNC: 65.1 MG/DL (ref 28–259)
MICROALBUMIN UR DL<=1MG/L-MCNC: <1.2 MG/DL
MICROALBUMIN/CREAT UR: NORMAL MG/G (ref 0–30)
PROT UR-MCNC: 5 MG/DL
PROT/CREAT UR-RTO: 0.1 MG/DL
URN SPEC COLLECT METH UR: NORMAL

## 2024-07-15 ENCOUNTER — TELEPHONE (OUTPATIENT)
Dept: PRIMARY CARE CLINIC | Age: 69
End: 2024-07-15

## 2024-07-15 NOTE — TELEPHONE ENCOUNTER
Pt dropped off paper work for [employer] Patient [employer] Program to be re faxed [phone]  Please call pt when complete [phone]

## 2024-07-23 ENCOUNTER — TELEPHONE (OUTPATIENT)
Dept: PRIMARY CARE CLINIC | Age: 69
End: 2024-07-23

## 2024-07-24 ENCOUNTER — TELEPHONE (OUTPATIENT)
Dept: PRIMARY CARE CLINIC | Age: 69
End: 2024-07-24

## 2024-07-24 NOTE — TELEPHONE ENCOUNTER
Pt was advised we received her Ozempic from her pt asst. Program and it will be here for her to - in the vaccine fridge in the back

## 2024-08-05 DIAGNOSIS — I10 ESSENTIAL HYPERTENSION: ICD-10-CM

## 2024-08-05 DIAGNOSIS — R00.2 PALPITATIONS: ICD-10-CM

## 2024-08-05 NOTE — TELEPHONE ENCOUNTER
Medication:   Requested Prescriptions     Pending Prescriptions Disp Refills    hydroCHLOROthiazide (HYDRODIURIL) 25 MG tablet [Pharmacy Med Name: hydroCHLOROthiazide 25 MG TABLET] 90 tablet 1     Sig: TAKE 1 TABLET BY MOUTH EVERY MORNING    metoprolol succinate (TOPROL XL) 25 MG extended release tablet [Pharmacy Med Name: METOPROLOL SUCC ER 25MG TAB, UU] 90 tablet 1     Sig: TAKE ONE TABLET BY MOUTH ONCE NIGHTLY        Last Filled:      Patient Phone Number: [phone] (home)     Last appt: 7/3/2024   Next appt: Visit date not found    Last OARRS:        No data to display

## 2024-08-06 RX ORDER — METOPROLOL SUCCINATE 25 MG/1
TABLET, EXTENDED RELEASE ORAL
Qty: 90 TABLET | Refills: 1 | Status: SHIPPED | OUTPATIENT
Start: 2024-08-06

## 2024-08-06 RX ORDER — HYDROCHLOROTHIAZIDE 25 MG/1
25 TABLET ORAL EVERY MORNING
Qty: 90 TABLET | Refills: 1 | Status: SHIPPED | OUTPATIENT
Start: 2024-08-06

## 2024-08-14 ENCOUNTER — TELEPHONE (OUTPATIENT)
Dept: PRIMARY CARE CLINIC | Age: 69
End: 2024-08-14

## 2024-08-14 NOTE — TELEPHONE ENCOUNTER
Per the Telephone encounter on 7/24/24 this was received, pt was informed and med was picked up.    Kayli from [employer] has been informed.

## 2024-08-14 NOTE — TELEPHONE ENCOUNTER
Concepc[facility] called wanting to confirm the receipt of PT's Ozempic order for 0.25-0.5 mg quantity 2 & 1 mg quantity 2 that was delivered on 7/23/24.     Please call back: [phone]

## 2024-09-23 ENCOUNTER — HOSPITAL ENCOUNTER (EMERGENCY)
Age: 69
Discharge: HOME | End: 2024-09-23
Attending: EMERGENCY MEDICINE
Payer: MEDICARE

## 2024-09-23 VITALS
HEIGHT: 66 IN | WEIGHT: 222.1 LBS | OXYGEN SATURATION: 99 % | RESPIRATION RATE: 18 BRPM | TEMPERATURE: 98.3 F | BODY MASS INDEX: 35.69 KG/M2 | SYSTOLIC BLOOD PRESSURE: 144 MMHG | HEART RATE: 74 BPM | DIASTOLIC BLOOD PRESSURE: 85 MMHG

## 2024-09-23 DIAGNOSIS — K59.00 CONSTIPATION, UNSPECIFIED CONSTIPATION TYPE: Primary | ICD-10-CM

## 2024-09-23 LAB
ANION GAP SERPL CALCULATED.3IONS-SCNC: 9 MMOL/L (ref 3–16)
BASOPHILS # BLD: 0.1 K/UL (ref 0–0.2)
BASOPHILS NFR BLD: 1.3 %
BUN SERPL-MCNC: 11 MG/DL (ref 7–20)
CALCIUM SERPL-MCNC: 9.9 MG/DL (ref 8.3–10.6)
CHLORIDE SERPL-SCNC: 97 MMOL/L (ref 99–110)
CO2 SERPL-SCNC: 29 MMOL/L (ref 21–32)
CREAT SERPL-MCNC: 1 MG/DL (ref 0.6–1.2)
DEPRECATED RDW RBC AUTO: 14 % (ref 12.4–15.4)
EOSINOPHIL # BLD: 0.1 K/UL (ref 0–0.6)
EOSINOPHIL NFR BLD: 0.9 %
GFR SERPLBLD CREATININE-BSD FMLA CKD-EPI: 61 ML/MIN/{1.73_M2}
GLUCOSE SERPL-MCNC: 84 MG/DL (ref 70–99)
HCT VFR BLD AUTO: 40.4 % (ref 36–48)
HGB BLD-MCNC: 13 G/DL (ref 12–16)
LYMPHOCYTES # BLD: 1.9 K/UL (ref 1–5.1)
LYMPHOCYTES NFR BLD: 31.9 %
MCH RBC QN AUTO: 28.4 PG (ref 26–34)
MCHC RBC AUTO-ENTMCNC: 32 G/DL (ref 31–36)
MCV RBC AUTO: 88.6 FL (ref 80–100)
MONOCYTES # BLD: 0.6 K/UL (ref 0–1.3)
MONOCYTES NFR BLD: 11 %
NEUTROPHILS # BLD: 3.3 K/UL (ref 1.7–7.7)
NEUTROPHILS NFR BLD: 54.9 %
PLATELET # BLD AUTO: 274 K/UL (ref 135–450)
PMV BLD AUTO: 8 FL (ref 5–10.5)
POTASSIUM SERPL-SCNC: 3.7 MMOL/L (ref 3.5–5.1)
RBC # BLD AUTO: 4.56 M/UL (ref 4–5.2)
SODIUM SERPL-SCNC: 135 MMOL/L (ref 136–145)
WBC # BLD AUTO: 5.9 K/UL (ref 4–11)

## 2024-09-23 PROCEDURE — 6370000000 HC RX 637 (ALT 250 FOR IP): Performed by: STUDENT IN AN ORGANIZED HEALTH CARE EDUCATION/TRAINING PROGRAM

## 2024-09-23 PROCEDURE — 85025 COMPLETE CBC W/AUTO DIFF WBC: CPT

## 2024-09-23 PROCEDURE — 80048 BASIC METABOLIC PNL TOTAL CA: CPT

## 2024-09-23 PROCEDURE — 99283 EMERGENCY DEPT VISIT LOW MDM: CPT

## 2024-09-23 RX ORDER — SENNOSIDES A AND B 8.6 MG/1
1 TABLET, FILM COATED ORAL 2 TIMES DAILY
Qty: 60 TABLET | Refills: 11 | Status: SHIPPED | OUTPATIENT
Start: 2024-09-23 | End: 2025-09-23

## 2024-09-23 RX ORDER — POLYETHYLENE GLYCOL 3350 17 G/17G
17 POWDER, FOR SOLUTION ORAL 2 TIMES DAILY
Qty: 850 G | Refills: 1 | Status: SHIPPED | OUTPATIENT
Start: 2024-09-23 | End: 2024-11-12

## 2024-09-23 RX ADMIN — MAJOR MAGNESIUM CITRATE ORAL SOLUTION - LEMON 296 ML: 1.75 LIQUID ORAL at 15:14

## 2024-09-23 RX ADMIN — KONDREMUL 330 ML: 2.5 LIQUID ORAL at 12:01

## 2024-09-23 ASSESSMENT — PAIN - FUNCTIONAL ASSESSMENT: PAIN_FUNCTIONAL_ASSESSMENT: NONE - DENIES PAIN

## 2024-09-23 ASSESSMENT — PAIN DESCRIPTION - LOCATION: LOCATION: BUTTOCKS;RECTUM

## 2024-09-23 ASSESSMENT — PAIN SCALES - GENERAL: PAINLEVEL_OUTOF10: 8

## 2024-09-23 ASSESSMENT — ENCOUNTER SYMPTOMS
NAUSEA: 0
ABDOMINAL PAIN: 0
BLOOD IN STOOL: 0
VOMITING: 0
CONSTIPATION: 1
ANAL BLEEDING: 0

## 2024-09-23 ASSESSMENT — LIFESTYLE VARIABLES
HOW OFTEN DO YOU HAVE A DRINK CONTAINING ALCOHOL: NEVER
HOW MANY STANDARD DRINKS CONTAINING ALCOHOL DO YOU HAVE ON A TYPICAL DAY: PATIENT DOES NOT DRINK

## 2024-10-01 ENCOUNTER — TELEPHONE (OUTPATIENT)
Dept: PRIMARY CARE CLINIC | Age: 69
End: 2024-10-01

## 2024-10-01 NOTE — TELEPHONE ENCOUNTER
Informed pt that her Ozempic has arrived.   She states that she will pick it up on 10/7/24 during her apt.

## 2024-10-06 SDOH — HEALTH STABILITY: PHYSICAL HEALTH: ON AVERAGE, HOW MANY DAYS PER WEEK DO YOU ENGAGE IN MODERATE TO STRENUOUS EXERCISE (LIKE A BRISK WALK)?: 0 DAYS

## 2024-10-06 SDOH — HEALTH STABILITY: PHYSICAL HEALTH: ON AVERAGE, HOW MANY MINUTES DO YOU ENGAGE IN EXERCISE AT THIS LEVEL?: 0 MIN

## 2024-10-06 ASSESSMENT — PATIENT HEALTH QUESTIONNAIRE - PHQ9
1. LITTLE INTEREST OR PLEASURE IN DOING THINGS: NOT AT ALL
2. FEELING DOWN, DEPRESSED OR HOPELESS: NOT AT ALL
SUM OF ALL RESPONSES TO PHQ QUESTIONS 1-9: 0
SUM OF ALL RESPONSES TO PHQ9 QUESTIONS 1 & 2: 0
SUM OF ALL RESPONSES TO PHQ QUESTIONS 1-9: 0

## 2024-10-06 ASSESSMENT — LIFESTYLE VARIABLES
HOW OFTEN DO YOU HAVE SIX OR MORE DRINKS ON ONE OCCASION: 1
HOW OFTEN DO YOU HAVE A DRINK CONTAINING ALCOHOL: NEVER
HOW MANY STANDARD DRINKS CONTAINING ALCOHOL DO YOU HAVE ON A TYPICAL DAY: 0
HOW MANY STANDARD DRINKS CONTAINING ALCOHOL DO YOU HAVE ON A TYPICAL DAY: PATIENT DOES NOT DRINK
HOW OFTEN DO YOU HAVE A DRINK CONTAINING ALCOHOL: 1

## 2024-10-07 ENCOUNTER — OFFICE VISIT (OUTPATIENT)
Dept: PRIMARY CARE CLINIC | Age: 69
End: 2024-10-07

## 2024-10-07 ENCOUNTER — HOSPITAL ENCOUNTER (OUTPATIENT)
Dept: GENERAL RADIOLOGY | Age: 69
Discharge: HOME | End: 2024-10-07
Attending: INTERNAL MEDICINE
Payer: MEDICARE

## 2024-10-07 ENCOUNTER — HOSPITAL ENCOUNTER (OUTPATIENT)
Age: 69
Discharge: HOME | End: 2024-10-07
Payer: MEDICARE

## 2024-10-07 VITALS
OXYGEN SATURATION: 91 % | DIASTOLIC BLOOD PRESSURE: 82 MMHG | WEIGHT: 222.6 LBS | HEIGHT: 66 IN | BODY MASS INDEX: 35.77 KG/M2 | SYSTOLIC BLOOD PRESSURE: 100 MMHG | RESPIRATION RATE: 16 BRPM | TEMPERATURE: 97.6 F | HEART RATE: 73 BPM

## 2024-10-07 DIAGNOSIS — M25.552 LEFT HIP PAIN: ICD-10-CM

## 2024-10-07 DIAGNOSIS — K59.03 DRUG-INDUCED CONSTIPATION: Primary | ICD-10-CM

## 2024-10-07 DIAGNOSIS — E78.2 MIXED HYPERLIPIDEMIA: ICD-10-CM

## 2024-10-07 DIAGNOSIS — I10 ESSENTIAL HYPERTENSION: ICD-10-CM

## 2024-10-07 DIAGNOSIS — M54.32 SCIATICA, LEFT SIDE: ICD-10-CM

## 2024-10-07 PROCEDURE — 72100 X-RAY EXAM L-S SPINE 2/3 VWS: CPT

## 2024-10-07 PROCEDURE — 73502 X-RAY EXAM HIP UNI 2-3 VIEWS: CPT

## 2024-10-07 RX ORDER — GABAPENTIN 100 MG/1
100 CAPSULE ORAL 3 TIMES DAILY
Qty: 90 CAPSULE | Refills: 5 | Status: SHIPPED | OUTPATIENT
Start: 2024-10-07 | End: 2025-04-05

## 2024-10-07 ASSESSMENT — ENCOUNTER SYMPTOMS
VOMITING: 0
CONSTIPATION: 1
ABDOMINAL DISTENTION: 0
SINUS PRESSURE: 0
WHEEZING: 0
SORE THROAT: 0
BACK PAIN: 0
SHORTNESS OF BREATH: 0
DIARRHEA: 0
ABDOMINAL PAIN: 0
COUGH: 0
SINUS PAIN: 0
NAUSEA: 0
EYES NEGATIVE: 1

## 2024-10-07 NOTE — PROGRESS NOTES
Adri Anthony (:  1955) is a 68 y.o. female,Established patient, here for evaluation of the following chief complaint(s):  Medicare AWV    Weight - Scale: 101 kg (222 lb 9.6 oz)   Wt Readings from Last 3 Encounters:   10/07/24 101 kg (222 lb 9.6 oz)   24 100.7 kg (222 lb 1.6 oz)   07/10/24 101.8 kg (224 lb 6.4 oz)    Highest weight 226  Assessment & Plan   ASSESSMENT/PLAN:  1. Drug-induced constipation, patient was on Wegovy 1 mg weekly for weight loss and developed severe constipation where she went to the emergency room with nausea but no abdominal pain and was very constipated and given several enemas and stool softeners and discharged with MiraLAX and Senokot and continue her Linzess.  Since has not lost much weight and due to severe GI problems will discontinue Wegovy.  Up-to-date with thyroid screening.  Renal function electrolytes were normal.    Lab Results   Component Value Date    WBC 5.9 2024    HGB 13.0 2024    HCT 40.4 2024    MCV 88.6 2024     2024       Lab Results   Component Value Date    CREATININE 1.0 2024    BUN 11 2024     (L) 2024    K 3.7 2024    CL 97 (L) 2024    CO2 29 2024       Lab Results   Component Value Date    TSH 1.10 2015    TSHFT4 1.29 2024       2. Sciatica, left side chronic but have been doing well until recently more symptomatic will send to physical therapy and start gabapentin which has helped in the past.  Is been 2 years since she has had an x-ray so we will get x-ray lumbar spine.  -     XR LUMBAR SPINE (2-3 VIEWS); Future  -     [facility]  -     gabapentin (NEURONTIN) 100 MG capsule; Take 1 capsule by mouth 3 times daily for 180 days. Intended supply: 30 days, Disp-90 capsule, R-5Normal  3. Left hip pain with pain with weightbearing and range of motion of the hip suspect arthritis will x-ray.  -     XR HIP LEFT (2-3 VIEWS); Future  -

## 2024-10-29 ENCOUNTER — HOSPITAL ENCOUNTER (OUTPATIENT)
Dept: PHYSICAL THERAPY | Age: 69
Setting detail: THERAPIES SERIES
Discharge: HOME | End: 2024-10-29
Payer: MEDICARE

## 2024-10-29 PROCEDURE — 97530 THERAPEUTIC ACTIVITIES: CPT

## 2024-10-29 PROCEDURE — 97140 MANUAL THERAPY 1/> REGIONS: CPT

## 2024-10-29 PROCEDURE — 97110 THERAPEUTIC EXERCISES: CPT

## 2024-10-29 PROCEDURE — 97161 PT EVAL LOW COMPLEX 20 MIN: CPT

## 2024-10-29 NOTE — PLAN OF CARE
[facility] [address], [place] [id number] office: [phone] fax: [phone]     Physical Therapy Initial Evaluation Certification      Dear Milena Crespo, *,    We had the pleasure of evaluating the following patient for physical therapy services at [facility].  A summary of our findings can be found in the initial assessment below.  This includes our plan of care.  If you have any questions or concerns regarding these findings, please do not hesitate to contact me at the office phone number listed above.  Thank you for the referral.     Physician Signature:_______________________________Date:__________________  By signing above (or electronic signature), therapist’s plan is approved by physician       Physical Therapy: TREATMENT/PROGRESS NOTE   Patient: Adri Anthony (68 y.o. female)   Examination Date: 10/29/2024   :  1955 MRN: [id number]   Visit #: 1   Insurance Allowable Auth Needed   BMN []Yes    [x]No    Insurance: Payor: MEDICARE / Plan: MEDICARE PART A AND B / Product Type: *No Product type* /   Insurance ID: [id number] - (Medicare)  Secondary Insurance (if applicable): [employer]   Treatment Diagnosis:   M25.552 L hip pain   Medical Diagnosis:  Sciatica, left side [M54.32]  Left hip pain [M25.552]   Referring Physician: Milena Crespo,*  PCP: Milena Crespo MD     Plan of care signed (Y/N):     Date of Patient follow up with Physician:      Plan of Care Report: EVAL today  POC update due: (10 visits /OR AUTH LIMITS, whichever is less)  2024                                             Medical History:  Comorbidities:  Hypertension  Other Neurological Conditions: Fibromyalgia  Other Cardiovascular Conditions: HLD  Relevant Medical History:                                          Precautions/ Contra-indications:           Latex allergy:  NO  Pacemaker:

## 2024-11-02 ENCOUNTER — OFFICE VISIT (OUTPATIENT)
Age: 69
End: 2024-11-02

## 2024-11-02 VITALS
OXYGEN SATURATION: 95 % | HEIGHT: 66 IN | HEART RATE: 84 BPM | RESPIRATION RATE: 16 BRPM | WEIGHT: 223.6 LBS | SYSTOLIC BLOOD PRESSURE: 124 MMHG | DIASTOLIC BLOOD PRESSURE: 81 MMHG | BODY MASS INDEX: 35.93 KG/M2 | TEMPERATURE: 98 F

## 2024-11-02 DIAGNOSIS — J40 BRONCHITIS: ICD-10-CM

## 2024-11-02 DIAGNOSIS — J22 LOWER RESPIRATORY TRACT INFECTION: Primary | ICD-10-CM

## 2024-11-02 RX ORDER — AZITHROMYCIN 250 MG/1
TABLET, FILM COATED ORAL
Qty: 6 TABLET | Refills: 0 | Status: SHIPPED | OUTPATIENT
Start: 2024-11-02 | End: 2024-11-12

## 2024-11-02 RX ORDER — DEXTROMETHORPHAN HYDROBROMIDE AND PROMETHAZINE HYDROCHLORIDE 15; 6.25 MG/5ML; MG/5ML
5 SYRUP ORAL 4 TIMES DAILY PRN
Qty: 118 ML | Refills: 0 | Status: SHIPPED | OUTPATIENT
Start: 2024-11-02 | End: 2024-11-09

## 2024-11-02 ASSESSMENT — ENCOUNTER SYMPTOMS
RHINORRHEA: 1
SORE THROAT: 0
VOMITING: 0
BACK PAIN: 0
ABDOMINAL PAIN: 0
NAUSEA: 0
SHORTNESS OF BREATH: 0
DIARRHEA: 0
COUGH: 1

## 2024-11-02 NOTE — PROGRESS NOTES
throat or shortness of breath.     Vitals:    11/02/24 1230   BP: 124/81   Site: Left Upper Arm   Position: Sitting   Cuff Size: Large Adult   Pulse: 84   Resp: 16   Temp: 98 °F (36.7 °C)   TempSrc: Oral   SpO2: 95%   Weight: 101.4 kg (223 lb 9.6 oz)   Height: 1.676 m (5' 6\")        Review of Systems   Constitutional:  Negative for chills, fatigue and fever.   HENT:  Positive for postnasal drip and rhinorrhea. Negative for congestion, ear pain and sore throat.    Respiratory:  Positive for cough. Negative for shortness of breath.    Cardiovascular:  Negative for chest pain.   Gastrointestinal:  Negative for abdominal pain, diarrhea, nausea and vomiting.   Genitourinary:  Negative for urgency.   Musculoskeletal:  Negative for back pain and neck pain.   Skin:  Negative for rash.   Neurological:  Negative for syncope and headaches.      Objective   Physical Exam  Constitutional:       Appearance: She is not ill-appearing or toxic-appearing.   HENT:      Right Ear: Tympanic membrane normal. Tympanic membrane is not injected or erythematous.      Left Ear: Tympanic membrane normal. Tympanic membrane is not injected or erythematous.      Nose: Nose normal.      Mouth/Throat:      Mouth: Mucous membranes are moist.      Pharynx: Oropharynx is clear. No pharyngeal swelling, oropharyngeal exudate or posterior oropharyngeal erythema.      Comments: No erythema, swelling, or exudate to pharynx  Eyes:      Pupils: Pupils are equal, round, and reactive to light.   Cardiovascular:      Rate and Rhythm: Normal rate and regular rhythm.      Pulses: Normal pulses.      Heart sounds: Normal heart sounds.   Pulmonary:      Effort: Pulmonary effort is normal.      Breath sounds: Normal breath sounds.      Comments: Lung sounds clear throughout  Abdominal:      Palpations: Abdomen is soft.      Tenderness: There is no abdominal tenderness.   Musculoskeletal:         General: Normal range of motion.   Skin:     General: Skin is warm and

## 2024-11-02 NOTE — PATIENT INSTRUCTIONS
New Prescriptions    AZITHROMYCIN (ZITHROMAX) 250 MG TABLET    500mg on day 1 followed by 250mg on days 2 - 5    PROMETHAZINE-DEXTROMETHORPHAN (PROMETHAZINE-DM) 6.25-15 MG/5ML SYRUP    Take 5 mLs by mouth 4 times daily as needed for Cough      Take the antibiotic as prescribed.  Take the cough medicine as needed for symptom relief.  Take tylenol and/or ibuprofen for pain/fever relief.  Encourage rest and increase fluid intake.  Follow-up with your PCP as needed.  Return for severe/worsening symptoms.

## 2024-11-03 DIAGNOSIS — E78.2 MIXED HYPERLIPIDEMIA: ICD-10-CM

## 2024-11-04 RX ORDER — SIMVASTATIN 20 MG
20 TABLET ORAL NIGHTLY
Qty: 90 TABLET | Refills: 3 | Status: SHIPPED | OUTPATIENT
Start: 2024-11-04

## 2024-11-04 NOTE — TELEPHONE ENCOUNTER
Medication:   Requested Prescriptions     Pending Prescriptions Disp Refills    simvastatin (ZOCOR) 20 MG tablet [Pharmacy Med Name: SIMVASTATIN 20 MG TABLET] 90 tablet 3     Sig: TAKE ONE TABLET BY MOUTH ONCE NIGHTLY       Last Filled:      Patient Phone Number: [phone] (home)     Last appt: 10/7/2024   Next appt: 11/18/2024    Last Lipid:   Lab Results   Component Value Date/Time    CHOL 185 05/23/2023 10:40 AM    TRIG 60 05/23/2023 10:40 AM    HDL 84 05/23/2023 10:40 AM    HDL 61 05/23/2012 12:33 PM

## 2024-11-18 ENCOUNTER — HOSPITAL ENCOUNTER (OUTPATIENT)
Dept: PHYSICAL THERAPY | Age: 69
Setting detail: THERAPIES SERIES
Discharge: HOME | End: 2024-11-18
Payer: MEDICARE

## 2024-11-18 ENCOUNTER — OFFICE VISIT (OUTPATIENT)
Dept: PRIMARY CARE CLINIC | Age: 69
End: 2024-11-18
Payer: MEDICARE

## 2024-11-18 VITALS
SYSTOLIC BLOOD PRESSURE: 116 MMHG | RESPIRATION RATE: 16 BRPM | HEIGHT: 66 IN | HEART RATE: 54 BPM | BODY MASS INDEX: 36.32 KG/M2 | DIASTOLIC BLOOD PRESSURE: 72 MMHG | OXYGEN SATURATION: 98 % | WEIGHT: 226 LBS | TEMPERATURE: 97.7 F

## 2024-11-18 DIAGNOSIS — E78.2 MIXED HYPERLIPIDEMIA: ICD-10-CM

## 2024-11-18 DIAGNOSIS — L65.9 ALOPECIA: Primary | ICD-10-CM

## 2024-11-18 DIAGNOSIS — L65.9 ALOPECIA: ICD-10-CM

## 2024-11-18 DIAGNOSIS — M79.7 FIBROMYALGIA: ICD-10-CM

## 2024-11-18 LAB
BASOPHILS # BLD: 0 K/UL (ref 0–0.2)
BASOPHILS NFR BLD: 0.8 %
CK SERPL-CCNC: 69 U/L (ref 26–192)
CRP SERPL-MCNC: <3 MG/L (ref 0–5.1)
DEPRECATED RDW RBC AUTO: 14.9 % (ref 12.4–15.4)
EOSINOPHIL # BLD: 0.1 K/UL (ref 0–0.6)
EOSINOPHIL NFR BLD: 1.9 %
ERYTHROCYTE [SEDIMENTATION RATE] IN BLOOD BY WESTERGREN METHOD: 33 MM/HR (ref 0–30)
HCT VFR BLD AUTO: 38.1 % (ref 36–48)
HGB BLD-MCNC: 12.5 G/DL (ref 12–16)
LYMPHOCYTES # BLD: 2.2 K/UL (ref 1–5.1)
LYMPHOCYTES NFR BLD: 43.3 %
MCH RBC QN AUTO: 29 PG (ref 26–34)
MCHC RBC AUTO-ENTMCNC: 32.7 G/DL (ref 31–36)
MCV RBC AUTO: 88.7 FL (ref 80–100)
MONOCYTES # BLD: 0.5 K/UL (ref 0–1.3)
MONOCYTES NFR BLD: 9.2 %
NEUTROPHILS # BLD: 2.3 K/UL (ref 1.7–7.7)
NEUTROPHILS NFR BLD: 44.8 %
PLATELET # BLD AUTO: 276 K/UL (ref 135–450)
PMV BLD AUTO: 8.7 FL (ref 5–10.5)
RBC # BLD AUTO: 4.29 M/UL (ref 4–5.2)
RHEUMATOID FACT SER IA-ACNC: <10 IU/ML
T4 FREE SERPL-MCNC: 1.4 NG/DL (ref 0.9–1.8)
TSH SERPL DL<=0.005 MIU/L-ACNC: 1.38 UIU/ML (ref 0.27–4.2)
WBC # BLD AUTO: 5 K/UL (ref 4–11)

## 2024-11-18 PROCEDURE — 1090F PRES/ABSN URINE INCON ASSESS: CPT | Performed by: INTERNAL MEDICINE

## 2024-11-18 PROCEDURE — 1036F TOBACCO NON-USER: CPT | Performed by: INTERNAL MEDICINE

## 2024-11-18 PROCEDURE — 3017F COLORECTAL CA SCREEN DOC REV: CPT | Performed by: INTERNAL MEDICINE

## 2024-11-18 PROCEDURE — 3078F DIAST BP <80 MM HG: CPT | Performed by: INTERNAL MEDICINE

## 2024-11-18 PROCEDURE — G8417 CALC BMI ABV UP PARAM F/U: HCPCS | Performed by: INTERNAL MEDICINE

## 2024-11-18 PROCEDURE — 1123F ACP DISCUSS/DSCN MKR DOCD: CPT | Performed by: INTERNAL MEDICINE

## 2024-11-18 PROCEDURE — G8399 PT W/DXA RESULTS DOCUMENT: HCPCS | Performed by: INTERNAL MEDICINE

## 2024-11-18 PROCEDURE — 3074F SYST BP LT 130 MM HG: CPT | Performed by: INTERNAL MEDICINE

## 2024-11-18 PROCEDURE — G8484 FLU IMMUNIZE NO ADMIN: HCPCS | Performed by: INTERNAL MEDICINE

## 2024-11-18 PROCEDURE — G8427 DOCREV CUR MEDS BY ELIG CLIN: HCPCS | Performed by: INTERNAL MEDICINE

## 2024-11-18 PROCEDURE — 1159F MED LIST DOCD IN RCRD: CPT | Performed by: INTERNAL MEDICINE

## 2024-11-18 PROCEDURE — 99214 OFFICE O/P EST MOD 30 MIN: CPT | Performed by: INTERNAL MEDICINE

## 2024-11-18 PROCEDURE — 1160F RVW MEDS BY RX/DR IN RCRD: CPT | Performed by: INTERNAL MEDICINE

## 2024-11-18 PROCEDURE — 97140 MANUAL THERAPY 1/> REGIONS: CPT

## 2024-11-18 PROCEDURE — 97110 THERAPEUTIC EXERCISES: CPT

## 2024-11-18 RX ORDER — DULOXETIN HYDROCHLORIDE 30 MG/1
30 CAPSULE, DELAYED RELEASE ORAL DAILY
Qty: 90 CAPSULE | Refills: 1 | Status: SHIPPED | OUTPATIENT
Start: 2024-11-18

## 2024-11-18 RX ORDER — TRIAMCINOLONE ACETONIDE 1 MG/G
CREAM TOPICAL
Qty: 15 G | Refills: 5 | Status: SHIPPED | OUTPATIENT
Start: 2024-11-18

## 2024-11-18 SDOH — ECONOMIC STABILITY: FOOD INSECURITY: WITHIN THE PAST 12 MONTHS, YOU WORRIED THAT YOUR FOOD WOULD RUN OUT BEFORE YOU GOT MONEY TO BUY MORE.: NEVER TRUE

## 2024-11-18 SDOH — ECONOMIC STABILITY: FOOD INSECURITY: WITHIN THE PAST 12 MONTHS, THE FOOD YOU BOUGHT JUST DIDN'T LAST AND YOU DIDN'T HAVE MONEY TO GET MORE.: NEVER TRUE

## 2024-11-18 SDOH — ECONOMIC STABILITY: INCOME INSECURITY: HOW HARD IS IT FOR YOU TO PAY FOR THE VERY BASICS LIKE FOOD, HOUSING, MEDICAL CARE, AND HEATING?: NOT HARD AT ALL

## 2024-11-18 ASSESSMENT — PATIENT HEALTH QUESTIONNAIRE - PHQ9
SUM OF ALL RESPONSES TO PHQ QUESTIONS 1-9: 0
1. LITTLE INTEREST OR PLEASURE IN DOING THINGS: NOT AT ALL
SUM OF ALL RESPONSES TO PHQ QUESTIONS 1-9: 0
SUM OF ALL RESPONSES TO PHQ QUESTIONS 1-9: 0
2. FEELING DOWN, DEPRESSED OR HOPELESS: NOT AT ALL
SUM OF ALL RESPONSES TO PHQ9 QUESTIONS 1 & 2: 0
SUM OF ALL RESPONSES TO PHQ QUESTIONS 1-9: 0

## 2024-11-18 NOTE — PROGRESS NOTES
Adri Anthony (:  1955) is a 69 y.o. female,Established patient, here for evaluation of the following chief complaint(s):  Follow-up      Assessment & Plan   ASSESSMENT/PLAN:  1. Alopecia with scaring  in center of scalp.  Refer to dermatology, order labs. Apply rogaine and kenalog until seen per dermatology.         -     CK; Future  -     Sedimentation Rate; Future  -     C-Reactive Protein; Future  -     DIANA Reflex to Antibody Cascade; Future  -     RHEUMATOID FACTOR; Future  -     CBC with Auto Differential; Future  -     TSH with Reflex to FT4; Future  -     T4, Free; Future  -     Ammon Solis MD, Dermatology, [place]  -     triamcinolone (KENALOG) 0.1 % cream; Apply topically 2 times daily., Disp-15 g, R-5, Normal  -     minoxidil (ROGAINE) 2 % external solution; Apply topically 2 times daily., Disp-60 mL, R-12, Normal  2. Fibromyalgia with muscle pain and negative inflammatory markers, start duloxetine.  Hold simvastatin until seen next month to see if this is contributing to pain.  -     DULoxetine (CYMBALTA) 30 MG extended release capsule; Take 1 capsule by mouth daily, Disp-90 capsule, R-1Normal  3. Mixed hyperlipidemia, was controlled on Simvastatin, but will hold for a month to see if this helps myalgias.   Lab Results   Component Value Date    CHOL 185 2023    CHOL 178 2022    CHOL 177 2020     Lab Results   Component Value Date    TRIG 60 2023    TRIG 41 2022    TRIG 54 2020     Lab Results   Component Value Date    HDL 84 (H) 2023    HDL 72 (H) 2022    HDL 73 (H) 2020     Lab Results   Component Value Date    LDL 89 2023    LDL 98 2022    LDL 93 2020     Lab Results   Component Value Date    VLDL 12 2023    VLDL 8 2022    VLDL 11 2020     No results found for: \"CHOLHDLRATIO\"       Return in about 3 months (around 2025) for fibromyalgia, alopecia, bp.         Subjective

## 2024-11-19 LAB — ANA SER QL IA: NEGATIVE

## 2024-11-20 ENCOUNTER — HOSPITAL ENCOUNTER (OUTPATIENT)
Dept: PHYSICAL THERAPY | Age: 69
Setting detail: THERAPIES SERIES
Discharge: HOME | End: 2024-11-20
Payer: MEDICARE

## 2024-11-20 PROCEDURE — 97110 THERAPEUTIC EXERCISES: CPT

## 2024-11-20 PROCEDURE — 97140 MANUAL THERAPY 1/> REGIONS: CPT

## 2024-11-20 NOTE — FLOWSHEET NOTE
function, and ADLs as indicated by Functional Deficits.  [] Progressing: [] Met: [] Not Met: [] Adjusted    Long Term Goals: To be achieved in: 8 weeks  1. Disability index score of 20% or less for the LEFS to assist with reaching prior level of function with activities such as stair climbing.  [] Progressing: [] Met: [] Not Met: [] Adjusted  2. Patient will demonstrate negative Real's Test for ITB tightness, without pain to allow for proper joint functioning to enable patient to ambulate with normal gait mechanics.   [] Progressing: [] Met: [] Not Met: [] Adjusted  3. Patient will demonstrate increased Strength of L hip flex and abd to at least 5/5 throughout without pain to allow for proper functional mobility to enable patient to return to stair climbing and other functional tasks without increased pain.   [] Progressing: [] Met: [] Not Met: [] Adjusted  4. Patient will return to standing and/or walking for >30 minutes without increased symptoms or restriction.   [] Progressing: [] Met: [] Not Met: [] Adjusted  5. Pt will report ability to sleep through the night without waking d/t increased pain levels.  [] Progressing: [] Met: [] Not Met: [] Adjusted     Overall Progression Towards Functional goals/ Treatment Progress Update:  [] Patient is progressing as expected towards functional goals listed.    [] Progression is slowed due to complexities/Impairments listed.  [] Progression has been slowed due to co-morbidities.  [x] Plan just implemented, too soon (<30days) to assess goals progression   [] Goals require adjustment due to lack of progress  [] Patient is not progressing as expected and requires additional follow up with physician  [] Other:     TREATMENT PLAN     Frequency/Duration: 2x/week for 8 weeks for the following treatment interventions:    Interventions:  Therapeutic Exercise (10711) including: strength training, ROM, and functional mobility  Therapeutic Activities (86163) including: functional

## 2024-11-22 NOTE — RESULT ENCOUNTER NOTE
The sed rate fluctuates but is good at 33 with normal being 0-30.  I was concerned and when it was up to 57 a year ago.

## 2024-11-25 ENCOUNTER — HOSPITAL ENCOUNTER (OUTPATIENT)
Dept: PHYSICAL THERAPY | Age: 69
Setting detail: THERAPIES SERIES
Discharge: HOME | End: 2024-11-25
Payer: MEDICARE

## 2024-11-25 PROCEDURE — 97110 THERAPEUTIC EXERCISES: CPT

## 2024-11-25 ASSESSMENT — ENCOUNTER SYMPTOMS
ABDOMINAL DISTENTION: 0
SINUS PRESSURE: 0
SINUS PAIN: 0
EYES NEGATIVE: 1
BACK PAIN: 0

## 2024-11-25 NOTE — FLOWSHEET NOTE
Progression has been slowed due to co-morbidities.  [x] Plan just implemented, too soon (<30days) to assess goals progression   [] Goals require adjustment due to lack of progress  [] Patient is not progressing as expected and requires additional follow up with physician  [] Other:     TREATMENT PLAN     Frequency/Duration: 2x/week for 8 weeks for the following treatment interventions:    Interventions:  Therapeutic Exercise (53974) including: strength training, ROM, and functional mobility  Therapeutic Activities (30514) including: functional mobility training and education.  Neuromuscular Re-education (46951) activation and proprioception, including postural re-education.    Gait Training (88254) for normalization of ambulation patterns and AD training.   Manual Therapy (81559) as indicated to include: Passive Range of Motion, Gr I-IV mobilizations, Soft Tissue Mobilization, Trigger Point Release, and Myofascial Release  Modalities as needed that may include: Cryotherapy, Electrical Stimulation, Thermal Agents, Traction, and Ultrasound  Patient education on joint protection, postural re-education, activity modification, and progression of HEP    Plan: Cont POC- Continue emphasis/focus on exercise progression, improving proper muscle recruitment and activation/motor control patterns, modulating pain, and increasing ROM. Next visit plan to progress weights, progress reps, and add new exercises     Electronically Signed by Candis Jacome, PT  Date: 11/25/2024     Note: Portions of this note have been templated and/or copied from initial evaluation, reassessments and prior notes for documentation efficiency.    Note: If patient does not return for scheduled/recommended follow up visits, this note will serve as a discharge from care along with the most recent update on progress.    Complex Ortho Evaluation

## 2024-12-02 ENCOUNTER — HOSPITAL ENCOUNTER (OUTPATIENT)
Dept: PHYSICAL THERAPY | Age: 69
Setting detail: THERAPIES SERIES
Discharge: HOME | End: 2024-12-02
Payer: MEDICARE

## 2024-12-02 PROCEDURE — 97110 THERAPEUTIC EXERCISES: CPT

## 2024-12-02 PROCEDURE — 97530 THERAPEUTIC ACTIVITIES: CPT

## 2024-12-02 NOTE — PROGRESS NOTES
[facility]- Outpatient Rehabilitation and Therapy [address], [place] [address] office: [phone] fax: [phone]           Physical Therapy: TREATMENT/PROGRESS NOTE   Patient: Adri Anthony (69 y.o. female)   Examination Date: 2024   :  1955 MRN: [id number]   Visit #: 5   Insurance Allowable Auth Needed   BMN []Yes    [x]No    Insurance: Payor: MEDICARE / Plan: MEDICARE PART A AND B / Product Type: *No Product type* /   Insurance ID: [id number] - (Medicare)  Secondary Insurance (if applicable): [employer]   Treatment Diagnosis:   M25.552 L hip pain   Medical Diagnosis:  Sciatica, left side [M54.32]  Left hip pain [M25.552]   Referring Physician: Milena Crespo,*  PCP: Milena Crespo MD     Plan of care signed (Y/N): N    Date of Patient follow up with Physician:      Plan of Care Report: YES, Date Range for this report: 10/29/24 to 24  POC update due: (10 visits /OR AUTH LIMITS, whichever is less)  2024                                             Medical History:  Comorbidities:  Hypertension  Other Neurological Conditions: Fibromyalgia  Other Cardiovascular Conditions: HLD  Relevant Medical History:                                          Precautions/ Contra-indications:           Latex allergy:  NO  Pacemaker:    NO  Contraindications for Manipulation: None  Date of Surgery:   Other:    Red Flags:  None    Suicide Screening:   The patient did not verbalize a primary behavioral concern, suicidal ideation, suicidal intent, or demonstrate suicidal behaviors.    Preferred Language for Healthcare:  English    SUBJECTIVE EXAMINATION     Patient stated complaint/comments: Pt notes improvements in pain levels. She continues to report inability to sleep on L side. She continues to have difficulty sleeping through the night, but report it is no longer because of the cramping she used to get when she felt like her legs were so

## 2024-12-05 ENCOUNTER — HOSPITAL ENCOUNTER (OUTPATIENT)
Dept: PHYSICAL THERAPY | Age: 69
Setting detail: THERAPIES SERIES
Discharge: HOME | End: 2024-12-05
Payer: MEDICARE

## 2024-12-05 PROCEDURE — 97110 THERAPEUTIC EXERCISES: CPT

## 2024-12-05 NOTE — FLOWSHEET NOTE
towards functional goals listed.    [] Progression is slowed due to complexities/Impairments listed.  [] Progression has been slowed due to co-morbidities.  [] Plan just implemented, too soon (<30days) to assess goals progression   [] Goals require adjustment due to lack of progress  [] Patient is not progressing as expected and requires additional follow up with physician  [] Other:     TREATMENT PLAN     Frequency/Duration: 2x/week for 8 weeks for the following treatment interventions:    Interventions:  Therapeutic Exercise (77588) including: strength training, ROM, and functional mobility  Therapeutic Activities (66610) including: functional mobility training and education.  Neuromuscular Re-education (21232) activation and proprioception, including postural re-education.    Gait Training (46556) for normalization of ambulation patterns and AD training.   Manual Therapy (63855) as indicated to include: Passive Range of Motion, Gr I-IV mobilizations, Soft Tissue Mobilization, Trigger Point Release, and Myofascial Release  Modalities as needed that may include: Cryotherapy, Electrical Stimulation, Thermal Agents, Traction, and Ultrasound  Patient education on joint protection, postural re-education, activity modification, and progression of HEP    Plan: Cont POC- Continue emphasis/focus on exercise progression, improving proper muscle recruitment and activation/motor control patterns, modulating pain, and increasing ROM. Next visit plan to progress weights, progress reps, and add new exercises     Electronically Signed by Candis Jacome PT  Date: 12/05/2024     Note: Portions of this note have been templated and/or copied from initial evaluation, reassessments and prior notes for documentation efficiency.    Note: If patient does not return for scheduled/recommended follow up visits, this note will serve as a discharge from care along with the most recent update on progress.    Complex Ortho Evaluation

## 2024-12-09 ENCOUNTER — HOSPITAL ENCOUNTER (OUTPATIENT)
Dept: PHYSICAL THERAPY | Age: 69
Setting detail: THERAPIES SERIES
Discharge: HOME | End: 2024-12-09
Payer: MEDICARE

## 2024-12-09 PROCEDURE — 97110 THERAPEUTIC EXERCISES: CPT

## 2024-12-09 NOTE — FLOWSHEET NOTE
performed to prevent loss of range of motion, maintain or improve muscular strength or increase flexibility, following either an injury or surgery.       GOALS     Patient stated goal: Ability to sleep on L side and complete stairs  [] Progressing: [] Met: [] Not Met: [] Adjusted    Therapist goals for Patient:   Short Term Goals: To be achieved in: 2 weeks  1. Independent in HEP and progression per patient tolerance, in order to prevent re-injury.   [] Progressing: [x] Met: [] Not Met: [] Adjusted  2. Patient will have a decrease in pain to <2/10 to facilitate improvement in movement, function, and ADLs as indicated by Functional Deficits.  [] Progressing: [x] Met: [] Not Met: [] Adjusted    Long Term Goals: To be achieved in: 8 weeks  1. Disability index score of 20% or less for the LEFS to assist with reaching prior level of function with activities such as stair climbing.  [x] Progressing: [] Met: [] Not Met: [] Adjusted   23.75% disability  2. Patient will demonstrate negative Real's Test for ITB tightness, without pain to allow for proper joint functioning to enable patient to ambulate with normal gait mechanics.   [] Progressing: [x] Met: [] Not Met: [] Adjusted  3. Patient will demonstrate increased Strength of L hip flex and abd to at least 5/5 throughout without pain to allow for proper functional mobility to enable patient to return to stair climbing and other functional tasks without increased pain.   [] Progressing: [] Met: [x] Not Met: [] Adjusted   L Hip flexion 4/5 with c/o discomfort with resistance  4. Patient will return to standing and/or walking for >30 minutes without increased symptoms or restriction.   [] Progressing: [x] Met: [] Not Met: [] Adjusted  5. Pt will report ability to sleep through the night without waking d/t increased pain levels.  [] Progressing: [] Met: [x] Not Met: [] Adjusted     Overall Progression Towards Functional goals/ Treatment Progress Update:  [x] Patient is

## 2024-12-12 ENCOUNTER — HOSPITAL ENCOUNTER (OUTPATIENT)
Dept: PHYSICAL THERAPY | Age: 69
Setting detail: THERAPIES SERIES
Discharge: HOME | End: 2024-12-12
Payer: MEDICARE

## 2024-12-12 PROCEDURE — 97110 THERAPEUTIC EXERCISES: CPT

## 2024-12-12 NOTE — FLOWSHEET NOTE
[facility]- Outpatient Rehabilitation and Therapy [address], [place] [address] office: [phone] fax: [phone]           Physical Therapy: TREATMENT/PROGRESS NOTE   Patient: Adri Anthony (69 y.o. female)   Examination Date: 2024   :  1955 MRN: [id number]   Visit #: 8   Insurance Allowable Auth Needed   BMN []Yes    [x]No    Insurance: Payor: MEDICARE / Plan: MEDICARE PART A AND B / Product Type: *No Product type* /   Insurance ID: [id number] - (Medicare)  Secondary Insurance (if applicable): MED[employer]   Treatment Diagnosis:   M25.552 L hip pain   Medical Diagnosis:  Sciatica, left side [M54.32]  Left hip pain [M25.552]   Referring Physician: Milena Crespo,*  PCP: Milena Crespo MD     Plan of care signed (Y/N): N    Date of Patient follow up with Physician:      Plan of Care Report: NO  POC update due: (10 visits /OR AUTH LIMITS, whichever is less)  2024                                             Medical History:  Comorbidities:  Hypertension  Other Neurological Conditions: Fibromyalgia  Other Cardiovascular Conditions: HLD  Relevant Medical History:                                          Precautions/ Contra-indications:           Latex allergy:  NO  Pacemaker:    NO  Contraindications for Manipulation: None  Date of Surgery:   Other:    Red Flags:  None    Suicide Screening:   The patient did not verbalize a primary behavioral concern, suicidal ideation, suicidal intent, or demonstrate suicidal behaviors.    Preferred Language for Healthcare:  English    SUBJECTIVE EXAMINATION     Patient stated complaint/comments: Pt is feeling good overall. She continues to have decreased pain and notes improvements on stairs.     Test used Initial score  10/29/24 12/2/24 2024   Pain Summary VAS 3/10 0/10     Functional questionnaire LEFS 37/80 = 53.75% disability 61/80 = 23.75% disability    Other:                  OBJECTIVE

## 2024-12-16 ENCOUNTER — HOSPITAL ENCOUNTER (OUTPATIENT)
Dept: PHYSICAL THERAPY | Age: 69
Setting detail: THERAPIES SERIES
Discharge: HOME | End: 2024-12-16
Payer: MEDICARE

## 2024-12-16 PROCEDURE — 97110 THERAPEUTIC EXERCISES: CPT

## 2024-12-16 PROCEDURE — 97140 MANUAL THERAPY 1/> REGIONS: CPT

## 2024-12-16 PROCEDURE — 97530 THERAPEUTIC ACTIVITIES: CPT

## 2024-12-16 NOTE — PLAN OF CARE
[facility]- Outpatient Rehabilitation and Therapy [address], [place] [address] office: [phone] fax: [phone]           Physical Therapy: Discharge NOTE   Patient: Adri Anthony (69 y.o. female)   Examination Date: 2024   :  1955 MRN: [id number]   Visit #: 9   Insurance Allowable Auth Needed   BMN []Yes    [x]No    Insurance: Payor: MEDICARE / Plan: MEDICARE PART A AND B / Product Type: *No Product type* /   Insurance ID: [id number] - (Medicare)  Secondary Insurance (if applicable): MEDICAL M[employer]UAL   Treatment Diagnosis:   M25.552 L hip pain   Medical Diagnosis:  Sciatica, left side [M54.32]  Left hip pain [M25.552]   Referring Physician: Milena Crespo,*  PCP: Milena Crespo MD     Plan of care signed (Y/N): N    Date of Patient follow up with Physician:      Plan of Care Report: NO  POC update due: (10 visits /OR AUTH LIMITS, whichever is less)  2024                                             Medical History:  Comorbidities:  Hypertension  Other Neurological Conditions: Fibromyalgia  Other Cardiovascular Conditions: HLD  Relevant Medical History:                                          Precautions/ Contra-indications:           Latex allergy:  NO  Pacemaker:    NO  Contraindications for Manipulation: None  Date of Surgery:   Other:    Red Flags:  None    Suicide Screening:   The patient did not verbalize a primary behavioral concern, suicidal ideation, suicidal intent, or demonstrate suicidal behaviors.    Preferred Language for Healthcare:  English    SUBJECTIVE EXAMINATION     Patient stated complaint/comments: Pt is feeling good. She has no pain and feels ready to be discharged. She is determined to maintain with use of HEP.     Test used Initial score  10/29/24 12/2/24 2024   Pain Summary VAS 3/10 0/10  0/10   Functional questionnaire LEFS 37/80 = 53.75% disability 61/80 = 23.75% disability 75/80 = 6.25% disability

## 2024-12-19 ENCOUNTER — APPOINTMENT (OUTPATIENT)
Dept: PHYSICAL THERAPY | Age: 69
End: 2024-12-19
Payer: MEDICARE

## 2024-12-23 ENCOUNTER — APPOINTMENT (OUTPATIENT)
Dept: PHYSICAL THERAPY | Age: 69
End: 2024-12-23
Payer: MEDICARE

## 2024-12-26 ENCOUNTER — APPOINTMENT (OUTPATIENT)
Dept: PHYSICAL THERAPY | Age: 69
End: 2024-12-26
Payer: MEDICARE

## 2024-12-30 ENCOUNTER — APPOINTMENT (OUTPATIENT)
Dept: PHYSICAL THERAPY | Age: 69
End: 2024-12-30
Payer: MEDICARE

## 2025-01-15 ENCOUNTER — OFFICE VISIT (OUTPATIENT)
Age: 70
End: 2025-01-15

## 2025-01-15 ENCOUNTER — HOSPITAL ENCOUNTER (EMERGENCY)
Age: 70
Discharge: HOME | End: 2025-01-15
Attending: STUDENT IN AN ORGANIZED HEALTH CARE EDUCATION/TRAINING PROGRAM
Payer: MEDICARE

## 2025-01-15 VITALS
HEIGHT: 66 IN | DIASTOLIC BLOOD PRESSURE: 85 MMHG | SYSTOLIC BLOOD PRESSURE: 138 MMHG | RESPIRATION RATE: 18 BRPM | OXYGEN SATURATION: 99 % | WEIGHT: 223 LBS | HEART RATE: 75 BPM | BODY MASS INDEX: 35.84 KG/M2 | TEMPERATURE: 98.3 F

## 2025-01-15 VITALS
SYSTOLIC BLOOD PRESSURE: 120 MMHG | HEART RATE: 70 BPM | DIASTOLIC BLOOD PRESSURE: 84 MMHG | WEIGHT: 223 LBS | TEMPERATURE: 98.1 F | OXYGEN SATURATION: 97 % | BODY MASS INDEX: 35.99 KG/M2

## 2025-01-15 DIAGNOSIS — R25.3 EYE MUSCLE TWITCHES: Primary | ICD-10-CM

## 2025-01-15 DIAGNOSIS — R29.810 FACIAL DROOP: Primary | ICD-10-CM

## 2025-01-15 DIAGNOSIS — G24.5 EYE TWITCH: ICD-10-CM

## 2025-01-15 LAB
ANION GAP SERPL CALCULATED.3IONS-SCNC: 13 MMOL/L (ref 3–16)
BASOPHILS # BLD: 0.1 K/UL (ref 0–0.2)
BASOPHILS NFR BLD: 1 %
BUN SERPL-MCNC: 19 MG/DL (ref 7–20)
CALCIUM SERPL-MCNC: 10 MG/DL (ref 8.3–10.6)
CHLORIDE SERPL-SCNC: 98 MMOL/L (ref 99–110)
CO2 SERPL-SCNC: 25 MMOL/L (ref 21–32)
CREAT SERPL-MCNC: 1.1 MG/DL (ref 0.6–1.2)
DEPRECATED RDW RBC AUTO: 14.3 % (ref 12.4–15.4)
EOSINOPHIL # BLD: 0.1 K/UL (ref 0–0.6)
EOSINOPHIL NFR BLD: 2.4 %
GFR SERPLBLD CREATININE-BSD FMLA CKD-EPI: 55 ML/MIN/{1.73_M2}
GLUCOSE SERPL-MCNC: 87 MG/DL (ref 70–99)
HCT VFR BLD AUTO: 41.6 % (ref 36–48)
HGB BLD-MCNC: 13.6 G/DL (ref 12–16)
LYMPHOCYTES # BLD: 2.8 K/UL (ref 1–5.1)
LYMPHOCYTES NFR BLD: 45.8 %
MAGNESIUM SERPL-MCNC: 2.17 MG/DL (ref 1.8–2.4)
MCH RBC QN AUTO: 28.7 PG (ref 26–34)
MCHC RBC AUTO-ENTMCNC: 32.7 G/DL (ref 31–36)
MCV RBC AUTO: 87.8 FL (ref 80–100)
MONOCYTES # BLD: 0.5 K/UL (ref 0–1.3)
MONOCYTES NFR BLD: 8.4 %
NEUTROPHILS # BLD: 2.6 K/UL (ref 1.7–7.7)
NEUTROPHILS NFR BLD: 42.4 %
PLATELET # BLD AUTO: 288 K/UL (ref 135–450)
PMV BLD AUTO: 8.1 FL (ref 5–10.5)
POTASSIUM SERPL-SCNC: ABNORMAL MMOL/L (ref 3.5–5.1)
RBC # BLD AUTO: 4.73 M/UL (ref 4–5.2)
SODIUM SERPL-SCNC: 136 MMOL/L (ref 136–145)
WBC # BLD AUTO: 6.2 K/UL (ref 4–11)

## 2025-01-15 PROCEDURE — 83735 ASSAY OF MAGNESIUM: CPT

## 2025-01-15 PROCEDURE — 80048 BASIC METABOLIC PNL TOTAL CA: CPT

## 2025-01-15 PROCEDURE — 99283 EMERGENCY DEPT VISIT LOW MDM: CPT

## 2025-01-15 PROCEDURE — 85025 COMPLETE CBC W/AUTO DIFF WBC: CPT

## 2025-01-15 ASSESSMENT — PAIN - FUNCTIONAL ASSESSMENT: PAIN_FUNCTIONAL_ASSESSMENT: NONE - DENIES PAIN

## 2025-01-15 NOTE — PATIENT INSTRUCTIONS
Adri,    Thank you for trusting [facility] with your health care needs. Your decision to come to us means a lot, and we are honored to be part of your healthcare journey.  At [facility], our dedicated team is committed to providing you with the highest quality of care in a warm and welcoming environment. Your health and well-being are our top priorities, and we appreciate the opportunity to serve you.    Thank you for choosing us, and we’re here for you whenever you need us!    Warm regards,       The [facility] Team    [] Dr. Nick [] ЕЛЕНА Richardson, Supervisor       [] CONNIE Yeung    [] RT Maru    [] ЕЛЕНА Suazo    [] ЕЛЕНА Gonzalez  [] ЕЛЕНА Corral   [] ЕЛЕНА Rosario    [] ЕЛЕНА Ward           PLEASE GO TO THE [facility] EMERGENCY ROOM IMMEDIATELY FOR EVALUATION OF YOUR SUBJECTIVE LEFT SIDED FACIAL DROOP AND EYE TWITCHING.

## 2025-01-15 NOTE — ED PROVIDER NOTES
THE [facility]  EMERGENCY DEPARTMENT ENCOUNTER          EM RESIDENT NOTE       Date of evaluation: 1/15/2025    Chief Complaint     Eye Problem (Pt. Presents to ED from urgent care with c/o left eye twitching for past 1.5 weeks and stated facial droop on the left side since last night. /No noticeable facial droop observed in triage area. )      History of Present Illness     Adri Anthony is a 69 y.o. female with a history of STEPHANIE, hypertension, fibromyalgia who presents to the emergency department today with an eye problem.  Patient states that approximately 2 weeks ago she began to notice intermittent left eye lid twitching that would occur sporadically throughout the day.  She states that she was recently started on Cymbalta for fibromyalgia and wonders if this could be associated with her symptoms.  Patient also reports noticing some left cheek droop last evening while preparing to go to bed.  She states that this resolved in the morning.  She presented to urgent care today for her concerns and was referred to the emergency department for possible stroke workup    MEDICAL DECISION MAKING / ASSESSMENT / PLAN     INITIAL VITALS: BP: 138/85, Temp: 98.3 °F (36.8 °C), Pulse: 75, Respirations: 18, SpO2: 99 %    Is this patient to be included in the SEP-1 core measure? No Exclusion criteria - the patient is NOT to be included for SEP-1 Core Measure due to: Infection is not suspected    Medical Decision Making  Amount and/or Complexity of Data Reviewed  Labs: ordered.      Initial impression notable for a very well-appearing 69-year-old female who is hemodynamically stable, afebrile, not tachycardic saturating around percent on room air.  Briefly, this is a patient presenting from urgent care for concerns of left eyelid twitching.  Also reports noticing some left cheek droop last evening before bed that resolved spontaneously.  She denies any headache, dizziness, falls or eye trauma.  Further denies any 
  ED Attending Attestation Note     Date of evaluation: 1/15/2025    This patient was seen by the resident.  I have seen and examined the patient, agree with the workup, evaluation, management and diagnosis. The care plan has been discussed.  My assessment reveals a 69 year old female with history of HTN and fibromyalgia presenting with left eye twitching and a question of resolved facial drooping that occurred yesterday. Eye twitching has been ongoing for 2 weeks. Patient thought she noticed drooping of her left cheek briefly yesterday but it resolved and has not recurred. Patient has no appreciable facial droop on my exam. She is ambulatory with a normal gait and has normal visual fields. No pronator drift. There are no current signs of stroke by exam and eye twitching would be quite atypical as a presentation.      Juma Miranda MD  01/15/25 1925    
 OB Discharge Instructions

## 2025-01-15 NOTE — PROGRESS NOTES
deficit.      Sensory: No sensory deficit.      Motor: No weakness.      Coordination: Coordination normal.      Gait: Gait normal.      Deep Tendon Reflexes: Reflexes normal.      Comments: No noticeable facial droop.  Strength and reflexes normal upper and lower.         PROCEDURES:  Unless otherwise noted below, none     Procedures    RESULTS:  No results found for this visit on 01/15/25.  An electronic signature was used to authenticate this note.    --Isauro Nick Jr., MD

## 2025-01-16 DIAGNOSIS — R25.3 MUSCLE TWITCHING: Primary | ICD-10-CM

## 2025-01-16 NOTE — ED NOTES
PIV removed per pt request. Pt states \"you shouldn't need this anymore there isnt anything wrong with my blood\"     Debra Osborn, RN  01/15/25 2011

## 2025-01-16 NOTE — DISCHARGE INSTRUCTIONS
Please return back to the emergency department if you experience facial droop, new numbness/tingling, weakness, headache, dizziness, gait instability or any other concerning.

## 2025-01-21 DIAGNOSIS — R25.3 MUSCLE TWITCHING: ICD-10-CM

## 2025-01-22 LAB
ALBUMIN SERPL-MCNC: 4.4 G/DL (ref 3.4–5)
ANION GAP SERPL CALCULATED.3IONS-SCNC: 11 MMOL/L (ref 3–16)
BUN SERPL-MCNC: 13 MG/DL (ref 7–20)
CALCIUM SERPL-MCNC: 10.2 MG/DL (ref 8.3–10.6)
CHLORIDE SERPL-SCNC: 98 MMOL/L (ref 99–110)
CK SERPL-CCNC: 87 U/L (ref 26–192)
CO2 SERPL-SCNC: 28 MMOL/L (ref 21–32)
CREAT SERPL-MCNC: 1 MG/DL (ref 0.6–1.2)
GFR SERPLBLD CREATININE-BSD FMLA CKD-EPI: 61 ML/MIN/{1.73_M2}
GLUCOSE SERPL-MCNC: 75 MG/DL (ref 70–99)
PHOSPHATE SERPL-MCNC: 3.8 MG/DL (ref 2.5–4.9)
POTASSIUM SERPL-SCNC: 4.3 MMOL/L (ref 3.5–5.1)
SODIUM SERPL-SCNC: 137 MMOL/L (ref 136–145)

## 2025-02-02 DIAGNOSIS — R00.2 PALPITATIONS: ICD-10-CM

## 2025-02-02 DIAGNOSIS — I10 ESSENTIAL HYPERTENSION: ICD-10-CM

## 2025-02-03 NOTE — TELEPHONE ENCOUNTER
Medication:   Requested Prescriptions     Pending Prescriptions Disp Refills    hydroCHLOROthiazide (HYDRODIURIL) 25 MG tablet [Pharmacy Med Name: hydroCHLOROthiazide 25 MG TABLET] 90 tablet 1     Sig: TAKE 1 TABLET BY MOUTH EVERY MORNING    metoprolol succinate (TOPROL XL) 25 MG extended release tablet [Pharmacy Med Name: METOPROLOL SUCC ER 25 MG TAB] 90 tablet 1     Sig: TAKE ONE TABLET BY MOUTH ONCE NIGHTLY        Last Filled:      Patient Phone Number: [phone] (home)     Last appt: 11/18/2024   Next appt: 2/19/2025    Last OARRS:        No data to display

## 2025-02-04 RX ORDER — METOPROLOL SUCCINATE 25 MG/1
TABLET, EXTENDED RELEASE ORAL
Qty: 90 TABLET | Refills: 1 | Status: SHIPPED | OUTPATIENT
Start: 2025-02-04

## 2025-02-04 RX ORDER — HYDROCHLOROTHIAZIDE 25 MG/1
25 TABLET ORAL EVERY MORNING
Qty: 90 TABLET | Refills: 1 | Status: SHIPPED | OUTPATIENT
Start: 2025-02-04

## 2025-02-18 SDOH — ECONOMIC STABILITY: INCOME INSECURITY: IN THE LAST 12 MONTHS, WAS THERE A TIME WHEN YOU WERE NOT ABLE TO PAY THE MORTGAGE OR RENT ON TIME?: NO

## 2025-02-18 SDOH — ECONOMIC STABILITY: FOOD INSECURITY: WITHIN THE PAST 12 MONTHS, YOU WORRIED THAT YOUR FOOD WOULD RUN OUT BEFORE YOU GOT MONEY TO BUY MORE.: NEVER TRUE

## 2025-02-18 SDOH — ECONOMIC STABILITY: TRANSPORTATION INSECURITY
IN THE PAST 12 MONTHS, HAS THE LACK OF TRANSPORTATION KEPT YOU FROM MEDICAL APPOINTMENTS OR FROM GETTING MEDICATIONS?: NO

## 2025-02-18 SDOH — ECONOMIC STABILITY: FOOD INSECURITY: WITHIN THE PAST 12 MONTHS, THE FOOD YOU BOUGHT JUST DIDN'T LAST AND YOU DIDN'T HAVE MONEY TO GET MORE.: NEVER TRUE

## 2025-02-19 ENCOUNTER — OFFICE VISIT (OUTPATIENT)
Dept: PRIMARY CARE CLINIC | Age: 70
End: 2025-02-19
Payer: MEDICARE

## 2025-02-19 VITALS
WEIGHT: 231.8 LBS | SYSTOLIC BLOOD PRESSURE: 106 MMHG | BODY MASS INDEX: 37.25 KG/M2 | HEART RATE: 72 BPM | DIASTOLIC BLOOD PRESSURE: 68 MMHG | TEMPERATURE: 97.2 F | OXYGEN SATURATION: 97 % | HEIGHT: 66 IN

## 2025-02-19 DIAGNOSIS — M79.7 FIBROMYALGIA: ICD-10-CM

## 2025-02-19 DIAGNOSIS — L65.9 ALOPECIA: Primary | ICD-10-CM

## 2025-02-19 DIAGNOSIS — J45.991 COUGH VARIANT ASTHMA: ICD-10-CM

## 2025-02-19 DIAGNOSIS — E78.2 MIXED HYPERLIPIDEMIA: ICD-10-CM

## 2025-02-19 PROCEDURE — G8427 DOCREV CUR MEDS BY ELIG CLIN: HCPCS | Performed by: INTERNAL MEDICINE

## 2025-02-19 PROCEDURE — 1123F ACP DISCUSS/DSCN MKR DOCD: CPT | Performed by: INTERNAL MEDICINE

## 2025-02-19 PROCEDURE — 1160F RVW MEDS BY RX/DR IN RCRD: CPT | Performed by: INTERNAL MEDICINE

## 2025-02-19 PROCEDURE — 3017F COLORECTAL CA SCREEN DOC REV: CPT | Performed by: INTERNAL MEDICINE

## 2025-02-19 PROCEDURE — 3078F DIAST BP <80 MM HG: CPT | Performed by: INTERNAL MEDICINE

## 2025-02-19 PROCEDURE — 3074F SYST BP LT 130 MM HG: CPT | Performed by: INTERNAL MEDICINE

## 2025-02-19 PROCEDURE — 1036F TOBACCO NON-USER: CPT | Performed by: INTERNAL MEDICINE

## 2025-02-19 PROCEDURE — 1090F PRES/ABSN URINE INCON ASSESS: CPT | Performed by: INTERNAL MEDICINE

## 2025-02-19 PROCEDURE — 99214 OFFICE O/P EST MOD 30 MIN: CPT | Performed by: INTERNAL MEDICINE

## 2025-02-19 PROCEDURE — G8399 PT W/DXA RESULTS DOCUMENT: HCPCS | Performed by: INTERNAL MEDICINE

## 2025-02-19 PROCEDURE — G8417 CALC BMI ABV UP PARAM F/U: HCPCS | Performed by: INTERNAL MEDICINE

## 2025-02-19 PROCEDURE — 1159F MED LIST DOCD IN RCRD: CPT | Performed by: INTERNAL MEDICINE

## 2025-02-19 RX ORDER — ALBUTEROL SULFATE 90 UG/1
2 INHALANT RESPIRATORY (INHALATION) 4 TIMES DAILY PRN
Qty: 18 G | Refills: 0 | Status: SHIPPED | OUTPATIENT
Start: 2025-02-19

## 2025-02-19 SDOH — ECONOMIC STABILITY: FOOD INSECURITY: WITHIN THE PAST 12 MONTHS, THE FOOD YOU BOUGHT JUST DIDN'T LAST AND YOU DIDN'T HAVE MONEY TO GET MORE.: NEVER TRUE

## 2025-02-19 SDOH — ECONOMIC STABILITY: FOOD INSECURITY: WITHIN THE PAST 12 MONTHS, YOU WORRIED THAT YOUR FOOD WOULD RUN OUT BEFORE YOU GOT MONEY TO BUY MORE.: NEVER TRUE

## 2025-02-19 ASSESSMENT — PATIENT HEALTH QUESTIONNAIRE - PHQ9
SUM OF ALL RESPONSES TO PHQ QUESTIONS 1-9: 0
SUM OF ALL RESPONSES TO PHQ QUESTIONS 1-9: 0
1. LITTLE INTEREST OR PLEASURE IN DOING THINGS: NOT AT ALL
SUM OF ALL RESPONSES TO PHQ QUESTIONS 1-9: 0
2. FEELING DOWN, DEPRESSED OR HOPELESS: NOT AT ALL
SUM OF ALL RESPONSES TO PHQ9 QUESTIONS 1 & 2: 0
SUM OF ALL RESPONSES TO PHQ QUESTIONS 1-9: 0

## 2025-02-19 NOTE — PROGRESS NOTES
Lymphadenopathy:      Cervical: No cervical adenopathy.   Skin:     General: Skin is warm and dry.      Findings: No lesion.      Comments: Male pattern thinning also large 4 cm area of  new hair growth   Neurological:      General: No focal deficit present.      Mental Status: She is alert and oriented to person, place, and time.      Cranial Nerves: No cranial nerve deficit.      Sensory: No sensory deficit.      Motor: No weakness.      Coordination: Coordination normal.      Gait: Gait normal.      Deep Tendon Reflexes: Reflexes normal.   Psychiatric:         Mood and Affect: Mood normal.         Behavior: Behavior normal.         Thought Content: Thought content normal.         Judgment: Judgment normal.                  An electronic signature was used to authenticate this note.    --COLBY WILLIAMSON MD

## 2025-02-22 DIAGNOSIS — E78.2 MIXED HYPERLIPIDEMIA: ICD-10-CM

## 2025-02-22 LAB
CHOLEST SERPL-MCNC: 246 MG/DL (ref 0–199)
CK SERPL-CCNC: 62 U/L (ref 26–192)
HDLC SERPL-MCNC: 71 MG/DL (ref 40–60)
LDLC SERPL CALC-MCNC: 162 MG/DL
TRIGL SERPL-MCNC: 64 MG/DL (ref 0–150)
VLDLC SERPL CALC-MCNC: 13 MG/DL

## 2025-02-25 RX ORDER — ROSUVASTATIN CALCIUM 5 MG/1
5 TABLET, COATED ORAL DAILY
Qty: 30 TABLET | Refills: 5 | Status: SHIPPED | OUTPATIENT
Start: 2025-02-25

## 2025-02-25 ASSESSMENT — ENCOUNTER SYMPTOMS
BACK PAIN: 0
SINUS PRESSURE: 0
SORE THROAT: 0
SINUS PAIN: 0
EYES NEGATIVE: 1
COUGH: 0
ABDOMINAL DISTENTION: 0
VOMITING: 0
RHINORRHEA: 0
ABDOMINAL PAIN: 0
NAUSEA: 0
EYE PAIN: 0
WHEEZING: 0
SHORTNESS OF BREATH: 0
CONSTIPATION: 0
DIARRHEA: 0

## 2025-02-25 NOTE — RESULT ENCOUNTER NOTE
The cholesterol is elevated.  The 10-year ASCVD risk score (Conor HARDY, et al., 2019) is: 8.7%    Values used to calculate the score:      Age: 69 years      Sex: Female      Is Non- : Yes      Diabetic: No      Tobacco smoker: No      Systolic Blood Pressure: 106 mmHg      Is BP treated: Yes      HDL Cholesterol: 71 mg/dL      Total Cholesterol: 246 mg/dL     The risk should be 5% or less.  I would like to start a low dose of 5 mg of Crestor, which is a water soluble statin to reduce your cardiovascular risk.  Much less chance of aching.  If you experience any aching, stop it and let me know.   Continue to take vitamin D supplement.  Detail Level: Detailed Anesthesia Volume In Cc: 0 Anesthesia Type: 1% lidocaine with epinephrine Notification Instructions: Patient will be notified of biopsy results. However, patient instructed to call the office if not contacted within 2 weeks. Render Post-Care Instructions In Note?: no Silver Nitrate Text: The wound bed was treated with silver nitrate after the biopsy was performed. Dressing: bandage Hemostasis: Aluminum Chloride Wound Care: Petrolatum Biopsy Type: H and E Electrodesiccation And Curettage Text: The wound bed was treated with electrodesiccation and curettage after the biopsy was performed. Curettage Text: The wound bed was treated with curettage after the biopsy was performed. Billing Type: Third-Party Bill Depth Of Biopsy: dermis Electrodesiccation Text: The wound bed was treated with electrodesiccation after the biopsy was performed. Biopsy Method: double edge Personna blade Type Of Destruction Used: Curettage Cryotherapy Text: The wound bed was treated with cryotherapy after the biopsy was performed. Consent: Verbal consent was obtained and risks were reviewed including but not limited to scarring, infection, bleeding, scabbing, incomplete removal, nerve damage and allergy to anesthesia. Post-Care Instructions: I reviewed with the patient in detail post-care instructions. Patient is to keep the biopsy site dry overnight, and then apply vaseline twice daily until healed. Was A Bandage Applied: Yes

## 2025-04-15 ENCOUNTER — HOSPITAL ENCOUNTER (OUTPATIENT)
Dept: WOMENS IMAGING | Age: 70
Discharge: HOME | End: 2025-04-15
Payer: MEDICARE

## 2025-04-15 VITALS — BODY MASS INDEX: 35.36 KG/M2 | HEIGHT: 66 IN | WEIGHT: 220 LBS

## 2025-04-15 DIAGNOSIS — Z12.31 VISIT FOR SCREENING MAMMOGRAM: ICD-10-CM

## 2025-04-15 PROCEDURE — 77063 BREAST TOMOSYNTHESIS BI: CPT

## 2025-04-17 ENCOUNTER — RESULTS FOLLOW-UP (OUTPATIENT)
Dept: PRIMARY CARE CLINIC | Age: 70
End: 2025-04-17

## 2025-05-19 ENCOUNTER — HOSPITAL ENCOUNTER (OUTPATIENT)
Age: 70
Discharge: HOME | End: 2025-05-19
Payer: MEDICARE

## 2025-05-19 ENCOUNTER — OFFICE VISIT (OUTPATIENT)
Dept: PRIMARY CARE CLINIC | Age: 70
End: 2025-05-19

## 2025-05-19 VITALS
OXYGEN SATURATION: 99 % | HEIGHT: 66 IN | TEMPERATURE: 97.3 F | DIASTOLIC BLOOD PRESSURE: 66 MMHG | HEART RATE: 73 BPM | SYSTOLIC BLOOD PRESSURE: 108 MMHG | RESPIRATION RATE: 16 BRPM | WEIGHT: 234 LBS | BODY MASS INDEX: 37.61 KG/M2

## 2025-05-19 DIAGNOSIS — I10 ESSENTIAL HYPERTENSION: ICD-10-CM

## 2025-05-19 DIAGNOSIS — E78.2 MIXED HYPERLIPIDEMIA: ICD-10-CM

## 2025-05-19 DIAGNOSIS — N18.31 STAGE 3A CHRONIC KIDNEY DISEASE (HCC): ICD-10-CM

## 2025-05-19 DIAGNOSIS — L65.9 ALOPECIA: Primary | ICD-10-CM

## 2025-05-19 PROBLEM — J01.20 ACUTE ETHMOIDAL SINUSITIS: Status: RESOLVED | Noted: 2023-12-20 | Resolved: 2025-05-19

## 2025-05-19 PROBLEM — R11.0 CHRONIC NAUSEA: Status: RESOLVED | Noted: 2024-04-08 | Resolved: 2025-05-19

## 2025-05-19 LAB
CHOLEST SERPL-MCNC: 172 MG/DL (ref 0–199)
CK SERPL-CCNC: 72 U/L (ref 26–192)
HDLC SERPL-MCNC: 68 MG/DL (ref 40–60)
LDLC SERPL CALC-MCNC: 92 MG/DL
TRIGL SERPL-MCNC: 60 MG/DL (ref 0–150)
VLDLC SERPL CALC-MCNC: 12 MG/DL

## 2025-05-19 PROCEDURE — 36415 COLL VENOUS BLD VENIPUNCTURE: CPT

## 2025-05-19 PROCEDURE — 80061 LIPID PANEL: CPT

## 2025-05-19 PROCEDURE — 82550 ASSAY OF CK (CPK): CPT

## 2025-05-19 SDOH — ECONOMIC STABILITY: FOOD INSECURITY: WITHIN THE PAST 12 MONTHS, YOU WORRIED THAT YOUR FOOD WOULD RUN OUT BEFORE YOU GOT MONEY TO BUY MORE.: NEVER TRUE

## 2025-05-19 SDOH — ECONOMIC STABILITY: FOOD INSECURITY: WITHIN THE PAST 12 MONTHS, THE FOOD YOU BOUGHT JUST DIDN'T LAST AND YOU DIDN'T HAVE MONEY TO GET MORE.: NEVER TRUE

## 2025-05-19 ASSESSMENT — PATIENT HEALTH QUESTIONNAIRE - PHQ9
SUM OF ALL RESPONSES TO PHQ QUESTIONS 1-9: 0
2. FEELING DOWN, DEPRESSED OR HOPELESS: NOT AT ALL
SUM OF ALL RESPONSES TO PHQ QUESTIONS 1-9: 0
1. LITTLE INTEREST OR PLEASURE IN DOING THINGS: NOT AT ALL

## 2025-05-19 NOTE — PROGRESS NOTES
Adri Anthony (:  1955) is a 69 y.o. female,Established patient, here for evaluation of the following chief complaint(s):  Hypertension      Assessment & Plan   ASSESSMENT/PLAN:  1. Alopecia  much improved with triamcinolone and rogaine but stopped using Rogaine due to increase hair growth on face but still using triamcinolone.  Encourage patient to start using Rogaine again but just 3 times a week.  Patient is avoiding heat and chemicals and traction.        2. Essential hypertension is controlled on hydrochlorothiazide 25 mg daily and metoprolol XL 25 mg daily, continue.  Patient is up to date with renal function and electrolyte monitoring.      Latest Ref Rng & Units 2025     4:09 PM 1/15/2025     7:34 PM 2025    10:48 AM 2024     2:34 PM 7/3/2024     4:46 PM   Labs Renal   BUN 7 - 20 mg/dL 13  19  14  11  19    Cr 0.6 - 1.2 mg/dL 1.0  1.1  1.1  1.0  1.1    K 3.5 - 5.1 mmol/L 4.3  see below  4.2  3.7  3.9    Na 136 - 145 mmol/L 137  136  136  135  134         BP Readings from Last 3 Encounters:   25 108/66   25 106/68   01/15/25 138/85      Pulse Readings from Last 3 Encounters:   25 73   25 72   01/15/25 75      3. Mixed hyperlipidemia tolerating rosuvastatin 5 mg daily without myalgias or weakness.  Most symptoms of CAD, TIA or PAD.  Continue and monitor labs.  -     Lipid Panel; Future  -     CK; Future  4. BMI 37.0-37.9, adult patient has plateaued and with weight loss would probably be able to come off of blood pressure medication and maybe cholesterol.  Will refer to bariatric doctor to help with weight loss.  Since weight loss surgery has good prolonged data for reduce cardiovascular risk.  Wt Readings from Last 3 Encounters:   25 106.1 kg (234 lb)   04/15/25 99.8 kg (220 lb)   25 105.1 kg (231 lb 12.8 oz)      -     Manolo Naidu DO, Bariatric Surgery, [place]  -     tirzepatide-weight management (ZEPBOUND) 2.5 MG/0.5ML

## 2025-05-21 ENCOUNTER — TELEPHONE (OUTPATIENT)
Dept: PRIMARY CARE CLINIC | Age: 70
End: 2025-05-21

## 2025-05-21 NOTE — TELEPHONE ENCOUNTER
Patient dropped off patient assistance program forms to be filled out and faxed over for zepbound. Placed on pcp mail box        Also the [employer] forms were incomplete. Page 7 (last page) top PRESCRIBER INFORMATION section needs to be filled out and re faxed.      Please contact patient when forms has been faxed

## 2025-05-23 ENCOUNTER — RESULTS FOLLOW-UP (OUTPATIENT)
Dept: PRIMARY CARE CLINIC | Age: 70
End: 2025-05-23

## 2025-05-31 ASSESSMENT — ENCOUNTER SYMPTOMS
WHEEZING: 0
SORE THROAT: 0
RHINORRHEA: 0
NAUSEA: 0
VOMITING: 0
COUGH: 0
EYES NEGATIVE: 1
ABDOMINAL DISTENTION: 0
SINUS PRESSURE: 0
BACK PAIN: 0
ABDOMINAL PAIN: 0
DIARRHEA: 0
CONSTIPATION: 0
SINUS PAIN: 0
SHORTNESS OF BREATH: 0
EYE PAIN: 0

## 2025-06-02 NOTE — PROGRESS NOTES
[facility]   General & Laparoscopic Surgery  Weight Management Solutions      HPI:    Adri Anthony is a very pleasant 69 y.o. obese female ,   Body mass index is 38.94 kg/m².  And multiple medical problems who is presenting for weight loss surgery evaluation and consultation by Milena Duggan.    Patient has been struggling for several years now with obesity. Patient feels the weight is an obstacle to achieve and perform things in daily living as well risk on health.     Tries to diet, and exercise but can't keep the weight off.  Patient tried other regimens, but with no sustainable weight loss.     Patient  is very determined to lose weight and be healthy, and is interested in surgical weight loss to achieve this goal.    Otherwise patient denies any nausea, vomiting, fevers, chills, shortness of breath, chest pain, constipation or urinary symptoms.      Morbid obesity and co-morbidities related to it are a threat to bodily function.    Non smoker, non drinker, not currently on blood thinners, or chronic steroids.   Hx of sleep apnea, uses c-pap consistently.     Patient previously was in Medical weight management program with Parker Guillen NP and did well with weight loss, she started off at 214 lbs and got down to her goal weight and then stopped being seen. Since then she has regained the weight plus some. Her weight loss goal is to get too 200 lbs.     Past Medical History:   Diagnosis Date    Allergic rhinitis     Chronic back pain     Fibroids     Fibromyalgia     Hyperlipidemia 06/25/2012    Hypertension     STEPHANIE (obstructive sleep apnea)     Wears CPAP    Sleep apnea      Past Surgical History:   Procedure Laterality Date    ACHILLES TENDON SURGERY  2007    left achilles repair by Dr. Michael Mcknight    APPENDECTOMY      1970's    BREAST CYST ASPIRATION      CERVIX SURGERY      chryo cervical surgery-- [facility] Dr [facility]    COLONOSCOPY  2006    Dr ULISES Victor and

## 2025-06-04 ENCOUNTER — OFFICE VISIT (OUTPATIENT)
Dept: BARIATRICS/WEIGHT MGMT | Age: 70
End: 2025-06-04
Payer: MEDICARE

## 2025-06-04 VITALS
SYSTOLIC BLOOD PRESSURE: 121 MMHG | BODY MASS INDEX: 38.99 KG/M2 | DIASTOLIC BLOOD PRESSURE: 83 MMHG | HEIGHT: 65 IN | WEIGHT: 234 LBS | HEART RATE: 78 BPM

## 2025-06-04 DIAGNOSIS — E66.01 SEVERE OBESITY (BMI 35.0-39.9) WITH COMORBIDITY (HCC): ICD-10-CM

## 2025-06-04 DIAGNOSIS — I10 ESSENTIAL HYPERTENSION: ICD-10-CM

## 2025-06-04 DIAGNOSIS — E88.810 METABOLIC SYNDROME: Primary | ICD-10-CM

## 2025-06-04 DIAGNOSIS — G47.33 OSA (OBSTRUCTIVE SLEEP APNEA): ICD-10-CM

## 2025-06-04 PROCEDURE — 3074F SYST BP LT 130 MM HG: CPT | Performed by: SURGERY

## 2025-06-04 PROCEDURE — 1036F TOBACCO NON-USER: CPT | Performed by: SURGERY

## 2025-06-04 PROCEDURE — G8399 PT W/DXA RESULTS DOCUMENT: HCPCS | Performed by: SURGERY

## 2025-06-04 PROCEDURE — G8417 CALC BMI ABV UP PARAM F/U: HCPCS | Performed by: SURGERY

## 2025-06-04 PROCEDURE — 3079F DIAST BP 80-89 MM HG: CPT | Performed by: SURGERY

## 2025-06-04 PROCEDURE — 1159F MED LIST DOCD IN RCRD: CPT | Performed by: SURGERY

## 2025-06-04 PROCEDURE — G8427 DOCREV CUR MEDS BY ELIG CLIN: HCPCS | Performed by: SURGERY

## 2025-06-04 PROCEDURE — 1090F PRES/ABSN URINE INCON ASSESS: CPT | Performed by: SURGERY

## 2025-06-04 PROCEDURE — 3017F COLORECTAL CA SCREEN DOC REV: CPT | Performed by: SURGERY

## 2025-06-04 PROCEDURE — 99205 OFFICE O/P NEW HI 60 MIN: CPT | Performed by: SURGERY

## 2025-06-04 PROCEDURE — 1123F ACP DISCUSS/DSCN MKR DOCD: CPT | Performed by: SURGERY

## 2025-06-04 NOTE — PROGRESS NOTES
Adri Anthony is a 69 y.o. female with a date of birth of 1955.    Vitals:    06/04/25 1023   BP: 121/83   Pulse: 78    BMI: Body mass index is 38.94 kg/m². Obesity Classification: Class II    Weight History:   Wt Readings from Last 3 Encounters:   06/04/25 106.1 kg (234 lb)   05/19/25 106.1 kg (234 lb)   04/15/25 99.8 kg (220 lb)       Patient's lowest adult weight was 203 lbs at age 61.     Patient's highest adult weight was 237 lbs at age 57.     Patient has not participated in the following weight loss programs: none.   Patient has participated in meal replacement/liquid diets: Optifast  Patient has participated in weight loss medications: Ozempic & Zepbound- recently prescribed by PCP.     Patient is not lactose intolerant.  Patient does not have Faith/cultural food concerns. Patient does not have food allergies. Patient avoids shellfish.Patient does tolerate artificial sweeteners.     24 hour recall/food frequency chart:  Breakfast: yes. Pb toast, watermelon, black coffee  Snack: no.   Lunch: yes. Donut + crackers + salami, black coffee   Snack: no.   Dinner: yes. Fried cod fish, fries, coleslaw, 1/2 cheesecake, diet soda   Snack: no.   Drinks throughout the day: water, diet soda, diet sports drinks, coffee   Do you drink alcohol? No.     Patient does not meet the criteria for binge eating disorder. Patient does not have grazing. Patient does not have night eating. Patient does have a history of emotional eating or eating out of boredom.      Goals:  Weight: 200 lbs   Health Improvement: reduce medications     Assessment  Nutritional Needs: RMR=(9.99 x 106.1) + (6.25 x 165.1) - (4.92 x 69 y.o.) -161  = 1596 kcal x 1.3 (sedentary activity factor)= 2075 kcal - 1000 (for 2 lb weight loss/week)= 1075 kcal.    Plan  Plan/Recommendations: Start 1200 kcal LCMP   General weight loss/lifestyle modification strategies discussed (elicit support from others; identify saboteurs; non-food rewards,

## 2025-06-24 DIAGNOSIS — E78.2 MIXED HYPERLIPIDEMIA: ICD-10-CM

## 2025-06-24 LAB
CHOLEST SERPL-MCNC: 192 MG/DL (ref 0–199)
HDLC SERPL-MCNC: 74 MG/DL (ref 40–60)
LDLC SERPL CALC-MCNC: 105 MG/DL
TRIGL SERPL-MCNC: 66 MG/DL (ref 0–150)
VLDLC SERPL CALC-MCNC: 13 MG/DL

## 2025-06-30 ENCOUNTER — TELEPHONE (OUTPATIENT)
Dept: PRIMARY CARE CLINIC | Age: 70
End: 2025-06-30

## 2025-06-30 NOTE — TELEPHONE ENCOUNTER
Pt stop into office to check on   Pt states it's being mailed to the office   6/4/25  Please call pt     tirzepatide-weight management (ZEPBOUND) 2.5 MG/0.5ML SOAJ subCUTAneous auto-injector pen

## 2025-06-30 NOTE — TELEPHONE ENCOUNTER
?  Med being sent here?        Called pt to clarify:.  She said that everything has been sent back to them, but she hasn't gotten any kind of approval or denial.  She said something was supposed to be sent here.  I see nothing in Media.  Nothing faxed.  Nothing that has come through the mail at this point.

## 2025-07-02 ENCOUNTER — OFFICE VISIT (OUTPATIENT)
Dept: BARIATRICS/WEIGHT MGMT | Age: 70
End: 2025-07-02
Payer: MEDICARE

## 2025-07-02 VITALS
HEIGHT: 65 IN | WEIGHT: 233 LBS | BODY MASS INDEX: 38.82 KG/M2 | DIASTOLIC BLOOD PRESSURE: 73 MMHG | SYSTOLIC BLOOD PRESSURE: 116 MMHG | HEART RATE: 70 BPM

## 2025-07-02 DIAGNOSIS — E88.810 METABOLIC SYNDROME: Primary | ICD-10-CM

## 2025-07-02 DIAGNOSIS — E66.01 SEVERE OBESITY (BMI 35.0-39.9) WITH COMORBIDITY (HCC): ICD-10-CM

## 2025-07-02 DIAGNOSIS — I10 ESSENTIAL HYPERTENSION: ICD-10-CM

## 2025-07-02 DIAGNOSIS — G47.33 OSA (OBSTRUCTIVE SLEEP APNEA): ICD-10-CM

## 2025-07-02 PROCEDURE — G8427 DOCREV CUR MEDS BY ELIG CLIN: HCPCS | Performed by: SURGERY

## 2025-07-02 PROCEDURE — 3017F COLORECTAL CA SCREEN DOC REV: CPT | Performed by: SURGERY

## 2025-07-02 PROCEDURE — 1123F ACP DISCUSS/DSCN MKR DOCD: CPT | Performed by: SURGERY

## 2025-07-02 PROCEDURE — 3074F SYST BP LT 130 MM HG: CPT | Performed by: SURGERY

## 2025-07-02 PROCEDURE — 1090F PRES/ABSN URINE INCON ASSESS: CPT | Performed by: SURGERY

## 2025-07-02 PROCEDURE — G8417 CALC BMI ABV UP PARAM F/U: HCPCS | Performed by: SURGERY

## 2025-07-02 PROCEDURE — G8399 PT W/DXA RESULTS DOCUMENT: HCPCS | Performed by: SURGERY

## 2025-07-02 PROCEDURE — 99214 OFFICE O/P EST MOD 30 MIN: CPT | Performed by: SURGERY

## 2025-07-02 PROCEDURE — 1036F TOBACCO NON-USER: CPT | Performed by: SURGERY

## 2025-07-02 PROCEDURE — G2211 COMPLEX E/M VISIT ADD ON: HCPCS | Performed by: SURGERY

## 2025-07-02 PROCEDURE — 3078F DIAST BP <80 MM HG: CPT | Performed by: SURGERY

## 2025-07-02 NOTE — PROGRESS NOTES
Adri Anthony lost 1.0 lbs over 1 month. Plans to start Zepbound with her PCP.     Current treatment plan: 1200 LC MP    Is patient adhering to diet/meal plan: trying to   Tracking: no    Wake up 7AM  Breakfast: (10AM) bowl of cereal (honey nut cheerios) with milk   Snack: none  Lunch: skips   Snack: none  Dinner: (4-5PM) baked ham with noodles with cheese sauce, green beans, bread and melon  Snack: (9PM) cheddar popcorn  Bed around 1130PM    Consuming at least 64oz of calorie free fluids? yes water    Participating in intentional exercise? No but is trying to move around more (2K-5K steps per day)    Plan/Goals:   - continue to follow LC MP  - aim for snack at 1PM and 8=9PM daily   - increase activity     Handouts: none    Shira Buchanan RD, LD   
43.1    Smokeless tobacco: Never   Substance Use Topics    Alcohol use: Not Currently     I counseled the patient on the importance of not smoking and risks of ETOH.   Allergies   Allergen Reactions    Lisinopril Anaphylaxis, Itching and Other (See Comments)     sweating    Flagyl [Metronidazole Hcl] Nausea And Vomiting and Other (See Comments)     \"metallic taste\"     Vitals:    07/02/25 0918   BP: 116/73   Pulse: 70   Weight: 105.7 kg (233 lb)   Height: 1.651 m (5' 5\")       Body mass index is 38.77 kg/m².      Current Outpatient Medications:     tirzepatide-weight management (ZEPBOUND) 2.5 MG/0.5ML SOAJ subCUTAneous auto-injector pen, Inject 2.5 mg into the skin every 7 days, Disp: 2 mL, Rfl: 0    tirzepatide-weight management (ZEPBOUND) 5 MG/0.5ML SOAJ subCUTAneous auto-injector pen, Inject 5 mg into the skin every 7 days, Disp: 2 mL, Rfl: 5    rosuvastatin (CRESTOR) 5 MG tablet, Take 1 tablet by mouth daily Stop atorvastatin, Disp: 30 tablet, Rfl: 5    minoxidil (ROGAINE) 2 % external solution, Apply topically 2 times daily., Disp: 60 mL, Rfl: 12    albuterol sulfate HFA (VENTOLIN HFA) 108 (90 Base) MCG/ACT inhaler, Inhale 2 puffs into the lungs 4 times daily as needed for Wheezing, Disp: 18 g, Rfl: 0    hydroCHLOROthiazide (HYDRODIURIL) 25 MG tablet, TAKE 1 TABLET BY MOUTH EVERY MORNING, Disp: 90 tablet, Rfl: 1    metoprolol succinate (TOPROL XL) 25 MG extended release tablet, TAKE ONE TABLET BY MOUTH ONCE NIGHTLY, Disp: 90 tablet, Rfl: 1    triamcinolone (KENALOG) 0.1 % cream, Apply topically 2 times daily., Disp: 15 g, Rfl: 5    senna (SENOKOT) 8.6 MG tablet, Take 1 tablet by mouth 2 times daily, Disp: 60 tablet, Rfl: 11    linaclotide (LINZESS) 145 MCG capsule, Take 1 capsule by mouth every morning (before breakfast), Disp: 90 capsule, Rfl: 3    cetirizine (ZYRTEC) 10 MG tablet, Take 1 tablet by mouth daily, Disp: 30 tablet, Rfl: 5    vitamin D3 (CHOLECALCIFEROL) 125 MCG (5000 UT) TABS tablet, Take 1 tablet

## 2025-07-07 NOTE — PROGRESS NOTES
[facility]   Weight Management Solutions    Medical Weight Loss    HPI: Adri Anthony is a 69 y.o. female with Body mass index is 38.94 kg/m². Here for medical weight loss. Patient denies any nausea, vomiting, fevers, chills, shortness of breath, chest pain, cough, constipation or difficulty urinating.    Past Medical History:   Diagnosis Date    Allergic rhinitis     Chronic back pain     Fibroids     Fibromyalgia     Hyperlipidemia 2012    Hypertension     STEPHANIE (obstructive sleep apnea)     Wears CPAP    Sleep apnea      Past Surgical History:   Procedure Laterality Date    ACHILLES TENDON SURGERY  2007    left achilles repair by Dr. Michael Mcknight    APPENDECTOMY      's    BREAST CYST ASPIRATION      CERVIX SURGERY      chryo cervical surgery-- sees Dr Lin [facility]    COLONOSCOPY      Dr ULISES Victor and 2012    COLONOSCOPY N/A 2022    COLONOSCOPY performed by Isauro Lara MD at [facility] ENDOSCOPY    ENDOMETRIAL BIOPSY      Dr Greg Brice    TUBAL LIGATION       Family History   Problem Relation Age of Onset    Cancer Mother         colon    Heart Disease Mother         heart infection    Stroke Mother     Atrial Fibrillation Mother     High Blood Pressure Father     High Cholesterol Sister     Breast Cancer Maternal Grandmother 65    Cancer Maternal Grandmother         breast cancer    Stroke Maternal Grandmother     Cancer Paternal Grandmother     Diabetes Paternal Grandmother     Diabetes Maternal Aunt     Kidney Disease Paternal Uncle     Cancer Paternal Uncle 54        colon cancer     Social History     Tobacco Use    Smoking status: Former     Current packs/day: 0.00     Average packs/day: 1 pack/day for 3.0 years (3.0 ttl pk-yrs)     Types: Cigarettes     Start date: 1979     Quit date: 1982     Years since quittin.1    Smokeless tobacco: Never   Substance Use Topics    Alcohol use: Not Currently     I counseled the patient on the importance

## 2025-07-07 NOTE — PATIENT INSTRUCTIONS
Key dietary points:     You should be eating protein at every meal and snack.  Protein is typically found in animal sources, i.e. chicken, lean beef, lean pork, fish, seafood and eggs. It is also found in low-fat dairy sources such as skim or 1% milk, low-fat yogurt, low-fat cheese, and low-fat cottage cheese. Plant based sources of protein include peanut butter, beans, nuts, seeds, hummus and soy.    Meats (preferably organic or grass fed) are great sources of protein and have no carbohydrates.    It is suggested to use coconut, olive, avocado, or almond oils. Choose vegetables that grow above ground as they are generally lower in carbohydrates and higher in fiber.    Avoid starches such as bread, rice, potatoes, pasta and all sources of simple sugars (desserts, soda, breakfast cereals).    Choose beverages that are low in calories and sugar.  You should be drinking 64 ounces of low calorie (5 calories or less per serving) fluids per day. Suggestions include:  Water (you may add fresh fruit, lemon, lime, cucumber or mint)  Crystal Light  David Liquid Water Enhancer  Propel Zero  Powerade Zero/Gatorade Zero  Isopure  Yrujixt0m  Zuroj9b  SOBE Lifewater Zero  Vitamin Water Zero  Sugar Free Yariel-Aid    You should be eating 4-5 times per day.  Three small meals plus 1-2 snacks per day is your goal. This balances your calories and nutrients evenly throughout the day and helps to boost your metabolism. Refer to the snack list provided at your initial visit.     You should be utilizing the 9-inch plate method.  Eating on a smaller plate will help you control portion sizes, but what you put on your plate counts. Make ¼ of your plate lean protein, ¼ carbohydrate (fruit or dairy) and ½ the plate non-starchy vegetables.    You should be reducing added fat and sugar in your diet.  Frying foods adds too much fat and calories, but you could use an air fryer as it requires significantly less oil. Baking, broiling, or grilling meats

## 2025-07-21 ENCOUNTER — OFFICE VISIT (OUTPATIENT)
Dept: BARIATRICS/WEIGHT MGMT | Age: 70
End: 2025-07-21
Payer: MEDICARE

## 2025-07-21 VITALS
HEIGHT: 65 IN | DIASTOLIC BLOOD PRESSURE: 80 MMHG | HEART RATE: 64 BPM | BODY MASS INDEX: 38.99 KG/M2 | SYSTOLIC BLOOD PRESSURE: 120 MMHG | WEIGHT: 234 LBS

## 2025-07-21 DIAGNOSIS — E78.2 MIXED HYPERLIPIDEMIA: ICD-10-CM

## 2025-07-21 DIAGNOSIS — I10 ESSENTIAL HYPERTENSION: Primary | ICD-10-CM

## 2025-07-21 DIAGNOSIS — E66.01 SEVERE OBESITY (BMI 35.0-39.9) WITH COMORBIDITY (HCC): ICD-10-CM

## 2025-07-21 PROCEDURE — G8417 CALC BMI ABV UP PARAM F/U: HCPCS | Performed by: NURSE PRACTITIONER

## 2025-07-21 PROCEDURE — 3074F SYST BP LT 130 MM HG: CPT | Performed by: NURSE PRACTITIONER

## 2025-07-21 PROCEDURE — 1160F RVW MEDS BY RX/DR IN RCRD: CPT | Performed by: NURSE PRACTITIONER

## 2025-07-21 PROCEDURE — G8427 DOCREV CUR MEDS BY ELIG CLIN: HCPCS | Performed by: NURSE PRACTITIONER

## 2025-07-21 PROCEDURE — 1123F ACP DISCUSS/DSCN MKR DOCD: CPT | Performed by: NURSE PRACTITIONER

## 2025-07-21 PROCEDURE — 1036F TOBACCO NON-USER: CPT | Performed by: NURSE PRACTITIONER

## 2025-07-21 PROCEDURE — 1090F PRES/ABSN URINE INCON ASSESS: CPT | Performed by: NURSE PRACTITIONER

## 2025-07-21 PROCEDURE — 3079F DIAST BP 80-89 MM HG: CPT | Performed by: NURSE PRACTITIONER

## 2025-07-21 PROCEDURE — 3017F COLORECTAL CA SCREEN DOC REV: CPT | Performed by: NURSE PRACTITIONER

## 2025-07-21 PROCEDURE — G8399 PT W/DXA RESULTS DOCUMENT: HCPCS | Performed by: NURSE PRACTITIONER

## 2025-07-21 PROCEDURE — 99214 OFFICE O/P EST MOD 30 MIN: CPT | Performed by: NURSE PRACTITIONER

## 2025-07-21 PROCEDURE — 1159F MED LIST DOCD IN RCRD: CPT | Performed by: NURSE PRACTITIONER

## 2025-07-21 NOTE — PROGRESS NOTES
Adri Anthony gained 1 lbs over 3 weeks. Concerned for bloating/constipation, clothing fitting tighter, takes senna daily.    Current treatment plan: 1200 kcal LCMP    Is patient adhering to diet/meal plan: Making adjustments  Tracking: No    Wake up 7 AM  Breakfast: 8:30 AM: 1 egg w/ cheese + 1 small bun + 1 sausage fredis  Snack: None  Lunch: 1-1:30 PM: 1 slice bread + 1/2 smoked sausage OR skips 3 days/week  Snack: None OR CC + fruit cup OR mixed nuts (walnut/cashew/pecans)  Dinner: 6 PM: Roast beef + mashed pots w/ gravy + broccoli + 1 slice toast + nectarine   Snack: None OR 1 single snack cookie  Bed around 10 PM     Consuming at least 64oz of calorie free fluids? yes 6-8 bottles     Participating in intentional exercise? Varies, just checks steps occasionally     Plan/Goals:   - Track daily 4731-7732 kcal, 20-30 grams fiber, 60-75 grams protein, 60-70 grams fat  - Add popcorn w/ nuts for snack  - Aim for 3-5K steps/day    Handouts: Baritastic + protein snacks    Teresita Travis RD

## 2025-08-10 DIAGNOSIS — I10 ESSENTIAL HYPERTENSION: ICD-10-CM

## 2025-08-10 DIAGNOSIS — R00.2 PALPITATIONS: ICD-10-CM

## 2025-08-11 RX ORDER — METOPROLOL SUCCINATE 25 MG/1
TABLET, EXTENDED RELEASE ORAL
Qty: 90 TABLET | Refills: 1 | Status: SHIPPED | OUTPATIENT
Start: 2025-08-11

## 2025-08-11 RX ORDER — HYDROCHLOROTHIAZIDE 25 MG/1
25 TABLET ORAL EVERY MORNING
Qty: 90 TABLET | Refills: 1 | Status: SHIPPED | OUTPATIENT
Start: 2025-08-11

## 2025-08-13 ENCOUNTER — OFFICE VISIT (OUTPATIENT)
Dept: BARIATRICS/WEIGHT MGMT | Age: 70
End: 2025-08-13
Payer: MEDICARE

## 2025-08-13 VITALS
WEIGHT: 234 LBS | OXYGEN SATURATION: 99 % | RESPIRATION RATE: 18 BRPM | BODY MASS INDEX: 38.99 KG/M2 | SYSTOLIC BLOOD PRESSURE: 111 MMHG | HEART RATE: 79 BPM | DIASTOLIC BLOOD PRESSURE: 71 MMHG | HEIGHT: 65 IN

## 2025-08-13 DIAGNOSIS — I10 ESSENTIAL HYPERTENSION: Primary | ICD-10-CM

## 2025-08-13 DIAGNOSIS — E78.2 MIXED HYPERLIPIDEMIA: ICD-10-CM

## 2025-08-13 DIAGNOSIS — R73.03 PREDIABETES: ICD-10-CM

## 2025-08-13 DIAGNOSIS — G47.33 OSA (OBSTRUCTIVE SLEEP APNEA): ICD-10-CM

## 2025-08-13 PROCEDURE — 1090F PRES/ABSN URINE INCON ASSESS: CPT | Performed by: NURSE PRACTITIONER

## 2025-08-13 PROCEDURE — 1159F MED LIST DOCD IN RCRD: CPT | Performed by: NURSE PRACTITIONER

## 2025-08-13 PROCEDURE — 1036F TOBACCO NON-USER: CPT | Performed by: NURSE PRACTITIONER

## 2025-08-13 PROCEDURE — 1123F ACP DISCUSS/DSCN MKR DOCD: CPT | Performed by: NURSE PRACTITIONER

## 2025-08-13 PROCEDURE — G8427 DOCREV CUR MEDS BY ELIG CLIN: HCPCS | Performed by: NURSE PRACTITIONER

## 2025-08-13 PROCEDURE — 99214 OFFICE O/P EST MOD 30 MIN: CPT | Performed by: NURSE PRACTITIONER

## 2025-08-13 PROCEDURE — 1160F RVW MEDS BY RX/DR IN RCRD: CPT | Performed by: NURSE PRACTITIONER

## 2025-08-13 PROCEDURE — 3078F DIAST BP <80 MM HG: CPT | Performed by: NURSE PRACTITIONER

## 2025-08-13 PROCEDURE — 3017F COLORECTAL CA SCREEN DOC REV: CPT | Performed by: NURSE PRACTITIONER

## 2025-08-13 PROCEDURE — G8399 PT W/DXA RESULTS DOCUMENT: HCPCS | Performed by: NURSE PRACTITIONER

## 2025-08-13 PROCEDURE — G8417 CALC BMI ABV UP PARAM F/U: HCPCS | Performed by: NURSE PRACTITIONER

## 2025-08-13 PROCEDURE — 3074F SYST BP LT 130 MM HG: CPT | Performed by: NURSE PRACTITIONER

## 2025-08-18 SDOH — HEALTH STABILITY: PHYSICAL HEALTH: ON AVERAGE, HOW MANY MINUTES DO YOU ENGAGE IN EXERCISE AT THIS LEVEL?: 0 MIN

## 2025-08-18 SDOH — HEALTH STABILITY: PHYSICAL HEALTH: ON AVERAGE, HOW MANY DAYS PER WEEK DO YOU ENGAGE IN MODERATE TO STRENUOUS EXERCISE (LIKE A BRISK WALK)?: 0 DAYS

## 2025-08-18 ASSESSMENT — PATIENT HEALTH QUESTIONNAIRE - PHQ9
SUM OF ALL RESPONSES TO PHQ QUESTIONS 1-9: 0
SUM OF ALL RESPONSES TO PHQ QUESTIONS 1-9: 0
2. FEELING DOWN, DEPRESSED OR HOPELESS: NOT AT ALL
SUM OF ALL RESPONSES TO PHQ QUESTIONS 1-9: 0
SUM OF ALL RESPONSES TO PHQ QUESTIONS 1-9: 0
1. LITTLE INTEREST OR PLEASURE IN DOING THINGS: NOT AT ALL

## 2025-08-18 ASSESSMENT — LIFESTYLE VARIABLES
HOW OFTEN DO YOU HAVE A DRINK CONTAINING ALCOHOL: PATIENT DECLINED
HOW OFTEN DO YOU HAVE SIX OR MORE DRINKS ON ONE OCCASION: 1
HOW MANY STANDARD DRINKS CONTAINING ALCOHOL DO YOU HAVE ON A TYPICAL DAY: 0
HOW OFTEN DO YOU HAVE A DRINK CONTAINING ALCOHOL: 98
HOW MANY STANDARD DRINKS CONTAINING ALCOHOL DO YOU HAVE ON A TYPICAL DAY: PATIENT DOES NOT DRINK

## 2025-08-20 ENCOUNTER — OFFICE VISIT (OUTPATIENT)
Dept: PRIMARY CARE CLINIC | Age: 70
End: 2025-08-20

## 2025-08-20 VITALS
HEART RATE: 67 BPM | WEIGHT: 235 LBS | TEMPERATURE: 97.3 F | SYSTOLIC BLOOD PRESSURE: 118 MMHG | BODY MASS INDEX: 37.77 KG/M2 | RESPIRATION RATE: 16 BRPM | DIASTOLIC BLOOD PRESSURE: 72 MMHG | HEIGHT: 66 IN | OXYGEN SATURATION: 98 %

## 2025-08-20 DIAGNOSIS — E66.01 SEVERE OBESITY (BMI 35.0-39.9) WITH COMORBIDITY (HCC): ICD-10-CM

## 2025-08-20 DIAGNOSIS — G47.33 OSA (OBSTRUCTIVE SLEEP APNEA): ICD-10-CM

## 2025-08-20 DIAGNOSIS — I10 ESSENTIAL HYPERTENSION: ICD-10-CM

## 2025-08-20 DIAGNOSIS — K58.1 IRRITABLE BOWEL SYNDROME WITH CONSTIPATION: ICD-10-CM

## 2025-08-20 DIAGNOSIS — Z00.00 MEDICARE ANNUAL WELLNESS VISIT, SUBSEQUENT: Primary | ICD-10-CM

## 2025-08-20 DIAGNOSIS — Z00.00 ENCOUNTER FOR ANNUAL WELLNESS VISIT (AWV) IN MEDICARE PATIENT: ICD-10-CM

## 2025-08-20 DIAGNOSIS — E78.2 MIXED HYPERLIPIDEMIA: ICD-10-CM

## 2025-08-20 RX ORDER — ROSUVASTATIN CALCIUM 10 MG/1
10 TABLET, COATED ORAL DAILY
Qty: 90 TABLET | Refills: 3 | Status: SHIPPED | OUTPATIENT
Start: 2025-08-20

## 2025-08-20 RX ORDER — LINACLOTIDE 290 UG/1
290 CAPSULE, GELATIN COATED ORAL
Qty: 90 CAPSULE | Refills: 3 | Status: SHIPPED | OUTPATIENT
Start: 2025-08-20